# Patient Record
Sex: MALE | Race: WHITE | NOT HISPANIC OR LATINO | Employment: OTHER | URBAN - METROPOLITAN AREA
[De-identification: names, ages, dates, MRNs, and addresses within clinical notes are randomized per-mention and may not be internally consistent; named-entity substitution may affect disease eponyms.]

---

## 2017-01-19 ENCOUNTER — TELEPHONE (OUTPATIENT)
Dept: NEUROLOGY | Facility: CLINIC | Age: 52
End: 2017-01-19

## 2017-01-19 ENCOUNTER — TELEPHONE (OUTPATIENT)
Dept: INTERNAL MEDICINE | Facility: CLINIC | Age: 52
End: 2017-01-19

## 2017-01-19 ENCOUNTER — OFFICE VISIT (OUTPATIENT)
Dept: NEUROLOGY | Facility: CLINIC | Age: 52
End: 2017-01-19
Payer: COMMERCIAL

## 2017-01-19 VITALS
HEIGHT: 72 IN | DIASTOLIC BLOOD PRESSURE: 101 MMHG | SYSTOLIC BLOOD PRESSURE: 162 MMHG | RESPIRATION RATE: 20 BRPM | WEIGHT: 237 LBS | BODY MASS INDEX: 32.1 KG/M2 | HEART RATE: 60 BPM

## 2017-01-19 DIAGNOSIS — G44.011 INTRACTABLE EPISODIC CLUSTER HEADACHE: Primary | ICD-10-CM

## 2017-01-19 DIAGNOSIS — G43.909 MIGRAINE WITHOUT STATUS MIGRAINOSUS, NOT INTRACTABLE, UNSPECIFIED MIGRAINE TYPE: Primary | ICD-10-CM

## 2017-01-19 PROCEDURE — 1159F MED LIST DOCD IN RCRD: CPT | Mod: S$GLB,,, | Performed by: PSYCHIATRY & NEUROLOGY

## 2017-01-19 PROCEDURE — 99204 OFFICE O/P NEW MOD 45 MIN: CPT | Mod: S$GLB,,, | Performed by: PSYCHIATRY & NEUROLOGY

## 2017-01-19 PROCEDURE — 99999 PR PBB SHADOW E&M-EST. PATIENT-LVL III: CPT | Mod: PBBFAC,,, | Performed by: PSYCHIATRY & NEUROLOGY

## 2017-01-19 RX ORDER — DIVALPROEX SODIUM 500 MG/1
500 TABLET, DELAYED RELEASE ORAL 2 TIMES DAILY
COMMUNITY
End: 2018-04-10 | Stop reason: SDUPTHER

## 2017-01-19 RX ORDER — ZOLMITRIPTAN 5 MG/1
1 SPRAY, METERED NASAL DAILY PRN
COMMUNITY
Start: 2017-01-19 | End: 2017-04-04

## 2017-01-19 RX ORDER — AMLODIPINE BESYLATE 5 MG/1
1 TABLET ORAL DAILY
COMMUNITY
Start: 2017-01-19 | End: 2017-01-20 | Stop reason: SDUPTHER

## 2017-01-19 RX ORDER — PREDNISONE 20 MG/1
1 TABLET ORAL DAILY
COMMUNITY
Start: 2017-01-19 | End: 2017-01-20 | Stop reason: SDUPTHER

## 2017-01-19 NOTE — PROGRESS NOTES
Headache questionnaire    1. When did your Headaches start?    01- (this cycle) previous was 2013      2. Where are your headaches located?   Left side-eye-forehead      3. Your headache's characteristics:   Excruciating, Pressure, Sharp      4. How long does the headache last?   Hours-days      5. How often does the headache occur?   daily      6. Are your headaches preceded or accompanied by other symptoms? yes   If yes, please describe.  Sometimes nausea      7. Does the headache awaken you at night? yes   If so, how often? Every time        8. Please renny the word that best describes your headache's intensity:    severe      9. Using a scale of 1 through 10, with 0 = no pain and 10 = the worst pain:   What score is your headache now? 7   What score is your headache at its worst? 10   What score is your headache at its best? 3        10. Possible associated headache symptoms:  [x]  Sensitivity to light  [] Dizziness  [x] Nasal or sinus pressure/ pain   [x] Sensitivity to noise  [] Vertigo  [x] Problems with concentration  [] Sensitivity to smells  [] Ringing in ears  [x] Problems with memory    [x] Blurred vision  [x] Irritability  [x] Problems with task completion   [] Double vision  [] Anger  [x]  Problems with relaxation  [] Loss of appetite  [] Anxiety  [x] Neck tightness, Neck pain  [x] Nausea   [x] Nasal congestion  [x] Vomiting         11. Headache improving factors:  [] Sleep  [x] Heat  [] Darkness  [x] Ice  [x] Local pressure [] Menses (period)  [x] Massage   [] Medications:        12. Headache worsening factors:   [] Fatigue [] Sneezing  [x] Changes in Weather  [] Light [] Bending Over [x] Stress  [] Noise [] Ovulation  [] Multiple Sclerosis Flare-Up  [] Smells  [] Menses  [] Food   [] Coughing [] Alcohol      13. Number of caffeinated drinks per day: 3-4      14. Number of diet drinks per day:  0      15. Have you seen any other [facility] within the last 3 years?  no

## 2017-01-19 NOTE — PROGRESS NOTES
Subjective:       Patient ID: Jovan Mcwilliams is a 51 y.o. male.    Chief Complaint: Headache    HPI  The patient is a pleasant 52 y/o male with chief complaint of headaches. He has been getting them in the fall (Octobe-November) since 2012. Next cool season in 2013, they recurred. He underwent an MRI of the brain with not significant findings (my interpretation). This last fall he did not get the headaches, instead they came on 1/3/2017. The headache is strictly unilateral, originating in a discrete area in the left posterior region and projecting to the left orbital and periorbital region. He went to the ED where he was given a combination of steroids, benadryl and compazine as well as a supraorbital block with good response. He is followed by Dr. Velasquez who has recommended prevention with verapamil and Depakote and transitional treatment with steroids. Unfortunately, verapamil causes decreased libido. He was switched to Amlodipine. He has never tried oxygen. He treats acutely with Zomig NS, previously with SQ sumatriptan    Review of Systems   Constitutional: Negative for activity change, appetite change, chills, fatigue and fever.   HENT: Positive for congestion, ear pain, facial swelling, postnasal drip, sinus pressure, sneezing and tinnitus. Negative for dental problem, hearing loss, trouble swallowing and voice change.    Eyes: Positive for photophobia, pain and redness. Negative for visual disturbance.   Respiratory: Positive for cough and wheezing. Negative for chest tightness and shortness of breath.    Cardiovascular: Negative for chest pain, palpitations and leg swelling.   Gastrointestinal: Positive for nausea and vomiting. Negative for abdominal pain, blood in stool, constipation and diarrhea.   Endocrine: Negative for cold intolerance and heat intolerance.   Genitourinary: Negative for difficulty urinating, dysuria and urgency.   Musculoskeletal: Positive for neck stiffness. Negative for arthralgias,  back pain, gait problem, myalgias and neck pain.   Skin: Negative for color change, pallor and rash.   Neurological: Positive for headaches. Negative for dizziness, tremors, seizures, syncope, facial asymmetry, speech difficulty, weakness, light-headedness and numbness.   Hematological: Negative for adenopathy. Does not bruise/bleed easily.   Psychiatric/Behavioral: Positive for agitation, decreased concentration and sleep disturbance. Negative for behavioral problems, confusion, self-injury and suicidal ideas. The patient is not nervous/anxious.          Past Medical History   Diagnosis Date    Additional heart attack      10 years ago    Eye pain      left eye    GERD (gastroesophageal reflux disease)     Hyperlipemia      Past Surgical History   Procedure Laterality Date    Back surgery      Hernia repair       Family History   Problem Relation Age of Onset    Hypertension Mother     Hyperlipidemia Mother     Diabetes Father     Cancer Maternal Aunt     Cancer Paternal Aunt     Cancer Maternal Grandmother     Cancer Maternal Grandfather     Cancer Paternal Grandfather      Social History     Social History    Marital status: Single     Spouse name: N/A    Number of children: N/A    Years of education: N/A     Occupational History    Not on file.     Social History Main Topics    Smoking status: Current Every Day Smoker     Packs/day: 1.00     Years: 15.00    Smokeless tobacco: Not on file      Comment: is about to quit    Alcohol use 0.6 oz/week     1 Cans of beer per week      Comment: per week    Drug use: No    Sexual activity: Not on file     Other Topics Concern    Not on file     Social History Narrative     Review of patient's allergies indicates:   Allergen Reactions    Doxycycline      Other reaction(s): nausea    Erythromycin      Other reaction(s): Nausea       Current Outpatient Prescriptions:     ALSUMA 6 mg/0.5 mL kit, , Disp: , Rfl:     amlodipine (NORVASC) 5 MG tablet,  Take 1 tablet by mouth once daily., Disp: , Rfl:     aspirin (ECOTRIN) 81 MG EC tablet, Take 81 mg by mouth Daily. 1 Tablet, Delayed Release (E.C.) Oral Every day, Disp: , Rfl:     divalproex (DEPAKOTE) 500 MG TbEC, Take 500 mg by mouth 2 (two) times daily., Disp: , Rfl:     predniSONE (DELTASONE) 20 MG tablet, Take 1 tablet by mouth once daily., Disp: , Rfl:     ZOMIG 5 mg Spry, 1 spray by Nasal route daily as needed., Disp: , Rfl:     ascorbic acid (VITAMIN C) 1000 MG tablet, , Disp: , Rfl:     multivitamin (THERAGRAN) per tablet, Take by mouth. 1 Tablet Oral Every day, Disp: , Rfl:     [DISCONTINUED] buPROPion (WELLBUTRIN XL) 150 MG TB24 tablet, , Disp: , Rfl:     [DISCONTINUED] valacyclovir (VALTREX) 1000 MG tablet, , Disp: , Rfl:       Objective:      Vitals:    01/19/17 1543   BP: (!) 162/101   Pulse: 60   Resp: 20     Body mass index is 32.14 kg/(m^2).    Physical Exam    Constitutional:   He appears well-developed and well-nourished. He is well groomed  HENT:    Head: Atraumatic, oral and nasal mucosa intact  Eyes: Conjunctivae and EOM are normal. Pupils are equal, round, and reactive to light OU  Neck: Neck supple. No thyromegaly present  Cardiovascular: Normal rate and normal heart sounds  No murmur heard  Pulmonary/Chest: Effort normal and breath sounds normal  Musculoskeletal: Normal range of motion. No joint stiffness. No vertebral point tenderness  Skin: Skin is warm and dry  Psychiatric: Normal mood and affect     Neuro exam:    Mental status:  Awake, attentive, Alert, oriented to self, place, year and month  Language function is intact    Cranial Nerves:  Smell was not formally evaluated  Cranial Nerves II - XII: intact  Pursuits were smooth, normal saccades, no nystagmus OU  Funduscopic exam - disc were flat and pink, no exudates or hemorrhages OU  Motor - facial movement was symmetrical and normal     Palate moved well and was symmetrical with normal palatal and oral sensation  Tongue  movements were full    Coordination:     Rapid alternating movements and rapid finger tapping - normal bilaterally  Finger to nose - normal and symmetric bilaterally   No intentional or positional tremor.     Motor:  Normal muscle bulk and symmetry. No fasciculations were noted    No pronator drift  Strength 5/5 bilaterally     Reflexes:  Tendon reflexes were 2 + at biceps, triceps, brachioradialis, patellar, and Achilles bilaterally  No clonus was noted     Sensory: Intact to light touch, pin prick in all extremities. Vibration and proprioception intact in all extremities.     Gait: Romberg absent. Normal gait. Normal arm swing and turns.     Review of Data: I have reviewed images of an MRI from 2013 and see no abnormal findings        Assessment and Plan   Cluster-Migraine syndrome. While there is a component of migraine, the vast majority of the headache phenotype is undoubtedly Cluster headache as evidenced by the headache characteristics, the circannual and circadian pattern and the response to medication. I have reviewed an MRI from 2013 which does not show underlying pathology or lesions. I do not think that another MRI is needed at this time.  Recommendations:  TRANSITIONAL TREATMENT  Prednisone 50 mg for 5 days, 40 mg for 3 days, 30 mg for 3 days, 20 mg for 3 days, and 10 mg for 3 days.  PREVENTIVE TREATMENT  Depakote 500 mg bid  Amlodipine for blood pressure control  ACUTE TREATMENT  Trial of lidocaine nasal spray  Zomig nasal spray should be restricted given his past cardiac history    Pa Griffith M.D  Medical Director, Headache and Facial Pain  [facility]

## 2017-01-19 NOTE — PATIENT INSTRUCTIONS
Cluster-Migraine syndrome. While there is a component of migraine, the vast majority of the headache phenotype is undoubtedly Cluster headache as evidenced by the headache characteristics, the circannual and circadian pattern and the response to medication. I have reviewed an MRI from 2013 which does not show underlying pathology or lesions. I do not think that another MRI is needed at this time.  Recommendations:  TRANSITIONAL TREATMENT  Prednisone 50 mg for 5 days, 40 mg for 3 days, 30 mg for 3 days, 20 mg for 3 days, and 10 mg for 3 days.  PREVENTIVE TREATMENT  Depakote 500 mg bid  Amlodipine for prevention  ACUTE TREATMENT  Trial of lidocaine nasal spray  Zomig nasal spray should be restricted given his past cardiac history

## 2017-01-19 NOTE — MR AVS SNAPSHOT
[facility]  [address] [facility] [address]  [place] [address]  Phone: [phone]  Fax: [phone]                  Jovan Mcwilliams   2017 3:00 PM   Office Visit    Description:  Male : 1965   Provider:  Helene Griffith MD   Department:  [facility]           Reason for Visit     Headache                To Do List           Goals (5 Years of Data)     None      [facility]     [facility] On [facility] Nurse Care Line -  Assistance  Registered nurses in the [facility] provide clinical advisement, health education, appointment booking, and other advisory services.  Call for this free service at [phone].             Medications           Message regarding Medications     Verify the changes and/or additions to your medication regime listed below are the same as discussed with your clinician today.  If any of these changes or additions are incorrect, please notify your healthcare provider.        STOP taking these medications     pravastatin (PRAVACHOL) 40 MG tablet Take by mouth. 1 Tablet Oral Every day    ranitidine (ZANTAC) 150 MG tablet Take by mouth. 1 Tablet Oral Every day    topiramate (TOPAMAX) 25 MG tablet            Verify that the below list of medications is an accurate representation of the medications you are currently taking.  If none reported, the list may be blank. If incorrect, please contact your healthcare provider. Carry this list with you in case of emergency.           Current Medications     ALSUMA 6 mg/0.5 mL kit     amlodipine (NORVASC) 5 MG tablet Take 1 tablet by mouth once daily.    ascorbic acid (VITAMIN C) 1000 MG tablet     aspirin (ECOTRIN) 81 MG EC tablet Take 81 mg by mouth Daily. 1 Tablet, Delayed Release (E.C.) Oral Every day    divalproex (DEPAKOTE) 500 MG TbEC Take 500 mg by mouth 2 (two) times daily.    multivitamin (THERAGRAN) per tablet Take by mouth. 1 Tablet Oral Every day    predniSONE (DELTASONE) 20 MG tablet Take 1  tablet by mouth once daily.    ZOMIG 5 mg Spry 1 spray by Nasal route daily as needed.           Clinical Reference Information           Vital Signs - Last Recorded  Most recent update: 1/19/2017  3:45 PM by Kary Burden LPN    BP Pulse Resp Ht Wt BMI    (!) 162/101 60 20 6' (1.829 m) 107.5 kg (237 lb) 32.14 kg/m2      Blood Pressure          Most Recent Value    BP  (!)  162/101      Allergies as of 1/19/2017     Doxycycline    Erythromycin      Immunizations Administered on Date of Encounter - 1/19/2017     None      Instructions    Cluster-Migraine syndrome. While there is a component of migraine, the vast majority of the headache phenotype is undoubtedly Cluster headache as evidenced by the headache characteristics, the circannual and circadian pattern and the response to medication. I have reviewed an MRI from 2013 which does not show underlying pathology or lesions. I do not think that another MRI is needed at this time.  Recommendations:  TRANSITIONAL TREATMENT  Prednisone 50 mg for 5 days, 40 mg for 3 days, 30 mg for 3 days, 20 mg for 3 days, and 10 mg for 3 days.  PREVENTIVE TREATMENT  Depakote 500 mg bid  Amlodipine for prevention  ACUTE TREATMENT  Trial of lidocaine nasal spray  Zomig nasal spray should be restricted given his past cardiac history         Smoking Cessation     If you would like to quit smoking:   You may be eligible for free services if you are a [place] resident and started smoking cigarettes before September 1, 1988.  Call the Smoking Cessation [employer] ([employer]) toll free at (376) 707-1542 or (506) 262-0082.   Call 1-800-QUIT-NOW if you do not meet the above criteria.

## 2017-01-19 NOTE — LETTER
January 20, 2017      Amauri Dickerson Jr., MD  [address]  [facility] [address]           [facility]  [address] [facility] [address] [place] [address]  Phone: [phone]  Fax: [phone]          Patient: Jovan Mcwilliams   MR Number: [id number]   YOB: 1965   Date of Visit: 1/19/2017       Dear Dr. Amauri Dickerson Jr.:    Thank you for referring Jovan Mcwilliams to me for evaluation. Attached you will find relevant portions of my assessment and plan of care.    If you have questions, please do not hesitate to call me. I look forward to following Jovan Mcwilliams along with you.    Sincerely,    Helene Griffith MD    Enclosure  CC:  No Recipients    If you would like to receive this communication electronically, please contact [email or web address] or [phone] to request more information on "[employer]" Link access.    For providers and/or their staff who would like to refer a patient to [facility], please contact us through our one-stop-shop provider referral line, [facility], at [phone].    If you feel you have received this communication in error or would no longer like to receive these types of communications, please e-mail [email or web address]

## 2017-01-19 NOTE — TELEPHONE ENCOUNTER
----- Message from Ilene Peter sent at 1/19/2017 10:06 AM CST -----  Contact: self   Patient would like to know if Dr Dickerson can submit a referral in for neurology . He is having migraines and they are saying he needs a referral.    He can be reached at [phone]

## 2017-01-19 NOTE — TELEPHONE ENCOUNTER
----- Message from Ilene Peter sent at 1/19/2017 10:05 AM CST -----  Contact: self   Patient would like to be contacted and speak with the nurse.  He is experiencing migraines and would like to be soon as possible , he feels like he can not wait until April. He would like a second opinion     He would like a work in today           He can be reaced at [phone]

## 2017-01-20 RX ORDER — AMLODIPINE BESYLATE 5 MG/1
5 TABLET ORAL DAILY
Qty: 30 TABLET | Refills: 11 | Status: SHIPPED | OUTPATIENT
Start: 2017-01-20 | End: 2017-04-04 | Stop reason: SDUPTHER

## 2017-01-20 RX ORDER — PREDNISONE 20 MG/1
TABLET ORAL
Qty: 55 TABLET | Refills: 0 | Status: SHIPPED | OUTPATIENT
Start: 2017-01-20 | End: 2017-02-20

## 2017-01-20 NOTE — TELEPHONE ENCOUNTER
----- Message from Armani Araya sent at 1/20/2017  2:32 PM CST -----  Contact: self   Patient wants to speak with a nurse regarding RX that was not called in please call back at [phone]

## 2017-02-09 ENCOUNTER — OFFICE VISIT (OUTPATIENT)
Dept: NEUROLOGY | Facility: CLINIC | Age: 52
End: 2017-02-09
Payer: COMMERCIAL

## 2017-02-09 ENCOUNTER — TELEPHONE (OUTPATIENT)
Dept: INTERNAL MEDICINE | Facility: CLINIC | Age: 52
End: 2017-02-09

## 2017-02-09 VITALS
BODY MASS INDEX: 31.95 KG/M2 | WEIGHT: 235.88 LBS | DIASTOLIC BLOOD PRESSURE: 90 MMHG | RESPIRATION RATE: 18 BRPM | HEIGHT: 72 IN | SYSTOLIC BLOOD PRESSURE: 141 MMHG | TEMPERATURE: 97 F | HEART RATE: 74 BPM

## 2017-02-09 DIAGNOSIS — G44.011 INTRACTABLE EPISODIC CLUSTER HEADACHE: Primary | ICD-10-CM

## 2017-02-09 PROCEDURE — 99999 PR PBB SHADOW E&M-EST. PATIENT-LVL III: CPT | Mod: PBBFAC,,, | Performed by: PSYCHIATRY & NEUROLOGY

## 2017-02-09 PROCEDURE — 99214 OFFICE O/P EST MOD 30 MIN: CPT | Mod: S$GLB,,, | Performed by: PSYCHIATRY & NEUROLOGY

## 2017-02-09 RX ORDER — HYDROCODONE BITARTRATE AND ACETAMINOPHEN 7.5; 325 MG/1; MG/1
1 TABLET ORAL DAILY
Refills: 0 | COMMUNITY
Start: 2017-01-20 | End: 2018-09-13

## 2017-02-09 NOTE — PROGRESS NOTES
Subjective:       Patient ID: Jovan Mcwilliams is a 51 y.o. male.    Chief Complaint: Headache  INTERVAL HISTORY  Transitional treatment with prednisone and prevention with Depakote resolved his headaches. He wishes to stay on low dose prevention to avoid another Cluster exacerbation.  HPI  The patient is a pleasant 50 y/o male with chief complaint of headaches. He has been getting them in the fall (Octobe-November) since 2012. Next cool season in 2013, they recurred. He underwent an MRI of the brain with not significant findings (my interpretation). This last fall he did not get the headaches, instead they came on 1/3/2017. The headache is strictly unilateral, originating in a discrete area in the left posterior region and projecting to the left orbital and periorbital region. He went to the ED where he was given a combination of steroids, benadryl and compazine as well as a supraorbital block with good response. He is followed by Dr. Velasquez who has recommended prevention with verapamil and Depakote and transitional treatment with steroids. Unfortunately, verapamil causes decreased libido. He was switched to Amlodipine. He has never tried oxygen. He treats acutely with Zomig NS, previously with SQ sumatriptan    Review of Systems   Constitutional: Negative for activity change, appetite change, chills, fatigue and fever.   HENT: Positive for congestion, ear pain, facial swelling, postnasal drip, sinus pressure, sneezing and tinnitus. Negative for dental problem, hearing loss, trouble swallowing and voice change.    Eyes: Positive for photophobia, pain and redness. Negative for visual disturbance.   Respiratory: Positive for cough and wheezing. Negative for chest tightness and shortness of breath.    Cardiovascular: Negative for chest pain, palpitations and leg swelling.   Gastrointestinal: Positive for nausea and vomiting. Negative for abdominal pain, blood in stool, constipation and diarrhea.   Endocrine: Negative for  cold intolerance and heat intolerance.   Genitourinary: Negative for difficulty urinating, dysuria and urgency.   Musculoskeletal: Positive for neck stiffness. Negative for arthralgias, back pain, gait problem, myalgias and neck pain.   Skin: Negative for color change, pallor and rash.   Neurological: Positive for headaches. Negative for dizziness, tremors, seizures, syncope, facial asymmetry, speech difficulty, weakness, light-headedness and numbness.   Hematological: Negative for adenopathy. Does not bruise/bleed easily.   Psychiatric/Behavioral: Positive for agitation, decreased concentration and sleep disturbance. Negative for behavioral problems, confusion, self-injury and suicidal ideas. The patient is not nervous/anxious.          Past Medical History   Diagnosis Date    Additional heart attack      10 years ago    Eye pain      left eye    GERD (gastroesophageal reflux disease)     Hyperlipemia      Past Surgical History   Procedure Laterality Date    Back surgery      Hernia repair       Family History   Problem Relation Age of Onset    Hypertension Mother     Hyperlipidemia Mother     Diabetes Father     Cancer Maternal Aunt     Cancer Paternal Aunt     Cancer Maternal Grandmother     Cancer Maternal Grandfather     Cancer Paternal Grandfather      Social History     Social History    Marital status: Single     Spouse name: N/A    Number of children: N/A    Years of education: N/A     Occupational History    Not on file.     Social History Main Topics    Smoking status: Current Every Day Smoker     Packs/day: 1.00     Years: 15.00    Smokeless tobacco: Not on file      Comment: is about to quit    Alcohol use 0.6 oz/week     1 Cans of beer per week      Comment: per week    Drug use: No    Sexual activity: Not on file     Other Topics Concern    Not on file     Social History Narrative     Review of patient's allergies indicates:   Allergen Reactions    Doxycycline      Other  reaction(s): nausea    Erythromycin      Other reaction(s): Nausea       Current Outpatient Prescriptions:     ALSUMA 6 mg/0.5 mL kit, , Disp: , Rfl:     amlodipine (NORVASC) 5 MG tablet, Take 1 tablet by mouth once daily., Disp: , Rfl:     aspirin (ECOTRIN) 81 MG EC tablet, Take 81 mg by mouth Daily. 1 Tablet, Delayed Release (E.C.) Oral Every day, Disp: , Rfl:     divalproex (DEPAKOTE) 500 MG TbEC, Take 500 mg by mouth 2 (two) times daily., Disp: , Rfl:     predniSONE (DELTASONE) 20 MG tablet, Take 1 tablet by mouth once daily., Disp: , Rfl:     ZOMIG 5 mg Spry, 1 spray by Nasal route daily as needed., Disp: , Rfl:     ascorbic acid (VITAMIN C) 1000 MG tablet, , Disp: , Rfl:     multivitamin (THERAGRAN) per tablet, Take by mouth. 1 Tablet Oral Every day, Disp: , Rfl:     [DISCONTINUED] buPROPion (WELLBUTRIN XL) 150 MG TB24 tablet, , Disp: , Rfl:     [DISCONTINUED] valacyclovir (VALTREX) 1000 MG tablet, , Disp: , Rfl:       Objective:      Vitals:    02/09/17 1458   BP: (!) 141/90   Pulse: 74   Resp: 18   Temp: 96.8 °F (36 °C)     Body mass index is 31.99 kg/(m^2).      Previous Physical Exam    Constitutional:   He appears well-developed and well-nourished. He is well groomed  HENT:    Head: Atraumatic, oral and nasal mucosa intact  Eyes: Conjunctivae and EOM are normal. Pupils are equal, round, and reactive to light OU  Neck: Neck supple. No thyromegaly present  Cardiovascular: Normal rate and normal heart sounds  No murmur heard  Pulmonary/Chest: Effort normal and breath sounds normal  Musculoskeletal: Normal range of motion. No joint stiffness. No vertebral point tenderness  Skin: Skin is warm and dry  Psychiatric: Normal mood and affect     Neuro exam:    Mental status:  Awake, attentive, Alert, oriented to self, place, year and month  Language function is intact    Cranial Nerves:  Smell was not formally evaluated  Cranial Nerves II - XII: intact  Pursuits were smooth, normal saccades, no nystagmus  OU  Funduscopic exam - disc were flat and pink, no exudates or hemorrhages OU  Motor - facial movement was symmetrical and normal     Palate moved well and was symmetrical with normal palatal and oral sensation  Tongue movements were full    Coordination:     Rapid alternating movements and rapid finger tapping - normal bilaterally  Finger to nose - normal and symmetric bilaterally   No intentional or positional tremor.     Motor:  Normal muscle bulk and symmetry. No fasciculations were noted    No pronator drift  Strength 5/5 bilaterally     Reflexes:  Tendon reflexes were 2 + at biceps, triceps, brachioradialis, patellar, and Achilles bilaterally  No clonus was noted     Sensory: Intact to light touch, pin prick in all extremities. Vibration and proprioception intact in all extremities.     Gait: Romberg absent. Normal gait. Normal arm swing and turns.     Review of Data: I have reviewed images of an MRI from 2013 and see no abnormal findings        Assessment and Plan   Cluster-Migraine syndrome. While there is a component of migraine, the vast majority of the headache phenotype is undoubtedly Cluster headache as evidenced by the headache characteristics, the circannual and circadian pattern and the response to medication. I have reviewed an MRI from 2013 which does not show underlying pathology or lesions. I do not think that another MRI is needed at this time.    Treatment given commpletely resolved pain, finished Prednisone, tolerating Depakote 500 mg bid  For acute attacks  Trial of lidocaine nasal spray if pain recurs    Hypertension noted. Advised to monitor blood pressure and discuss with PCP  Counseling:  More than 50% of the 25 minute encounter was spent face to face counseling the patient regarding current status and future plan of care as well as side of the medications. All questions were answered to patient's satisfaction    Pa Griffith M.D  Medical Director, Headache and Facial Pain  [facility]

## 2017-02-20 ENCOUNTER — NURSE TRIAGE (OUTPATIENT)
Dept: ADMINISTRATIVE | Facility: CLINIC | Age: 52
End: 2017-02-20

## 2017-02-20 ENCOUNTER — HOSPITAL ENCOUNTER (OUTPATIENT)
Dept: RADIOLOGY | Facility: HOSPITAL | Age: 52
Discharge: HOME | End: 2017-02-20
Attending: INTERNAL MEDICINE
Payer: COMMERCIAL

## 2017-02-20 ENCOUNTER — OFFICE VISIT (OUTPATIENT)
Dept: INTERNAL MEDICINE | Facility: CLINIC | Age: 52
End: 2017-02-20
Payer: COMMERCIAL

## 2017-02-20 VITALS
HEIGHT: 72 IN | WEIGHT: 240.31 LBS | SYSTOLIC BLOOD PRESSURE: 124 MMHG | DIASTOLIC BLOOD PRESSURE: 86 MMHG | HEART RATE: 76 BPM | BODY MASS INDEX: 32.55 KG/M2 | TEMPERATURE: 99 F | OXYGEN SATURATION: 97 %

## 2017-02-20 DIAGNOSIS — F17.210 CIGARETTE SMOKER: ICD-10-CM

## 2017-02-20 DIAGNOSIS — R10.10 UPPER ABDOMINAL PAIN: ICD-10-CM

## 2017-02-20 DIAGNOSIS — Z00.00 ANNUAL PHYSICAL EXAM: ICD-10-CM

## 2017-02-20 DIAGNOSIS — R10.11 RUQ PAIN: Primary | ICD-10-CM

## 2017-02-20 DIAGNOSIS — R10.11 RUQ PAIN: ICD-10-CM

## 2017-02-20 PROCEDURE — 71020 XR CHEST PA AND LATERAL: CPT | Mod: 26,,, | Performed by: RADIOLOGY

## 2017-02-20 PROCEDURE — 99203 OFFICE O/P NEW LOW 30 MIN: CPT | Mod: S$GLB,,, | Performed by: INTERNAL MEDICINE

## 2017-02-20 PROCEDURE — 71020 XR CHEST PA AND LATERAL: CPT | Mod: TC

## 2017-02-20 PROCEDURE — 99999 PR PBB SHADOW E&M-EST. PATIENT-LVL III: CPT | Mod: PBBFAC,,, | Performed by: INTERNAL MEDICINE

## 2017-02-20 NOTE — TELEPHONE ENCOUNTER
Reason for Disposition   [1] MODERATE pain (e.g., interferes with normal activities) AND [2] pain comes and goes (cramps) AND [3] present > 24 hours  (Exception: pain with Vomiting or Diarrhea - see that Guideline)    Protocols used: ST ABDOMINAL PAIN - MALE-A-    Pt calling with complaints of abdominal pain for 2 to 3 weeks.  No other symptoms like vomiting, diarrhea, or fever per pt.  Pt does state the pain is all over the abdomen and can radiate to his back.  Appointment scheduled today.

## 2017-02-20 NOTE — MR AVS SNAPSHOT
[facility]  [address]  [facility] [address]  Phone: [phone]  Fax: [phone]                  Jovan Mcwilliams   2017 11:30 AM   Office Visit    Description:  Male : 1965   Provider:  Teodora Shaikh MD   Department:  [facility]           Reason for Visit     Abdominal Pain           Diagnoses this Visit        Comments    RUQ pain    -  Primary     Upper abdominal pain         Annual physical exam                To Do List           Future Appointments        Provider Department Dept Phone    2017 1:30 PM [id number] [facility] [phone]    2017 8:40 AM Amauri Dickerson Jr., MD [facility] [phone]      Goals (5 Years of Data)     None      [facility]     [facility] Care [facility] -  Assistance  Registered nurses in the [facility] provide clinical advisement, health education, appointment booking, and other advisory services.  Call for this free service at [phone].             Medications           Message regarding Medications     Verify the changes and/or additions to your medication regime listed below are the same as discussed with your clinician today.  If any of these changes or additions are incorrect, please notify your healthcare provider.        STOP taking these medications     predniSONE (DELTASONE) 20 MG tablet Take 50 mg for 5 days, 40 mg for 3 days, 30 mg for 3 days, 20 mg for 3 days, and 10 mg for 3 days           Verify that the below list of medications is an accurate representation of the medications you are currently taking.  If none reported, the list may be blank. If incorrect, please contact your healthcare provider. Carry this list with you in case of emergency.           Current Medications     ALSUMA 6 mg/0.5 mL kit     amlodipine (NORVASC) 5 MG tablet Take 1 tablet (5 mg total) by mouth once daily.    ascorbic acid (VITAMIN  C) 1000 MG tablet     aspirin (ECOTRIN) 81 MG EC tablet Take 81 mg by mouth Daily. 1 Tablet, Delayed Release (E.C.) Oral Every day    divalproex (DEPAKOTE) 500 MG TbEC Take 500 mg by mouth 2 (two) times daily.    hydrocodone-acetaminophen 7.5-325mg (NORCO) 7.5-325 mg per tablet Take 1 tablet by mouth once daily.    multivitamin (THERAGRAN) per tablet Take by mouth. 1 Tablet Oral Every day    ZOMIG 5 mg Spry 1 spray by Nasal route daily as needed.           Clinical Reference Information           Your Vitals Were     BP Pulse Temp Height Weight SpO2    124/86 (BP Location: Right arm, Patient Position: Sitting, BP Method: Manual) 76 99.1 °F (37.3 °C) (Oral) 6' (1.829 m) 109 kg (240 lb 4.8 oz) 97%    BMI                32.59 kg/m2          Blood Pressure          Most Recent Value    BP  124/86      Allergies as of 2/20/2017     Doxycycline    Erythromycin      Immunizations Administered on Date of Encounter - 2/20/2017     None      Orders Placed During Today's Visit     Future Labs/Procedures Expected by Expires    CBC auto differential  2/20/2017 2/20/2018    Comprehensive metabolic panel  2/20/2017 2/20/2018    Lipid panel  2/20/2017 2/20/2018    US Abdomen Complete  2/20/2017 5/21/2017    X-Ray Chest PA And Lateral  2/20/2017 2/20/2018      Smoking Cessation     If you would like to quit smoking:   You may be eligible for free services if you are a [place] resident and started smoking cigarettes before September 1, 1988.  Call the Smoking Cessation Trust ([facility]) toll free at (806) 393-1278 or (479) 035-3302.   Call 1-800-QUIT-NOW if you do not meet the above criteria.            Language Assistance Services     ATTENTION: Language assistance services are available, free of charge. Please call [phone].      ATENCIÓN: Si joycelynla devinlos, tiene a durant disposición servicios gratuitos de asistencia lingüística. Llame al [phone].     CHÚ Ý: N?u b?n nói Ti?ng Vi?t, có các d?ch v? h? tr? ngôn ng? mi?n phí  dành cho b?n. G?i s? [phone].         [facility] complies with applicable Federal civil rights laws and does not discriminate on the basis of race, color, national origin, age, disability, or sex.

## 2017-02-20 NOTE — MEDICAL/APP STUDENT
Subjective:       Patient ID: Jovan Mcwilliams is a 51 y.o. male.    Chief Complaint: Abdominal Pain (x 3 weeks upper and lower 5/10 otc tylenol norco some relief)    HPI   Mr. Mcwilliams presents today with a 3 week abdominal pain. 3 weeks ago he began developing headaches that led to a cold. During the cold, pt developed an intense cough after which he felt he had strained a muscle in his right lower chest or upper abdomen. Pt was also concerned he may have broken a rib from coughing too hard. Pain has started to radiate from RUQ to LUQ and both flanks. Constant dull pain that is usually a 5/10 but can get up to a 10/10. Exacerbated by rolling over in bed and taking a deep breath. Occasionally pain is so intense will wake pt up at night. This is his first experience with pain of this nature. Finds relief when laying in certain positions. Ibuprofen, aleve, and tylenol have provided mild relief. Also c/o bloating. Normal BMs.     Taking Norco and Depakote for headaches. Norco is PRN, not daily. Plans to taper Depakote after Roberto Gras.    Review of Systems   Constitutional: Negative for fatigue, fever and unexpected weight change.   HENT: Negative for congestion, rhinorrhea, sore throat and trouble swallowing.    Respiratory: Negative for cough, chest tightness and shortness of breath.    Cardiovascular: Negative for chest pain, palpitations and leg swelling.   Gastrointestinal: Positive for abdominal pain. Negative for blood in stool, constipation, diarrhea, nausea and vomiting.        Diffuse abdominal pain but worse in RUQ and LUQ.   Genitourinary: Negative for dysuria, frequency and urgency.   Neurological: Positive for headaches. Negative for dizziness, seizures, syncope and light-headedness.   Psychiatric/Behavioral: Negative for dysphoric mood. The patient is not nervous/anxious.        Objective:      Physical Exam   Constitutional: He is oriented to person, place, and time. He appears well-developed and  well-nourished.   HENT:   Head: Normocephalic and atraumatic.   Cardiovascular: Normal rate, regular rhythm, normal heart sounds and intact distal pulses.    Pulmonary/Chest: Effort normal and breath sounds normal.   Abdominal: Soft. He exhibits distension. He exhibits no mass. There is tenderness. There is no rebound and no guarding.   Tenderness in RUQ.   Neurological: He is alert and oriented to person, place, and time.       Assessment:       1. RUQ pain    2. Upper abdominal pain    3. Annual physical exam        Plan:         1. RUQ Pain - 3 weeks, constant  - CXR to evaluate for rib fracture  - Abdo U/S to evaluate for cholecystitis  - Continue OTC ibuprofen for pain control    Health Maintenance - CBC, CMP, and Lipid Panel    Amberly Corrales MS-4  [employer]

## 2017-02-20 NOTE — PROGRESS NOTES
Subjective:       Patient ID: Jovan Mcwilliams is a 51 y.o. male.    Chief Complaint: Abdominal Pain (x 3 weeks upper and lower 5/10 otc tylenol norco some relief)    HPI   H/ cad, gerd.  3 weeks ago developed headaches and was dx with cluster headaches.  Then developed a cold.  Had an intense cough followed by R sided abd pain, near rib cage,  about 3 weeks ago.   He though it was muscular, but pain radiated and increased in intensity.  Now pain in RUQ, sometimes LUQ and occas radiates to both flanks.  Constant dull pain.  Moving torso makes  Stabbing pain in RUQ at lower rib region.  May awaken him from sleep.  5-10/10.  Pain exacerbated by deep breath or sigh.  Also feels bloated.  BM's nl.  Taking hydrocodone on occasion.  He has tried aleve, ibuprofen, with some relief.    He has a h/o cluster headaches, exacerbated in January, for which Depakote started.     Review of Systems   Constitutional: Negative for activity change and unexpected weight change.   Respiratory: Negative for chest tightness and shortness of breath.    Cardiovascular: Negative for chest pain and leg swelling.   Gastrointestinal: Negative for abdominal pain.   Neurological: Negative for headaches.   Psychiatric/Behavioral: Negative for dysphoric mood.       Objective:      Physical Exam   Constitutional: He is oriented to person, place, and time. He appears well-developed and well-nourished.   Eyes: No scleral icterus.   Neck: No JVD present. No thyromegaly present.   Cardiovascular: Normal rate, regular rhythm and normal heart sounds.    Pulmonary/Chest: Effort normal and breath sounds normal. No respiratory distress. He has no wheezes. He has no rales.   Abdominal: Soft. He exhibits no mass. There is no tenderness.   Musculoskeletal: He exhibits no edema.   Neurological: He is alert and oriented to person, place, and time.   Psychiatric: He has a normal mood and affect. His behavior is normal.       Assessment:       1. RUQ pain - likely due to  muscle pull   2. Upper abdominal pain -  r/o depakote effect   3. Annual physical exam upcoming.  Will check labs now    4.     Tobacco smoker  Plan:       Jovan was seen today for abdominal pain.    Diagnoses and all orders for this visit:    RUQ pain  -     X-Ray Chest PA And Lateral; Future    Upper abdominal pain  -     US Abdomen Complete; Future    Annual physical exam  -     Comprehensive metabolic panel; Future  -     CBC auto differential; Future  -     Lipid panel; Future    Cigarette smoker       Stop smoking

## 2017-02-22 ENCOUNTER — TELEPHONE (OUTPATIENT)
Dept: INTERNAL MEDICINE | Facility: CLINIC | Age: 52
End: 2017-02-22

## 2017-02-22 RX ORDER — IBUPROFEN 800 MG/1
TABLET ORAL
Qty: 90 TABLET | Refills: 0 | Status: SHIPPED | OUTPATIENT
Start: 2017-02-22 | End: 2018-09-17

## 2017-02-22 NOTE — TELEPHONE ENCOUNTER
Is Dr. Shaikh willing to prescribe medication for pt. Pt was seen in her clinic on 2/20/17 for RUQ pain. Please advise.

## 2017-02-22 NOTE — TELEPHONE ENCOUNTER
----- Message from Will Vargas sent at 2/21/2017  4:15 PM CST -----  Contact: self/ [phone] cell  Type: Rx    Name of medication(s):  Ibuprofen 800 MG    Is this a refill? New rx? New    Who prescribed medication?     Pharmacy Name, Phone, & Location: [facility]    Comments: Pt was seen in clinic on yesterday and discussed getting a milder medication sent in to the pharmacy.  Pt states that she told him she would send in Ibuprofen 600 or 800 mg.  Pt would like to request that it be sent in today, if possible.  Please call and advise.    Thank you

## 2017-03-03 ENCOUNTER — HOSPITAL ENCOUNTER (OUTPATIENT)
Dept: RADIOLOGY | Facility: HOSPITAL | Age: 52
Discharge: HOME | End: 2017-03-03
Attending: INTERNAL MEDICINE
Payer: COMMERCIAL

## 2017-03-03 DIAGNOSIS — R10.10 UPPER ABDOMINAL PAIN: ICD-10-CM

## 2017-03-03 PROCEDURE — 76700 US EXAM ABDOM COMPLETE: CPT | Mod: TC

## 2017-03-03 PROCEDURE — 76700 US EXAM ABDOM COMPLETE: CPT | Mod: 26,,, | Performed by: RADIOLOGY

## 2017-04-04 ENCOUNTER — OFFICE VISIT (OUTPATIENT)
Dept: INTERNAL MEDICINE | Facility: CLINIC | Age: 52
End: 2017-04-04
Payer: COMMERCIAL

## 2017-04-04 VITALS
HEART RATE: 82 BPM | HEIGHT: 72 IN | DIASTOLIC BLOOD PRESSURE: 88 MMHG | SYSTOLIC BLOOD PRESSURE: 126 MMHG | BODY MASS INDEX: 33.23 KG/M2 | WEIGHT: 245.38 LBS

## 2017-04-04 DIAGNOSIS — K21.9 GASTROESOPHAGEAL REFLUX DISEASE, ESOPHAGITIS PRESENCE NOT SPECIFIED: Primary | ICD-10-CM

## 2017-04-04 DIAGNOSIS — I15.9 SECONDARY HYPERTENSION: ICD-10-CM

## 2017-04-04 DIAGNOSIS — E78.5 HYPERLIPIDEMIA, UNSPECIFIED HYPERLIPIDEMIA TYPE: ICD-10-CM

## 2017-04-04 DIAGNOSIS — Z72.0 TOBACCO ABUSE: ICD-10-CM

## 2017-04-04 PROBLEM — I10 HTN (HYPERTENSION): Status: ACTIVE | Noted: 2017-04-04

## 2017-04-04 PROCEDURE — 3079F DIAST BP 80-89 MM HG: CPT | Mod: S$GLB,,, | Performed by: INTERNAL MEDICINE

## 2017-04-04 PROCEDURE — 99999 PR PBB SHADOW E&M-EST. PATIENT-LVL III: CPT | Mod: PBBFAC,,, | Performed by: INTERNAL MEDICINE

## 2017-04-04 PROCEDURE — 99214 OFFICE O/P EST MOD 30 MIN: CPT | Mod: S$GLB,,, | Performed by: INTERNAL MEDICINE

## 2017-04-04 PROCEDURE — 3074F SYST BP LT 130 MM HG: CPT | Mod: S$GLB,,, | Performed by: INTERNAL MEDICINE

## 2017-04-04 PROCEDURE — 1160F RVW MEDS BY RX/DR IN RCRD: CPT | Mod: S$GLB,,, | Performed by: INTERNAL MEDICINE

## 2017-04-04 RX ORDER — AMLODIPINE BESYLATE 5 MG/1
5 TABLET ORAL DAILY
Qty: 30 TABLET | Refills: 11 | Status: SHIPPED | OUTPATIENT
Start: 2017-04-04 | End: 2018-09-13

## 2017-04-04 RX ORDER — ATORVASTATIN CALCIUM 40 MG/1
40 TABLET, FILM COATED ORAL DAILY
Qty: 90 TABLET | Refills: 3 | Status: SHIPPED | OUTPATIENT
Start: 2017-04-04 | End: 2018-09-13

## 2017-04-04 RX ORDER — FLUCONAZOLE 100 MG/1
100 TABLET ORAL DAILY
Qty: 7 TABLET | Refills: 2 | Status: SHIPPED | OUTPATIENT
Start: 2017-04-04 | End: 2017-04-11

## 2017-04-04 RX ORDER — OMEPRAZOLE 40 MG/1
40 CAPSULE, DELAYED RELEASE ORAL DAILY
Qty: 90 CAPSULE | Refills: 3 | Status: SHIPPED | OUTPATIENT
Start: 2017-04-04 | End: 2018-09-13 | Stop reason: SDUPTHER

## 2017-04-04 NOTE — PROGRESS NOTES
He is a 51 -year-old gentleman coming in today to follow up his ongoing   medical problems including hyperlipidemia and some glucose intolerance.         He has been having headaches. He Has had cluster headaches. He saw a neurologist, Dr Helene Griffith<  on the [place] and was dx with migraines.   He was put on Depakote usually just in November. It has helped.  He headaches are much better.     History of Brugada syndrome with abnormal EKG. He saw arrhythmia back in   2008, Dr. Merritt. He is currently doing well. He denies any chest   pain or short-windedness.       Tobacco abuse: He has tried medication for this. He wants to  quite. He was also put on the bupropion for his smoking, but it caused some Ed so he stopped.  He does not want to take anything for this.     Reflux: He was taking over-the-counter omeprazole. He's had reflux for about 4 years,. He states it worsens with some of the foods he eats.           ROS : Gen - no fatigue or significant weight change  Eyes - no eye pain or visual changes  ENT - no hoarseness or sore throat  CV - No chest pain or SOB. NO palpitations.   Pulm - no cough or wheezing  GI - no N/V/D   no dysuria or incontinence  MS - no joint pain or muscle pain  Skin - no rash, or c/o of skin lesions  Neuro - no HA, dizziness--- memory is doing well.   Heme - no abnormal bleeding or bruising  Endo - no polydipsia, or temperature changes  Psych - no anxiety or depression         PHYSICAL EXAM: Today, his weight is 245 lb, blood pressure 126/88 , pulse of   82 and regular.   NECK: Supple. No JVD. Thyroid is not enlarged.   LUNGS/CHEST   Clear to auscultation and percussion with normal air movement. Breathing   was unlabored.   CARDIOVASCULAR: S1, S2, regular rate and rhythm without murmur, gallop or   rub.   ABDOMEN: Soft, nontender. No hepatosplenomegaly. No guarding or rebound   tenderness.   LOWER EXTREMITIES: No edema.     ASSESSMENT:   1. Hyperlipidemia. -  Discussed diet and  exercise. WIll start lipitor 40 mg a day   2. Tobacco abuse. Spent 5 minutes counseling. WIll set up smoking cessation classes.    3. Headaches- doing better.   4. GERD- will start prescription Omeprazole.    5. History of Brugada syndrome with abnormal EKG. He saw arrhythmia back in   2008, Dr. Merritt. He is currently doing well.           F/u in 6 months      Complains of cloudy urine -- will check a UA. No penis discharge.

## 2017-04-11 ENCOUNTER — CLINICAL SUPPORT (OUTPATIENT)
Dept: SMOKING CESSATION | Facility: CLINIC | Age: 52
End: 2017-04-11
Payer: COMMERCIAL

## 2017-04-11 DIAGNOSIS — F17.210 MODERATE SMOKER (20 OR LESS PER DAY): Primary | ICD-10-CM

## 2017-04-11 PROCEDURE — 99999 PR PBB SHADOW E&M-EST. PATIENT-LVL II: CPT | Mod: PBBFAC,,,

## 2017-04-11 PROCEDURE — 99404 PREV MED CNSL INDIV APPRX 60: CPT | Mod: S$GLB,,, | Performed by: GENERAL PRACTICE

## 2017-04-11 RX ORDER — IBUPROFEN 200 MG
1 TABLET ORAL DAILY
Qty: 14 PATCH | Refills: 0 | Status: SHIPPED | OUTPATIENT
Start: 2017-04-11 | End: 2017-04-27 | Stop reason: ALTCHOICE

## 2017-04-11 RX ORDER — VARENICLINE TARTRATE 0.5 (11)-1
KIT ORAL
Qty: 1 PACKAGE | Refills: 0 | Status: SHIPPED | OUTPATIENT
Start: 2017-04-11 | End: 2017-05-03 | Stop reason: DRUGHIGH

## 2017-04-12 ENCOUNTER — CLINICAL SUPPORT (OUTPATIENT)
Dept: SMOKING CESSATION | Facility: CLINIC | Age: 52
End: 2017-04-12
Payer: COMMERCIAL

## 2017-04-12 DIAGNOSIS — F17.210 MODERATE SMOKER (20 OR LESS PER DAY): Primary | ICD-10-CM

## 2017-04-12 PROCEDURE — 90853 GROUP PSYCHOTHERAPY: CPT | Mod: S$GLB,,, | Performed by: GENERAL PRACTICE

## 2017-04-12 PROCEDURE — 99999 PR PBB SHADOW E&M-EST. PATIENT-LVL II: CPT | Mod: PBBFAC,,,

## 2017-04-12 NOTE — Clinical Note
Patient is currently smoking 6 cigarettes per day and using a 21 mg patch with no negative side effects at this time. Patient has not begun taking Chantix as he has not yet received it.

## 2017-04-13 NOTE — PROGRESS NOTES
Smoking Cessation Group Session #2    Site: [facility]  Date:  4/12/17  Clinical Status of Patient: Outpatient   Length of Service and Code: 60 minutes - [id number]   Number in Attendance: 6  Group Activities/Focus of Group:  completion of TCRS (Tobacco Cessation Rating Scale) reviewed strategies, cues, and triggers. Introduced the negative impact of tobacco on health, the health advantages of discontinuing the use of tobacco, time line improved health changes after a quit, withdrawal issues to expect from nicotine and habit, and ways to achieve the goal of a quit.        Target symptoms:  withdrawal and medication side effects             The following were rated moderate (3) to severe (4) on TCRS:       Moderate 3: none     Severe 4:   none  Patient's Response to Intervention: Patient is currently smoking 6 cigarettes per day and using a 21 mg patch with no negative side effects at this time. Patient has not begun taking Chantix as he has not yet received it.  Progress Toward Goals and Other Mental Status Changes: The patient denies any abnormal behavioral or mental changes at this time.         Diagnosis: Z72.0  Plan:The patient will continue with group therapy sessions and medication monitoring by CTTS. Prescribed medication management will be by physician.   Return to Clinic: 1 week    Quit Date:    Planned Quit Date:

## 2017-04-20 ENCOUNTER — CLINICAL SUPPORT (OUTPATIENT)
Dept: SMOKING CESSATION | Facility: CLINIC | Age: 52
End: 2017-04-20
Payer: COMMERCIAL

## 2017-04-20 DIAGNOSIS — F17.210 MODERATE SMOKER (20 OR LESS PER DAY): Primary | ICD-10-CM

## 2017-04-20 PROCEDURE — 99407 BEHAV CHNG SMOKING > 10 MIN: CPT | Mod: S$GLB,,, | Performed by: GENERAL PRACTICE

## 2017-04-20 PROCEDURE — 99999 PR PBB SHADOW E&M-EST. PATIENT-LVL II: CPT | Mod: PBBFAC,,,

## 2017-04-26 ENCOUNTER — CLINICAL SUPPORT (OUTPATIENT)
Dept: SMOKING CESSATION | Facility: CLINIC | Age: 52
End: 2017-04-26
Payer: COMMERCIAL

## 2017-04-26 DIAGNOSIS — F17.210 MODERATE SMOKER (20 OR LESS PER DAY): Primary | ICD-10-CM

## 2017-04-26 PROCEDURE — 99999 PR PBB SHADOW E&M-EST. PATIENT-LVL II: CPT | Mod: PBBFAC,,,

## 2017-04-26 PROCEDURE — 90853 GROUP PSYCHOTHERAPY: CPT | Mod: S$GLB,,, | Performed by: GENERAL PRACTICE

## 2017-04-26 NOTE — Clinical Note
Patient is currently smoking 4 cigarettes per day and taking Chantix with no negative side effects at this time.

## 2017-04-27 NOTE — PROGRESS NOTES
Smoking Cessation Group Session #4    Site: [facility]  Date:  4/26/17  Clinical Status of Patient: Outpatient   Length of Service and Code: 60 minutes - 83199   Number in Attendance: 5  Group Activities/Focus of Group:  completion of TCRS (Tobacco Cessation Rating Scale) reviewed strategies, habitual behavior, stress, and high risk situations. Introduced stress with addition interventions, SOLVE, relaxation with interventions, nutrition, exercise, weight gain, and the importance of rewarding oneself for accomplishments toward becoming tobacco free. Open discussion of all items with interventions.       Target symptoms:  withdrawal and medication side effects             The following were rated moderate (3) to severe (4) on TCRS:       Moderate 3: none     Severe 4:   none  Patient's Response to Intervention: Patient is currently smoking 4 cigarettes per day and taking Chantix with no negative side effects at this time.  Progress Toward Goals and Other Mental Status Changes: The patient denies any abnormal behavioral or mental changes at this time.         Diagnosis: Z72.0  Plan: The patient will continue with group therapy sessions and medication monitoring by CTTS. Prescribed medication management will be by physician.   Return to Clinic: 1 week    Quit Date:    Planned Quit Date: 5/1/17

## 2017-05-03 ENCOUNTER — TELEPHONE (OUTPATIENT)
Dept: SMOKING CESSATION | Facility: CLINIC | Age: 52
End: 2017-05-03

## 2017-05-03 ENCOUNTER — CLINICAL SUPPORT (OUTPATIENT)
Dept: SMOKING CESSATION | Facility: CLINIC | Age: 52
End: 2017-05-03
Payer: COMMERCIAL

## 2017-05-03 DIAGNOSIS — F17.200 TOBACCO USE DISORDER: Primary | ICD-10-CM

## 2017-05-03 DIAGNOSIS — F17.210 MODERATE SMOKER (20 OR LESS PER DAY): Primary | ICD-10-CM

## 2017-05-03 PROCEDURE — 99999 PR PBB SHADOW E&M-EST. PATIENT-LVL II: CPT | Mod: PBBFAC,,,

## 2017-05-03 PROCEDURE — 90853 GROUP PSYCHOTHERAPY: CPT | Mod: S$GLB,,, | Performed by: GENERAL PRACTICE

## 2017-05-03 RX ORDER — VARENICLINE TARTRATE 1 MG/1
1 TABLET, FILM COATED ORAL 2 TIMES DAILY
Qty: 62 TABLET | Refills: 0 | Status: SHIPPED | OUTPATIENT
Start: 2017-05-03 | End: 2017-05-24 | Stop reason: SDUPTHER

## 2017-05-03 NOTE — Clinical Note
Patient is currently smoking 3-4 cigarettes per day and taking Chantix with no negative side effects at this time.

## 2017-05-04 NOTE — PROGRESS NOTES
Smoking Cessation Group Session #5    Site: [facility]  Date:  5/3/17  Clinical Status of Patient: Outpatient   Length of Service and Code: 60 minutes - 51605   Number in Attendance: 2  Group Activities/Focus of Group:  completion of TCRS (Tobacco Cessation Rating Scale) reviewed strategies, habitual behavior, high risks situations, understanding urges and cravings, stress and relaxation with open discussion and additional interventions, Introduced lapses, relapses, understanding them and analyzing the situation of a lapse, conflict issues that may be linked to a lapse.         Target symptoms:  withdrawal and medication side effects             The following were rated moderate (3) to severe (4) on TCRS:       Moderate 3: none     Severe 4:   none  Patient's Response to Intervention: Patient is currently smoking 3-4 cigarettes per day and taking Chantix with no negative side effects at this time.  Progress Toward Goals and Other Mental Status Changes: The patient denies any abnormal behavioral or mental changes at this time.         Diagnosis: Z72.0  Plan: The patient will continue with group therapy sessions and medication monitoring by CTTS. Prescribed medication management will be by physician.   Return to Clinic: 1 week    Quit Date:    Planned Quit Date: 5/10/17

## 2017-05-19 ENCOUNTER — CLINICAL SUPPORT (OUTPATIENT)
Dept: SMOKING CESSATION | Facility: CLINIC | Age: 52
End: 2017-05-19
Payer: COMMERCIAL

## 2017-05-19 DIAGNOSIS — F17.200 TOBACCO USE DISORDER: Primary | ICD-10-CM

## 2017-05-19 PROCEDURE — 99999 PR PBB SHADOW E&M-EST. PATIENT-LVL II: CPT | Mod: PBBFAC,,,

## 2017-05-19 PROCEDURE — 99407 BEHAV CHNG SMOKING > 10 MIN: CPT | Mod: S$GLB,,, | Performed by: GENERAL PRACTICE

## 2017-05-24 ENCOUNTER — CLINICAL SUPPORT (OUTPATIENT)
Dept: SMOKING CESSATION | Facility: CLINIC | Age: 52
End: 2017-05-24
Payer: COMMERCIAL

## 2017-05-24 DIAGNOSIS — F17.200 TOBACCO USE DISORDER: Primary | ICD-10-CM

## 2017-05-24 PROCEDURE — 99999 PR PBB SHADOW E&M-EST. PATIENT-LVL II: CPT | Mod: PBBFAC,,,

## 2017-05-24 PROCEDURE — 99407 BEHAV CHNG SMOKING > 10 MIN: CPT | Mod: S$GLB,,, | Performed by: GENERAL PRACTICE

## 2017-05-24 RX ORDER — VARENICLINE TARTRATE 1 MG/1
1 TABLET, FILM COATED ORAL 2 TIMES DAILY
Qty: 62 TABLET | Refills: 0 | Status: SHIPPED | OUTPATIENT
Start: 2017-05-24 | End: 2017-06-24

## 2017-06-30 ENCOUNTER — CLINICAL SUPPORT (OUTPATIENT)
Dept: SMOKING CESSATION | Facility: CLINIC | Age: 52
End: 2017-06-30
Payer: COMMERCIAL

## 2017-06-30 ENCOUNTER — TELEPHONE (OUTPATIENT)
Dept: SMOKING CESSATION | Facility: CLINIC | Age: 52
End: 2017-06-30

## 2017-06-30 DIAGNOSIS — F17.200 TOBACCO USE DISORDER: Primary | ICD-10-CM

## 2017-06-30 PROCEDURE — 99406 BEHAV CHNG SMOKING 3-10 MIN: CPT | Mod: S$GLB,,, | Performed by: GENERAL PRACTICE

## 2017-06-30 PROCEDURE — 99999 PR PBB SHADOW E&M-EST. PATIENT-LVL II: CPT | Mod: PBBFAC,,,

## 2017-07-17 RX ORDER — FLUCONAZOLE 100 MG/1
100 TABLET ORAL DAILY
Qty: 7 TABLET | Refills: 2 | Status: SHIPPED | OUTPATIENT
Start: 2017-07-17 | End: 2017-08-16

## 2017-10-04 ENCOUNTER — LAB VISIT (OUTPATIENT)
Dept: LAB | Facility: HOSPITAL | Age: 52
End: 2017-10-04
Attending: INTERNAL MEDICINE
Payer: COMMERCIAL

## 2017-10-04 DIAGNOSIS — E78.5 HYPERLIPIDEMIA, UNSPECIFIED HYPERLIPIDEMIA TYPE: ICD-10-CM

## 2017-10-04 LAB
ALBUMIN SERPL BCP-MCNC: 3.7 G/DL
ALP SERPL-CCNC: 85 U/L
ALT SERPL W/O P-5'-P-CCNC: 43 U/L
ANION GAP SERPL CALC-SCNC: 11 MMOL/L
AST SERPL-CCNC: 25 U/L
BILIRUB SERPL-MCNC: 0.6 MG/DL
BUN SERPL-MCNC: 13 MG/DL
CALCIUM SERPL-MCNC: 9.7 MG/DL
CHLORIDE SERPL-SCNC: 106 MMOL/L
CHOLEST SERPL-MCNC: 170 MG/DL
CHOLEST/HDLC SERPL: 3.9 {RATIO}
CO2 SERPL-SCNC: 26 MMOL/L
CREAT SERPL-MCNC: 1.1 MG/DL
EST. GFR  (AFRICAN AMERICAN): >60 ML/MIN/1.73 M^2
EST. GFR  (NON AFRICAN AMERICAN): >60 ML/MIN/1.73 M^2
GLUCOSE SERPL-MCNC: 109 MG/DL
HDLC SERPL-MCNC: 44 MG/DL
HDLC SERPL: 25.9 %
LDLC SERPL CALC-MCNC: 95.6 MG/DL
NONHDLC SERPL-MCNC: 126 MG/DL
POTASSIUM SERPL-SCNC: 4.8 MMOL/L
PROT SERPL-MCNC: 7.1 G/DL
SODIUM SERPL-SCNC: 143 MMOL/L
TRIGL SERPL-MCNC: 152 MG/DL

## 2017-10-04 PROCEDURE — 36415 COLL VENOUS BLD VENIPUNCTURE: CPT

## 2017-10-04 PROCEDURE — 80061 LIPID PANEL: CPT

## 2017-10-04 PROCEDURE — 80053 COMPREHEN METABOLIC PANEL: CPT

## 2017-12-11 RX ORDER — FLUCONAZOLE 100 MG/1
100 TABLET ORAL DAILY
Qty: 7 TABLET | Refills: 2 | Status: SHIPPED | OUTPATIENT
Start: 2017-12-11 | End: 2018-01-10

## 2018-03-28 ENCOUNTER — TELEPHONE (OUTPATIENT)
Dept: NEUROLOGY | Facility: CLINIC | Age: 53
End: 2018-03-28

## 2018-03-28 RX ORDER — PREDNISONE 10 MG/1
TABLET ORAL
Qty: 20 TABLET | Refills: 0 | Status: SHIPPED | OUTPATIENT
Start: 2018-03-28 | End: 2018-04-10 | Stop reason: SDUPTHER

## 2018-03-28 NOTE — TELEPHONE ENCOUNTER
Called pt and scheduled follow up appointment for worsening headaches. Appointment successfully scheduled, pt verbalized understanding.

## 2018-03-28 NOTE — TELEPHONE ENCOUNTER
----- Message from Suzie Snow sent at 3/28/2018  9:15 AM CDT -----  Contact: self [phone]  He was calling to schedule an appt for worsening headaches.  I do not have availability until 5/2.  Please call him about fitting him in or with advice.  Thank you!

## 2018-04-03 ENCOUNTER — TELEPHONE (OUTPATIENT)
Dept: SMOKING CESSATION | Facility: CLINIC | Age: 53
End: 2018-04-03

## 2018-04-10 ENCOUNTER — TELEPHONE (OUTPATIENT)
Dept: NEUROLOGY | Facility: CLINIC | Age: 53
End: 2018-04-10

## 2018-04-10 ENCOUNTER — OFFICE VISIT (OUTPATIENT)
Dept: NEUROLOGY | Facility: CLINIC | Age: 53
End: 2018-04-10
Payer: COMMERCIAL

## 2018-04-10 VITALS
HEIGHT: 72 IN | SYSTOLIC BLOOD PRESSURE: 143 MMHG | HEART RATE: 61 BPM | BODY MASS INDEX: 33.2 KG/M2 | WEIGHT: 245.13 LBS | RESPIRATION RATE: 16 BRPM | DIASTOLIC BLOOD PRESSURE: 87 MMHG

## 2018-04-10 DIAGNOSIS — I49.8 BRUGADA SYNDROME: ICD-10-CM

## 2018-04-10 DIAGNOSIS — G44.011 INTRACTABLE EPISODIC CLUSTER HEADACHE: Primary | ICD-10-CM

## 2018-04-10 PROCEDURE — 99999 PR PBB SHADOW E&M-EST. PATIENT-LVL III: CPT | Mod: PBBFAC,,, | Performed by: PSYCHIATRY & NEUROLOGY

## 2018-04-10 PROCEDURE — 3079F DIAST BP 80-89 MM HG: CPT | Mod: CPTII,S$GLB,, | Performed by: PSYCHIATRY & NEUROLOGY

## 2018-04-10 PROCEDURE — 96374 THER/PROPH/DIAG INJ IV PUSH: CPT | Mod: S$GLB,,, | Performed by: PSYCHIATRY & NEUROLOGY

## 2018-04-10 PROCEDURE — 99214 OFFICE O/P EST MOD 30 MIN: CPT | Mod: 25,S$GLB,, | Performed by: PSYCHIATRY & NEUROLOGY

## 2018-04-10 PROCEDURE — 3077F SYST BP >= 140 MM HG: CPT | Mod: CPTII,S$GLB,, | Performed by: PSYCHIATRY & NEUROLOGY

## 2018-04-10 RX ORDER — DIVALPROEX SODIUM 500 MG/1
500 TABLET, DELAYED RELEASE ORAL 2 TIMES DAILY
Qty: 60 TABLET | Refills: 4 | Status: SHIPPED | OUTPATIENT
Start: 2018-04-10 | End: 2020-02-10 | Stop reason: SDUPTHER

## 2018-04-10 RX ORDER — PREDNISONE 10 MG/1
TABLET ORAL
Qty: 20 TABLET | Refills: 0 | Status: SHIPPED | OUTPATIENT
Start: 2018-04-10 | End: 2018-09-13

## 2018-04-10 RX ORDER — BUTALBITAL, ACETAMINOPHEN AND CAFFEINE 50; 325; 40 MG/1; MG/1; MG/1
TABLET ORAL
Qty: 14 TABLET | Refills: 2 | Status: SHIPPED | OUTPATIENT
Start: 2018-04-10 | End: 2018-04-30 | Stop reason: SDUPTHER

## 2018-04-10 RX ORDER — KETOROLAC TROMETHAMINE 30 MG/ML
30 INJECTION, SOLUTION INTRAMUSCULAR; INTRAVENOUS ONCE
Status: SHIPPED | OUTPATIENT
Start: 2018-04-10 | End: 2018-04-13

## 2018-04-10 NOTE — TELEPHONE ENCOUNTER
----- Message from Lisbet Carnes sent at 4/10/2018  4:37 PM CDT -----  Contact: Patient  Jovan, patient [phone] calling because he was seen today and Lidocaine nasal spray was supposed to be called in, but he did not receive it. Please advise. Thanks.    [id number]  [id number]  [place] [address]  Phone: [phone] Fax: [phone]

## 2018-04-10 NOTE — PROGRESS NOTES
Subjective:       Patient ID: Jovan Mcwilliams is a 51 y.o. male.    Chief Complaint: Headache  INTERVAL HISTORY  The patient comes for follow up. He is experiencing a severe exacerbation of his Cluster-Migraine syndrome. He is taking steroids left over from last exacerbation. He weaned himself off Depakote. He is not taking Zomig nasal spray because of significant cardiac history.  HPI  The patient is a pleasant 52 y/o male with chief complaint of headaches. He has been getting them in the fall (Octobe-November) since 2012. Next cool season in 2013, they recurred. He underwent an MRI of the brain with not significant findings (my interpretation). This last fall he did not get the headaches, instead they came on 1/3/2017. The headache is strictly unilateral, originating in a discrete area in the left posterior region and projecting to the left orbital and periorbital region. He went to the ED where he was given a combination of steroids, benadryl and compazine as well as a supraorbital block with good response. He is followed by Dr. Velasquez who has recommended prevention with verapamil and Depakote and transitional treatment with steroids. Unfortunately, verapamil causes decreased libido. He was switched to Amlodipine. He has never tried oxygen. He treats acutely with Zomig NS, previously with SQ sumatriptan    Review of Systems   Constitutional: Negative for activity change, appetite change, chills, fatigue and fever.   HENT: Positive for congestion, ear pain, facial swelling, postnasal drip, sinus pressure, sneezing and tinnitus. Negative for dental problem, hearing loss, trouble swallowing and voice change.    Eyes: Positive for photophobia, pain and redness. Negative for visual disturbance.   Respiratory: Positive for cough and wheezing. Negative for chest tightness and shortness of breath.    Cardiovascular: Negative for chest pain, palpitations and leg swelling.   Gastrointestinal: Positive for nausea and vomiting.  Negative for abdominal pain, blood in stool, constipation and diarrhea.   Endocrine: Negative for cold intolerance and heat intolerance.   Genitourinary: Negative for difficulty urinating, dysuria and urgency.   Musculoskeletal: Positive for neck stiffness. Negative for arthralgias, back pain, gait problem, myalgias and neck pain.   Skin: Negative for color change, pallor and rash.   Neurological: Positive for headaches. Negative for dizziness, tremors, seizures, syncope, facial asymmetry, speech difficulty, weakness, light-headedness and numbness.   Hematological: Negative for adenopathy. Does not bruise/bleed easily.   Psychiatric/Behavioral: Positive for agitation, decreased concentration and sleep disturbance. Negative for behavioral problems, confusion, self-injury and suicidal ideas. The patient is not nervous/anxious.          Past Medical History   Diagnosis Date    Additional heart attack      10 years ago    Eye pain      left eye    GERD (gastroesophageal reflux disease)     Hyperlipemia      Past Surgical History   Procedure Laterality Date    Back surgery      Hernia repair       Family History   Problem Relation Age of Onset    Hypertension Mother     Hyperlipidemia Mother     Diabetes Father     Cancer Maternal Aunt     Cancer Paternal Aunt     Cancer Maternal Grandmother     Cancer Maternal Grandfather     Cancer Paternal Grandfather      Social History     Social History    Marital status: Single     Spouse name: N/A    Number of children: N/A    Years of education: N/A     Occupational History    Not on file.     Social History Main Topics    Smoking status: Current Every Day Smoker     Packs/day: 1.00     Years: 15.00    Smokeless tobacco: Not on file      Comment: is about to quit    Alcohol use 0.6 oz/week     1 Cans of beer per week      Comment: per week    Drug use: No    Sexual activity: Not on file     Other Topics Concern    Not on file     Social History Narrative      Review of patient's allergies indicates:   Allergen Reactions    Doxycycline      Other reaction(s): nausea    Erythromycin      Other reaction(s): Nausea       Current Outpatient Prescriptions   Medication Sig    ascorbic acid (VITAMIN C) 1000 MG tablet     aspirin (ECOTRIN) 81 MG EC tablet Take 81 mg by mouth Daily. 1 Tablet, Delayed Release (E.C.) Oral Every day    ibuprofen (ADVIL,MOTRIN) 800 MG tablet 1 tqab po q 8 h prn pain    omeprazole (PRILOSEC) 40 MG capsule Take 1 capsule (40 mg total) by mouth once daily.    predniSONE (DELTASONE) 10 mg tablet pack Prednisone 10 mg tabs (40 mg for 2 days, 30 mg po for 2 days, 20 mg po for 2 days and 10 mg po for 2 days. Omeprazol 20 mg daily for gastro protection) Total #20 tabs    amlodipine (NORVASC) 5 MG tablet Take 1 tablet (5 mg total) by mouth once daily.    atorvastatin (LIPITOR) 40 MG tablet Take 1 tablet (40 mg total) by mouth once daily.    divalproex (DEPAKOTE) 500 MG TbEC Take 500 mg by mouth 2 (two) times daily.    hydrocodone-acetaminophen 7.5-325mg (NORCO) 7.5-325 mg per tablet Take 1 tablet by mouth once daily.    multivitamin (THERAGRAN) per tablet Take by mouth. 1 Tablet Oral Every day     No current facility-administered medications for this visit.            Objective:      Vitals:    04/10/18 1424   BP: (!) 143/87   Pulse: 61   Resp: 16     Body mass index is 33.25 kg/m².      Previous Physical Exam    Constitutional:   He appears well-developed and well-nourished. He is well groomed  HENT:    Head: Atraumatic, oral and nasal mucosa intact  Eyes: Conjunctivae and EOM are normal. Pupils are equal, round, and reactive to light OU  Neck: Neck supple. No thyromegaly present  Cardiovascular: Normal rate and normal heart sounds  No murmur heard  Pulmonary/Chest: Effort normal and breath sounds normal  Musculoskeletal: Normal range of motion. No joint stiffness. No vertebral point tenderness  Skin: Skin is warm and dry  Psychiatric: Normal  mood and affect     Neuro exam:    Mental status:  Awake, attentive, Alert, oriented to self, place, year and month  Language function is intact    Cranial Nerves:  Smell was not formally evaluated  Cranial Nerves II - XII: intact  Pursuits were smooth, normal saccades, no nystagmus OU  Funduscopic exam - disc were flat and pink, no exudates or hemorrhages OU  Motor - facial movement was symmetrical and normal     Palate moved well and was symmetrical with normal palatal and oral sensation  Tongue movements were full    Coordination:     Rapid alternating movements and rapid finger tapping - normal bilaterally  Finger to nose - normal and symmetric bilaterally   No intentional or positional tremor.     Motor:  Normal muscle bulk and symmetry. No fasciculations were noted    No pronator drift  Strength 5/5 bilaterally     Reflexes:  Tendon reflexes were 2 + at biceps, triceps, brachioradialis, patellar, and Achilles bilaterally  No clonus was noted     Sensory: Intact to light touch, pin prick in all extremities. Vibration and proprioception intact in all extremities.     Gait: Romberg absent. Normal gait. Normal arm swing and turns.     Review of Data: I have reviewed images of an MRI from 2013 and see no abnormal findings    The patient reported a severe headache affecting the left orbital and temporal region. I injected 30 mg of IV Toradol very slow push.        Assessment and Plan   Cluster-Migraine syndrome. While there is a component of migraine, the vast majority of the headache phenotype is undoubtedly Cluster headache as evidenced by the headache characteristics, the circannual and circadian pattern and the response to medication and the tearing of the left eye.  Today I gave him Toradol 30 mg IV. Prednisone refilled for transition treatment.  Restart Depakote.  For acute attacks use Lidocaine IN spray  Fioricet for rescue  Counseling:  More than 50% of the 25 minute encounter was spent face to face  counseling the patient regarding current status and future plan of care as well as side of the medications. All questions were answered to patient's satisfaction            Pa Griffith M.D  Medical Director, Headache and Facial Pain  [facility]

## 2018-04-10 NOTE — LETTER
April 10,2018             [facility]  [address] [facility]  [place] [address]  Phone: [phone]  Fax: [phone]   April 10, 2018     Patient: Jovan Mcwilliams   YOB: 1965   Date of Visit: 4/10/2018       To Whom it May Concern:    Jovan Mcwilliams was seen in my clinic on 4/4/18 and  4/10/2018. He may return to work with no restrictions.    If you have any questions or concerns, please don't hesitate to call.    Sincerely,         Ade Dave LPN

## 2018-04-13 ENCOUNTER — TELEPHONE (OUTPATIENT)
Dept: NEUROLOGY | Facility: CLINIC | Age: 53
End: 2018-04-13

## 2018-04-13 ENCOUNTER — CLINICAL SUPPORT (OUTPATIENT)
Dept: NEUROLOGY | Facility: CLINIC | Age: 53
End: 2018-04-13
Payer: COMMERCIAL

## 2018-04-13 VITALS
HEIGHT: 72 IN | WEIGHT: 245.13 LBS | HEART RATE: 76 BPM | DIASTOLIC BLOOD PRESSURE: 87 MMHG | SYSTOLIC BLOOD PRESSURE: 146 MMHG | BODY MASS INDEX: 33.2 KG/M2 | RESPIRATION RATE: 18 BRPM

## 2018-04-13 PROCEDURE — 99999 PR PBB SHADOW E&M-EST. PATIENT-LVL III: CPT | Mod: PBBFAC,,, | Performed by: PSYCHIATRY & NEUROLOGY

## 2018-04-13 RX ORDER — KETOROLAC TROMETHAMINE 30 MG/ML
60 INJECTION, SOLUTION INTRAMUSCULAR; INTRAVENOUS
Status: DISCONTINUED | OUTPATIENT
Start: 2018-04-13 | End: 2018-09-13

## 2018-04-13 NOTE — LETTER
April 13, 2018      [facility]  [address]  [place] [address]  Phone: [phone]  Fax: [phone]       Patient: Jovan Mcwilliams   YOB: 1965  Date of Visit: 04/13/2018    To Whom It May Concern:    Rianna Mcwilliams  was under our care at  [facility]  04/11/2018 until 4/13/2018 . He may return to work with no restrictions. If you have any questions or concerns, or if I can be of further assistance, please do not hesitate to contact me.    Sincerely,      Ade Dave LPN

## 2018-04-13 NOTE — PROGRESS NOTES
Patient came into clinic today for Nurse Visit to receive Toradol IM 60 mg per provider request. IM administered in right arm deltoid. Pt tolerated well.

## 2018-04-13 NOTE — TELEPHONE ENCOUNTER
----- Message from Dora Gonzalez sent at 4/13/2018 10:37 AM CDT -----  Contact: [phone]  Patient stated he needs to speak with nurse concerning being seen today for injection/stated important he be seen/had bad three days/please call patient back at [phone] to schedule or advise.

## 2018-04-17 ENCOUNTER — NURSE TRIAGE (OUTPATIENT)
Dept: ADMINISTRATIVE | Facility: CLINIC | Age: 53
End: 2018-04-17

## 2018-04-17 ENCOUNTER — OFFICE VISIT (OUTPATIENT)
Dept: NEUROLOGY | Facility: CLINIC | Age: 53
End: 2018-04-17
Payer: COMMERCIAL

## 2018-04-17 VITALS
WEIGHT: 244.69 LBS | SYSTOLIC BLOOD PRESSURE: 134 MMHG | HEART RATE: 68 BPM | DIASTOLIC BLOOD PRESSURE: 87 MMHG | HEIGHT: 72 IN | RESPIRATION RATE: 16 BRPM | BODY MASS INDEX: 33.14 KG/M2

## 2018-04-17 DIAGNOSIS — G44.011 INTRACTABLE EPISODIC CLUSTER HEADACHE: Primary | ICD-10-CM

## 2018-04-17 PROCEDURE — 99999 PR PBB SHADOW E&M-EST. PATIENT-LVL III: CPT | Mod: PBBFAC,,, | Performed by: PSYCHIATRY & NEUROLOGY

## 2018-04-17 PROCEDURE — 64450 NJX AA&/STRD OTHER PN/BRANCH: CPT | Mod: LT,S$GLB,, | Performed by: PSYCHIATRY & NEUROLOGY

## 2018-04-17 PROCEDURE — 3079F DIAST BP 80-89 MM HG: CPT | Mod: CPTII,S$GLB,, | Performed by: PSYCHIATRY & NEUROLOGY

## 2018-04-17 PROCEDURE — 64405 NJX AA&/STRD GR OCPL NRV: CPT | Mod: 59,LT,S$GLB, | Performed by: PSYCHIATRY & NEUROLOGY

## 2018-04-17 PROCEDURE — 3075F SYST BP GE 130 - 139MM HG: CPT | Mod: CPTII,S$GLB,, | Performed by: PSYCHIATRY & NEUROLOGY

## 2018-04-17 RX ORDER — VERAPAMIL HYDROCHLORIDE 240 MG/1
240 TABLET, FILM COATED, EXTENDED RELEASE ORAL NIGHTLY
Qty: 30 TABLET | Refills: 11 | Status: SHIPPED | OUTPATIENT
Start: 2018-04-17 | End: 2019-04-15 | Stop reason: SDUPTHER

## 2018-04-17 RX ORDER — SUMATRIPTAN SUCCINATE 6 MG/.5ML
6 INJECTION, SOLUTION SUBCUTANEOUS NIGHTLY PRN
Qty: 12 EACH | Refills: 3 | Status: SHIPPED | OUTPATIENT
Start: 2018-04-17 | End: 2018-10-11

## 2018-04-17 NOTE — PROGRESS NOTES
Subjective:       Patient ID: Jovan Mcwilliams is a 51 y.o. male.    Chief Complaint: Headache  INTERVAL HISTORY  The patient comes for follow up. He is experiencing a severe exacerbation of his Cluster-Migraine syndrome. He is just finishing a steroids course. He is on Depakote 500 mg BID, started a week ago. Otherwise information below is still accurate and current.    HPI  The patient is a pleasant 50 y/o male with chief complaint of headaches. He has been getting them in the fall (Octobe-November) since 2012. Next cool season in 2013, they recurred. He underwent an MRI of the brain with not significant findings (my interpretation). This last fall he did not get the headaches, instead they came on 1/3/2017. The headache is strictly unilateral, originating in a discrete area in the left posterior region and projecting to the left orbital and periorbital region. He went to the ED where he was given a combination of steroids, benadryl and compazine as well as a supraorbital block with good response. He is followed by Dr. Velasquez who has recommended prevention with verapamil and Depakote and transitional treatment with steroids. Unfortunately, verapamil causes decreased libido. He was switched to Amlodipine. He has never tried oxygen. He treats acutely with Zomig NS, previously with SQ sumatriptan    Review of Systems   Constitutional: Negative for activity change, appetite change, chills, fatigue and fever.   HENT: Positive for congestion, ear pain, facial swelling, postnasal drip, sinus pressure, sneezing and tinnitus. Negative for dental problem, hearing loss, trouble swallowing and voice change.    Eyes: Positive for photophobia, pain and redness. Negative for visual disturbance.   Respiratory: Positive for cough and wheezing. Negative for chest tightness and shortness of breath.    Cardiovascular: Negative for chest pain, palpitations and leg swelling.   Gastrointestinal: Positive for nausea and vomiting. Negative  for abdominal pain, blood in stool, constipation and diarrhea.   Endocrine: Negative for cold intolerance and heat intolerance.   Genitourinary: Negative for difficulty urinating, dysuria and urgency.   Musculoskeletal: Positive for neck stiffness. Negative for arthralgias, back pain, gait problem, myalgias and neck pain.   Skin: Negative for color change, pallor and rash.   Neurological: Positive for headaches. Negative for dizziness, tremors, seizures, syncope, facial asymmetry, speech difficulty, weakness, light-headedness and numbness.   Hematological: Negative for adenopathy. Does not bruise/bleed easily.   Psychiatric/Behavioral: Positive for agitation, decreased concentration and sleep disturbance. Negative for behavioral problems, confusion, self-injury and suicidal ideas. The patient is not nervous/anxious.          Past Medical History   Diagnosis Date    Additional heart attack      10 years ago    Eye pain      left eye    GERD (gastroesophageal reflux disease)     Hyperlipemia      Past Surgical History   Procedure Laterality Date    Back surgery      Hernia repair       Family History   Problem Relation Age of Onset    Hypertension Mother     Hyperlipidemia Mother     Diabetes Father     Cancer Maternal Aunt     Cancer Paternal Aunt     Cancer Maternal Grandmother     Cancer Maternal Grandfather     Cancer Paternal Grandfather      Social History     Social History    Marital status: Single     Spouse name: N/A    Number of children: N/A    Years of education: N/A     Occupational History    Not on file.     Social History Main Topics    Smoking status: Current Every Day Smoker     Packs/day: 1.00     Years: 15.00    Smokeless tobacco: Not on file      Comment: is about to quit    Alcohol use 0.6 oz/week     1 Cans of beer per week      Comment: per week    Drug use: No    Sexual activity: Not on file     Other Topics Concern    Not on file     Social History Narrative      Review of patient's allergies indicates:   Allergen Reactions    Doxycycline      Other reaction(s): nausea    Erythromycin      Other reaction(s): Nausea       Current Outpatient Prescriptions   Medication Sig    ascorbic acid (VITAMIN C) 1000 MG tablet     aspirin (ECOTRIN) 81 MG EC tablet Take 81 mg by mouth Daily. 1 Tablet, Delayed Release (E.C.) Oral Every day    ibuprofen (ADVIL,MOTRIN) 800 MG tablet 1 tqab po q 8 h prn pain    omeprazole (PRILOSEC) 40 MG capsule Take 1 capsule (40 mg total) by mouth once daily.    predniSONE (DELTASONE) 10 mg tablet pack Prednisone 10 mg tabs (40 mg for 2 days, 30 mg po for 2 days, 20 mg po for 2 days and 10 mg po for 2 days. Omeprazol 20 mg daily for gastro protection) Total #20 tabs    amlodipine (NORVASC) 5 MG tablet Take 1 tablet (5 mg total) by mouth once daily.    atorvastatin (LIPITOR) 40 MG tablet Take 1 tablet (40 mg total) by mouth once daily.    divalproex (DEPAKOTE) 500 MG TbEC Take 500 mg by mouth 2 (two) times daily.    hydrocodone-acetaminophen 7.5-325mg (NORCO) 7.5-325 mg per tablet Take 1 tablet by mouth once daily.    multivitamin (THERAGRAN) per tablet Take by mouth. 1 Tablet Oral Every day     No current facility-administered medications for this visit.            Objective:      Vitals:    04/17/18 1443   BP: 134/87   Pulse: 68   Resp: 16     Body mass index is 33.19 kg/m².    Previous Physical Exam    Constitutional:   He appears well-developed and well-nourished. He is well groomed  HENT:    Head: Atraumatic, oral and nasal mucosa intact  Eyes: Conjunctivae and EOM are normal. Pupils are equal, round, and reactive to light OU  Neck: Neck supple. No thyromegaly present  Cardiovascular: Normal rate and normal heart sounds  No murmur heard  Pulmonary/Chest: Effort normal and breath sounds normal  Musculoskeletal: Normal range of motion. No joint stiffness. No vertebral point tenderness  Skin: Skin is warm and dry  Psychiatric: Normal mood  and affect     Neuro exam:    Mental status:  Awake, attentive, Alert, oriented to self, place, year and month  Language function is intact    Cranial Nerves:  Smell was not formally evaluated  Cranial Nerves II - XII: intact  Pursuits were smooth, normal saccades, no nystagmus OU  Funduscopic exam - disc were flat and pink, no exudates or hemorrhages OU  Motor - facial movement was symmetrical and normal     Palate moved well and was symmetrical with normal palatal and oral sensation  Tongue movements were full    Coordination:     Rapid alternating movements and rapid finger tapping - normal bilaterally  Finger to nose - normal and symmetric bilaterally   No intentional or positional tremor.     Motor:  Normal muscle bulk and symmetry. No fasciculations were noted    No pronator drift  Strength 5/5 bilaterally     Reflexes:  Tendon reflexes were 2 + at biceps, triceps, brachioradialis, patellar, and Achilles bilaterally  No clonus was noted     Sensory: Intact to light touch, pin prick in all extremities. Vibration and proprioception intact in all extremities.     Gait: Romberg absent. Normal gait. Normal arm swing and turns.     Review of Data: I have reviewed images of an MRI from 2013 and see no abnormal findings    AURICULOTEMPORAL Block,LEFT SIDE  After having been explained the risks and benefits of the procedure, the patient provided consent . Then, the area over the auriculotemporal nerves was prepped with alcohol. Using a 30 gauge 1/2 inch needle, 1mL of local anesthetic was injected into each of the nerves bilaterally. There was negative aspiration for blood. The patient tolerated the procedure well without any apparent complications.  The local anesthetic was bupivacaine 0.25%.  The patient tolerated the procedure well and no complications were encountered.    SUPRAORBITAL BLOCK, LEFT SIDE  After obtaining informed consent, the patient was positioned in supine position on the exam table.  The patients  eye bow region regions were prepped in the usual sterile fashion. Area of maximal tenderness was identified by palpation. A 30 gauge, 0.5 inch needle was introduced under the skin down to the periosteum just above the medial border of the eye brow. After aspiration was negative or blood approximately 1.5 cc of 0.25% bupivacaine was injected. The patient tolerated the procedure well with no adverse events.     Greater and Lesser occipital Nerve block left side  A time out was conducted just before the start of the procedure to verify the correct patient and procedure, procedure location, and all relevant critical information.   After risks and benefits were explained including bleeding, infection, worsening of the pain, damage to the area being injected, weakness, allergic reaction to medications, vascular injection, and nerve damage, and verbal consent was obtained. All questions were answered.   The area of the occipital nerve were identified and the skin prepped three times with alcohol and the alcohol allowed to dry. Anatomical landmark of occipital protuberance and mastoid identified and area 2cm lateral to external occipital protuberance located, next a 30 gauge 1 inch needle was placed in the area and after negative aspiration, medication was injected. Medication used: Lidocaine 1% and Bupivacaine 0.25% AND KENALOG 20 MG  Instructed patient to stay very well hydrated with electrolyte liquids and to stay in cool environments to avoid causing triggers for today.         Assessment and Plan   Cluster-Migraine syndrome. While there is a component of migraine, the vast majority of the headache phenotype is undoubtedly Cluster headache as evidenced by the headache characteristics, the circannual and circadian pattern and the response to medication and the tearing of the left eye.  Today I gave him A TRIPLLE BLOCK: left auriculotemporal, supraorbital and greater occipital nerves  Consider SPG block if headache  persists  Continue Depakote and start Verapamil 240 mg QHS. Add magnesium to counteract constipation  For acute attacks use Lidocaine IN spray and Imitrex 6 mg SQ injection if allowed by his Internist given the unclear history of CAD  Fioricet for rescue      Pa Griffith M.D  Medical Director, Headache and Facial Pain  [facility]

## 2018-04-17 NOTE — TELEPHONE ENCOUNTER
"  Reason for Disposition   [1] Follow-up call to recent contact AND [2] information only call, no triage required    Answer Assessment - Initial Assessment Questions  1. REASON FOR CALL or QUESTION: "What is your reason for calling today?" or "How can I best help you?" or "What question do you have that I can help answer?"      Pt was seen by neuro today and given injections for headache. On the way home wife reported sx's were worse. He is now sleeping. She wanted to know if worsening pain was normal    Protocols used: ST INFORMATION ONLY CALL-A-AH    "

## 2018-04-30 ENCOUNTER — TELEPHONE (OUTPATIENT)
Dept: NEUROLOGY | Facility: CLINIC | Age: 53
End: 2018-04-30

## 2018-04-30 ENCOUNTER — OFFICE VISIT (OUTPATIENT)
Dept: NEUROLOGY | Facility: CLINIC | Age: 53
End: 2018-04-30
Payer: COMMERCIAL

## 2018-04-30 ENCOUNTER — CLINICAL SUPPORT (OUTPATIENT)
Dept: SMOKING CESSATION | Facility: CLINIC | Age: 53
End: 2018-04-30
Payer: COMMERCIAL

## 2018-04-30 VITALS
BODY MASS INDEX: 33.05 KG/M2 | HEIGHT: 72 IN | DIASTOLIC BLOOD PRESSURE: 82 MMHG | HEART RATE: 73 BPM | SYSTOLIC BLOOD PRESSURE: 118 MMHG | WEIGHT: 244 LBS | RESPIRATION RATE: 18 BRPM

## 2018-04-30 DIAGNOSIS — Z51.81 MEDICATION MONITORING ENCOUNTER: ICD-10-CM

## 2018-04-30 DIAGNOSIS — F17.200 NICOTINE DEPENDENCE: Primary | ICD-10-CM

## 2018-04-30 DIAGNOSIS — G44.011 INTRACTABLE EPISODIC CLUSTER HEADACHE: ICD-10-CM

## 2018-04-30 DIAGNOSIS — G44.89 CHRONIC MIXED HEADACHE SYNDROME: ICD-10-CM

## 2018-04-30 PROCEDURE — 3074F SYST BP LT 130 MM HG: CPT | Mod: CPTII,S$GLB,, | Performed by: PSYCHIATRY & NEUROLOGY

## 2018-04-30 PROCEDURE — 99407 BEHAV CHNG SMOKING > 10 MIN: CPT | Mod: S$GLB,,,

## 2018-04-30 PROCEDURE — 99999 PR PBB SHADOW E&M-EST. PATIENT-LVL III: CPT | Mod: PBBFAC,,, | Performed by: PSYCHIATRY & NEUROLOGY

## 2018-04-30 PROCEDURE — 99215 OFFICE O/P EST HI 40 MIN: CPT | Mod: S$GLB,,, | Performed by: PSYCHIATRY & NEUROLOGY

## 2018-04-30 PROCEDURE — 3079F DIAST BP 80-89 MM HG: CPT | Mod: CPTII,S$GLB,, | Performed by: PSYCHIATRY & NEUROLOGY

## 2018-04-30 RX ORDER — BUTALBITAL, ACETAMINOPHEN AND CAFFEINE 50; 325; 40 MG/1; MG/1; MG/1
TABLET ORAL
Qty: 14 TABLET | Refills: 2 | Status: SHIPPED | OUTPATIENT
Start: 2018-04-30 | End: 2018-06-26 | Stop reason: SDUPTHER

## 2018-04-30 NOTE — PROGRESS NOTES
"Subjective:       Patient ID: Jovan Mcwilliams is a 51 y.o. male.    Chief Complaint: Headache  INTERVAL HISTORY  The patient is added to the clinic schedule as his headaches persist. He complains of a new symptom consisting of blurry vision with the left eye. He denies a specific description of a visual migraine aura in the form of fortification spectra, scotomata, hemianopsia, photopsia or transient loss of vision. He uses Imitrex injectable with good benefit but states that he is "drained" and unable to work the next day. He is tired because of lack of sleep.   He underwent peripheral nerve blocks last week including GLORIA, auriculotemporal and supraorbital. He stated that initially he felt significant benefit but when the pain came back, it came back much worse, he almost went to the ED.   He is on Depakote 500 mg BID. Otherwise headache characteristics are unchanged.  HPI  The patient is a pleasant 52 y/o male with chief complaint of headaches. He has been getting them in the fall (Octobe-November) since 2012. Next cool season in 2013, they recurred. He underwent an MRI of the brain with not significant findings (my interpretation). This last fall he did not get the headaches, instead they came on 1/3/2017. The headache is strictly unilateral, originating in a discrete area in the left posterior region and projecting to the left orbital and periorbital region. He went to the ED where he was given a combination of steroids, benadryl and compazine as well as a supraorbital block with good response. He is followed by Dr. Velasquez who has recommended prevention with verapamil and Depakote and transitional treatment with steroids. Unfortunately, verapamil causes decreased libido. He was switched to Amlodipine. He has never tried oxygen. He treats acutely with Zomig NS, previously with SQ sumatriptan    Review of Systems   Constitutional: Negative for activity change, appetite change, chills, fatigue and fever.   HENT: " Positive for congestion, ear pain, facial swelling, postnasal drip, sinus pressure, sneezing and tinnitus. Negative for dental problem, hearing loss, trouble swallowing and voice change.    Eyes: Positive for photophobia, pain and redness. Negative for visual disturbance.   Respiratory: Positive for cough and wheezing. Negative for chest tightness and shortness of breath.    Cardiovascular: Negative for chest pain, palpitations and leg swelling.   Gastrointestinal: Positive for nausea and vomiting. Negative for abdominal pain, blood in stool, constipation and diarrhea.   Endocrine: Negative for cold intolerance and heat intolerance.   Genitourinary: Negative for difficulty urinating, dysuria and urgency.   Musculoskeletal: Positive for neck stiffness. Negative for arthralgias, back pain, gait problem, myalgias and neck pain.   Skin: Negative for color change, pallor and rash.   Neurological: Positive for headaches. Negative for dizziness, tremors, seizures, syncope, facial asymmetry, speech difficulty, weakness, light-headedness and numbness.   Hematological: Negative for adenopathy. Does not bruise/bleed easily.   Psychiatric/Behavioral: Positive for agitation, decreased concentration and sleep disturbance. Negative for behavioral problems, confusion, self-injury and suicidal ideas. The patient is not nervous/anxious.          Past Medical History   Diagnosis Date    Additional heart attack      10 years ago    Eye pain      left eye    GERD (gastroesophageal reflux disease)     Hyperlipemia      Past Surgical History   Procedure Laterality Date    Back surgery      Hernia repair       Family History   Problem Relation Age of Onset    Hypertension Mother     Hyperlipidemia Mother     Diabetes Father     Cancer Maternal Aunt     Cancer Paternal Aunt     Cancer Maternal Grandmother     Cancer Maternal Grandfather     Cancer Paternal Grandfather      Social History     Social History    Marital status:  Single     Spouse name: N/A    Number of children: N/A    Years of education: N/A     Occupational History    Not on file.     Social History Main Topics    Smoking status: Current Every Day Smoker     Packs/day: 1.00     Years: 15.00    Smokeless tobacco: Not on file      Comment: is about to quit    Alcohol use 0.6 oz/week     1 Cans of beer per week      Comment: per week    Drug use: No    Sexual activity: Not on file     Other Topics Concern    Not on file     Social History Narrative     Review of patient's allergies indicates:   Allergen Reactions    Doxycycline      Other reaction(s): nausea    Erythromycin      Other reaction(s): Nausea       Current Outpatient Prescriptions   Medication Sig    ascorbic acid (VITAMIN C) 1000 MG tablet     aspirin (ECOTRIN) 81 MG EC tablet Take 81 mg by mouth Daily. 1 Tablet, Delayed Release (E.C.) Oral Every day    ibuprofen (ADVIL,MOTRIN) 800 MG tablet 1 tqab po q 8 h prn pain    omeprazole (PRILOSEC) 40 MG capsule Take 1 capsule (40 mg total) by mouth once daily.    predniSONE (DELTASONE) 10 mg tablet pack Prednisone 10 mg tabs (40 mg for 2 days, 30 mg po for 2 days, 20 mg po for 2 days and 10 mg po for 2 days. Omeprazol 20 mg daily for gastro protection) Total #20 tabs    amlodipine (NORVASC) 5 MG tablet Take 1 tablet (5 mg total) by mouth once daily.    atorvastatin (LIPITOR) 40 MG tablet Take 1 tablet (40 mg total) by mouth once daily.    divalproex (DEPAKOTE) 500 MG TbEC Take 500 mg by mouth 2 (two) times daily.    hydrocodone-acetaminophen 7.5-325mg (NORCO) 7.5-325 mg per tablet Take 1 tablet by mouth once daily.    multivitamin (THERAGRAN) per tablet Take by mouth. 1 Tablet Oral Every day     No current facility-administered medications for this visit.            Objective:      Vitals:    04/30/18 1511   BP: 118/82   Pulse: 73   Resp: 18       Previous Physical Exam    Constitutional:   He appears well-developed and well-nourished. He is well  groomed  HENT:    Head: Atraumatic, oral and nasal mucosa intact  Eyes: Conjunctivae and EOM are normal. Pupils are equal, round, and reactive to light OU  Neck: Neck supple. No thyromegaly present  Cardiovascular: Normal rate and normal heart sounds  No murmur heard  Pulmonary/Chest: Effort normal and breath sounds normal  Musculoskeletal: Normal range of motion. No joint stiffness. No vertebral point tenderness  Skin: Skin is warm and dry  Psychiatric: Normal mood and affect     Neuro exam:    Mental status:  Awake, attentive, Alert, oriented to self, place, year and month  Language function is intact    Cranial Nerves:  Smell was not formally evaluated  Cranial Nerves II - XII: intact  Pursuits were smooth, normal saccades, no nystagmus OU  Funduscopic exam - disc were flat and pink, no exudates or hemorrhages OU  Motor - facial movement was symmetrical and normal     Palate moved well and was symmetrical with normal palatal and oral sensation  Tongue movements were full    Coordination:     Rapid alternating movements and rapid finger tapping - normal bilaterally  Finger to nose - normal and symmetric bilaterally   No intentional or positional tremor.     Motor:  Normal muscle bulk and symmetry. No fasciculations were noted    No pronator drift  Strength 5/5 bilaterally     Reflexes:  Tendon reflexes were 2 + at biceps, triceps, brachioradialis, patellar, and Achilles bilaterally  No clonus was noted     Sensory: Intact to light touch, pin prick in all extremities. Vibration and proprioception intact in all extremities.     Gait: Romberg absent. Normal gait. Normal arm swing and turns.     Review of Data: I have reviewed images of an MRI from 2013 and see no abnormal findings        Assessment and Plan   Cluster-Migraine syndrome. While there is a component of migraine, the vast majority of the headache phenotype is undoubtedly Cluster headache as evidenced by the headache characteristics, the circannual and  circadian pattern and the response to medication and the tearing of the left eye.  There are a number of atypical features associated to his headache. As an example, the constant blurred vision of the left eye is not associated with either migraine or cluster headache. In addition, the response to the nerve blocks is atypical. Furthermore, the effect from Imitrex is also quite atypical as it does not cross the BBB, has a half life of 1 hour and was designed to preserve function. It is atypical to be drained and unable to work after its use. Therefore, I will obtain an MRI of the brain to rule out underlying pathology.  Wife inquires about hypnic headache. This patient does not meet criteria as per IHC-III classification  Continue Depakote and Verapamil 240 mg QHS. Add magnesium to counteract constipation  For acute attacks use Lidocaine IN spray and Imitrex 6 mg SQ injection only if allowed by his Internist given the unclear history of CAD  Fioricet for rescue    Counseling:  More than 50% of the 40 minute encounter was spent face to face counseling the patient regarding current status and future plan of care as well as side of the medications. All questions were answered to patient's satisfaction        Pa Griffith M.D  Medical Director, Headache and Facial Pain  [facility]

## 2018-04-30 NOTE — TELEPHONE ENCOUNTER
----- Message from Cherelle Ruvalcaba sent at 4/27/2018  2:32 PM CDT -----  Contact: patient   Patient calling to speak to the Nurse. Please advise. He states that he is having severe headaches. Call to pod. No answer.   Call back    Thanks!

## 2018-04-30 NOTE — TELEPHONE ENCOUNTER
Returned call and spoke with patient. Patient says he has been getting daily headaches. Sumatriptan will help for a little while, but the headaches come right back. Appointment scheduled. Patient verbalized understanding.

## 2018-05-18 ENCOUNTER — HOSPITAL ENCOUNTER (EMERGENCY)
Facility: HOSPITAL | Age: 53
Discharge: HOME | End: 2018-05-18
Attending: EMERGENCY MEDICINE
Payer: COMMERCIAL

## 2018-05-18 VITALS
WEIGHT: 245 LBS | RESPIRATION RATE: 16 BRPM | HEART RATE: 60 BPM | DIASTOLIC BLOOD PRESSURE: 94 MMHG | HEIGHT: 72 IN | OXYGEN SATURATION: 97 % | SYSTOLIC BLOOD PRESSURE: 150 MMHG | BODY MASS INDEX: 33.18 KG/M2 | TEMPERATURE: 98 F

## 2018-05-18 DIAGNOSIS — R51.9 ACUTE NONINTRACTABLE HEADACHE, UNSPECIFIED HEADACHE TYPE: Primary | ICD-10-CM

## 2018-05-18 PROCEDURE — 63600175 PHARM REV CODE 636 W HCPCS: Performed by: EMERGENCY MEDICINE

## 2018-05-18 PROCEDURE — 96361 HYDRATE IV INFUSION ADD-ON: CPT

## 2018-05-18 PROCEDURE — 96374 THER/PROPH/DIAG INJ IV PUSH: CPT

## 2018-05-18 PROCEDURE — 96372 THER/PROPH/DIAG INJ SC/IM: CPT

## 2018-05-18 PROCEDURE — 25000003 PHARM REV CODE 250: Performed by: EMERGENCY MEDICINE

## 2018-05-18 PROCEDURE — 99284 EMERGENCY DEPT VISIT MOD MDM: CPT | Mod: 25

## 2018-05-18 PROCEDURE — 96375 TX/PRO/DX INJ NEW DRUG ADDON: CPT

## 2018-05-18 RX ORDER — DIPHENHYDRAMINE HYDROCHLORIDE 50 MG/ML
50 INJECTION INTRAMUSCULAR; INTRAVENOUS
Status: COMPLETED | OUTPATIENT
Start: 2018-05-18 | End: 2018-05-18

## 2018-05-18 RX ORDER — METOCLOPRAMIDE HYDROCHLORIDE 5 MG/ML
10 INJECTION INTRAMUSCULAR; INTRAVENOUS
Status: COMPLETED | OUTPATIENT
Start: 2018-05-18 | End: 2018-05-18

## 2018-05-18 RX ORDER — SUMATRIPTAN 6 MG/.5ML
6 INJECTION, SOLUTION SUBCUTANEOUS
Status: COMPLETED | OUTPATIENT
Start: 2018-05-18 | End: 2018-05-18

## 2018-05-18 RX ORDER — DEXAMETHASONE SODIUM PHOSPHATE 4 MG/ML
8 INJECTION, SOLUTION INTRA-ARTICULAR; INTRALESIONAL; INTRAMUSCULAR; INTRAVENOUS; SOFT TISSUE
Status: COMPLETED | OUTPATIENT
Start: 2018-05-18 | End: 2018-05-18

## 2018-05-18 RX ADMIN — METOCLOPRAMIDE 10 MG: 5 INJECTION, SOLUTION INTRAMUSCULAR; INTRAVENOUS at 04:05

## 2018-05-18 RX ADMIN — DEXAMETHASONE SODIUM PHOSPHATE 8 MG: 4 INJECTION, SOLUTION INTRAMUSCULAR; INTRAVENOUS at 06:05

## 2018-05-18 RX ADMIN — SODIUM CHLORIDE 1000 ML: 0.9 INJECTION, SOLUTION INTRAVENOUS at 04:05

## 2018-05-18 RX ADMIN — SUMATRIPTAN 6 MG: 6 INJECTION, SOLUTION SUBCUTANEOUS at 06:05

## 2018-05-18 RX ADMIN — DIPHENHYDRAMINE HYDROCHLORIDE 50 MG: 50 INJECTION, SOLUTION INTRAMUSCULAR; INTRAVENOUS at 04:05

## 2018-05-18 NOTE — TELEPHONE ENCOUNTER
----- Message from Latanya Timmons sent at 5/18/2018  1:23 PM CDT -----  Contact: Eduin [phone]  Prescription Request:     Name of medication: fluconazole (DIFLUCAN) 100 MG tablet    Reason for request: Refill    Pharmacy: [id number] - [place] - [id number] [facility] [address]    Please advise.    Thank You

## 2018-05-18 NOTE — ED NOTES
To From 23 , 52 year old white male complaint of cluster headache /migraine to left eye that began at 1430 .. Pressure above left eye  With jaw pain  With nausea   Did take lidocaine nasal spray at 1500 and ibuprofen 800 mg at 1530

## 2018-05-18 NOTE — ED PROVIDER NOTES
Encounter Date: 5/18/2018    SCRIBE #1 NOTE: I, Almas Martinez, am scribing for, and in the presence of,  Dr. Freda Gamez. I have scribed the entire note.       History     Chief Complaint   Patient presents with    Migraine     pt c/o L sided cluster HA that began at approx 2:30 pm today.  Pt states he has a history and pain feels similar to migraines in the past.  Pt states he took lidocaine nasal spray, 800 ibuprofen, and is drinking coke.  pt states he also usually takes fiorcet and imitrex injections but could not drive home to get them     Jovan Mcwilliams is a 52 y.o. male who  has a past medical history of Additional heart attack; Eye pain; GERD (gastroesophageal reflux disease); and Hyperlipemia.    The patient presents to the ED due to L sided cluster migraine headache x2 hrs. Pt has hx of similar headaches, and feels symptoms are identical.  HA was gradual in onset and is not the worst of his life.   He generally uses lidocaine spray, 800 mg ibuprofen, Imitrex IM and coca-cola. HA began in left eye and has radiated into the temporal area throughout the left side. He has associated nausea, one episode vomiting yesterday, decreased balance, blurred vision. Pt got new glasses two weeks prior. He has presented to ER previously for migraine.    Cluster headaches dx by Dr. Griffith in [place]        The history is provided by the patient.     Review of patient's allergies indicates:   Allergen Reactions    Doxycycline      Other reaction(s): nausea    Erythromycin      Other reaction(s): Nausea     Past Medical History:   Diagnosis Date    Additional heart attack     10 years ago    Eye pain     left eye    GERD (gastroesophageal reflux disease)     Hyperlipemia      Past Surgical History:   Procedure Laterality Date    BACK SURGERY      HERNIA REPAIR       Family History   Problem Relation Age of Onset    Hypertension Mother     Hyperlipidemia Mother     Diabetes Father     Cancer Maternal Aunt      Cancer Paternal Aunt     Cancer Maternal Grandmother     Cancer Maternal Grandfather     Cancer Paternal Grandfather      Social History   Substance Use Topics    Smoking status: Former Smoker     Packs/day: 1.00     Years: 31.00     Quit date: 5/5/2017    Smokeless tobacco: Never Used      Comment: is about to quit    Alcohol use 0.6 oz/week     1 Cans of beer per week      Comment: per week     Review of Systems   Constitutional: Negative for chills and fever.   HENT: Negative for congestion, ear pain, rhinorrhea and sore throat.    Eyes: Positive for photophobia and visual disturbance.   Respiratory: Negative for cough, shortness of breath and wheezing.    Cardiovascular: Negative for chest pain and palpitations.   Gastrointestinal: Negative for abdominal pain, diarrhea, nausea and vomiting.   Genitourinary: Negative for dysuria and hematuria.   Musculoskeletal: Negative for back pain, myalgias and neck pain.   Skin: Negative for rash.   Neurological: Positive for dizziness and headaches (migraine). Negative for weakness and light-headedness.   Psychiatric/Behavioral: Negative for confusion.       Physical Exam     Initial Vitals [05/18/18 1616]   BP Pulse Resp Temp SpO2   (!) 188/98 64 16 98.1 °F (36.7 °C) 97 %      MAP       128         Physical Exam    Nursing note and vitals reviewed.  Constitutional: He appears well-developed and well-nourished. He is not diaphoretic. No distress.   HENT:   Head: Normocephalic and atraumatic.   Right Ear: External ear normal.   Left Ear: External ear normal.   Nose: Nose normal.   No temporal tenderness to palpation   Eyes: Conjunctivae and EOM are normal. Pupils are equal, round, and reactive to light. Right eye exhibits no discharge. Left eye exhibits no discharge. No scleral icterus.   Neck: Normal range of motion. Neck supple.   No meningismus   Cardiovascular: Normal rate, regular rhythm and normal heart sounds. Exam reveals no gallop and no friction rub.    No  murmur heard.  Pulmonary/Chest: Breath sounds normal. No respiratory distress.   Abdominal: Soft. He exhibits no distension. There is no tenderness.   Musculoskeletal: Normal range of motion.   Neurological: He is alert and oriented to person, place, and time. He has normal strength. No cranial nerve deficit or sensory deficit.   Symmetric strength   Skin: Skin is warm and dry. Capillary refill takes less than 2 seconds.   Psychiatric: He has a normal mood and affect. Thought content normal.         ED Course   Procedures  Labs Reviewed - No data to display          Medical Decision Making:   Initial Assessment:   53 y/o male presents with L sided migraine HA since 2:30 pm. Pt has hx of migraine. Will give Reglan, benadryl, fluids and medication for pain.   Differential Diagnosis:   Migraine headache, cluster headache  ED Management:  6:23 PM  feeling better on reassessment, HA almost completely gone    I do not feel that it is necessary to order imaging on this patient presenting with HA that is exactly like his previous cluster headaches.  He is followed by neurology and has MRI scheduled next week.  Low suspicion for SAH or other intracranial pathology.  No focal neuro deficits on exam.  Low suspicion for temporal arteritis, no temporal TTP.  Low suspicion for meningitis, no meningismus, no fever, patient nontoxic appearing.  Suspect migraine/cluster HA.   Patient stable for discharge.  Given strict return precautions and recommendations to follow up with neurologist next week.                        Clinical Impression:   The encounter diagnosis was Acute nonintractable headache, unspecified headache type.    Disposition:   Disposition: Discharged  Condition: Stable       I, Freda Gamez,  personally performed the services described in this documentation. All medical record entries made by the scribe were at my direction and in my presence.  I have reviewed the chart and agree that the record reflects my  personal performance and is accurate and complete. Freda Gamez M.D. 6:32 PM05/18/2018                   Freda Gamez MD  05/18/18 1834

## 2018-05-20 RX ORDER — FLUCONAZOLE 100 MG/1
100 TABLET ORAL DAILY
Qty: 7 TABLET | Refills: 2 | OUTPATIENT
Start: 2018-05-20 | End: 2018-06-19

## 2018-05-23 ENCOUNTER — HOSPITAL ENCOUNTER (OUTPATIENT)
Dept: RADIOLOGY | Facility: HOSPITAL | Age: 53
Discharge: HOME | End: 2018-05-23
Attending: PSYCHIATRY & NEUROLOGY
Payer: COMMERCIAL

## 2018-05-23 DIAGNOSIS — G44.89 CHRONIC MIXED HEADACHE SYNDROME: ICD-10-CM

## 2018-05-23 PROCEDURE — 70553 MRI BRAIN STEM W/O & W/DYE: CPT | Mod: 26,,, | Performed by: RADIOLOGY

## 2018-05-23 PROCEDURE — 70553 MRI BRAIN STEM W/O & W/DYE: CPT | Mod: TC,PO

## 2018-05-23 PROCEDURE — A9585 GADOBUTROL INJECTION: HCPCS | Mod: PO | Performed by: PSYCHIATRY & NEUROLOGY

## 2018-05-23 PROCEDURE — 25500020 PHARM REV CODE 255: Mod: PO | Performed by: PSYCHIATRY & NEUROLOGY

## 2018-05-23 RX ORDER — GADOBUTROL 604.72 MG/ML
10 INJECTION INTRAVENOUS
Status: COMPLETED | OUTPATIENT
Start: 2018-05-23 | End: 2018-05-23

## 2018-05-23 RX ADMIN — GADOBUTROL 10 ML: 604.72 INJECTION INTRAVENOUS at 09:05

## 2018-06-13 ENCOUNTER — TELEPHONE (OUTPATIENT)
Dept: NEUROLOGY | Facility: CLINIC | Age: 53
End: 2018-06-13

## 2018-06-13 NOTE — TELEPHONE ENCOUNTER
----- Message from Helene Griffith MD sent at 6/12/2018  4:48 PM CDT -----  Contact: Pt  I have no further recommendations. I am having a meeting with a rep tomorrow about a new device for chronic Cluster patients. Will let him know if he is a good candidate for the surgically implanted device (ATI)  ----- Message -----  From: Katy Juarez LPN  Sent: 6/12/2018   9:12 AM  To: Helene Griffith MD    Would you like for me to add this patient to the 1:00 spot that is on hold?  ----- Message -----  From: Ayanna Lopez  Sent: 6/12/2018   8:39 AM  To: Nacho Narayan Staff (Neuro)    Same Day Appointment Request    Pt states he is experiencing an extremely bad headache would like to know if he can be seen today     Pt contact number [phone]  Thanks

## 2018-06-13 NOTE — TELEPHONE ENCOUNTER
----- Message from Ayanna Lopez sent at 6/12/2018  8:39 AM CDT -----  Contact: Pt  Same Day Appointment Request    Pt states he is experiencing an extremely bad headache would like to know if he can be seen today     Pt contact number [phone]  Thanks

## 2018-06-13 NOTE — TELEPHONE ENCOUNTER
Spoke with the pt, pt was informed of new device via [place] and plans to go by the office to discuss.

## 2018-06-26 DIAGNOSIS — G44.011 INTRACTABLE EPISODIC CLUSTER HEADACHE: Primary | ICD-10-CM

## 2018-06-26 RX ORDER — BUTALBITAL, ACETAMINOPHEN AND CAFFEINE 50; 325; 40 MG/1; MG/1; MG/1
TABLET ORAL
Qty: 14 TABLET | Refills: 2 | Status: SHIPPED | OUTPATIENT
Start: 2018-06-26 | End: 2019-08-06 | Stop reason: SDUPTHER

## 2018-06-26 NOTE — TELEPHONE ENCOUNTER
----- Message from oDra Gonzalez sent at 6/26/2018  8:54 AM CDT -----  Type:  RX Refill Request    Who Called:  Wife (Dia)  RX Name and Strength:  butalbital-acetaminophen-caffeine -40 mg (FIORICET, ESGIC) -40 mg per tablet  Preferred Pharmacy with phone number:  [facility] at [phone] (Phone)  Local or Mail Order:  local  Ordering Provider:  Helene Giraldo Call Back Number:  [phone]  Additional Information:

## 2018-06-27 ENCOUNTER — OFFICE VISIT (OUTPATIENT)
Dept: OTOLARYNGOLOGY | Facility: CLINIC | Age: 53
End: 2018-06-27
Payer: COMMERCIAL

## 2018-06-27 VITALS — DIASTOLIC BLOOD PRESSURE: 92 MMHG | HEART RATE: 65 BPM | SYSTOLIC BLOOD PRESSURE: 137 MMHG

## 2018-06-27 DIAGNOSIS — R51.9 HEADACHE, UNSPECIFIED HEADACHE TYPE: Primary | ICD-10-CM

## 2018-06-27 PROCEDURE — 99999 PR PBB SHADOW E&M-EST. PATIENT-LVL II: CPT | Mod: PBBFAC,,, | Performed by: OTOLARYNGOLOGY

## 2018-06-27 PROCEDURE — 99204 OFFICE O/P NEW MOD 45 MIN: CPT | Mod: 25,S$GLB,, | Performed by: OTOLARYNGOLOGY

## 2018-06-27 PROCEDURE — 31231 NASAL ENDOSCOPY DX: CPT | Mod: S$GLB,,, | Performed by: OTOLARYNGOLOGY

## 2018-06-27 PROCEDURE — 3075F SYST BP GE 130 - 139MM HG: CPT | Mod: CPTII,S$GLB,, | Performed by: OTOLARYNGOLOGY

## 2018-06-27 PROCEDURE — 3080F DIAST BP >= 90 MM HG: CPT | Mod: CPTII,S$GLB,, | Performed by: OTOLARYNGOLOGY

## 2018-06-28 NOTE — CONSULTS
Mr. Mcwilliams presents for consultation.    VITAL SIGNS:  Per nurses' notes.    CHIEF COMPLAINT:  Headaches.    HISTORY OF PRESENT ILLNESS:  This is a 52-year-old white male who has a history   of once annually cluster type migraine headaches on his left side.  He states   that this either occurs between December and March or between March and May.  He   denies any nasal obstruction.  It only occurs once a year.  He is being seen   and treated by Dr. Griffith on the [place] who has a headache specialist.  An   MRI had been ordered and was obtained last month, which showed no intracranial   issues, but did have some evidence of possible nasal septal spur.  He presents   for evaluation of this.    REVIEW OF SYSTEMS:  CONSTITUTIONAL: Weight loss or weight gain: Negative.  ALLERGY/IMMUNOLOGIC: Negative.  ENT/Mouth:  Hearing Loss/Dizziness/Tinnitus: Negative.  Ear Infections/Otalgia: Negative.  Rhinitis/Sinusitis/Epistaxis: Negative.  Headache/Facial Pain: Negative.  Nasal Obstruction/Snoring/DELMY: Negative.  Throat: Infections/Pain: Negative.  Hoarseness/Speech Disturbance: Negative.  Salivary Glands Disorder: Negative.  Trauma: Hx: Negative.    Cardiovascular:  MI/Angina: Negative.  Hypertension: Negative.  Endo: DM/Steroids: Negative.  Eyes: Negative.  GI: Dysphagia/Reflux: Negative.  : GYN Pregnancy: Negative.      Renal: Dialysis: Negative.  Lymph: Neck Mass/Lymphadenopathy: Negative.  Musculoskeletal: Negative.  Hem: Bleeding Disorders/Anemia: Negative.  Neuro: Cranial/Neuralgia: Negative.  Pulm: Asthma/SOB/Cough: Negative.  Skin/Breast: Negative.    PAST MEDICAL/FAMILY/SOCIAL HISTORY:    Additional Past Medical History   ENT Surgery: Negative.   Occupational Exposure: Negative.    Problems: Negative.   Cancer: Negative.   Positive for heart attack 10 years ago, left eye pain, reflux disease and   hyperlipidemia.  Surgical history positive for back surgery and hernia repair.    Past Family History   Family  history hearing loss: Negative.   Family history cancer: Positive.   Positive for diabetes, hyperlipidemia and hypertension.    Past Social History   Tobacco: He is a former smoker who quit in 2017.   Alcohol: He is a regular social beer drinker.    MEDICATIONS:  Per MedCard.    ALLERGIES:  Per MedCard.    EXAMINATION:  General Appearance:  Well-developed, well-nourished 52-year-old white male in no   apparent distress.  Communication Ability:  Good.  EARS, NOSE, THROAT, MOUTH;  EARS: Clear.   External auditory canals: Clear.   Hearing: Grossly Intact.   Tympanic membranes: Clear.  NOSE:   External: Shows adequate valve support.   Intranasal: The septum appears relatively straight anteriorly, 1-2+ turbinates.    On flexible fiberoptic exam with scope #[id number], nasal endoscopy shows him to   have a very moderate inferiorly based right nasal septal spur, not abutting the   sidewall.  Remainder of his mucosa is healthy.  No polyps or masses or lesions   are noted.  This spur is on the right side.  His left side appears open and   clear completely.  MOUTH:   Intraorally: Lips, teeth and gums: Normal.   Oropharynx: Normal.   Mucosa: Normal.  THROAT:   Tongue: Normal.   Palate: Normal.   Tonsils: Minimum.   Posterior pharynx: Normal.  HEAD/FACE INSPECTION: Normal and atraumatic.   Palpation/Percussion:  Non tender.   Facial Strength: Normal and symmetric.   Salivary glands: Normal.    NECK: Supple.  THYROID: No masses.  LYMPHATICS: No nodes.  RESPIRATORY:   Effort: Normal.  EYES:   Ocular Mobility: Normal.   Vision: Grossly intact.  NEURO/PSYCH:   Cranial nerves: 2-12 grossly intact.   Orientation: x3.   Mood/Affect: Normal.    IMPRESSION:  A 52-year-old white male with history of once a year left-sided   migraine type headaches.    RECOMMENDATION:  I have discussed my findings with him in detail as well as my   recommendations for treatment.  I do not feel that his nasal spur is causing his   headaches nor is it causing  him any nasal obstruction.  We have discussed the   use of sinus rinses utilizing distilled water and he states that he has done   this in the past and will now do this regularly.  He will continue to follow up   with Dr. Griffith for his headaches.  He will return to clinic here pkathi DIEZ  dd: 06/27/2018 09:52:54 (CDT)  td: 06/27/2018 23:25:21 (CDT)  Doc ID   #[id number]  Job ID #[id number]    CC:

## 2018-07-16 DIAGNOSIS — Z12.11 COLON CANCER SCREENING: ICD-10-CM

## 2018-09-13 ENCOUNTER — OFFICE VISIT (OUTPATIENT)
Dept: INTERNAL MEDICINE | Facility: CLINIC | Age: 53
End: 2018-09-13
Payer: COMMERCIAL

## 2018-09-13 ENCOUNTER — TELEPHONE (OUTPATIENT)
Dept: GASTROENTEROLOGY | Facility: CLINIC | Age: 53
End: 2018-09-13

## 2018-09-13 ENCOUNTER — TELEPHONE (OUTPATIENT)
Dept: INTERNAL MEDICINE | Facility: CLINIC | Age: 53
End: 2018-09-13

## 2018-09-13 ENCOUNTER — LAB VISIT (OUTPATIENT)
Dept: LAB | Facility: HOSPITAL | Age: 53
End: 2018-09-13
Attending: INTERNAL MEDICINE
Payer: COMMERCIAL

## 2018-09-13 VITALS
WEIGHT: 244.06 LBS | OXYGEN SATURATION: 97 % | SYSTOLIC BLOOD PRESSURE: 140 MMHG | HEART RATE: 68 BPM | DIASTOLIC BLOOD PRESSURE: 94 MMHG | TEMPERATURE: 99 F | BODY MASS INDEX: 33.06 KG/M2 | HEIGHT: 72 IN

## 2018-09-13 DIAGNOSIS — I15.9 SECONDARY HYPERTENSION: Primary | ICD-10-CM

## 2018-09-13 DIAGNOSIS — Z12.5 SCREENING PSA (PROSTATE SPECIFIC ANTIGEN): ICD-10-CM

## 2018-09-13 DIAGNOSIS — E78.5 HYPERLIPIDEMIA, UNSPECIFIED HYPERLIPIDEMIA TYPE: ICD-10-CM

## 2018-09-13 DIAGNOSIS — R21 RASH: ICD-10-CM

## 2018-09-13 DIAGNOSIS — K21.9 GASTROESOPHAGEAL REFLUX DISEASE, ESOPHAGITIS PRESENCE NOT SPECIFIED: ICD-10-CM

## 2018-09-13 DIAGNOSIS — I25.10 CORONARY ARTERY DISEASE, ANGINA PRESENCE UNSPECIFIED, UNSPECIFIED VESSEL OR LESION TYPE, UNSPECIFIED WHETHER NATIVE OR TRANSPLANTED HEART: ICD-10-CM

## 2018-09-13 LAB
ALBUMIN SERPL BCP-MCNC: 4.2 G/DL
ALP SERPL-CCNC: 71 U/L
ALT SERPL W/O P-5'-P-CCNC: 29 U/L
ANION GAP SERPL CALC-SCNC: 10 MMOL/L
AST SERPL-CCNC: 20 U/L
BILIRUB SERPL-MCNC: 0.5 MG/DL
BUN SERPL-MCNC: 15 MG/DL
CALCIUM SERPL-MCNC: 9.6 MG/DL
CHLORIDE SERPL-SCNC: 105 MMOL/L
CHOLEST SERPL-MCNC: 312 MG/DL
CHOLEST/HDLC SERPL: 8.2 {RATIO}
CO2 SERPL-SCNC: 27 MMOL/L
COMPLEXED PSA SERPL-MCNC: 0.42 NG/ML
CREAT SERPL-MCNC: 1.1 MG/DL
EST. GFR  (AFRICAN AMERICAN): >60 ML/MIN/1.73 M^2
EST. GFR  (NON AFRICAN AMERICAN): >60 ML/MIN/1.73 M^2
GLUCOSE SERPL-MCNC: 93 MG/DL
HDLC SERPL-MCNC: 38 MG/DL
HDLC SERPL: 12.2 %
LDLC SERPL CALC-MCNC: 229.8 MG/DL
NONHDLC SERPL-MCNC: 274 MG/DL
POTASSIUM SERPL-SCNC: 4 MMOL/L
PROT SERPL-MCNC: 7.4 G/DL
SODIUM SERPL-SCNC: 142 MMOL/L
TRIGL SERPL-MCNC: 221 MG/DL

## 2018-09-13 PROCEDURE — 3008F BODY MASS INDEX DOCD: CPT | Mod: CPTII,S$GLB,, | Performed by: INTERNAL MEDICINE

## 2018-09-13 PROCEDURE — 80061 LIPID PANEL: CPT

## 2018-09-13 PROCEDURE — 3080F DIAST BP >= 90 MM HG: CPT | Mod: CPTII,S$GLB,, | Performed by: INTERNAL MEDICINE

## 2018-09-13 PROCEDURE — 86803 HEPATITIS C AB TEST: CPT

## 2018-09-13 PROCEDURE — 80053 COMPREHEN METABOLIC PANEL: CPT

## 2018-09-13 PROCEDURE — 99999 PR PBB SHADOW E&M-EST. PATIENT-LVL V: CPT | Mod: PBBFAC,,, | Performed by: INTERNAL MEDICINE

## 2018-09-13 PROCEDURE — 3077F SYST BP >= 140 MM HG: CPT | Mod: CPTII,S$GLB,, | Performed by: INTERNAL MEDICINE

## 2018-09-13 PROCEDURE — 99214 OFFICE O/P EST MOD 30 MIN: CPT | Mod: S$GLB,,, | Performed by: INTERNAL MEDICINE

## 2018-09-13 PROCEDURE — 36415 COLL VENOUS BLD VENIPUNCTURE: CPT

## 2018-09-13 PROCEDURE — 84153 ASSAY OF PSA TOTAL: CPT

## 2018-09-13 RX ORDER — OMEPRAZOLE 40 MG/1
40 CAPSULE, DELAYED RELEASE ORAL DAILY
Qty: 90 CAPSULE | Refills: 3 | Status: SHIPPED | OUTPATIENT
Start: 2018-09-13 | End: 2019-08-21 | Stop reason: SDUPTHER

## 2018-09-13 RX ORDER — CLOTRIMAZOLE AND BETAMETHASONE DIPROPIONATE 10; .64 MG/G; MG/G
CREAM TOPICAL 2 TIMES DAILY
Qty: 15 G | Refills: 1 | Status: SHIPPED | OUTPATIENT
Start: 2018-09-13

## 2018-09-13 RX ORDER — ATORVASTATIN CALCIUM 40 MG/1
40 TABLET, FILM COATED ORAL DAILY
Qty: 90 TABLET | Refills: 3 | Status: SHIPPED | OUTPATIENT
Start: 2018-09-13 | End: 2019-08-21 | Stop reason: SDUPTHER

## 2018-09-13 NOTE — TELEPHONE ENCOUNTER
----- Message from Catina Cruz sent at 9/13/2018  2:11 PM CDT -----  Contact: pt#[phone]  Patient Returning Call from [facility]    Who Left Message for Patient:Elissa   Communication Preference:callback   Additional Information:

## 2018-09-13 NOTE — TELEPHONE ENCOUNTER
Spoke with patient.  Scheduled to see Dr.Kimberly Johnson at [facility]'s [employer] location on 10/11 for 2:30.  Will mail confirmation.

## 2018-09-13 NOTE — PROGRESS NOTES
He is a 52 -year-old gentleman coming in today to follow up his ongoing   medical problems including hyperlipidemia and some glucose intolerance.         He has been having headaches. He Has had cluster headaches. He saw a neurologist, Dr Helene Griffith<  on the [place] and was dx with migraines.   He was put on Depakote usually just in November. It has helped.  He headaches are much better.     History of Brugada syndrome with abnormal EKG. He saw arrhythmia back in   2008, Dr. Merritt. He is currently doing well. He denies any chest   pain or short-windedness.       Tobacco abuse: He has tried medication for this. He wants to  quite. He was also put on the bupropion for his smoking, but it caused some Ed so he stopped.  He does not want to take anything for this.     Reflux: He was taking over-the-counter omeprazole. He's had reflux for about 4 years,. He states it worsens with some of the foods he eats.           ROS : Gen - no fatigue or significant weight change  Eyes - no eye pain or visual changes  ENT - no hoarseness or sore throat  CV - No chest pain or SOB. NO palpitations.   Pulm - no cough or wheezing  GI - no N/V/D   no dysuria or incontinence  MS - no joint pain or muscle pain  Skin - no rash, or c/o of skin lesions  Neuro - no HA, dizziness--- memory is doing well.   Heme - no abnormal bleeding or bruising  Endo - no polydipsia, or temperature changes  Psych - no anxiety or depression         PHYSICAL EXAM: BP (!) 140/94 (BP Location: Right arm, Patient Position: Sitting, BP Method: Large (Manual))   Pulse 68   Temp 99 °F (37.2 °C)   Ht 6' (1.829 m)   Wt 110.7 kg (244 lb 0.8 oz)   SpO2 97%   BMI 33.10 kg/m²   NECK: Supple. No JVD. Thyroid is not enlarged.   LUNGS/CHEST   Clear to auscultation and percussion with normal air movement. Breathing   was unlabored.   CARDIOVASCULAR: S1, S2, regular rate and rhythm without murmur, gallop or   rub.   ABDOMEN: Soft, nontender. No  hepatosplenomegaly. No guarding or rebound   tenderness.   LOWER EXTREMITIES: No edema.     ASSESSMENT:   1. Hyperlipidemia. -  Discussed diet and exercise. WIll restart lipitor 40 mg a day   2. Tobacco abuse. Spent 5 minutes counseling. WIll set up smoking cessation classes.    3. Headaches- doing better.   4. GERD- will restart prescription Omeprazole and will refer to gastro .    5. History of Brugada syndrome vs Brugada pattern with abnormal EKG. He saw arrhythmia back in   2008,  Will check ekg and echo and refer back to cards    6. Uri- conservative treatment   7. Brother with recent prostate cancer dx- will screen with psa   8. htn- did not take bp last night- will bring back in 2 months and recheck         F/u in 2 months        Answers for HPI/ROS submitted by the patient on 9/12/2018   activity change: No  unexpected weight change: No  neck pain: No  hearing loss: Yes  rhinorrhea: Yes  trouble swallowing: No  eye discharge: No  visual disturbance: No  chest tightness: No  wheezing: No  chest pain: Yes  palpitations: No  blood in stool: No  constipation: No  vomiting: No  diarrhea: No  polydipsia: No  polyuria: Yes  difficulty urinating: No  urgency: Yes  hematuria: No  arthralgias: Yes  headaches: Yes  weakness: No  confusion: No  dysphoric mood: No

## 2018-09-13 NOTE — TELEPHONE ENCOUNTER
----- Message from Meliton Hauqe sent at 9/13/2018  2:14 PM CDT -----  Contact: self/[phone]  Type: Return to Work Note    When were you seen by the physician/provider? Yes ()    How long have you been out of work? 9/12-9/13    When will you be returning to work? 9/14    Comments: Pt forgot to get a return to work slip. Please advise.      Thanks

## 2018-09-13 NOTE — TELEPHONE ENCOUNTER
----- Message from Merry Lewis sent at 9/13/2018 10:56 AM CDT -----  Pt is in need of an apt with Gastro for Reflux.  Please call with and apt

## 2018-09-13 NOTE — TELEPHONE ENCOUNTER
----- Message from Catina Cruz sent at 9/13/2018  2:11 PM CDT -----  Contact: pt#[phone]  Patient Returning Call from [facility]    Who Left Message for Patient:Elissa   Communication Preference:callback   Additional Information:

## 2018-09-14 LAB — HCV AB SERPL QL IA: NEGATIVE

## 2018-09-17 ENCOUNTER — OFFICE VISIT (OUTPATIENT)
Dept: CARDIOLOGY | Facility: CLINIC | Age: 53
End: 2018-09-17
Payer: COMMERCIAL

## 2018-09-17 VITALS
HEART RATE: 65 BPM | HEIGHT: 72 IN | SYSTOLIC BLOOD PRESSURE: 139 MMHG | WEIGHT: 250.44 LBS | DIASTOLIC BLOOD PRESSURE: 89 MMHG | BODY MASS INDEX: 33.92 KG/M2

## 2018-09-17 DIAGNOSIS — I15.9 SECONDARY HYPERTENSION: Primary | ICD-10-CM

## 2018-09-17 DIAGNOSIS — R07.89 OTHER CHEST PAIN: ICD-10-CM

## 2018-09-17 DIAGNOSIS — E78.5 HYPERLIPIDEMIA, UNSPECIFIED HYPERLIPIDEMIA TYPE: ICD-10-CM

## 2018-09-17 DIAGNOSIS — F17.200 SMOKER: ICD-10-CM

## 2018-09-17 PROCEDURE — 99204 OFFICE O/P NEW MOD 45 MIN: CPT | Mod: S$GLB,,, | Performed by: INTERNAL MEDICINE

## 2018-09-17 PROCEDURE — 3008F BODY MASS INDEX DOCD: CPT | Mod: CPTII,S$GLB,, | Performed by: INTERNAL MEDICINE

## 2018-09-17 PROCEDURE — 3079F DIAST BP 80-89 MM HG: CPT | Mod: CPTII,S$GLB,, | Performed by: INTERNAL MEDICINE

## 2018-09-17 PROCEDURE — 3075F SYST BP GE 130 - 139MM HG: CPT | Mod: CPTII,S$GLB,, | Performed by: INTERNAL MEDICINE

## 2018-09-17 PROCEDURE — 99999 PR PBB SHADOW E&M-EST. PATIENT-LVL III: CPT | Mod: PBBFAC,,, | Performed by: INTERNAL MEDICINE

## 2018-09-17 NOTE — LETTER
September 17, 2018      Amauri Dickerson Jr., MD  [address]  [facility] [address]           [facility]  [address] [facility] [address] [place] [address]  Phone: [phone]          Patient: Jovan Mcwilliams   MR Number: [id number]   YOB: 1965   Date of Visit: 9/17/2018       Dear Dr. Amauri Dickerson Jr.:    Thank you for referring Jovan Mcwilliams to me for evaluation. Attached you will find relevant portions of my assessment and plan of care.    If you have questions, please do not hesitate to call me. I look forward to following Jovan Mcwilliams along with you.    Sincerely,    Kostas Daugherty Jr., MD    Enclosure  CC:  No Recipients    If you would like to receive this communication electronically, please contact [email or web address] or [phone] to request more information on [employer] Link access.    For providers and/or their staff who would like to refer a patient to [facility], please contact us through our one-stop-shop provider referral line, [facility], at [phone].    If you feel you have received this communication in error or would no longer like to receive these types of communications, please e-mail [email or web address]

## 2018-09-17 NOTE — PROGRESS NOTES
Subjective:    Patient ID:  Jovan Mcwilliams is a 52 y.o. male who presents for evaluation of Consult (Refer by PCP with labs)      HPI52 yo WM smoker, HTN and HLD who has had EKG's in the past showing a Brugada pattern with negative EP study in 2008. No hx of syncope and no family hx of sudden death. Does have occasional chest pain but no consistently related to exertion.    Review of Systems   Constitution: Negative for decreased appetite, fever, weakness, malaise/fatigue, weight gain and weight loss.   HENT: Negative for hearing loss and nosebleeds.    Eyes: Negative for visual disturbance.   Cardiovascular: Positive for chest pain. Negative for claudication, cyanosis, dyspnea on exertion, irregular heartbeat, leg swelling, near-syncope, orthopnea, palpitations, paroxysmal nocturnal dyspnea and syncope.   Respiratory: Negative for cough, hemoptysis, shortness of breath, sleep disturbances due to breathing, snoring and wheezing.    Endocrine: Negative for cold intolerance, heat intolerance, polydipsia and polyuria.   Hematologic/Lymphatic: Negative for adenopathy and bleeding problem. Does not bruise/bleed easily.   Skin: Negative for color change, itching, poor wound healing, rash and suspicious lesions.   Musculoskeletal: Negative for arthritis, back pain, falls, joint pain, joint swelling, muscle cramps, muscle weakness and myalgias.   Gastrointestinal: Negative for bloating, abdominal pain, change in bowel habit, constipation, flatus, heartburn, hematemesis, hematochezia, hemorrhoids, jaundice, melena, nausea and vomiting.   Genitourinary: Negative for bladder incontinence, decreased libido, frequency, hematuria, hesitancy and urgency.   Neurological: Negative for brief paralysis, difficulty with concentration, excessive daytime sleepiness, dizziness, focal weakness, headaches, light-headedness, loss of balance, numbness and vertigo.   Psychiatric/Behavioral: Negative for altered mental status, depression and  memory loss. The patient does not have insomnia and is not nervous/anxious.    Allergic/Immunologic: Negative for environmental allergies, hives and persistent infections.        Objective:    Physical Exam   Constitutional: He is oriented to person, place, and time. He appears well-developed and well-nourished. No distress.   /89 (BP Location: Right arm, Patient Position: Sitting, BP Method: Large (Automatic))   Pulse 65   Ht 6' (1.829 m)   Wt 113.6 kg (250 lb 7.1 oz)   BMI 33.97 kg/m²      HENT:   Head: Normocephalic and atraumatic.   Eyes: Conjunctivae and lids are normal. Pupils are equal, round, and reactive to light. Right eye exhibits no discharge. No scleral icterus.   Neck: Normal range of motion. Neck supple. No JVD present. No tracheal deviation present. No thyromegaly present.   Cardiovascular: Normal rate, regular rhythm, S1 normal, S2 normal, normal heart sounds and intact distal pulses. Exam reveals no gallop and no friction rub.   No murmur heard.  Pulses:       Carotid pulses are 2+ on the right side, and 2+ on the left side.       Radial pulses are 2+ on the right side, and 2+ on the left side.        Femoral pulses are 2+ on the right side, and 2+ on the left side.       Popliteal pulses are 2+ on the right side, and 2+ on the left side.        Dorsalis pedis pulses are 2+ on the right side, and 2+ on the left side.        Posterior tibial pulses are 2+ on the right side, and 2+ on the left side.   Pulmonary/Chest: Effort normal and breath sounds normal. No respiratory distress. He has no wheezes. He has no rales. He exhibits no tenderness.   Abdominal: Soft. Bowel sounds are normal. He exhibits no distension and no mass. There is no hepatosplenomegaly or hepatomegaly. There is no tenderness. There is no rebound and no guarding.   Musculoskeletal: Normal range of motion. He exhibits no edema or tenderness.   Lymphadenopathy:     He has no cervical adenopathy.   Neurological: He is alert  and oriented to person, place, and time. He has normal reflexes. No cranial nerve deficit. Coordination normal.   Skin: Skin is warm and dry. No rash noted. He is not diaphoretic. No erythema. No pallor.   Psychiatric: He has a normal mood and affect. His speech is normal and behavior is normal. Judgment and thought content normal. Cognition and memory are normal.         Assessment:       1. Secondary hypertension    2. Hyperlipidemia, unspecified hyperlipidemia type    3. Smoker    4. Other chest pain         Plan:     Do not feel the hx of Brugada has any clinical significance.    Has severe HLD with HTN and smoker along with atypical CP    Lipitor 40 mg already started. Add ASA 81mg    Patient advised to modify risk factors such as weight, exercise, diet,  tobacco and alcohol exposure      Orders Placed This Encounter   Procedures    Echocardiogram stress test     Follow-up in about 6 months (around 3/17/2019).

## 2018-09-26 ENCOUNTER — TELEPHONE (OUTPATIENT)
Dept: INTERNAL MEDICINE | Facility: CLINIC | Age: 53
End: 2018-09-26

## 2018-10-02 ENCOUNTER — CLINICAL SUPPORT (OUTPATIENT)
Dept: CARDIOLOGY | Facility: CLINIC | Age: 53
End: 2018-10-02
Attending: INTERNAL MEDICINE
Payer: COMMERCIAL

## 2018-10-02 ENCOUNTER — TELEPHONE (OUTPATIENT)
Dept: CARDIOLOGY | Facility: CLINIC | Age: 53
End: 2018-10-02

## 2018-10-02 VITALS — WEIGHT: 250 LBS | HEIGHT: 72 IN | BODY MASS INDEX: 33.86 KG/M2

## 2018-10-02 DIAGNOSIS — R07.89 OTHER CHEST PAIN: ICD-10-CM

## 2018-10-02 DIAGNOSIS — F17.200 SMOKER: ICD-10-CM

## 2018-10-02 DIAGNOSIS — E78.5 HYPERLIPIDEMIA, UNSPECIFIED HYPERLIPIDEMIA TYPE: ICD-10-CM

## 2018-10-02 DIAGNOSIS — I15.9 SECONDARY HYPERTENSION: ICD-10-CM

## 2018-10-02 LAB
ASCENDING AORTA: 3.67 CM
BSA FOR ECHO PROCEDURE: 2.4 M2
CV ECHO LV RWT: 0.46 CM
CV STRESS BASE HR: 60
DIASTOLIC BLOOD PRESSURE: 97
DOP CALC LVOT AREA: 4.3 CM2
DOP CALC LVOT DIAMETER: 2.34 CM
DOP CALC LVOT STROKE VOLUME: 69.33 CM3
DOP CALCLVOT PEAK VEL VTI: 16.13 CM
E WAVE DECELERATION TIME: 169.37 MSEC
E/A RATIO: 1.13
E/E' RATIO: 8.21
ECHO LV POSTERIOR WALL: 1.1 CM (ref 0.6–1.1)
FRACTIONAL SHORTENING: 32 % (ref 28–44)
INTERVENTRICULAR SEPTUM: 1.05 CM (ref 0.6–1.1)
IVRT: 0.08 MSEC
LA MAJOR: 4.31 CM
LA MINOR: 4.94 CM
LA WIDTH: 3.87 CM
LEFT ATRIUM SIZE: 3.54 CM
LEFT ATRIUM VOLUME INDEX: 22.3 ML/M2
LEFT ATRIUM VOLUME: 53.61 CM3
LEFT INTERNAL DIMENSION IN SYSTOLE: 3.14 CM (ref 2.1–4)
LEFT VENTRICLE DIASTOLIC VOLUME INDEX: 41.46 ML/M2
LEFT VENTRICLE DIASTOLIC VOLUME: 99.51 ML
LEFT VENTRICLE MASS INDEX: 74.1 G/M2
LEFT VENTRICLE SYSTOLIC VOLUME INDEX: 16.3 ML/M2
LEFT VENTRICLE SYSTOLIC VOLUME: 39.21 ML
LEFT VENTRICULAR INTERNAL DIMENSION IN DIASTOLE: 4.64 CM (ref 3.5–6)
LEFT VENTRICULAR MASS: 177.94 G
LV LATERAL E/E' RATIO: 6.5
LV SEPTAL E/E' RATIO: 11.14
MV PEAK A VEL: 0.69 M/S
MV PEAK E VEL: 0.78 M/S
MV STENOSIS PRESSURE HALF TIME: 49.12 MS
MV VALVE AREA P 1/2 METHOD: 4.48 CM2
OHS CV CPX 1 MINUTE RECOVERY HEART RATE: 100 BPM
OHS CV CPX 85 PERCENT MAX PREDICTED HEART RATE MALE: 145
OHS CV CPX ESTIMATED METS: 15
OHS CV CPX MAX PREDICTED HEART RATE: 170
OHS CV CPX PATIENT IS FEMALE: 0
OHS CV CPX PATIENT IS MALE: 1
OHS CV CPX PEAK DIASTOLIC BLOOD PRESSURE: 55 MMHG
OHS CV CPX PEAK HEAR RATE: 129
OHS CV CPX PEAK RATE PRESSURE PRODUCT: NORMAL
OHS CV CPX PEAK SYSTOLIC BLOOD PRESSURE: 196
OHS CV CPX PERCENT MAX PREDICTED HEART RATE ACHIEVED: 76
OHS CV CPX PERCENT TARGET HEART RATE ACHIEVED: 75.88
OHS CV CPX RATE PRESSURE PRODUCT PRESENTING: 9480
OHS CV CPX TARGET HEART RATE: 170
PULM VEIN S/D RATIO: 1
PV PEAK D VEL: 0.33 M/S
PV PEAK S VEL: 0.33 M/S
RA MAJOR: 3.9 CM
RA WIDTH: 4.01 CM
RETIRED EF AND QEF - SEE NOTES: 60.6 %
SINUS: 3.71 CM
STJ: 3.76 CM
STRESS ECHO POST EXERCISE DUR MIN: 7 MIN
STRESS ECHO POST EXERCISE DUR SEC: 47
SYSTOLIC BLOOD PRESSURE: 158
TDI LATERAL: 0.12
TDI SEPTAL: 0.07
TDI: 0.1

## 2018-10-02 PROCEDURE — 99999 PR PBB SHADOW E&M-EST. PATIENT-LVL I: CPT | Mod: PBBFAC,,,

## 2018-10-02 PROCEDURE — 93351 STRESS TTE COMPLETE: CPT | Mod: S$GLB,,, | Performed by: INTERNAL MEDICINE

## 2018-10-11 ENCOUNTER — OFFICE VISIT (OUTPATIENT)
Dept: GASTROENTEROLOGY | Facility: CLINIC | Age: 53
End: 2018-10-11
Payer: COMMERCIAL

## 2018-10-11 VITALS
BODY MASS INDEX: 33.72 KG/M2 | WEIGHT: 249 LBS | SYSTOLIC BLOOD PRESSURE: 143 MMHG | HEIGHT: 72 IN | DIASTOLIC BLOOD PRESSURE: 81 MMHG | HEART RATE: 68 BPM

## 2018-10-11 DIAGNOSIS — K21.9 GASTROESOPHAGEAL REFLUX DISEASE, ESOPHAGITIS PRESENCE NOT SPECIFIED: Primary | ICD-10-CM

## 2018-10-11 PROCEDURE — 99203 OFFICE O/P NEW LOW 30 MIN: CPT | Mod: S$GLB,,, | Performed by: INTERNAL MEDICINE

## 2018-10-11 PROCEDURE — 99999 PR PBB SHADOW E&M-EST. PATIENT-LVL III: CPT | Mod: PBBFAC,,, | Performed by: INTERNAL MEDICINE

## 2018-10-11 NOTE — LETTER
October 11, 2018      Amauri Dickerson Jr., MD  [address]  [facility] [address]           [facility]  [address] [place] [address]  [place] [address]  Phone: [phone]          Patient: Jovan Mcwilliams   MR Number: [id number]   YOB: 1965   Date of Visit: 10/11/2018       Dear Dr. Amauri Dickerson Jr.:    Thank you for referring Jovan Mcwilliams to me for evaluation. Attached you will find relevant portions of my assessment and plan of care.    If you have questions, please do not hesitate to call me. I look forward to following Jovan Mcwilliams along with you.    Sincerely,    Mary Johnson MD    Enclosure  CC:  No Recipients    If you would like to receive this communication electronically, please contact [email or web address] or [phone] to request more information on [employer] Link access.    For providers and/or their staff who would like to refer a patient to [facility], please contact us through our one-stop-shop provider referral line, [facility] , at [phone].    If you feel you have received this communication in error or would no longer like to receive these types of communications, please e-mail [email or web address]

## 2018-10-11 NOTE — PATIENT INSTRUCTIONS
EGD Prep Instructions    [facility] [address] Clinic Office [phone]  Endoscopy Lab [phone]    You are scheduled for an EGD with Dr. Johnson on Monday, October 29 at [facility].  You will check in at Admit on the first floor of the hospital.    You may have clear liquids in the morning when you wake.  Nothing to drink starting THREE HOURS BEFORE YOUR ARRIVAL TIME.  You MAY brush your teeth.    You MAY take your blood pressure, heart, and seizure medication on the morning of the procedure, with a SIP of water.  Hold ALL other medications until after the procedure.    If you are on blood thinners THAT YOU HAVE BEEN INSTRUCTED TO HOLD BY YOUR DOCTOR FOR THIS PROCEDURE, then do NOT take this the morning of your EGD.  Do NOT stop these medications on your own, they must be approved to be held by your doctor.  Your EGD can NOT be done if you are on these medications.  Examples of blood thinners include: Coumadin, Aggrenox, Plavix, Pradaxa, Reapro, Pletal, Xarelto, Ticagrelor, Brilinta, Eliquis, and high dose aspirin (325 mg).  You do not have to stop baby aspirin 81 mg.    You will receive a call 2 days before your EGD to tell you the time to arrive.  If you have not received a call by the day before your procedure, call the Endoscopy Lab at [phone].

## 2018-10-11 NOTE — PROGRESS NOTES
Subjective:       Patient ID: Jovan Mcwilliams is a 52 y.o. male.    Chief Complaint: Gastroesophageal Reflux    53 yo M complains of longstanding GERD.  He states he has had symptoms for over 10 years.  He will get symptoms intermittently, and when it is more frequent and more severe, he will take PPI for a few months.  Symptoms will improve and he will then stop the PPI.  The cycle will repeat.  For the past 2 months he wakes with bile or fluid in his mouth.  Sometimes he has to get up to spit it out, and he has vomited bile once.  He denies hematemesis or weight loss.      Review of Systems   Constitutional: Negative for appetite change and unexpected weight change.   Eyes: Negative for photophobia and visual disturbance.   Respiratory: Negative for chest tightness, shortness of breath and wheezing.    Cardiovascular: Negative for chest pain, palpitations and leg swelling.   Genitourinary: Negative for dysuria, flank pain and hematuria.   Musculoskeletal: Negative for joint swelling and myalgias.   Skin: Negative for color change and rash.   Neurological: Negative for dizziness and speech difficulty.   Psychiatric/Behavioral: Negative for confusion and hallucinations.       Objective:       BP (!) 143/81 (BP Location: Right arm)   Pulse 68   Ht 6' (1.829 m)   Wt 112.9 kg (249 lb)   BMI 33.77 kg/m²     Physical Exam   Constitutional: He is oriented to person, place, and time. He appears well-developed and well-nourished.   HENT:   Head: Normocephalic and atraumatic.   Eyes: EOM are normal. Pupils are equal, round, and reactive to light.   Neck: Normal range of motion. Neck supple.   Cardiovascular: Normal rate, regular rhythm, normal heart sounds and intact distal pulses.   Pulmonary/Chest: Effort normal and breath sounds normal.   Abdominal: Soft. Bowel sounds are normal. He exhibits no distension and no mass. There is no tenderness. There is no rebound and no guarding.   Musculoskeletal: He exhibits no edema or  deformity.   Neurological: He is alert and oriented to person, place, and time.   Skin: Skin is warm and dry.   Psychiatric: He has a normal mood and affect. His behavior is normal.       Assessment:       1. Gastroesophageal reflux disease, esophagitis presence not specified        Plan:       Gastroesophageal reflux disease, esophagitis presence not specified  -     Case request GI: EGD (ESOPHAGOGASTRODUODENOSCOPY)  -     Cont PPI.  Pt can try to decrease to the 20 mg dose after 6-12 months if he chooses.  Lowest dose that is effective is the best dose.  -     Proven risks of long term PPI use, including pneumonia, c.diff infection, and bone loss, as well as theoretical risks including dementia and CKD, were discussed with the patient at length and the patient understands risks and benefits of therapy.  Option of tapering/weaning PPI away was also discussed, including the need for possible long term therapy to treat symptoms if they recur after cessation of medication, as well as to mitigate the risk of developing complications of reflux such as Pat's esophagus and/or esophageal cancer.  Patient understands the risks and benefits of treatment with drug versus cessation.  Daily supplementation with MVI with calcium and vitamin D were recommended as was follow up with PCP for bone scan when appropriate.  Labs including B12, folate, CMP, CBC, calcium, and magnesium should be checked at least annually.

## 2018-10-24 ENCOUNTER — TELEPHONE (OUTPATIENT)
Dept: GASTROENTEROLOGY | Facility: CLINIC | Age: 53
End: 2018-10-24

## 2018-10-24 NOTE — TELEPHONE ENCOUNTER
----- Message from Aleshia Lai RN sent at 10/24/2018  8:42 AM CDT -----  Regarding: Cancelled Procedure.   Patient cancelled because of co pay for procedure. Patient states that medication therapy has been very effective.

## 2018-10-26 ENCOUNTER — PATIENT MESSAGE (OUTPATIENT)
Dept: NEUROLOGY | Facility: CLINIC | Age: 53
End: 2018-10-26

## 2018-10-26 DIAGNOSIS — G43.719 INTRACTABLE CHRONIC MIGRAINE WITHOUT AURA AND WITHOUT STATUS MIGRAINOSUS: Primary | ICD-10-CM

## 2018-11-05 ENCOUNTER — TELEPHONE (OUTPATIENT)
Dept: GASTROENTEROLOGY | Facility: CLINIC | Age: 53
End: 2018-11-05

## 2018-11-05 NOTE — TELEPHONE ENCOUNTER
Spoke with patient to see if he was ready to schedule his EGD with Dr. Johnson. Patient stated that he has to get with his wife to see when she's available to bring on his procedure day. Patient stated that he will give office a call back.

## 2018-11-06 ENCOUNTER — OFFICE VISIT (OUTPATIENT)
Dept: DERMATOLOGY | Facility: CLINIC | Age: 53
End: 2018-11-06
Payer: COMMERCIAL

## 2018-11-06 DIAGNOSIS — D22.9 MULTIPLE BENIGN NEVI: ICD-10-CM

## 2018-11-06 DIAGNOSIS — L82.1 SK (SEBORRHEIC KERATOSIS): Primary | ICD-10-CM

## 2018-11-06 DIAGNOSIS — D23.9 DERMATOFIBROMA: ICD-10-CM

## 2018-11-06 DIAGNOSIS — Z12.83 SCREENING EXAM FOR SKIN CANCER: ICD-10-CM

## 2018-11-06 PROCEDURE — 99203 OFFICE O/P NEW LOW 30 MIN: CPT | Mod: S$GLB,,, | Performed by: DERMATOLOGY

## 2018-11-06 PROCEDURE — 99999 PR PBB SHADOW E&M-EST. PATIENT-LVL II: CPT | Mod: PBBFAC,,, | Performed by: DERMATOLOGY

## 2018-11-06 NOTE — PROGRESS NOTES
Subjective:       Patient ID:  Jovan Mcwilliams is a 52 y.o. male who presents for   Chief Complaint   Patient presents with    Skin Check     TBSE, spot to L lat knee few years asymptomatic no prev tx      Here for TBSE  + h/o sun exposure  No h/o nmsc or mm    Patient complains of lesion(s)  Location: left knee  Duration: 3 years  Symptoms: none  Relieving factors/Previous treatments: none              Review of Systems   Skin: Positive for activity-related sunscreen use and recent sunburn (post neck). Negative for daily sunscreen use.   Hematologic/Lymphatic: Does not bruise/bleed easily.        Objective:    Physical Exam   Constitutional: He appears well-developed and well-nourished. No distress.   Neurological: He is alert and oriented to person, place, and time. He is not disoriented.   Psychiatric: He has a normal mood and affect.   Skin:   Areas Examined (abnormalities noted in diagram):   Scalp / Hair Palpated and Inspected  Head / Face Inspection Performed  Neck Inspection Performed  Chest / Axilla Inspection Performed  Abdomen Inspection Performed  Genitals / Buttocks / Groin Inspection Performed  Back Inspection Performed  RUE Inspected  LUE Inspection Performed  RLE Inspected  LLE Inspection Performed  Nails and Digits Inspection Performed              Diagram Legend     Erythematous scaling macule/papule c/w actinic keratosis       Vascular papule c/w angioma      Pigmented verrucoid papule/plaque c/w seborrheic keratosis      Yellow umbilicated papule c/w sebaceous hyperplasia      Irregularly shaped tan macule c/w lentigo     1-2 mm smooth white papules consistent with Milia      Movable subcutaneous cyst with punctum c/w epidermal inclusion cyst      Subcutaneous movable cyst c/w pilar cyst      Firm pink to brown papule c/w dermatofibroma      Pedunculated fleshy papule(s) c/w skin tag(s)      Evenly pigmented macule c/w junctional nevus     Mildly variegated pigmented, slightly irregular-bordered  macule c/w mildly atypical nevus      Flesh colored to evenly pigmented papule c/w intradermal nevus       Pink pearly papule/plaque c/w basal cell carcinoma      Erythematous hyperkeratotic cursted plaque c/w SCC      Surgical scar with no sign of skin cancer recurrence      Open and closed comedones      Inflammatory papules and pustules      Verrucoid papule consistent consistent with wart     Erythematous eczematous patches and plaques     Dystrophic onycholytic nail with subungual debris c/w onychomycosis     Umbilicated papule    Erythematous-base heme-crusted tan verrucoid plaque consistent with inflamed seborrheic keratosis     Erythematous Silvery Scaling Plaque c/w Psoriasis     See annotation      Assessment / Plan:        SK (seborrheic keratosis)  These are benign inherited growths without a malignant potential. Reassurance given to patient. No treatment is necessary.       Multiple benign nevi  total body skin examination performed today including at least 12 points as noted in physical examination. No lesions suspicious for malignancy noted.  Reassurance provided.  Instructed patient to observe lesion(s) for changes and follow up in clinic if changes are noted. Discussed ABCDE's of moles and brochure provided.      Screening exam for skin cancer  Total body skin examination performed today including at least 12 points as noted in physical examination. No lesions suspicious for malignancy noted.      Dermatofibroma  This is a benign scar-like lesion secondary to minor trauma. No treatment required.                Follow-up if symptoms worsen or fail to improve.

## 2018-11-06 NOTE — LETTER
November 6, 2018      Amauri Dickerson Jr., MD  [address]  [facility] [address]           [facility]  [address]  [facility] [address]  Phone: [phone]  Fax: [phone]          Patient: Jovan Mcwilliams   MR Number: [id number]   YOB: 1965   Date of Visit: 11/6/2018       Dear Dr. Amauri Dickerson Jr.:    Thank you for referring Jovan Mcwilliams to me for evaluation. Attached you will find relevant portions of my assessment and plan of care.    If you have questions, please do not hesitate to call me. I look forward to following Jovan Mcwilliams along with you.    Sincerely,    Evy Delcid MD    Enclosure  CC:  No Recipients    If you would like to receive this communication electronically, please contact [email or web address] or [phone] to request more information on [employer] Link access.    For providers and/or their staff who would like to refer a patient to [facility], please contact us through our one-stop-shop provider referral line, [facility], at [phone].    If you feel you have received this communication in error or would no longer like to receive these types of communications, please e-mail [email or web address]

## 2018-11-14 ENCOUNTER — OFFICE VISIT (OUTPATIENT)
Dept: INTERNAL MEDICINE | Facility: CLINIC | Age: 53
End: 2018-11-14
Payer: COMMERCIAL

## 2018-11-14 VITALS
OXYGEN SATURATION: 94 % | WEIGHT: 250 LBS | BODY MASS INDEX: 33.86 KG/M2 | SYSTOLIC BLOOD PRESSURE: 130 MMHG | HEIGHT: 72 IN | DIASTOLIC BLOOD PRESSURE: 82 MMHG | HEART RATE: 73 BPM

## 2018-11-14 DIAGNOSIS — E78.5 HYPERLIPIDEMIA, UNSPECIFIED HYPERLIPIDEMIA TYPE: ICD-10-CM

## 2018-11-14 DIAGNOSIS — K21.9 GASTROESOPHAGEAL REFLUX DISEASE, ESOPHAGITIS PRESENCE NOT SPECIFIED: ICD-10-CM

## 2018-11-14 DIAGNOSIS — I15.9 SECONDARY HYPERTENSION: Primary | ICD-10-CM

## 2018-11-14 PROCEDURE — 99999 PR PBB SHADOW E&M-EST. PATIENT-LVL III: CPT | Mod: PBBFAC,,, | Performed by: INTERNAL MEDICINE

## 2018-11-14 PROCEDURE — 99396 PREV VISIT EST AGE 40-64: CPT | Mod: S$GLB,,, | Performed by: INTERNAL MEDICINE

## 2018-11-14 PROCEDURE — 3075F SYST BP GE 130 - 139MM HG: CPT | Mod: CPTII,S$GLB,, | Performed by: INTERNAL MEDICINE

## 2018-11-14 PROCEDURE — 3079F DIAST BP 80-89 MM HG: CPT | Mod: CPTII,S$GLB,, | Performed by: INTERNAL MEDICINE

## 2018-12-12 ENCOUNTER — TELEPHONE (OUTPATIENT)
Dept: GASTROENTEROLOGY | Facility: CLINIC | Age: 53
End: 2018-12-12

## 2018-12-12 NOTE — TELEPHONE ENCOUNTER
Attempted to contact patient about scheduling an EGD. Left patient a message to give office a call back.

## 2018-12-19 ENCOUNTER — LAB VISIT (OUTPATIENT)
Dept: LAB | Facility: HOSPITAL | Age: 53
End: 2018-12-19
Attending: INTERNAL MEDICINE
Payer: COMMERCIAL

## 2018-12-19 DIAGNOSIS — Z12.11 COLON CANCER SCREENING: ICD-10-CM

## 2018-12-19 LAB — HEMOCCULT STL QL IA: NEGATIVE

## 2018-12-19 PROCEDURE — 82274 ASSAY TEST FOR BLOOD FECAL: CPT

## 2018-12-23 NOTE — PROGRESS NOTES
HISTORY OF PRESENT ILLNESS:  A 53-year-old gentleman, I last saw him back in   September.  At that time, his blood pressure is elevated.  Last visit, we   addressed hyperlipidemia, tobacco abuse, headaches, reflux or gouty syndrome and   URI, which has resolved and a family history of prostate cancer.  He remained   on his verapamil 240 a day.  Blood pressure today is 130/82, which is much   better.  He does not take any blood pressure medication.  He also has been   having reflux and at last visit, we started him on omeprazole that has helped   out the reflux quite a bit  He says he is feeling well.  He denies any chest   pain, shortness of breath, palpitations, nausea, vomiting and blurriness of   vision.  No PND or orthopnea.  He has seen Gastro and they have him set up for   an EGD, but he states his GI symptoms have since resolved, otherwise he has no   new complaints.    SCREENINGS:  He is due for his influenza vaccine, which we discussed.    PHYSICAL EXAMINATION:  GENERAL:  A well-appearing 53-year-old gentleman, in no acute distress.  VITAL SIGNS:  Blood pressure 130/82, pulse is 76.  He is 6 feet tall, weight 113   kg.  NECK:  Supple.  CHEST:  Clear without wheeze.  CARDIOVASCULAR:  S1 and S2.  Regular rate and rhythm without murmur, gallop or   rub.  ABDOMEN:  Soft and nontender.  No palpable liver.    ASSESSMENT:  1.  Hypertension, better controlled.  Continue verapamil.  2.  Hyperlipidemia.  We will continue atorvastatin 40 mg a day.  3.  Reflux.  Question about whether he needs an EGD.  We discussed that GI would   like to do one.  I will recommend it since he has been having issue for so long   and we will follow him up afterwards.      ROSITA  dd: 12/23/2018 09:48:06 (CST)  td: 12/24/2018 09:09:41 (CST)  Doc ID   #[id number]  Job ID #[id number]    CC:     Answers for HPI/ROS submitted by the patient on 11/14/2018   activity change: No  unexpected weight change: No  neck pain: No  hearing loss:  No  rhinorrhea: No  trouble swallowing: No  eye discharge: No  visual disturbance: No  chest tightness: No  wheezing: No  chest pain: No  palpitations: No  blood in stool: No  constipation: No  vomiting: No  diarrhea: No  polydipsia: No  polyuria: No  difficulty urinating: No  urgency: No  hematuria: No  joint swelling: No  arthralgias: No  headaches: No  weakness: No  confusion: No  dysphoric mood: No

## 2019-01-28 ENCOUNTER — OFFICE VISIT (OUTPATIENT)
Dept: FAMILY MEDICINE | Facility: CLINIC | Age: 54
End: 2019-01-28
Payer: COMMERCIAL

## 2019-01-28 VITALS
WEIGHT: 246.69 LBS | DIASTOLIC BLOOD PRESSURE: 84 MMHG | OXYGEN SATURATION: 97 % | HEART RATE: 77 BPM | BODY MASS INDEX: 33.46 KG/M2 | TEMPERATURE: 99 F | SYSTOLIC BLOOD PRESSURE: 142 MMHG

## 2019-01-28 DIAGNOSIS — J44.1 CHRONIC OBSTRUCTIVE PULMONARY DISEASE WITH ACUTE EXACERBATION: ICD-10-CM

## 2019-01-28 DIAGNOSIS — F17.200 SMOKER: Primary | ICD-10-CM

## 2019-01-28 DIAGNOSIS — B96.89 ACUTE BACTERIAL BRONCHITIS: ICD-10-CM

## 2019-01-28 DIAGNOSIS — J20.8 ACUTE BACTERIAL BRONCHITIS: ICD-10-CM

## 2019-01-28 PROCEDURE — 99213 PR OFFICE/OUTPT VISIT, EST, LEVL III, 20-29 MIN: ICD-10-PCS | Mod: S$GLB,,, | Performed by: PHYSICIAN ASSISTANT

## 2019-01-28 PROCEDURE — 3077F SYST BP >= 140 MM HG: CPT | Mod: CPTII,S$GLB,, | Performed by: PHYSICIAN ASSISTANT

## 2019-01-28 PROCEDURE — 99999 PR PBB SHADOW E&M-EST. PATIENT-LVL III: ICD-10-PCS | Mod: PBBFAC,,, | Performed by: PHYSICIAN ASSISTANT

## 2019-01-28 PROCEDURE — 99213 OFFICE O/P EST LOW 20 MIN: CPT | Mod: S$GLB,,, | Performed by: PHYSICIAN ASSISTANT

## 2019-01-28 PROCEDURE — 3079F PR MOST RECENT DIASTOLIC BLOOD PRESSURE 80-89 MM HG: ICD-10-PCS | Mod: CPTII,S$GLB,, | Performed by: PHYSICIAN ASSISTANT

## 2019-01-28 PROCEDURE — 3077F PR MOST RECENT SYSTOLIC BLOOD PRESSURE >= 140 MM HG: ICD-10-PCS | Mod: CPTII,S$GLB,, | Performed by: PHYSICIAN ASSISTANT

## 2019-01-28 PROCEDURE — 3079F DIAST BP 80-89 MM HG: CPT | Mod: CPTII,S$GLB,, | Performed by: PHYSICIAN ASSISTANT

## 2019-01-28 PROCEDURE — 3008F BODY MASS INDEX DOCD: CPT | Mod: CPTII,S$GLB,, | Performed by: PHYSICIAN ASSISTANT

## 2019-01-28 PROCEDURE — 99999 PR PBB SHADOW E&M-EST. PATIENT-LVL III: CPT | Mod: PBBFAC,,, | Performed by: PHYSICIAN ASSISTANT

## 2019-01-28 PROCEDURE — 3008F PR BODY MASS INDEX (BMI) DOCUMENTED: ICD-10-PCS | Mod: CPTII,S$GLB,, | Performed by: PHYSICIAN ASSISTANT

## 2019-01-28 RX ORDER — PREDNISONE 10 MG/1
TABLET ORAL
Qty: 15 TABLET | Refills: 0 | Status: SHIPPED | OUTPATIENT
Start: 2019-01-28 | End: 2020-01-20

## 2019-01-28 RX ORDER — AMOXICILLIN AND CLAVULANATE POTASSIUM 875; 125 MG/1; MG/1
1 TABLET, FILM COATED ORAL 2 TIMES DAILY
Qty: 20 TABLET | Refills: 0 | Status: SHIPPED | OUTPATIENT
Start: 2019-01-28 | End: 2019-02-07

## 2019-01-28 RX ORDER — ALBUTEROL SULFATE 90 UG/1
2 AEROSOL, METERED RESPIRATORY (INHALATION) EVERY 4 HOURS PRN
Qty: 18 G | Refills: 5 | Status: SHIPPED | OUTPATIENT
Start: 2019-01-28

## 2019-01-28 NOTE — PROGRESS NOTES
Subjective:       Patient ID: Jovan Mcwilliams is a 53 y.o. male.    Chief Complaint: Cough and Nasal Congestion    HPI   Sx x 2 wks  wheeze  Review of Systems   Constitutional: Positive for activity change. Negative for appetite change, chills, diaphoresis, fatigue, fever and unexpected weight change.   HENT: Positive for congestion and postnasal drip. Negative for sinus pressure, sinus pain and sore throat.    Eyes: Negative.    Respiratory: Positive for cough and wheezing.    Cardiovascular: Negative.  Negative for chest pain and leg swelling.   Gastrointestinal: Negative.    Endocrine: Negative.    Genitourinary: Negative.    Musculoskeletal: Negative.    Skin: Negative.  Negative for rash.       Objective:      Physical Exam   Constitutional: He appears well-developed and well-nourished. No distress.   HENT:   Head: Normocephalic and atraumatic.   Right Ear: External ear normal.   Left Ear: External ear normal.   Nose: Nose normal.   Mouth/Throat: Oropharynx is clear and moist. No oropharyngeal exudate.   Sinuses non tender  Mucus clear   Eyes: Conjunctivae are normal. No scleral icterus.   Neck: Normal range of motion. Neck supple. No tracheal deviation present. No thyromegaly present.   Cardiovascular: Normal rate, regular rhythm, normal heart sounds and intact distal pulses. Exam reveals no gallop and no friction rub.   No murmur heard.  Pulmonary/Chest: Effort normal. No stridor. No respiratory distress. He has wheezes. He has no rales.   Faint expiratory wheeze with few scattered basilar rhonchi bilat    Musculoskeletal: He exhibits no edema.   Lymphadenopathy:     He has no cervical adenopathy.   Skin: Skin is warm and dry. No rash noted.   Vitals reviewed.      Assessment:       1. Smoker    2. Chronic obstructive pulmonary disease with acute exacerbation    3. Acute bacterial bronchitis        Plan:       Smoker  -     albuterol (PROVENTIL/VENTOLIN HFA) 90 mcg/actuation inhaler; Inhale 2 puffs into the lungs  every 4 (four) hours as needed for Wheezing or Shortness of Breath.  Dispense: 18 g; Refill: 5  -     amoxicillin-clavulanate 875-125mg (AUGMENTIN) 875-125 mg per tablet; Take 1 tablet by mouth 2 (two) times daily. for 10 days  Dispense: 20 tablet; Refill: 0  -     predniSONE (DELTASONE) 10 MG tablet; 2 tabs in AM x 5 days then one tab in AM x 5 days  Dispense: 15 tablet; Refill: 0    Chronic obstructive pulmonary disease with acute exacerbation  -     albuterol (PROVENTIL/VENTOLIN HFA) 90 mcg/actuation inhaler; Inhale 2 puffs into the lungs every 4 (four) hours as needed for Wheezing or Shortness of Breath.  Dispense: 18 g; Refill: 5  -     amoxicillin-clavulanate 875-125mg (AUGMENTIN) 875-125 mg per tablet; Take 1 tablet by mouth 2 (two) times daily. for 10 days  Dispense: 20 tablet; Refill: 0  -     predniSONE (DELTASONE) 10 MG tablet; 2 tabs in AM x 5 days then one tab in AM x 5 days  Dispense: 15 tablet; Refill: 0    Acute bacterial bronchitis  -     albuterol (PROVENTIL/VENTOLIN HFA) 90 mcg/actuation inhaler; Inhale 2 puffs into the lungs every 4 (four) hours as needed for Wheezing or Shortness of Breath.  Dispense: 18 g; Refill: 5  -     amoxicillin-clavulanate 875-125mg (AUGMENTIN) 875-125 mg per tablet; Take 1 tablet by mouth 2 (two) times daily. for 10 days  Dispense: 20 tablet; Refill: 0  -     predniSONE (DELTASONE) 10 MG tablet; 2 tabs in AM x 5 days then one tab in AM x 5 days  Dispense: 15 tablet; Refill: 0    discussed otc's  Vaporizer  Stop smoking handout given

## 2019-02-15 ENCOUNTER — TELEPHONE (OUTPATIENT)
Dept: GASTROENTEROLOGY | Facility: CLINIC | Age: 54
End: 2019-02-15

## 2019-04-02 DIAGNOSIS — G43.719 INTRACTABLE CHRONIC MIGRAINE WITHOUT AURA AND WITHOUT STATUS MIGRAINOSUS: ICD-10-CM

## 2019-04-15 DIAGNOSIS — G43.719 INTRACTABLE CHRONIC MIGRAINE WITHOUT AURA AND WITHOUT STATUS MIGRAINOSUS: Primary | ICD-10-CM

## 2019-04-15 RX ORDER — VERAPAMIL HYDROCHLORIDE 240 MG/1
240 TABLET, FILM COATED, EXTENDED RELEASE ORAL NIGHTLY
Qty: 30 TABLET | Refills: 11 | Status: SHIPPED | OUTPATIENT
Start: 2019-04-15 | End: 2020-08-03 | Stop reason: SDUPTHER

## 2019-05-14 ENCOUNTER — OFFICE VISIT (OUTPATIENT)
Dept: INTERNAL MEDICINE | Facility: CLINIC | Age: 54
End: 2019-05-14
Payer: COMMERCIAL

## 2019-05-14 ENCOUNTER — LAB VISIT (OUTPATIENT)
Dept: LAB | Facility: HOSPITAL | Age: 54
End: 2019-05-14
Attending: INTERNAL MEDICINE
Payer: COMMERCIAL

## 2019-05-14 VITALS
BODY MASS INDEX: 34.1 KG/M2 | SYSTOLIC BLOOD PRESSURE: 120 MMHG | HEART RATE: 72 BPM | HEIGHT: 72 IN | DIASTOLIC BLOOD PRESSURE: 88 MMHG | WEIGHT: 251.75 LBS | OXYGEN SATURATION: 97 %

## 2019-05-14 DIAGNOSIS — E66.9 CLASS 1 OBESITY WITH BODY MASS INDEX (BMI) OF 34.0 TO 34.9 IN ADULT, UNSPECIFIED OBESITY TYPE, UNSPECIFIED WHETHER SERIOUS COMORBIDITY PRESENT: ICD-10-CM

## 2019-05-14 DIAGNOSIS — K21.9 GASTROESOPHAGEAL REFLUX DISEASE, ESOPHAGITIS PRESENCE NOT SPECIFIED: ICD-10-CM

## 2019-05-14 DIAGNOSIS — Z12.5 SCREENING PSA (PROSTATE SPECIFIC ANTIGEN): ICD-10-CM

## 2019-05-14 DIAGNOSIS — Z72.0 TOBACCO ABUSE: ICD-10-CM

## 2019-05-14 DIAGNOSIS — I15.9 SECONDARY HYPERTENSION: ICD-10-CM

## 2019-05-14 DIAGNOSIS — J44.1 CHRONIC OBSTRUCTIVE PULMONARY DISEASE WITH ACUTE EXACERBATION: Primary | ICD-10-CM

## 2019-05-14 DIAGNOSIS — E78.5 HYPERLIPIDEMIA, UNSPECIFIED HYPERLIPIDEMIA TYPE: ICD-10-CM

## 2019-05-14 LAB
ALBUMIN SERPL BCP-MCNC: 4.1 G/DL (ref 3.5–5.2)
ALP SERPL-CCNC: 78 U/L (ref 55–135)
ALT SERPL W/O P-5'-P-CCNC: 37 U/L (ref 10–44)
ANION GAP SERPL CALC-SCNC: 8 MMOL/L (ref 8–16)
AST SERPL-CCNC: 26 U/L (ref 10–40)
BILIRUB SERPL-MCNC: 0.6 MG/DL (ref 0.1–1)
BUN SERPL-MCNC: 18 MG/DL (ref 6–20)
CALCIUM SERPL-MCNC: 9.6 MG/DL (ref 8.7–10.5)
CHLORIDE SERPL-SCNC: 104 MMOL/L (ref 95–110)
CHOLEST SERPL-MCNC: 172 MG/DL (ref 120–199)
CHOLEST/HDLC SERPL: 4 {RATIO} (ref 2–5)
CO2 SERPL-SCNC: 28 MMOL/L (ref 23–29)
COMPLEXED PSA SERPL-MCNC: 0.48 NG/ML (ref 0–4)
CREAT SERPL-MCNC: 1.1 MG/DL (ref 0.5–1.4)
EST. GFR  (AFRICAN AMERICAN): >60 ML/MIN/1.73 M^2
EST. GFR  (NON AFRICAN AMERICAN): >60 ML/MIN/1.73 M^2
ESTIMATED AVG GLUCOSE: 111 MG/DL (ref 68–131)
GLUCOSE SERPL-MCNC: 103 MG/DL (ref 70–110)
HBA1C MFR BLD HPLC: 5.5 % (ref 4–5.6)
HDLC SERPL-MCNC: 43 MG/DL (ref 40–75)
HDLC SERPL: 25 % (ref 20–50)
LDLC SERPL CALC-MCNC: 107.6 MG/DL (ref 63–159)
NONHDLC SERPL-MCNC: 129 MG/DL
POTASSIUM SERPL-SCNC: 4.1 MMOL/L (ref 3.5–5.1)
PROT SERPL-MCNC: 7 G/DL (ref 6–8.4)
SODIUM SERPL-SCNC: 140 MMOL/L (ref 136–145)
TRIGL SERPL-MCNC: 107 MG/DL (ref 30–150)

## 2019-05-14 PROCEDURE — 99999 PR PBB SHADOW E&M-EST. PATIENT-LVL V: CPT | Mod: PBBFAC,,, | Performed by: INTERNAL MEDICINE

## 2019-05-14 PROCEDURE — 36415 COLL VENOUS BLD VENIPUNCTURE: CPT

## 2019-05-14 PROCEDURE — 3079F DIAST BP 80-89 MM HG: CPT | Mod: CPTII,S$GLB,, | Performed by: INTERNAL MEDICINE

## 2019-05-14 PROCEDURE — 99396 PR PREVENTIVE VISIT,EST,40-64: ICD-10-PCS | Mod: S$GLB,,, | Performed by: INTERNAL MEDICINE

## 2019-05-14 PROCEDURE — 80061 LIPID PANEL: CPT

## 2019-05-14 PROCEDURE — 99396 PREV VISIT EST AGE 40-64: CPT | Mod: S$GLB,,, | Performed by: INTERNAL MEDICINE

## 2019-05-14 PROCEDURE — 3074F SYST BP LT 130 MM HG: CPT | Mod: CPTII,S$GLB,, | Performed by: INTERNAL MEDICINE

## 2019-05-14 PROCEDURE — 84153 ASSAY OF PSA TOTAL: CPT

## 2019-05-14 PROCEDURE — 80053 COMPREHEN METABOLIC PANEL: CPT

## 2019-05-14 PROCEDURE — 3079F PR MOST RECENT DIASTOLIC BLOOD PRESSURE 80-89 MM HG: ICD-10-PCS | Mod: CPTII,S$GLB,, | Performed by: INTERNAL MEDICINE

## 2019-05-14 PROCEDURE — 83036 HEMOGLOBIN GLYCOSYLATED A1C: CPT

## 2019-05-14 PROCEDURE — 3074F PR MOST RECENT SYSTOLIC BLOOD PRESSURE < 130 MM HG: ICD-10-PCS | Mod: CPTII,S$GLB,, | Performed by: INTERNAL MEDICINE

## 2019-05-14 PROCEDURE — 99999 PR PBB SHADOW E&M-EST. PATIENT-LVL V: ICD-10-PCS | Mod: PBBFAC,,, | Performed by: INTERNAL MEDICINE

## 2019-05-14 NOTE — PROGRESS NOTES
He is a 53  -year-old gentleman coming in today to follow up his ongoing   medical problems including hyperlipidemia, Gerd, and reflux, tobacco abuse,  and some glucose intolerance.         He has been having headaches. He Has had cluster headaches. He saw a neurologist, Dr Helene Griffith<  on the [place] and was dx with migraines. Since last visit he has not been using the Depakote.    .  He headaches are much better.     History of Brugada syndrome with abnormal EKG. He saw arrhythmia back in   2008, Dr. Merritt. He is currently doing well. He denies any chest   pain or short-windedness.       Tobacco abuse: He has tried medication for this. He wants to  quite. His wife is on board now.  He had stopped but has restarted.  He does have COPD.        Reflux:  He's had reflux for about 5 years,. He states it worsens with some of the foods he eats. He has been on omeprazole, but was not taking it regularly and had a lot of breakthrough. Now that e has been more regular taking his meds- he is doing much better.       ROS : Gen - no fatigue or significant weight change  Eyes - no eye pain or visual changes  ENT - no hoarseness or sore throat  CV - No chest pain or SOB.  NO palpitations.  Pulm - no cough or wheezing  GI - no N/V/D   no dysuria or incontinence  MS - no joint pain or muscle pain  Skin - no rash, or c/o of skin lesions  Neuro - no HA, dizziness--- memory is doing well.   Heme - no abnormal bleeding or bruising  Endo - no polydipsia, or temperature changes  Psych - no anxiety or depression       PHYSICAL EXAMINATION: /88 (BP Location: Right arm, Patient Position: Sitting, BP Method: Large (Manual))   Pulse 72   Ht 6' (1.829 m)   Wt 114.2 kg (251 lb 12.3 oz)   SpO2 97%   BMI 34.15 kg/m²     GENERAL:  A well-appearing 53-year-old gentleman, in no acute distress.    NECK:  Supple.  CHEST:  Clear without wheeze.  CARDIOVASCULAR:  S1 and S2.  Regular rate and rhythm without murmur, gallop or    Rub.  Lungs are clear bilaterally    ABDOMEN:  Soft and nontender.  No palpable liver.     ASSESSMENT:  1.  Hypertension, better controlled.  Continue verapamil.  2.  Hyperlipidemia.  We will continue atorvastatin 40 mg a day. Need to recheck labs    3.  Reflux.  Needs regular PPI. And diet    4. COPD and tobacco abuse- smoking cessation consult.  .  5. Obesity       Discussed diet and exercise.

## 2019-05-14 NOTE — MEDICAL/APP STUDENT
Subjective:       Patient ID: Jovan Mcwilliams is a 53 y.o. male.    Chief Complaint: Annual Exam    Mr Mcwilliams is a 54 yo man with PMHx of HTN, HLD, COPD, GERD. Here for annual visit and medication refills. He has been doing well over the year. He had some episodes of severe GERD which he saw his GI for and was planning to get an endoscopy, after he became more regular with taking his PPI the episodes have resolved and he has not gone on to do the EGD. The other issue is his smoking. He had good success with the cessation clinic in the past, however is now back to smoking 0.75 packs per day. He would like to go back with is wife.     Review of Systems   Constitutional: Negative.    HENT: Negative.    Eyes: Negative.    Respiratory: Negative for cough, chest tightness and shortness of breath.    Cardiovascular: Negative for chest pain and palpitations.   Gastrointestinal: Negative.    Genitourinary: Negative.    Musculoskeletal: Negative.    Skin: Negative.    Neurological: Negative.        Objective:      Physical Exam   Constitutional: He is oriented to person, place, and time. He appears well-developed and well-nourished.   HENT:   Head: Normocephalic and atraumatic.   Eyes: Pupils are equal, round, and reactive to light. EOM are normal.   Cardiovascular: Normal rate, regular rhythm and normal heart sounds.   Pulmonary/Chest: Effort normal and breath sounds normal. He has no wheezes.   Abdominal: Soft. There is no tenderness.   Neurological: He is alert and oriented to person, place, and time.   Skin: Skin is warm and dry.   Psychiatric: He has a normal mood and affect. His behavior is normal.       Assessment:    Mr Mcwilliams is a 54 yo man here for his annual physical. Will address his tobacco abuse as well as his hyperlipidemia from Labs in 09/18.  Plan:       Tobacco Abuse:  - good outcome in past with smoking cessation clinic  - referral placed with smoking cessation clinic for him and he will take his wife along.      COPD:  - Secondary to smoking history  - not current requiring albuterol    HTN:  - /80 today  - well controlled off medication    Brugada Syndrome:  - on verapamil 240, can continue    HLD:  - 9/18 lipids LDL >230  - repeat lipid panel today  - if elevated increase atorvastatin, currently on atorvastatin 40mg    GERD:  - better controlled with omeprazole 40, continue  - if worsening can get his EGD    Health Maintenance:  -Ordered CMP, Lipids, PSA, HbA1c    RTC in 1 year    Tahir Sanchez  Medical Student  [facility]

## 2019-08-06 ENCOUNTER — PATIENT MESSAGE (OUTPATIENT)
Dept: NEUROLOGY | Facility: CLINIC | Age: 54
End: 2019-08-06

## 2019-08-06 DIAGNOSIS — G44.011 INTRACTABLE EPISODIC CLUSTER HEADACHE: ICD-10-CM

## 2019-08-06 RX ORDER — BUTALBITAL, ACETAMINOPHEN AND CAFFEINE 50; 325; 40 MG/1; MG/1; MG/1
TABLET ORAL
Qty: 14 TABLET | Refills: 2 | Status: SHIPPED | OUTPATIENT
Start: 2019-08-06 | End: 2019-10-23 | Stop reason: SDUPTHER

## 2019-08-21 RX ORDER — OMEPRAZOLE 40 MG/1
40 CAPSULE, DELAYED RELEASE ORAL DAILY
Qty: 90 CAPSULE | Refills: 3 | Status: SHIPPED | OUTPATIENT
Start: 2019-08-21 | End: 2020-09-28 | Stop reason: SDUPTHER

## 2019-08-21 RX ORDER — ATORVASTATIN CALCIUM 40 MG/1
40 TABLET, FILM COATED ORAL DAILY
Qty: 90 TABLET | Refills: 3 | Status: SHIPPED | OUTPATIENT
Start: 2019-08-21 | End: 2020-03-03 | Stop reason: SDUPTHER

## 2019-10-23 DIAGNOSIS — G44.011 INTRACTABLE EPISODIC CLUSTER HEADACHE: ICD-10-CM

## 2019-10-24 RX ORDER — BUTALBITAL, ACETAMINOPHEN AND CAFFEINE 50; 325; 40 MG/1; MG/1; MG/1
TABLET ORAL
Qty: 14 TABLET | Refills: 2 | Status: SHIPPED | OUTPATIENT
Start: 2019-10-24 | End: 2020-01-28 | Stop reason: SDUPTHER

## 2019-11-25 ENCOUNTER — OFFICE VISIT (OUTPATIENT)
Dept: DERMATOLOGY | Facility: CLINIC | Age: 54
End: 2019-11-25
Payer: COMMERCIAL

## 2019-11-25 ENCOUNTER — PATIENT OUTREACH (OUTPATIENT)
Dept: ADMINISTRATIVE | Facility: OTHER | Age: 54
End: 2019-11-25

## 2019-11-25 DIAGNOSIS — L02.91 ABSCESS: Primary | ICD-10-CM

## 2019-11-25 PROCEDURE — 87077 CULTURE AEROBIC IDENTIFY: CPT

## 2019-11-25 PROCEDURE — 10060 PR DRAIN SKIN ABSCESS SIMPLE: ICD-10-PCS | Mod: S$GLB,,, | Performed by: NURSE PRACTITIONER

## 2019-11-25 PROCEDURE — 99999 PR PBB SHADOW E&M-EST. PATIENT-LVL II: ICD-10-PCS | Mod: PBBFAC,,, | Performed by: NURSE PRACTITIONER

## 2019-11-25 PROCEDURE — 87070 CULTURE OTHR SPECIMN AEROBIC: CPT

## 2019-11-25 PROCEDURE — 99214 PR OFFICE/OUTPT VISIT, EST, LEVL IV, 30-39 MIN: ICD-10-PCS | Mod: 25,S$GLB,, | Performed by: NURSE PRACTITIONER

## 2019-11-25 PROCEDURE — 99999 PR PBB SHADOW E&M-EST. PATIENT-LVL II: CPT | Mod: PBBFAC,,, | Performed by: NURSE PRACTITIONER

## 2019-11-25 PROCEDURE — 10060 I&D ABSCESS SIMPLE/SINGLE: CPT | Mod: S$GLB,,, | Performed by: NURSE PRACTITIONER

## 2019-11-25 PROCEDURE — 99214 OFFICE O/P EST MOD 30 MIN: CPT | Mod: 25,S$GLB,, | Performed by: NURSE PRACTITIONER

## 2019-11-25 PROCEDURE — 87186 SC STD MICRODIL/AGAR DIL: CPT

## 2019-11-25 RX ORDER — MUPIROCIN 20 MG/G
OINTMENT TOPICAL
Qty: 30 G | Refills: 2 | Status: SHIPPED | OUTPATIENT
Start: 2019-11-25 | End: 2021-02-04

## 2019-11-25 RX ORDER — SULFAMETHOXAZOLE AND TRIMETHOPRIM 800; 160 MG/1; MG/1
1 TABLET ORAL 2 TIMES DAILY
Qty: 20 TABLET | Refills: 0 | Status: SHIPPED | OUTPATIENT
Start: 2019-11-25 | End: 2019-12-05

## 2019-11-25 NOTE — PROGRESS NOTES
Subjective:       Patient ID:  Jovan Mcwilliams is a 53 y.o. male who presents for   Chief Complaint   Patient presents with    Cyst     under left arm, X4days, painful, no tx      Known pt to [facility]. Last skin cancer screening 11/6/18, here today for new problem    Cyst  - Initial  Affected locations: left axilla  Duration: 4 days  Signs / symptoms: redness, tender and growing  Severity: mild to moderate  Timing: constant  Aggravated by: nothing  Treatments tried: warm compresses.        Review of Systems   Constitutional: Negative for fever, chills, fatigue and malaise.   Skin: Positive for activity-related sunscreen use. Negative for daily sunscreen use.        Objective:    Physical Exam   Constitutional: He appears well-developed and well-nourished. No distress.   Neurological: He is alert and oriented to person, place, and time. He is not disoriented.   Psychiatric: He has a normal mood and affect.   Skin:   Areas Examined (abnormalities noted in diagram):   Chest / Axilla Inspection Performed             Diagram Legend     Erythematous scaling macule/papule c/w actinic keratosis       Vascular papule c/w angioma      Pigmented verrucoid papule/plaque c/w seborrheic keratosis      Yellow umbilicated papule c/w sebaceous hyperplasia      Irregularly shaped tan macule c/w lentigo     1-2 mm smooth white papules consistent with Milia      Movable subcutaneous cyst with punctum c/w epidermal inclusion cyst      Subcutaneous movable cyst c/w pilar cyst      Firm pink to brown papule c/w dermatofibroma      Pedunculated fleshy papule(s) c/w skin tag(s)      Evenly pigmented macule c/w junctional nevus     Mildly variegated pigmented, slightly irregular-bordered macule c/w mildly atypical nevus      Flesh colored to evenly pigmented papule c/w intradermal nevus       Pink pearly papule/plaque c/w basal cell carcinoma      Erythematous hyperkeratotic cursted plaque c/w SCC      Surgical scar with no sign of skin cancer  recurrence      Open and closed comedones      Inflammatory papules and pustules      Verrucoid papule consistent consistent with wart     Erythematous eczematous patches and plaques     Dystrophic onycholytic nail with subungual debris c/w onychomycosis     Umbilicated papule    Erythematous-base heme-crusted tan verrucoid plaque consistent with inflamed seborrheic keratosis     Erythematous Silvery Scaling Plaque c/w Psoriasis     See annotation      Assessment / Plan:        Abscess  -     sulfamethoxazole-trimethoprim 800-160mg (BACTRIM DS) 800-160 mg Tab; Take 1 tablet by mouth 2 (two) times daily. for 10 days  Dispense: 20 tablet; Refill: 0  -     mupirocin (BACTROBAN) 2 % ointment; AAA BID  Dispense: 30 g; Refill: 2  -     Aerobic culture    Lesion incised with #11 blade and drained on today's date. Bacterial culture performed. Patient instructed to perform warm compresses 2 - 3 times/daily.    Bacterial cx sent today- allergy to Doxy & erythromycin; will start on PO Bactrim           Follow up if symptoms worsen or fail to improve.

## 2019-11-27 LAB — BACTERIA SPEC AEROBE CULT: ABNORMAL

## 2019-11-28 ENCOUNTER — PATIENT MESSAGE (OUTPATIENT)
Dept: DERMATOLOGY | Facility: CLINIC | Age: 54
End: 2019-11-28

## 2020-01-14 ENCOUNTER — TELEPHONE (OUTPATIENT)
Dept: NEUROLOGY | Facility: CLINIC | Age: 55
End: 2020-01-14

## 2020-01-14 NOTE — TELEPHONE ENCOUNTER
Called patient and informed Dr. Griffith soonest follow up appointment is in march. Offered to schedule patient with our nurse practitioner, Deyanira Jackman. Patient stated that was fine and requested 1/20/2020 if we had appointments available that day. Scheduled patient appointment. Patient expressed understanding.

## 2020-01-14 NOTE — TELEPHONE ENCOUNTER
----- Message from Fay Covington sent at 1/14/2020  2:24 PM CST -----  Contact: Jovan pt  Type: Needs Medical Advice    Who Called:  Jovan  Symptoms (please be specific):  Pt is getting the migraines again  How long has patient had these symptoms:  3 weeks  Pharmacy name and phone #:    [facility] - [place] - [address]  [id number]  [place] [id number]  Phone: [phone] Fax: [phone]    Best Call Back Number: [phone]  Additional Information: Pls call pt regarding his migraines

## 2020-01-20 ENCOUNTER — OFFICE VISIT (OUTPATIENT)
Dept: NEUROLOGY | Facility: CLINIC | Age: 55
End: 2020-01-20
Payer: COMMERCIAL

## 2020-01-20 VITALS
DIASTOLIC BLOOD PRESSURE: 96 MMHG | RESPIRATION RATE: 18 BRPM | HEART RATE: 63 BPM | HEIGHT: 72 IN | BODY MASS INDEX: 32.77 KG/M2 | WEIGHT: 241.94 LBS | SYSTOLIC BLOOD PRESSURE: 155 MMHG

## 2020-01-20 DIAGNOSIS — G44.011 INTRACTABLE EPISODIC CLUSTER HEADACHE: Primary | ICD-10-CM

## 2020-01-20 DIAGNOSIS — G43.719 INTRACTABLE CHRONIC MIGRAINE WITHOUT AURA AND WITHOUT STATUS MIGRAINOSUS: ICD-10-CM

## 2020-01-20 PROCEDURE — 99999 PR PBB SHADOW E&M-EST. PATIENT-LVL IV: CPT | Mod: PBBFAC,,, | Performed by: NURSE PRACTITIONER

## 2020-01-20 PROCEDURE — 3080F DIAST BP >= 90 MM HG: CPT | Mod: CPTII,S$GLB,, | Performed by: NURSE PRACTITIONER

## 2020-01-20 PROCEDURE — 99214 PR OFFICE/OUTPT VISIT, EST, LEVL IV, 30-39 MIN: ICD-10-PCS | Mod: S$GLB,,, | Performed by: NURSE PRACTITIONER

## 2020-01-20 PROCEDURE — 3008F PR BODY MASS INDEX (BMI) DOCUMENTED: ICD-10-PCS | Mod: CPTII,S$GLB,, | Performed by: NURSE PRACTITIONER

## 2020-01-20 PROCEDURE — 3077F PR MOST RECENT SYSTOLIC BLOOD PRESSURE >= 140 MM HG: ICD-10-PCS | Mod: CPTII,S$GLB,, | Performed by: NURSE PRACTITIONER

## 2020-01-20 PROCEDURE — 99999 PR PBB SHADOW E&M-EST. PATIENT-LVL IV: ICD-10-PCS | Mod: PBBFAC,,, | Performed by: NURSE PRACTITIONER

## 2020-01-20 PROCEDURE — 3080F PR MOST RECENT DIASTOLIC BLOOD PRESSURE >= 90 MM HG: ICD-10-PCS | Mod: CPTII,S$GLB,, | Performed by: NURSE PRACTITIONER

## 2020-01-20 PROCEDURE — 3077F SYST BP >= 140 MM HG: CPT | Mod: CPTII,S$GLB,, | Performed by: NURSE PRACTITIONER

## 2020-01-20 PROCEDURE — 99214 OFFICE O/P EST MOD 30 MIN: CPT | Mod: S$GLB,,, | Performed by: NURSE PRACTITIONER

## 2020-01-20 PROCEDURE — 3008F BODY MASS INDEX DOCD: CPT | Mod: CPTII,S$GLB,, | Performed by: NURSE PRACTITIONER

## 2020-01-20 RX ORDER — PROCHLORPERAZINE MALEATE 10 MG
10 TABLET ORAL EVERY 6 HOURS PRN
Qty: 60 TABLET | Refills: 11 | Status: SHIPPED | OUTPATIENT
Start: 2020-01-20 | End: 2022-05-17 | Stop reason: SDUPTHER

## 2020-01-20 NOTE — PROGRESS NOTES
"Subjective:       Patient ID: Jovan Mcwilliams is a 51 y.o. male.    Chief Complaint: Headache    INTERVAL HISTORY 1/20/2020  Patient presents for follow up. He has been on Aimovig 70 mg since October 2018. He is also on Depakote and verapamil. He stopped depakote 6 months ago but resumed when cluster cycle about 5 weeks ago. Wife present today reports patient started taking Aimovig every other month about 6 months ago as well.  He has Imitrex IM, Zomig NS and Fioricet for escalations. He reports he has not used the Imitrex or Zomig in close to two years. When he last used the Imitrex, he reports the headache worsened for 5 minutes and then improved. He has HTN, HLD and history of chest pain.     Today he reports he is a little worse. Headaches are starting again after 18 months of little to no headaches. They are behind the left eye and left side of the head. current pain 3 with range 0-10. This cluster began about 5 weeks ago. Lidocaine NS used as first line but not effective. Fioricet second line and somewhat effective.     INTERVAL HISTORY  The patient is added to the clinic schedule as his headaches persist. He complains of a new symptom consisting of blurry vision with the left eye. He denies a specific description of a visual migraine aura in the form of fortification spectra, scotomata, hemianopsia, photopsia or transient loss of vision. He uses Imitrex injectable with good benefit but states that he is "drained" and unable to work the next day. He is tired because of lack of sleep.   He underwent peripheral nerve blocks last week including GLORIA, auriculotemporal and supraorbital. He stated that initially he felt significant benefit but when the pain came back, it came back much worse, he almost went to the ED.   He is on Depakote 500 mg BID. Otherwise headache characteristics are unchanged.  HPI  The patient is a pleasant 52 y/o male with chief complaint of headaches. He has been getting them in the fall " (Octobe-November) since 2012. Next cool season in 2013, they recurred. He underwent an MRI of the brain with not significant findings (my interpretation). This last fall he did not get the headaches, instead they came on 1/3/2017. The headache is strictly unilateral, originating in a discrete area in the left posterior region and projecting to the left orbital and periorbital region. He went to the ED where he was given a combination of steroids, benadryl and compazine as well as a supraorbital block with good response. He is followed by Dr. Velasquez who has recommended prevention with verapamil and Depakote and transitional treatment with steroids. Unfortunately, verapamil causes decreased libido. He was switched to Amlodipine. He has never tried oxygen. He treats acutely with Zomig NS, previously with SQ sumatriptan    Review of Systems   Constitutional: Negative for activity change, appetite change, chills, fatigue and fever.   HENT: Positive for congestion, ear pain, facial swelling, postnasal drip, sinus pressure, sneezing and tinnitus. Negative for dental problem, hearing loss, trouble swallowing and voice change.    Eyes: Positive for photophobia, pain and redness. Negative for visual disturbance.   Respiratory: Positive for cough and wheezing. Negative for chest tightness and shortness of breath.    Cardiovascular: Negative for chest pain, palpitations and leg swelling.   Gastrointestinal: Positive for nausea and vomiting. Negative for abdominal pain, blood in stool, constipation and diarrhea.   Endocrine: Negative for cold intolerance and heat intolerance.   Genitourinary: Negative for difficulty urinating, dysuria and urgency.   Musculoskeletal: Positive for neck stiffness. Negative for arthralgias, back pain, gait problem, myalgias and neck pain.   Skin: Negative for color change, pallor and rash.   Neurological: Positive for headaches. Negative for dizziness, tremors, seizures, syncope, facial asymmetry,  speech difficulty, weakness, light-headedness and numbness.   Hematological: Negative for adenopathy. Does not bruise/bleed easily.   Psychiatric/Behavioral: Positive for agitation, decreased concentration and sleep disturbance. Negative for behavioral problems, confusion, self-injury and suicidal ideas. The patient is not nervous/anxious.          Past Medical History   Diagnosis Date    Additional heart attack      10 years ago    Eye pain      left eye    GERD (gastroesophageal reflux disease)     Hyperlipemia      Past Surgical History   Procedure Laterality Date    Back surgery      Hernia repair       Family History   Problem Relation Age of Onset    Hypertension Mother     Hyperlipidemia Mother     Diabetes Father     Cancer Maternal Aunt     Cancer Paternal Aunt     Cancer Maternal Grandmother     Cancer Maternal Grandfather     Cancer Paternal Grandfather      Social History     Social History    Marital status: Single     Spouse name: N/A    Number of children: N/A    Years of education: N/A     Occupational History    Not on file.     Social History Main Topics    Smoking status: Current Every Day Smoker     Packs/day: 1.00     Years: 15.00    Smokeless tobacco: Not on file      Comment: is about to quit    Alcohol use 0.6 oz/week     1 Cans of beer per week      Comment: per week    Drug use: No    Sexual activity: Not on file     Other Topics Concern    Not on file     Social History Narrative     Review of patient's allergies indicates:   Allergen Reactions    Doxycycline      Other reaction(s): nausea    Erythromycin      Other reaction(s): Nausea       Current Outpatient Prescriptions   Medication Sig    ascorbic acid (VITAMIN C) 1000 MG tablet     aspirin (ECOTRIN) 81 MG EC tablet Take 81 mg by mouth Daily. 1 Tablet, Delayed Release (E.C.) Oral Every day    ibuprofen (ADVIL,MOTRIN) 800 MG tablet 1 tqab po q 8 h prn pain    omeprazole (PRILOSEC) 40 MG capsule Take 1  capsule (40 mg total) by mouth once daily.    predniSONE (DELTASONE) 10 mg tablet pack Prednisone 10 mg tabs (40 mg for 2 days, 30 mg po for 2 days, 20 mg po for 2 days and 10 mg po for 2 days. Omeprazol 20 mg daily for gastro protection) Total #20 tabs    amlodipine (NORVASC) 5 MG tablet Take 1 tablet (5 mg total) by mouth once daily.    atorvastatin (LIPITOR) 40 MG tablet Take 1 tablet (40 mg total) by mouth once daily.    divalproex (DEPAKOTE) 500 MG TbEC Take 500 mg by mouth 2 (two) times daily.    hydrocodone-acetaminophen 7.5-325mg (NORCO) 7.5-325 mg per tablet Take 1 tablet by mouth once daily.    multivitamin (THERAGRAN) per tablet Take by mouth. 1 Tablet Oral Every day     No current facility-administered medications for this visit.            Objective:      Vitals:    01/20/20 0850   BP: (!) 155/96   Pulse: 63   Resp: 18       Previous Physical Exam    Constitutional:   He appears well-developed and well-nourished. He is well groomed  HENT:    Head: Atraumatic, oral and nasal mucosa intact  Eyes: Conjunctivae and EOM are normal. Pupils are equal, round, and reactive to light OU  Neck: Neck supple. No thyromegaly present  Cardiovascular: Normal rate and normal heart sounds  No murmur heard  Pulmonary/Chest: Effort normal and breath sounds normal  Musculoskeletal: Normal range of motion. No joint stiffness. No vertebral point tenderness  Skin: Skin is warm and dry  Psychiatric: Normal mood and affect     Neuro exam:    Mental status:  Awake, attentive, Alert, oriented to self, place, year and month  Language function is intact    Cranial Nerves:  Smell was not formally evaluated  Cranial Nerves II - XII: intact  Pursuits were smooth, normal saccades, no nystagmus OU  Funduscopic exam - disc were flat and pink, no exudates or hemorrhages OU  Motor - facial movement was symmetrical and normal     Palate moved well and was symmetrical with normal palatal and oral sensation  Tongue movements were  full    Coordination:     Rapid alternating movements and rapid finger tapping - normal bilaterally  Finger to nose - normal and symmetric bilaterally   No intentional or positional tremor.     Motor:  Normal muscle bulk and symmetry. No fasciculations were noted    No pronator drift  Strength 5/5 bilaterally     Reflexes:  Tendon reflexes were 2 + at biceps, triceps, brachioradialis, patellar, and Achilles bilaterally  No clonus was noted     Sensory: Intact to light touch, pin prick in all extremities. Vibration and proprioception intact in all extremities.     Gait: Romberg absent. Normal gait. Normal arm swing and turns.     Review of Data: I have reviewed images of an MRI from 2013 and see no abnormal findings        Assessment and Plan   Cluster-Migraine syndrome. While there is a component of migraine, the vast majority of the headache phenotype is undoubtedly Cluster headache as evidenced by the headache characteristics, the circannual and circadian pattern and the response to medication and the tearing of the left eye.    Stop aimovig.  Start Emgality cluster dosing of 300 mg monthly during cluster cycle.   Continue Depakote and Verapamil. Add magnesium to counteract constipation  For acute attacks use Lidocaine IN.  Avoid Imitrex 6 mg SQ injection as he has HTN, HLD, smoker, and has recurrent chest pain.  Fioricet for rescue  Add compazine for nausea and mild headache relief.   Discussed with patient new oral acute treatment options recently approved by  FDA but not available in pharmacy at this time. Will trial one of the new options when available.     Counseling:  More than 50% of the 30 minute encounter was spent face to face counseling the patient regarding current status and future plan of care as well as side of the medications. All questions were answered to patient's satisfaction    KELLY Hilario

## 2020-01-22 ENCOUNTER — TELEPHONE (OUTPATIENT)
Dept: NEUROLOGY | Facility: CLINIC | Age: 55
End: 2020-01-22

## 2020-01-22 NOTE — TELEPHONE ENCOUNTER
----- Message from Alvin Woods sent at 1/22/2020 11:00 AM CST -----  Contact: Self  Pt calling in regards to when will his shot for headaches starts to be delivered so he can start back feeling better and going to work    Pt states he haven't heard anything as of yet on what is going on with shots and need nurse or provider to contact him    Please advise pt can be reached at [phone]

## 2020-01-22 NOTE — TELEPHONE ENCOUNTER
Called patient in regards to prescription of emgality. Instructed patient to call [facility] in regards to prescription delivery. Patient verbalized understanding.

## 2020-01-23 ENCOUNTER — TELEPHONE (OUTPATIENT)
Dept: PHARMACY | Facility: CLINIC | Age: 55
End: 2020-01-23

## 2020-01-24 ENCOUNTER — TELEPHONE (OUTPATIENT)
Dept: NEUROLOGY | Facility: CLINIC | Age: 55
End: 2020-01-24

## 2020-01-24 DIAGNOSIS — G44.011 INTRACTABLE EPISODIC CLUSTER HEADACHE: Primary | ICD-10-CM

## 2020-01-24 RX ORDER — METHYLPREDNISOLONE 4 MG/1
TABLET ORAL
Qty: 1 PACKAGE | Refills: 0 | Status: SHIPPED | OUTPATIENT
Start: 2020-01-24 | End: 2020-02-04 | Stop reason: ALTCHOICE

## 2020-01-24 NOTE — TELEPHONE ENCOUNTER
Returned patients call. Spoke with Dia. Dia stated patient has had a migraine since Monday after leaving doctors office. Patient has tried Fioricet, 2 sumatriptan injections and lidocaine nasal spray with no constant relief. States that pain will go away but then come back even worse. Patient is requesting something else to relieve pain. Informed I would send a message and see if our NP can send anything in. Dia expressed understanding.

## 2020-01-24 NOTE — TELEPHONE ENCOUNTER
----- Message from Sharmaine Hung sent at 1/24/2020  2:25 PM CST -----  Contact: Pt wife Dia   Type: Needs Medical Advice    Who Called: Dia  Best Call Back Number: [phone]   Additional Information: Dia is requesting a call from office regarding pt having a migraine. Dia would like advise on what to do. Please call Dia [phone] and advise.

## 2020-01-24 NOTE — TELEPHONE ENCOUNTER
Called patient and spoke with cortney. Patient has also taken compazine. Informed patient that our NP is sending steroids to the pharmacy to help break the cycle. Cortney expressed understanding and also informed that she is not sure if its a side effect of the mediations or not but patient has been having incontinence problems. Informed cortney that if patient does not get better and he is still having incontinence to bring patient to the ED. Cortney expressed understanding.

## 2020-01-24 NOTE — TELEPHONE ENCOUNTER
----- Message from Sharmaine Hung sent at 1/24/2020  2:25 PM CST -----  Contact: Pt wife Dia   Type: Needs Medical Advice    Who Called: Dia  Best Call Back Number: [phone]   Additional Information: Dia is requesting a call from office regarding pt having a migraine. Dia would like advise on what to do. Please call Dia [phone] and advise.

## 2020-01-28 ENCOUNTER — TELEPHONE (OUTPATIENT)
Dept: NEUROLOGY | Facility: CLINIC | Age: 55
End: 2020-01-28

## 2020-01-28 DIAGNOSIS — G44.011 INTRACTABLE EPISODIC CLUSTER HEADACHE: ICD-10-CM

## 2020-01-28 RX ORDER — BUTALBITAL, ACETAMINOPHEN AND CAFFEINE 50; 325; 40 MG/1; MG/1; MG/1
TABLET ORAL
Qty: 14 TABLET | Refills: 2 | Status: SHIPPED | OUTPATIENT
Start: 2020-01-28 | End: 2020-03-03 | Stop reason: SDUPTHER

## 2020-01-28 NOTE — TELEPHONE ENCOUNTER
----- Message from Armani Araya sent at 1/28/2020  4:27 PM CST -----  Contact: self   Patient want to speak with a nurse regarding having headaches and some medical advice please call back at [phone]    Case number [id number]

## 2020-01-29 ENCOUNTER — TELEPHONE (OUTPATIENT)
Dept: NEUROLOGY | Facility: CLINIC | Age: 55
End: 2020-01-29

## 2020-01-29 DIAGNOSIS — G44.89 CHRONIC MIXED HEADACHE SYNDROME: ICD-10-CM

## 2020-01-29 NOTE — TELEPHONE ENCOUNTER
Submitted URGENT prior authorization for EMGALITY to insurance company for review on 1/29/2020 [facility]

## 2020-01-29 NOTE — TELEPHONE ENCOUNTER
Returned patients call in regards to current migraine. Advised patient to come in and  prescription for new migraine medication , Uberverly. Patient stated he will come into clinic and  prescription .

## 2020-01-29 NOTE — TELEPHONE ENCOUNTER
----- Message from Carleen Gregorio sent at 1/29/2020  4:06 PM CST -----  Contact: Dia(wife)  Type:  Needs Medical Advice    Who Called: Dia  Would the patient rather a call back or a response via [employer]? Call back  Best Call Back Number: [phone]  Additional Information: Would like to speak to nurse in office

## 2020-01-30 ENCOUNTER — TELEPHONE (OUTPATIENT)
Dept: NEUROLOGY | Facility: CLINIC | Age: 55
End: 2020-01-30

## 2020-01-30 NOTE — TELEPHONE ENCOUNTER
Severe migraines can cause these symptoms, however, if he needs STAT MRI, he would likely need ER for evaluation.

## 2020-01-30 NOTE — TELEPHONE ENCOUNTER
----- Message from Alanis Brand sent at 1/30/2020 12:07 PM CST -----  Contact: patient  Type: Needs Medical Advice    Who Called:  Patient  Best Call Back Number: [phone] (home)   Additional Information: the patient would like a call back to sched an MRI he missed a call

## 2020-01-30 NOTE — TELEPHONE ENCOUNTER
DOCUMENTATION ONLY:   Prior authorization for EMGALITY approved from 1/30/2020 to 4/30/2020.     Case ID# [id number]    Co-pay: $50- $0 WITH [employer]    Patient Assistance IS NOT required.     Forward to clinical pharmacist for consult & shipment. [facility]

## 2020-02-01 ENCOUNTER — PATIENT MESSAGE (OUTPATIENT)
Dept: NEUROLOGY | Facility: CLINIC | Age: 55
End: 2020-02-01

## 2020-02-04 ENCOUNTER — TELEPHONE (OUTPATIENT)
Dept: PHARMACY | Facility: CLINIC | Age: 55
End: 2020-02-04

## 2020-02-04 NOTE — TELEPHONE ENCOUNTER
Initial Emgality consult completed on . Emgality 100mg x3 will be shipped on  to arrive at patient's home on  via FedEx. $0 copay. Patient intends to start Emgality once received. Address confirmed. Confirmed 2 patient identifiers - name and . Therapy Appropriate.    Indication: Cluster headache prophlaxis  Goals of treatment: reduction in cluster attack frequency    --Injection experience: Aimovig    Store in refrigerator prior to use (do not freeze, do not shake, keep in original box until use).    Counseled patient on administration directions:  - Inject (300 mg) comes in three (100 mg) prefilled syringes, which are taken one after the other at the start of a cluster period and then every month until the end of the cluster period.  - Take out of the refrigerator 30 minutes prior to injection to reach room temperature.  - Wash hands before and after injection.  - Monthly RX will come with gauze, band aids, and alcohol swabs.  - Patient may self-inject in either the front/top of the thighs, abdomen- but at least 2 inches away from belly button   - If someone else is giving the injection, they may also use the outer part of your upper arm or your buttocks.   - Use 3 different injection sites.   - Patient was instructed to rotate injections sites monthly.  - Inspect medication - should be clear and colorless to slightly yellow to slightly brown.   - Patient is to wipe down the injection site with the alcohol pad, wait to dry.    - Remove white cap from needle (pull straight off).  - Gently pinch the skin where you will inject and insert needle at a 45-degree angle.  - Slowly depress plunger with thumb until all medicine injected   - If you have bleeding at the injection site, press a cotton ball or gauze over the injection site. Do not rub the injection site.  - Do not put the needle cap back on the prefilled syringe.  - Patient will use sharps container; once full, per state law, she/he may securely lock  the sharps container and place in trash. Pharmacy will replace the sharps at no additional charge.    Patient was counseled on possible side effects:  - Injection site reaction: redness, soreness, itching, bruising, which should resolve within 3-5 days.  - Allergic reactions: swelling of face, mouth, tongue, throat, trouble breathing.     Medication and Allergy list reviewed. Drug list up to date in Epic. No DDIs or drug-allergy concerns with Emgality.    Consultation included the importance of compliance and of keeping all follow up appointments. Patient understands to report any medication changes to [facility] and provider. All questions answered and addressed to patients satisfaction. [facility] will contact patient in 3 weeks for refills.    Huy Rodriguez, PharmD  Clinical Pharmacist   [facility]   P: [phone]

## 2020-02-08 ENCOUNTER — PATIENT MESSAGE (OUTPATIENT)
Dept: NEUROLOGY | Facility: CLINIC | Age: 55
End: 2020-02-08

## 2020-02-08 DIAGNOSIS — G44.011 INTRACTABLE EPISODIC CLUSTER HEADACHE: Primary | ICD-10-CM

## 2020-02-10 RX ORDER — DIVALPROEX SODIUM 500 MG/1
500 TABLET, DELAYED RELEASE ORAL 2 TIMES DAILY
Qty: 60 TABLET | Refills: 4 | Status: SHIPPED | OUTPATIENT
Start: 2020-02-10 | End: 2020-06-17 | Stop reason: SDUPTHER

## 2020-02-18 ENCOUNTER — PATIENT OUTREACH (OUTPATIENT)
Dept: ADMINISTRATIVE | Facility: HOSPITAL | Age: 55
End: 2020-02-18

## 2020-02-18 DIAGNOSIS — Z12.11 ENCOUNTER FOR FIT (FECAL IMMUNOCHEMICAL TEST) SCREENING: Primary | ICD-10-CM

## 2020-03-03 ENCOUNTER — LAB VISIT (OUTPATIENT)
Dept: LAB | Facility: HOSPITAL | Age: 55
End: 2020-03-03
Attending: INTERNAL MEDICINE
Payer: COMMERCIAL

## 2020-03-03 ENCOUNTER — PATIENT MESSAGE (OUTPATIENT)
Dept: PHARMACY | Facility: CLINIC | Age: 55
End: 2020-03-03

## 2020-03-03 ENCOUNTER — OFFICE VISIT (OUTPATIENT)
Dept: INTERNAL MEDICINE | Facility: CLINIC | Age: 55
End: 2020-03-03
Payer: COMMERCIAL

## 2020-03-03 ENCOUNTER — IMMUNIZATION (OUTPATIENT)
Dept: PHARMACY | Facility: CLINIC | Age: 55
End: 2020-03-03
Payer: COMMERCIAL

## 2020-03-03 VITALS
DIASTOLIC BLOOD PRESSURE: 86 MMHG | BODY MASS INDEX: 33.47 KG/M2 | SYSTOLIC BLOOD PRESSURE: 130 MMHG | WEIGHT: 247.13 LBS | OXYGEN SATURATION: 96 % | HEIGHT: 72 IN | HEART RATE: 68 BPM

## 2020-03-03 DIAGNOSIS — Z12.5 SCREENING PSA (PROSTATE SPECIFIC ANTIGEN): ICD-10-CM

## 2020-03-03 DIAGNOSIS — Z11.4 ENCOUNTER FOR SCREENING FOR HIV: ICD-10-CM

## 2020-03-03 DIAGNOSIS — I15.9 SECONDARY HYPERTENSION: ICD-10-CM

## 2020-03-03 DIAGNOSIS — Z12.11 COLON CANCER SCREENING: ICD-10-CM

## 2020-03-03 DIAGNOSIS — G44.011 INTRACTABLE EPISODIC CLUSTER HEADACHE: ICD-10-CM

## 2020-03-03 DIAGNOSIS — F17.200 SMOKER: ICD-10-CM

## 2020-03-03 DIAGNOSIS — E78.5 HYPERLIPIDEMIA, UNSPECIFIED HYPERLIPIDEMIA TYPE: ICD-10-CM

## 2020-03-03 DIAGNOSIS — J44.1 CHRONIC OBSTRUCTIVE PULMONARY DISEASE WITH ACUTE EXACERBATION: Primary | ICD-10-CM

## 2020-03-03 PROBLEM — R07.89 OTHER CHEST PAIN: Status: RESOLVED | Noted: 2018-09-17 | Resolved: 2020-03-03

## 2020-03-03 LAB
ALBUMIN SERPL BCP-MCNC: 3.6 G/DL (ref 3.5–5.2)
ALP SERPL-CCNC: 53 U/L (ref 55–135)
ALT SERPL W/O P-5'-P-CCNC: 30 U/L (ref 10–44)
ANION GAP SERPL CALC-SCNC: 7 MMOL/L (ref 8–16)
AST SERPL-CCNC: 23 U/L (ref 10–40)
BILIRUB SERPL-MCNC: 0.2 MG/DL (ref 0.1–1)
BUN SERPL-MCNC: 17 MG/DL (ref 6–20)
CALCIUM SERPL-MCNC: 9 MG/DL (ref 8.7–10.5)
CHLORIDE SERPL-SCNC: 108 MMOL/L (ref 95–110)
CHOLEST SERPL-MCNC: 306 MG/DL (ref 120–199)
CHOLEST/HDLC SERPL: 7.5 {RATIO} (ref 2–5)
CO2 SERPL-SCNC: 30 MMOL/L (ref 23–29)
COMPLEXED PSA SERPL-MCNC: 0.74 NG/ML (ref 0–4)
CREAT SERPL-MCNC: 1.1 MG/DL (ref 0.5–1.4)
EST. GFR  (AFRICAN AMERICAN): >60 ML/MIN/1.73 M^2
EST. GFR  (NON AFRICAN AMERICAN): >60 ML/MIN/1.73 M^2
GLUCOSE SERPL-MCNC: 89 MG/DL (ref 70–110)
HDLC SERPL-MCNC: 41 MG/DL (ref 40–75)
HDLC SERPL: 13.4 % (ref 20–50)
LDLC SERPL CALC-MCNC: 215.6 MG/DL (ref 63–159)
NONHDLC SERPL-MCNC: 265 MG/DL
POTASSIUM SERPL-SCNC: 4.2 MMOL/L (ref 3.5–5.1)
PROT SERPL-MCNC: 6.6 G/DL (ref 6–8.4)
SODIUM SERPL-SCNC: 145 MMOL/L (ref 136–145)
TRIGL SERPL-MCNC: 247 MG/DL (ref 30–150)

## 2020-03-03 PROCEDURE — 99214 PR OFFICE/OUTPT VISIT, EST, LEVL IV, 30-39 MIN: ICD-10-PCS | Mod: S$GLB,,, | Performed by: INTERNAL MEDICINE

## 2020-03-03 PROCEDURE — 3075F SYST BP GE 130 - 139MM HG: CPT | Mod: CPTII,S$GLB,, | Performed by: INTERNAL MEDICINE

## 2020-03-03 PROCEDURE — 84153 ASSAY OF PSA TOTAL: CPT

## 2020-03-03 PROCEDURE — 99999 PR PBB SHADOW E&M-EST. PATIENT-LVL III: ICD-10-PCS | Mod: PBBFAC,,, | Performed by: INTERNAL MEDICINE

## 2020-03-03 PROCEDURE — 99999 PR PBB SHADOW E&M-EST. PATIENT-LVL III: CPT | Mod: PBBFAC,,, | Performed by: INTERNAL MEDICINE

## 2020-03-03 PROCEDURE — 80061 LIPID PANEL: CPT

## 2020-03-03 PROCEDURE — 86703 HIV-1/HIV-2 1 RESULT ANTBDY: CPT

## 2020-03-03 PROCEDURE — 3079F DIAST BP 80-89 MM HG: CPT | Mod: CPTII,S$GLB,, | Performed by: INTERNAL MEDICINE

## 2020-03-03 PROCEDURE — 3008F PR BODY MASS INDEX (BMI) DOCUMENTED: ICD-10-PCS | Mod: CPTII,S$GLB,, | Performed by: INTERNAL MEDICINE

## 2020-03-03 PROCEDURE — 80053 COMPREHEN METABOLIC PANEL: CPT

## 2020-03-03 PROCEDURE — 3075F PR MOST RECENT SYSTOLIC BLOOD PRESS GE 130-139MM HG: ICD-10-PCS | Mod: CPTII,S$GLB,, | Performed by: INTERNAL MEDICINE

## 2020-03-03 PROCEDURE — 3008F BODY MASS INDEX DOCD: CPT | Mod: CPTII,S$GLB,, | Performed by: INTERNAL MEDICINE

## 2020-03-03 PROCEDURE — 36415 COLL VENOUS BLD VENIPUNCTURE: CPT

## 2020-03-03 PROCEDURE — 99214 OFFICE O/P EST MOD 30 MIN: CPT | Mod: S$GLB,,, | Performed by: INTERNAL MEDICINE

## 2020-03-03 PROCEDURE — 3079F PR MOST RECENT DIASTOLIC BLOOD PRESSURE 80-89 MM HG: ICD-10-PCS | Mod: CPTII,S$GLB,, | Performed by: INTERNAL MEDICINE

## 2020-03-03 RX ORDER — BUTALBITAL, ACETAMINOPHEN AND CAFFEINE 50; 325; 40 MG/1; MG/1; MG/1
TABLET ORAL
Qty: 14 TABLET | Refills: 2 | Status: SHIPPED | OUTPATIENT
Start: 2020-03-03 | End: 2020-04-27 | Stop reason: SDUPTHER

## 2020-03-03 RX ORDER — UBROGEPANT 50 MG/1
TABLET ORAL
COMMUNITY
Start: 2020-01-30 | End: 2021-07-14

## 2020-03-03 RX ORDER — ATORVASTATIN CALCIUM 40 MG/1
40 TABLET, FILM COATED ORAL DAILY
Qty: 90 TABLET | Refills: 3 | Status: SHIPPED | OUTPATIENT
Start: 2020-03-03 | End: 2020-08-03 | Stop reason: SDUPTHER

## 2020-03-03 RX ORDER — FLUCONAZOLE 100 MG/1
100 TABLET ORAL DAILY
Qty: 7 TABLET | Refills: 1 | Status: SHIPPED | OUTPATIENT
Start: 2020-03-03 | End: 2020-03-10

## 2020-03-03 NOTE — PROGRESS NOTES
He is a 54  -year-old gentleman coming in today to follow up his ongoing   medical problems including hyperlipidemia, Gerd, and reflux, tobacco abuse,  and some glucose intolerance.         He has been having headaches. He Has had cluster headaches. He saw a neurologist, Dr Helene Griffith<  on the [place] and was dx with migraines. Since last visit he has not been using the Depakote.    .  He headaches are a pain.  He  is starting Galcanezumab injections every 4 weeks,       History of Brugada syndrome with abnormal EKG. He saw arrhythmia back in   2008, Dr. Merritt. He is currently doing well. He denies any chest   pain or short-windedness.       Tobacco abuse: He has tried medication for this. He wants to  quite. He is cutting back again.  He started a new position yesterday and he thinks that this position is gonna help him quit smoking.  His wife is on board now.  He had stopped but has restarted.  He does have COPD.         Reflux:  He's had reflux for about 5 years,. He states it worsens with some of the foods he eats. He has been on omeprazole, but was not taking it regularly and had a lot of breakthrough. Now that   has been more regular taking his meds- he is doing much better.        Answers for HPI/ROS submitted by the patient on 3/3/2020   activity change: No  unexpected weight change: No  neck pain: No  hearing loss: No  rhinorrhea: No  trouble swallowing: No  eye discharge: No  visual disturbance: No  chest tightness: No  wheezing: No  chest pain: No  palpitations: No  blood in stool: No  constipation: No  vomiting: No  diarrhea: No  polydipsia: No  polyuria: No  difficulty urinating: No  urgency: No  hematuria: No  joint swelling: No  arthralgias: No  headaches: Yes  weakness: No  confusion: No  dysphoric mood: No       ROS : Gen - no fatigue or significant weight change  Eyes - no eye pain or visual changes  ENT - no hoarseness or sore throat  CV - No chest pain or SOB.  NO palpitations.  Pulm -  no cough or wheezing  GI - no N/V/D   no dysuria or incontinence  MS - no joint pain or muscle pain  Skin - no rash, or c/o of skin lesions  Neuro - no HA, dizziness--- memory is doing well.   Heme - no abnormal bleeding or bruising  Endo - no polydipsia, or temperature changes  Psych - no anxiety or depression        PHYSICAL EXAMINATION:     /86 (BP Location: Right arm, Patient Position: Sitting, BP Method: Large (Manual))   Pulse 68   Ht 6' (1.829 m)   Wt 112.1 kg (247 lb 2.2 oz)   SpO2 96%   BMI 33.52 kg/m²        GENERAL:  A well-appearing 54-year-old gentleman, in no acute distress.     General appearance: alert, appears stated age and cooperative  Head: Normocephalic, without obvious abnormality, atraumatic  Eyes: conjunctivae/corneas clear. PERRL, EOM's intact. Fundi benign.  Throat: lips, mucosa, and tongue normal; teeth and gums normal  Neck: no adenopathy, no carotid bruit, no JVD, supple, symmetrical, trachea midline and thyroid not enlarged, symmetric, no tenderness/mass/nodules  Back: symmetric, no curvature. ROM normal. No CVA tenderness.  Lungs: clear to auscultation bilaterally  Chest wall: no tenderness  Heart: regular rate and rhythm, S1, S2 normal, no murmur, click, rub or gallop  Abdomen: soft, non-tender; bowel sounds normal; no masses,  no organomegaly  Extremities: extremities normal, atraumatic, no cyanosis or edema  Skin: Skin color, texture, turgor normal. No rashes or lesions  Lymph nodes: Cervical, supraclavicular, and axillary nodes normal.  Neurologic: Grossly normal         ASSESSMENT:  1.  Hypertension, better controlled.  Continue verapamil.  2.  Hyperlipidemia.  We will continue atorvastatin 40 mg a day. Need to recheck labs    3.  Reflux.  Needs regular PPI. And diet    4. COPD and tobacco abuse- smoking cessation consult.  .  5. Obesity - has had some weight loss.          Discussed diet and exercise.

## 2020-03-04 LAB — HIV 1+2 AB+HIV1 P24 AG SERPL QL IA: NEGATIVE

## 2020-03-09 ENCOUNTER — LAB VISIT (OUTPATIENT)
Dept: LAB | Facility: HOSPITAL | Age: 55
End: 2020-03-09
Attending: INTERNAL MEDICINE
Payer: COMMERCIAL

## 2020-03-09 DIAGNOSIS — Z12.11 COLON CANCER SCREENING: ICD-10-CM

## 2020-03-09 LAB — HEMOCCULT STL QL IA: NEGATIVE

## 2020-03-09 PROCEDURE — 82274 ASSAY TEST FOR BLOOD FECAL: CPT

## 2020-03-17 ENCOUNTER — TELEPHONE (OUTPATIENT)
Dept: PHARMACY | Facility: CLINIC | Age: 55
End: 2020-03-17

## 2020-03-30 ENCOUNTER — PATIENT MESSAGE (OUTPATIENT)
Dept: NEUROLOGY | Facility: CLINIC | Age: 55
End: 2020-03-30

## 2020-03-30 ENCOUNTER — TELEPHONE (OUTPATIENT)
Dept: NEUROLOGY | Facility: CLINIC | Age: 55
End: 2020-03-30

## 2020-04-01 ENCOUNTER — TELEPHONE (OUTPATIENT)
Dept: NEUROLOGY | Facility: CLINIC | Age: 55
End: 2020-04-01

## 2020-04-09 ENCOUNTER — TELEPHONE (OUTPATIENT)
Dept: PAIN MEDICINE | Facility: CLINIC | Age: 55
End: 2020-04-09

## 2020-04-16 ENCOUNTER — TELEPHONE (OUTPATIENT)
Dept: PHARMACY | Facility: CLINIC | Age: 55
End: 2020-04-16

## 2020-04-16 ENCOUNTER — PATIENT OUTREACH (OUTPATIENT)
Dept: ADMINISTRATIVE | Facility: OTHER | Age: 55
End: 2020-04-16

## 2020-04-17 ENCOUNTER — DOCUMENTATION ONLY (OUTPATIENT)
Dept: NEUROLOGY | Facility: CLINIC | Age: 55
End: 2020-04-17

## 2020-04-20 ENCOUNTER — TELEPHONE (OUTPATIENT)
Dept: NEUROLOGY | Facility: CLINIC | Age: 55
End: 2020-04-20

## 2020-04-20 NOTE — TELEPHONE ENCOUNTER
----- Message from Geno Hackett sent at 4/20/2020  1:44 PM CDT -----  Contact: Jarret MURPHY/ [facility]  Type: Needs Medical Advice    Who Called:  Jarret Giraldo Call Back Number: [phone]  Additional Information: Requesting a call back regarding question they had regarding pt medication aimovig 17 mg   Please Advise ---Thank you

## 2020-04-22 ENCOUNTER — TELEPHONE (OUTPATIENT)
Dept: NEUROLOGY | Facility: CLINIC | Age: 55
End: 2020-04-22

## 2020-04-22 NOTE — TELEPHONE ENCOUNTER
----- Message from Tessa Franco sent at 4/22/2020 10:51 AM CDT -----  Contact: self  Type:  Pharmacy Calling to Clarify an RX    Name of Caller:  Macario   Pharmacy Name:  [facility]  Prescription Name:  Aimobig 70 MG per ML  What do they need to clarify?:  Needs prior authorization  Best Call Back Number:  [phone] PA#[id number]  Additional Information:  na

## 2020-04-23 ENCOUNTER — TELEPHONE (OUTPATIENT)
Dept: PHARMACY | Facility: CLINIC | Age: 55
End: 2020-04-23

## 2020-04-27 DIAGNOSIS — G44.011 INTRACTABLE EPISODIC CLUSTER HEADACHE: ICD-10-CM

## 2020-04-27 RX ORDER — BUTALBITAL, ACETAMINOPHEN AND CAFFEINE 50; 325; 40 MG/1; MG/1; MG/1
TABLET ORAL
Qty: 14 TABLET | Refills: 2 | Status: SHIPPED | OUTPATIENT
Start: 2020-04-27 | End: 2020-07-17 | Stop reason: SDUPTHER

## 2020-05-21 ENCOUNTER — TELEPHONE (OUTPATIENT)
Dept: PHARMACY | Facility: CLINIC | Age: 55
End: 2020-05-21

## 2020-06-16 ENCOUNTER — TELEPHONE (OUTPATIENT)
Dept: PHARMACY | Facility: CLINIC | Age: 55
End: 2020-06-16

## 2020-06-17 DIAGNOSIS — G44.011 INTRACTABLE EPISODIC CLUSTER HEADACHE: ICD-10-CM

## 2020-06-18 RX ORDER — DIVALPROEX SODIUM 500 MG/1
500 TABLET, DELAYED RELEASE ORAL 2 TIMES DAILY
Qty: 60 TABLET | Refills: 4 | Status: SHIPPED | OUTPATIENT
Start: 2020-06-18 | End: 2020-12-14 | Stop reason: SDUPTHER

## 2020-06-24 ENCOUNTER — TELEPHONE (OUTPATIENT)
Dept: PHARMACY | Facility: CLINIC | Age: 55
End: 2020-06-24

## 2020-06-24 NOTE — TELEPHONE ENCOUNTER
Spoke to patient regarding Emgality refill. Verified to ship on Thursday 6/25 for delivery on Friday 6/26 via [employer]. Address confirmed. $0.00 copay at 004.     Deshaun Fajardo, PharmD  Clinical Pharmacist  [facility]  P: [phone]     [phone]

## 2020-07-17 ENCOUNTER — TELEPHONE (OUTPATIENT)
Dept: PHARMACY | Facility: CLINIC | Age: 55
End: 2020-07-17

## 2020-07-17 DIAGNOSIS — G44.011 INTRACTABLE EPISODIC CLUSTER HEADACHE: ICD-10-CM

## 2020-07-17 RX ORDER — BUTALBITAL, ACETAMINOPHEN AND CAFFEINE 50; 325; 40 MG/1; MG/1; MG/1
TABLET ORAL
Qty: 14 TABLET | Refills: 1 | Status: SHIPPED | OUTPATIENT
Start: 2020-07-17 | End: 2021-07-22 | Stop reason: SDUPTHER

## 2020-08-03 ENCOUNTER — IMMUNIZATION (OUTPATIENT)
Dept: PHARMACY | Facility: CLINIC | Age: 55
End: 2020-08-03
Payer: COMMERCIAL

## 2020-08-03 ENCOUNTER — HOSPITAL ENCOUNTER (OUTPATIENT)
Dept: RADIOLOGY | Facility: HOSPITAL | Age: 55
Discharge: HOME | End: 2020-08-03
Attending: INTERNAL MEDICINE
Payer: COMMERCIAL

## 2020-08-03 ENCOUNTER — OFFICE VISIT (OUTPATIENT)
Dept: INTERNAL MEDICINE | Facility: CLINIC | Age: 55
End: 2020-08-03
Payer: COMMERCIAL

## 2020-08-03 VITALS
OXYGEN SATURATION: 96 % | SYSTOLIC BLOOD PRESSURE: 136 MMHG | HEIGHT: 72 IN | DIASTOLIC BLOOD PRESSURE: 90 MMHG | WEIGHT: 250.44 LBS | HEART RATE: 69 BPM | BODY MASS INDEX: 33.92 KG/M2

## 2020-08-03 DIAGNOSIS — J44.1 CHRONIC OBSTRUCTIVE PULMONARY DISEASE WITH ACUTE EXACERBATION: ICD-10-CM

## 2020-08-03 DIAGNOSIS — N50.89 TESTICULAR MASS: ICD-10-CM

## 2020-08-03 DIAGNOSIS — G43.719 INTRACTABLE CHRONIC MIGRAINE WITHOUT AURA AND WITHOUT STATUS MIGRAINOSUS: ICD-10-CM

## 2020-08-03 DIAGNOSIS — I15.9 SECONDARY HYPERTENSION: ICD-10-CM

## 2020-08-03 DIAGNOSIS — N50.89 TESTICULAR MASS: Primary | ICD-10-CM

## 2020-08-03 DIAGNOSIS — Z12.5 SCREENING PSA (PROSTATE SPECIFIC ANTIGEN): ICD-10-CM

## 2020-08-03 DIAGNOSIS — E78.5 HYPERLIPIDEMIA, UNSPECIFIED HYPERLIPIDEMIA TYPE: ICD-10-CM

## 2020-08-03 DIAGNOSIS — F17.200 SMOKER: ICD-10-CM

## 2020-08-03 PROCEDURE — 99999 PR PBB SHADOW E&M-EST. PATIENT-LVL V: CPT | Mod: PBBFAC,,, | Performed by: INTERNAL MEDICINE

## 2020-08-03 PROCEDURE — 3075F SYST BP GE 130 - 139MM HG: CPT | Mod: CPTII,S$GLB,, | Performed by: INTERNAL MEDICINE

## 2020-08-03 PROCEDURE — 3008F PR BODY MASS INDEX (BMI) DOCUMENTED: ICD-10-PCS | Mod: CPTII,S$GLB,, | Performed by: INTERNAL MEDICINE

## 2020-08-03 PROCEDURE — 99999 PR PBB SHADOW E&M-EST. PATIENT-LVL V: ICD-10-PCS | Mod: PBBFAC,,, | Performed by: INTERNAL MEDICINE

## 2020-08-03 PROCEDURE — 99214 OFFICE O/P EST MOD 30 MIN: CPT | Mod: S$GLB,,, | Performed by: INTERNAL MEDICINE

## 2020-08-03 PROCEDURE — 76870 US SCROTUM AND TESTICLES: ICD-10-PCS | Mod: 26,,, | Performed by: RADIOLOGY

## 2020-08-03 PROCEDURE — 3080F PR MOST RECENT DIASTOLIC BLOOD PRESSURE >= 90 MM HG: ICD-10-PCS | Mod: CPTII,S$GLB,, | Performed by: INTERNAL MEDICINE

## 2020-08-03 PROCEDURE — 99214 PR OFFICE/OUTPT VISIT, EST, LEVL IV, 30-39 MIN: ICD-10-PCS | Mod: S$GLB,,, | Performed by: INTERNAL MEDICINE

## 2020-08-03 PROCEDURE — 76870 US EXAM SCROTUM: CPT | Mod: 26,,, | Performed by: RADIOLOGY

## 2020-08-03 PROCEDURE — 3008F BODY MASS INDEX DOCD: CPT | Mod: CPTII,S$GLB,, | Performed by: INTERNAL MEDICINE

## 2020-08-03 PROCEDURE — 3080F DIAST BP >= 90 MM HG: CPT | Mod: CPTII,S$GLB,, | Performed by: INTERNAL MEDICINE

## 2020-08-03 PROCEDURE — 76870 US EXAM SCROTUM: CPT | Mod: TC

## 2020-08-03 PROCEDURE — 3075F PR MOST RECENT SYSTOLIC BLOOD PRESS GE 130-139MM HG: ICD-10-PCS | Mod: CPTII,S$GLB,, | Performed by: INTERNAL MEDICINE

## 2020-08-03 RX ORDER — ATORVASTATIN CALCIUM 40 MG/1
40 TABLET, FILM COATED ORAL DAILY
Qty: 90 TABLET | Refills: 3 | Status: SHIPPED | OUTPATIENT
Start: 2020-08-03 | End: 2021-02-04 | Stop reason: SDUPTHER

## 2020-08-03 RX ORDER — VERAPAMIL HYDROCHLORIDE 240 MG/1
240 TABLET, FILM COATED, EXTENDED RELEASE ORAL NIGHTLY
Qty: 90 TABLET | Refills: 3 | Status: SHIPPED | OUTPATIENT
Start: 2020-08-03 | End: 2021-07-06 | Stop reason: SDUPTHER

## 2020-08-03 NOTE — PROGRESS NOTES
"Mr. Mcwilliams is a 54 year old male with hypertension, hyperlipidemia, headaches, and chronic obstructive pulmonary disease. He is here to follow up on his ongoing medical issues and for new R testicular pain. He states that this pain started about 4 weeks ago when he sat down in his truck. As he sat he "rolled" the testicle causing immediate pain. He denies any discoloration, but notes some increased redness and swelling on the right size. The pain has remained the same since that time, without any improvement from NSAIDs or rest. He denies any urinary changes, denies pain with ejaculation, and denies any lesions or other trauma to the area.     HTN: Noted since 2017. On verapamil 240mg QD. Has not been on recently due to running out of the medication. No CP or SOB. States he is doing well.     HLD: Labs in March showed worse control compared to previous check. Pt states he was off the atorvastatin 40mg at the time. Says he is since back on the medication. He also states that he is working on his diet and trying to avoid excessive fat intake.    HA: Following with VIANCA Potter for his intractable headaches. Taking depakote BID and has started a new monthly shot. He states he has had better control.     COPD: Longtime smoker with previous attempts at quitting. Last attempt was 2 years ago. He is currently a .5ppd smoker. He states that he and his wife are both trying to quit and would like some help making a plan.     Brugada syndrome: Past abnormal EKG. Arrhythmia back in 2008 and saw Dr. Merritt. He is currently doing well. He denies any CP or SOB.     Review of Systems   Constitutional: Negative for chills and fever.   HENT: Negative for hearing loss and sore throat.    Eyes: Negative for photophobia and pain.   Respiratory: Negative for cough, shortness of breath and wheezing.    Cardiovascular: Negative for chest pain and palpitations.   Gastrointestinal: Negative for abdominal pain, constipation, diarrhea, " heartburn, nausea and vomiting.   Genitourinary: Negative for dysuria, frequency and hematuria.   Musculoskeletal: Negative for myalgias.   Neurological: Positive for headaches. Negative for dizziness and weakness.   Endo/Heme/Allergies: Positive for environmental allergies.     Labs:   3/20 CMP: unremarkable  3/20 HIV: neg  3/20 PSA: .74  3/20 Lipid Panel: Elevated triglycerides, LDL, cholesterol. Decreased HDL/ cholesterol ratio.   3/20 iFOBT: neg    No new imaging or procedures to review.    Physical Exam   Constitutional: He is oriented to person, place, and time. He appears well-developed and well-nourished.   HENT:   Head: Normocephalic and atraumatic.   Eyes: Conjunctivae and EOM are normal.   Neck: Normal range of motion. Neck supple.   Cardiovascular: Normal rate, regular rhythm and normal heart sounds.   Pulmonary/Chest: Effort normal and breath sounds normal.   Abdominal: Soft. Bowel sounds are normal.   Genitourinary:    Penis normal.   No penile tenderness.    Genitourinary Comments: Swollen and erythematous R scrotum. No tenderness noted.      Musculoskeletal: Normal range of motion.         General: No edema.   Neurological: He is alert and oriented to person, place, and time.   Skin: Skin is warm and dry.   Psychiatric: He has a normal mood and affect. His behavior is normal. Judgment and thought content normal.       Assessment and Plan    Mr. Mcwilliams is a 54 year old male with hypertension, hyperlipidemia, headaches, and chronic obstructive pulmonary disease. He is here to follow up on his ongoing medical issues and for new R testicular pain.    R Testicular Pain:  - US of the scrotum ordered    Tobacco Abuse:  - good outcome in past with smoking cessation clinic  - referral placed with smoking cessation clinic for him and he will take his wife along.      COPD:  - Secondary to smoking history  - not current requiring albuterol     HTN:  - /90 today  - Refilled his medication     Brugada  Syndrome:  - on verapamil 240, can continue     HLD:  - Back on atorvastatin  - repeat lipid panel      GERD:  - controlled with omeprazole 40, continue

## 2020-08-03 NOTE — PROGRESS NOTES
I have personally taken the history and examined this patient and agree with the resident's note as stated above with the following thoughts:  BP (!) 136/90 (BP Location: Right arm, Patient Position: Sitting, BP Method: Large (Manual))   Pulse 69   Ht 6' (1.829 m)   Wt 113.6 kg (250 lb 7.1 oz)   SpO2 96%   BMI 33.97 kg/m²     Discussed getting testicular US>  Rest support and will follow up on this

## 2020-08-07 ENCOUNTER — TELEPHONE (OUTPATIENT)
Dept: INTERNAL MEDICINE | Facility: CLINIC | Age: 55
End: 2020-08-07

## 2020-08-07 RX ORDER — CIPROFLOXACIN 500 MG/1
500 TABLET ORAL 2 TIMES DAILY
Qty: 14 TABLET | Refills: 0 | Status: SHIPPED | OUTPATIENT
Start: 2020-08-07 | End: 2020-08-14

## 2020-08-07 NOTE — TELEPHONE ENCOUNTER
We are going to treat him for epididymitis.  Cipro sent in.    He is going let me know how he is doing next week.

## 2020-08-19 ENCOUNTER — CLINICAL SUPPORT (OUTPATIENT)
Dept: SMOKING CESSATION | Facility: CLINIC | Age: 55
End: 2020-08-19
Payer: COMMERCIAL

## 2020-08-19 DIAGNOSIS — F17.200 TOBACCO USE DISORDER: Primary | ICD-10-CM

## 2020-08-19 PROCEDURE — 99999 PR PBB SHADOW E&M-EST. PATIENT-LVL III: ICD-10-PCS | Mod: PBBFAC,,,

## 2020-08-19 PROCEDURE — 99999 PR PBB SHADOW E&M-EST. PATIENT-LVL III: CPT | Mod: PBBFAC,,,

## 2020-08-19 PROCEDURE — 99404 PREV MED CNSL INDIV APPRX 60: CPT | Mod: S$GLB,,, | Performed by: GENERAL PRACTICE

## 2020-08-19 PROCEDURE — 99404 PR PREVENT COUNSEL,INDIV,60 MIN: ICD-10-PCS | Mod: S$GLB,,, | Performed by: GENERAL PRACTICE

## 2020-08-19 RX ORDER — VARENICLINE TARTRATE 0.5 (11)-1
KIT ORAL
Qty: 53 TABLET | Refills: 0 | Status: SHIPPED | OUTPATIENT
Start: 2020-08-19 | End: 2020-09-10 | Stop reason: DRUGHIGH

## 2020-08-19 RX ORDER — DIPHENHYDRAMINE HCL 25 MG
CAPSULE ORAL
Qty: 100 EACH | Refills: 0 | Status: SHIPPED | OUTPATIENT
Start: 2020-08-19 | End: 2020-11-11

## 2020-08-19 NOTE — Clinical Note
Patient will be participating in weekly tobacco cessation meetings and will begin the prescribed tobacco cessation medication regime of the Chantix starter pack and 4 mg gum. Patient has used Chantix, patches and lozenges before.

## 2020-08-22 ENCOUNTER — TELEPHONE (OUTPATIENT)
Dept: PHARMACY | Facility: CLINIC | Age: 55
End: 2020-08-22

## 2020-09-10 ENCOUNTER — CLINICAL SUPPORT (OUTPATIENT)
Dept: SMOKING CESSATION | Facility: CLINIC | Age: 55
End: 2020-09-10
Payer: COMMERCIAL

## 2020-09-10 DIAGNOSIS — F17.200 TOBACCO USE DISORDER: Primary | ICD-10-CM

## 2020-09-10 PROCEDURE — 99404 PREV MED CNSL INDIV APPRX 60: CPT | Mod: S$GLB,,, | Performed by: GENERAL PRACTICE

## 2020-09-10 PROCEDURE — 99999 PR PBB SHADOW E&M-EST. PATIENT-LVL III: ICD-10-PCS | Mod: PBBFAC,,,

## 2020-09-10 PROCEDURE — 99999 PR PBB SHADOW E&M-EST. PATIENT-LVL III: CPT | Mod: PBBFAC,,,

## 2020-09-10 PROCEDURE — 99404 PR PREVENT COUNSEL,INDIV,60 MIN: ICD-10-PCS | Mod: S$GLB,,, | Performed by: GENERAL PRACTICE

## 2020-09-10 RX ORDER — VARENICLINE TARTRATE 1 MG/1
1 TABLET, FILM COATED ORAL 2 TIMES DAILY
Qty: 60 TABLET | Refills: 0 | Status: SHIPPED | OUTPATIENT
Start: 2020-09-10 | End: 2020-10-14 | Stop reason: SDUPTHER

## 2020-09-10 NOTE — Clinical Note
Patient is currently smoking 4 cpd and taking Chantix and gum with no negative side effects at this time. We discussed strategies to help with his quit as well as habitual behaviors and distractions. Patient has follow up in 2 weeks.

## 2020-09-10 NOTE — PROGRESS NOTES
Individual Follow-Up Form    9/10/2020    Quit Date:     Clinical Status of Patient: Outpatient    Length of Service: 60 minutes    Continuing Medication: yes  Chantix    Other Medications: gum     Target Symptoms: Withdrawal and medication side effects. The following were  rated moderate (3) to severe (4) on TCRS:  · Moderate (3): none  · Severe (4): none    Comments: Patient is currently smoking 4 cpd and taking Chantix and gum with no negative side effects at this time. We discussed strategies to help with his quit as well as habitual behaviors and distractions. Patient has follow up in 2 weeks.    Diagnosis: F17.200    Next Visit: 2 weeks

## 2020-09-19 ENCOUNTER — TELEPHONE (OUTPATIENT)
Dept: PHARMACY | Facility: CLINIC | Age: 55
End: 2020-09-19

## 2020-09-19 NOTE — TELEPHONE ENCOUNTER
Call attempt 1- [facility] refill. No Answer. LEft VOicemail. Sent mychart. NEeds copay card. opening FA activity and sending FA an email.

## 2020-09-26 ENCOUNTER — TELEPHONE (OUTPATIENT)
Dept: PHARMACY | Facility: CLINIC | Age: 55
End: 2020-09-26

## 2020-09-26 NOTE — TELEPHONE ENCOUNTER
Call attempt #2- [facility] refill. No Answer. Left VOicemail. MyChart not read. Needs copay card - FA has also been reaching out.

## 2020-09-27 ENCOUNTER — PATIENT MESSAGE (OUTPATIENT)
Dept: INTERNAL MEDICINE | Facility: CLINIC | Age: 55
End: 2020-09-27

## 2020-09-28 ENCOUNTER — CLINICAL SUPPORT (OUTPATIENT)
Dept: SMOKING CESSATION | Facility: CLINIC | Age: 55
End: 2020-09-28
Payer: COMMERCIAL

## 2020-09-28 DIAGNOSIS — F17.200 TOBACCO USE DISORDER: Primary | ICD-10-CM

## 2020-09-28 PROCEDURE — 99404 PR PREVENT COUNSEL,INDIV,60 MIN: ICD-10-PCS | Mod: S$GLB,,, | Performed by: GENERAL PRACTICE

## 2020-09-28 PROCEDURE — 99999 PR PBB SHADOW E&M-EST. PATIENT-LVL III: ICD-10-PCS | Mod: PBBFAC,,,

## 2020-09-28 PROCEDURE — 99999 PR PBB SHADOW E&M-EST. PATIENT-LVL III: CPT | Mod: PBBFAC,,,

## 2020-09-28 PROCEDURE — 99404 PREV MED CNSL INDIV APPRX 60: CPT | Mod: S$GLB,,, | Performed by: GENERAL PRACTICE

## 2020-09-28 RX ORDER — OMEPRAZOLE 40 MG/1
40 CAPSULE, DELAYED RELEASE ORAL DAILY
Qty: 90 CAPSULE | Refills: 3 | Status: SHIPPED | OUTPATIENT
Start: 2020-09-28 | End: 2021-07-22 | Stop reason: SDUPTHER

## 2020-09-28 NOTE — PROGRESS NOTES
Individual Follow-Up Form    9/28/2020    Quit Date: 9/20/20    Clinical Status of Patient: Outpatient    Length of Service: 60 minutes    Continuing Medication: yes  Chantix    Other Medications:      Target Symptoms: Withdrawal and medication side effects. The following were  rated moderate (3) to severe (4) on TCRS:  · Moderate (3): none  · Severe (4): none    Comments: Patient is currently tobacco free and taking Chantix with no negative side effects at this time. We discussed health, habitual behaviors and strategies to maintain his quit. Patient has follow up in 2 weeks.    Diagnosis: F17.200    Next Visit: 2 weeks

## 2020-09-28 NOTE — Clinical Note
Patient is currently tobacco free and taking Chantix with no negative side effects at this time. We discussed health, habitual behaviors and strategies to maintain his quit. Patient has follow up in 2 weeks.

## 2020-10-14 ENCOUNTER — TELEPHONE (OUTPATIENT)
Dept: SMOKING CESSATION | Facility: CLINIC | Age: 55
End: 2020-10-14

## 2020-10-14 DIAGNOSIS — F17.200 TOBACCO USE DISORDER: ICD-10-CM

## 2020-10-14 RX ORDER — VARENICLINE TARTRATE 1 MG/1
1 TABLET, FILM COATED ORAL 2 TIMES DAILY
Qty: 60 TABLET | Refills: 0 | Status: SHIPPED | OUTPATIENT
Start: 2020-10-14 | End: 2020-11-13

## 2020-10-18 ENCOUNTER — PATIENT MESSAGE (OUTPATIENT)
Dept: INTERNAL MEDICINE | Facility: CLINIC | Age: 55
End: 2020-10-18

## 2020-10-22 ENCOUNTER — LAB VISIT (OUTPATIENT)
Dept: LAB | Facility: HOSPITAL | Age: 55
End: 2020-10-22
Attending: INTERNAL MEDICINE
Payer: COMMERCIAL

## 2020-10-22 ENCOUNTER — IMMUNIZATION (OUTPATIENT)
Dept: INTERNAL MEDICINE | Facility: CLINIC | Age: 55
End: 2020-10-22
Payer: COMMERCIAL

## 2020-10-22 ENCOUNTER — OFFICE VISIT (OUTPATIENT)
Dept: INTERNAL MEDICINE | Facility: CLINIC | Age: 55
End: 2020-10-22
Payer: COMMERCIAL

## 2020-10-22 VITALS
HEIGHT: 72 IN | WEIGHT: 262.38 LBS | HEART RATE: 55 BPM | OXYGEN SATURATION: 98 % | DIASTOLIC BLOOD PRESSURE: 86 MMHG | SYSTOLIC BLOOD PRESSURE: 120 MMHG | BODY MASS INDEX: 35.54 KG/M2

## 2020-10-22 DIAGNOSIS — E78.5 HYPERLIPIDEMIA, UNSPECIFIED HYPERLIPIDEMIA TYPE: ICD-10-CM

## 2020-10-22 DIAGNOSIS — I15.9 SECONDARY HYPERTENSION: ICD-10-CM

## 2020-10-22 DIAGNOSIS — J44.1 CHRONIC OBSTRUCTIVE PULMONARY DISEASE WITH ACUTE EXACERBATION: Primary | ICD-10-CM

## 2020-10-22 DIAGNOSIS — K21.9 GASTROESOPHAGEAL REFLUX DISEASE, UNSPECIFIED WHETHER ESOPHAGITIS PRESENT: ICD-10-CM

## 2020-10-22 DIAGNOSIS — R10.11 RUQ PAIN: ICD-10-CM

## 2020-10-22 LAB
ALBUMIN SERPL BCP-MCNC: 4 G/DL (ref 3.5–5.2)
ALP SERPL-CCNC: 47 U/L (ref 55–135)
ALT SERPL W/O P-5'-P-CCNC: 35 U/L (ref 10–44)
ANION GAP SERPL CALC-SCNC: 8 MMOL/L (ref 8–16)
AST SERPL-CCNC: 23 U/L (ref 10–40)
BILIRUB SERPL-MCNC: 0.6 MG/DL (ref 0.1–1)
BUN SERPL-MCNC: 23 MG/DL (ref 6–20)
CALCIUM SERPL-MCNC: 8.9 MG/DL (ref 8.7–10.5)
CHLORIDE SERPL-SCNC: 105 MMOL/L (ref 95–110)
CHOLEST SERPL-MCNC: 190 MG/DL (ref 120–199)
CHOLEST/HDLC SERPL: 4.5 {RATIO} (ref 2–5)
CO2 SERPL-SCNC: 28 MMOL/L (ref 23–29)
CREAT SERPL-MCNC: 1 MG/DL (ref 0.5–1.4)
EST. GFR  (AFRICAN AMERICAN): >60 ML/MIN/1.73 M^2
EST. GFR  (NON AFRICAN AMERICAN): >60 ML/MIN/1.73 M^2
GLUCOSE SERPL-MCNC: 89 MG/DL (ref 70–110)
HDLC SERPL-MCNC: 42 MG/DL (ref 40–75)
HDLC SERPL: 22.1 % (ref 20–50)
LDLC SERPL CALC-MCNC: 117.6 MG/DL (ref 63–159)
NONHDLC SERPL-MCNC: 148 MG/DL
POTASSIUM SERPL-SCNC: 4.1 MMOL/L (ref 3.5–5.1)
PROT SERPL-MCNC: 6.8 G/DL (ref 6–8.4)
SODIUM SERPL-SCNC: 141 MMOL/L (ref 136–145)
TRIGL SERPL-MCNC: 152 MG/DL (ref 30–150)

## 2020-10-22 PROCEDURE — 36415 COLL VENOUS BLD VENIPUNCTURE: CPT

## 2020-10-22 PROCEDURE — 3074F SYST BP LT 130 MM HG: CPT | Mod: CPTII,S$GLB,, | Performed by: INTERNAL MEDICINE

## 2020-10-22 PROCEDURE — 80061 LIPID PANEL: CPT

## 2020-10-22 PROCEDURE — 90471 IMMUNIZATION ADMIN: CPT | Mod: S$GLB,,, | Performed by: INTERNAL MEDICINE

## 2020-10-22 PROCEDURE — 99214 PR OFFICE/OUTPT VISIT, EST, LEVL IV, 30-39 MIN: ICD-10-PCS | Mod: 25,S$GLB,, | Performed by: INTERNAL MEDICINE

## 2020-10-22 PROCEDURE — 3079F PR MOST RECENT DIASTOLIC BLOOD PRESSURE 80-89 MM HG: ICD-10-PCS | Mod: CPTII,S$GLB,, | Performed by: INTERNAL MEDICINE

## 2020-10-22 PROCEDURE — 3079F DIAST BP 80-89 MM HG: CPT | Mod: CPTII,S$GLB,, | Performed by: INTERNAL MEDICINE

## 2020-10-22 PROCEDURE — 99999 PR PBB SHADOW E&M-EST. PATIENT-LVL IV: ICD-10-PCS | Mod: PBBFAC,,, | Performed by: INTERNAL MEDICINE

## 2020-10-22 PROCEDURE — 3008F PR BODY MASS INDEX (BMI) DOCUMENTED: ICD-10-PCS | Mod: CPTII,S$GLB,, | Performed by: INTERNAL MEDICINE

## 2020-10-22 PROCEDURE — 99214 OFFICE O/P EST MOD 30 MIN: CPT | Mod: 25,S$GLB,, | Performed by: INTERNAL MEDICINE

## 2020-10-22 PROCEDURE — 90686 IIV4 VACC NO PRSV 0.5 ML IM: CPT | Mod: S$GLB,,, | Performed by: INTERNAL MEDICINE

## 2020-10-22 PROCEDURE — 3074F PR MOST RECENT SYSTOLIC BLOOD PRESSURE < 130 MM HG: ICD-10-PCS | Mod: CPTII,S$GLB,, | Performed by: INTERNAL MEDICINE

## 2020-10-22 PROCEDURE — 80053 COMPREHEN METABOLIC PANEL: CPT

## 2020-10-22 PROCEDURE — 90471 FLU VACCINE (QUAD) GREATER THAN OR EQUAL TO 3YO PRESERVATIVE FREE IM: ICD-10-PCS | Mod: S$GLB,,, | Performed by: INTERNAL MEDICINE

## 2020-10-22 PROCEDURE — 3008F BODY MASS INDEX DOCD: CPT | Mod: CPTII,S$GLB,, | Performed by: INTERNAL MEDICINE

## 2020-10-22 PROCEDURE — 99999 PR PBB SHADOW E&M-EST. PATIENT-LVL IV: CPT | Mod: PBBFAC,,, | Performed by: INTERNAL MEDICINE

## 2020-10-22 PROCEDURE — 90686 FLU VACCINE (QUAD) GREATER THAN OR EQUAL TO 3YO PRESERVATIVE FREE IM: ICD-10-PCS | Mod: S$GLB,,, | Performed by: INTERNAL MEDICINE

## 2020-10-22 NOTE — PROGRESS NOTES
He is a 54  -year-old gentleman coming in today to follow up his ongoing   medical problems including hyperlipidemia, Gerd, and reflux, tobacco abuse,  and some glucose intolerance.         He has been having headaches. He Has had cluster headaches. He saw a neurologist, Dr Helene Griffith<  on the [place] and was dx with migraines. Since last visit he has not been using the Depakote.    .  He headaches are a pain.  He  is starting Galcanezumab injections every 4 weeks,       History of Brugada syndrome with abnormal EKG. He saw arrhythmia back in   2008, Dr. Merritt. He is currently doing well. He denies any chest   pain or short-windedness.       Tobacco abuse: He has stopped smoking since last visit-- it has been about a month.  He went through smoking cessation. He is still on chantix.  He has gained about  15 #  Since last visit.    He does have COPD.         Reflux:  He's had reflux for about 5 years,. He states it worsens with some of the foods he eats. He has been on omeprazole, but was not taking it regularly and had a lot of breakthrough. Now that   has been more regular taking his meds- he is doing much better.        Hyperlipidemia-- started on lipitor 40 mg back in 8/2020 -- he has been on this for a while.            ROS : Gen - no fatigue or significant weight change  Eyes - no eye pain or visual changes  ENT - no hoarseness or sore throat  CV - No chest pain or SOB.  NO palpitations.  Pulm - no cough or wheezing  GI - no N/V/D-- He has right sided abdominal pain for the past 4 weeks.  It is right sided..  Food does not seem to bother it, but he things fried food might.     no dysuria or incontinence  MS - no joint pain or muscle pain  Skin - no rash, or c/o of skin lesions  Neuro - no HA, dizziness--- memory is doing well.   Heme - no abnormal bleeding or bruising  Endo - no polydipsia, or temperature changes  Psych - no anxiety or depression        PHYSICAL EXAMINATION:           /86  (BP Location: Right arm, Patient Position: Sitting, BP Method: Large (Manual))   Pulse (!) 55   Ht 6' (1.829 m)   Wt 119 kg (262 lb 5.6 oz)   SpO2 98%   BMI 35.58 kg/m²           GENERAL:  A well-appearing 54-year-old gentleman, in no acute distress.     General appearance: alert, appears stated age and cooperative  Head: Normocephalic, without obvious abnormality, atraumatic  Eyes: conjunctivae/corneas clear. PERRL, EOM's intact. Fundi benign.  Throat: lips, mucosa, and tongue normal; teeth and gums normal  Neck: no adenopathy, no carotid bruit, no JVD, supple, symmetrical, trachea midline and thyroid not enlarged, symmetric, no tenderness/mass/nodules  Back: symmetric, no curvature. ROM normal. No CVA tenderness.  Lungs: clear to auscultation bilaterally  Chest wall: no tenderness  Heart: regular rate and rhythm, S1, S2 normal, no murmur, click, rub or gallop  Abdomen: soft, non-tender; bowel sounds normal; no masses,  no organomegaly  Extremities: extremities normal, atraumatic, no cyanosis or edema  Skin: Skin color, texture, turgor normal. No rashes or lesions  Lymph nodes: Cervical, supraclavicular, and axillary nodes normal.  Neurologic: Grossly normal           ASSESSMENT:  1.  Hypertension, better controlled.  Continue verapamil.  2.  Hyperlipidemia.  We will continue atorvastatin 40 mg a day. Need to recheck labs    3.  Reflux.  Needs regular PPI. Doing well.  4. COPD and tobacco abuse- stopped smoking    5. Obesity -  BMI - 35.58.    6. RUQ pain- reviewed us from 2017 and that had resolved back then-- will recheck us of abdomen.          Discussed diet and exercise.

## 2020-10-28 ENCOUNTER — TELEPHONE (OUTPATIENT)
Dept: PHARMACY | Facility: CLINIC | Age: 55
End: 2020-10-28

## 2020-10-28 ENCOUNTER — TELEPHONE (OUTPATIENT)
Dept: SMOKING CESSATION | Facility: CLINIC | Age: 55
End: 2020-10-28

## 2020-10-28 ENCOUNTER — TELEPHONE (OUTPATIENT)
Dept: INTERNAL MEDICINE | Facility: CLINIC | Age: 55
End: 2020-10-28

## 2020-10-28 NOTE — TELEPHONE ENCOUNTER
----- Message from Puja Perez sent at 10/28/2020 10:40 AM CDT -----  Contact: [phone]  Patient would like to speak to the nurse in regard to his ultrasound nitza tomorrow. Please call and advise.
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
## 2020-10-29 ENCOUNTER — HOSPITAL ENCOUNTER (OUTPATIENT)
Dept: RADIOLOGY | Facility: HOSPITAL | Age: 55
Discharge: HOME | End: 2020-10-29
Attending: INTERNAL MEDICINE
Payer: COMMERCIAL

## 2020-10-29 ENCOUNTER — PATIENT OUTREACH (OUTPATIENT)
Dept: ADMINISTRATIVE | Facility: OTHER | Age: 55
End: 2020-10-29

## 2020-10-29 DIAGNOSIS — R10.11 RUQ PAIN: ICD-10-CM

## 2020-10-29 PROCEDURE — 76700 US ABDOMEN COMPLETE: ICD-10-PCS | Mod: 26,,, | Performed by: RADIOLOGY

## 2020-10-29 PROCEDURE — 76700 US EXAM ABDOM COMPLETE: CPT | Mod: 26,,, | Performed by: RADIOLOGY

## 2020-10-29 PROCEDURE — 76700 US EXAM ABDOM COMPLETE: CPT | Mod: TC

## 2020-10-30 ENCOUNTER — OFFICE VISIT (OUTPATIENT)
Dept: DERMATOLOGY | Facility: CLINIC | Age: 55
End: 2020-10-30
Payer: COMMERCIAL

## 2020-10-30 DIAGNOSIS — L82.1 SK (SEBORRHEIC KERATOSIS): ICD-10-CM

## 2020-10-30 DIAGNOSIS — D23.9 DERMATOFIBROMA: ICD-10-CM

## 2020-10-30 DIAGNOSIS — D22.9 MULTIPLE BENIGN NEVI: ICD-10-CM

## 2020-10-30 DIAGNOSIS — Z12.83 SCREENING EXAM FOR SKIN CANCER: ICD-10-CM

## 2020-10-30 DIAGNOSIS — C44.612 BCC (BASAL CELL CARCINOMA), ARM, RIGHT: Primary | ICD-10-CM

## 2020-10-30 PROCEDURE — 99214 OFFICE O/P EST MOD 30 MIN: CPT | Mod: S$GLB,,, | Performed by: DERMATOLOGY

## 2020-10-30 PROCEDURE — 99214 PR OFFICE/OUTPT VISIT, EST, LEVL IV, 30-39 MIN: ICD-10-PCS | Mod: S$GLB,,, | Performed by: DERMATOLOGY

## 2020-10-30 PROCEDURE — 99999 PR PBB SHADOW E&M-EST. PATIENT-LVL III: CPT | Mod: PBBFAC,,, | Performed by: DERMATOLOGY

## 2020-10-30 PROCEDURE — 99999 PR PBB SHADOW E&M-EST. PATIENT-LVL III: ICD-10-PCS | Mod: PBBFAC,,, | Performed by: DERMATOLOGY

## 2020-10-30 NOTE — PROGRESS NOTES
Subjective:       Patient ID:  Jovan Mcwilliams is a 54 y.o. male who presents for   Chief Complaint   Patient presents with    Skin Check     Here for TBSE  + h/o sun exposure  No h/o nmsc or mm    Last seen by me for TBSE 11/18    Patient with new complaint of lesion(s)  Location: right forearm  Duration: 6 months  Symptoms: raised  Relieving factors/Previous treatments: none          Review of Systems   Skin: Positive for activity-related sunscreen use and recent sunburn (head - mild). Negative for daily sunscreen use.   Hematologic/Lymphatic: Does not bruise/bleed easily.        Objective:    Physical Exam   Constitutional: He appears well-developed and well-nourished. No distress.   Neurological: He is alert and oriented to person, place, and time. He is not disoriented.   Psychiatric: He has a normal mood and affect.   Skin:   Areas Examined (abnormalities noted in diagram):   Scalp / Hair Palpated and Inspected  Head / Face Inspection Performed  Neck Inspection Performed  Chest / Axilla Inspection Performed  Abdomen Inspection Performed  Genitals / Buttocks / Groin Inspection Performed  Back Inspection Performed  RUE Inspected  LUE Inspection Performed  RLE Inspected  LLE Inspection Performed  Nails and Digits Inspection Performed              Diagram Legend     Erythematous scaling macule/papule c/w actinic keratosis       Vascular papule c/w angioma      Pigmented verrucoid papule/plaque c/w seborrheic keratosis      Yellow umbilicated papule c/w sebaceous hyperplasia      Irregularly shaped tan macule c/w lentigo     1-2 mm smooth white papules consistent with Milia      Movable subcutaneous cyst with punctum c/w epidermal inclusion cyst      Subcutaneous movable cyst c/w pilar cyst      Firm pink to brown papule c/w dermatofibroma      Pedunculated fleshy papule(s) c/w skin tag(s)      Evenly pigmented macule c/w junctional nevus     Mildly variegated pigmented, slightly irregular-bordered macule c/w mildly  atypical nevus      Flesh colored to evenly pigmented papule c/w intradermal nevus       Pink pearly papule/plaque c/w basal cell carcinoma      Erythematous hyperkeratotic cursted plaque c/w SCC      Surgical scar with no sign of skin cancer recurrence      Open and closed comedones      Inflammatory papules and pustules      Verrucoid papule consistent consistent with wart     Erythematous eczematous patches and plaques     Dystrophic onycholytic nail with subungual debris c/w onychomycosis     Umbilicated papule    Erythematous-base heme-crusted tan verrucoid plaque consistent with inflamed seborrheic keratosis     Erythematous Silvery Scaling Plaque c/w Psoriasis     See annotation              Assessment / Plan:        BCC (basal cell carcinoma), right forearm  Schedule procedure for definitive excision.       Multiple benign nevi   - stable and chronic      SK (seborrheic keratosis)  These are benign inherited growths without a malignant potential. Reassurance given to patient. No treatment is necessary.       Dermatofibroma   - stable and chronic      Screening exam for skin cancer  Total body skin examination performed today including at least 12 points as noted in physical examination. Suspicious lesions noted.               Follow up for excision.

## 2020-11-03 ENCOUNTER — PATIENT MESSAGE (OUTPATIENT)
Dept: INTERNAL MEDICINE | Facility: CLINIC | Age: 55
End: 2020-11-03

## 2020-11-04 ENCOUNTER — TELEPHONE (OUTPATIENT)
Dept: INTERNAL MEDICINE | Facility: CLINIC | Age: 55
End: 2020-11-04

## 2020-11-04 DIAGNOSIS — K62.89 RECTAL PAIN: Primary | ICD-10-CM

## 2020-11-04 RX ORDER — DICLOFENAC SODIUM 50 MG/1
50 TABLET, DELAYED RELEASE ORAL 2 TIMES DAILY
Qty: 28 TABLET | Refills: 0 | Status: SHIPPED | OUTPATIENT
Start: 2020-11-04 | End: 2020-11-18

## 2020-11-04 NOTE — TELEPHONE ENCOUNTER
I will give him some diclofenac 1 twice a day for the next 2 weeks and if the right sided pain which I feel is skeletal muscular pain is not getting better, he will let me know in 2 weeks.  Also he wishes see Colorectal surgery because he has a rectal sensation of just being un clean.

## 2020-11-06 ENCOUNTER — OFFICE VISIT (OUTPATIENT)
Dept: SURGERY | Facility: CLINIC | Age: 55
End: 2020-11-06
Payer: COMMERCIAL

## 2020-11-06 ENCOUNTER — TELEPHONE (OUTPATIENT)
Dept: SURGERY | Facility: CLINIC | Age: 55
End: 2020-11-06

## 2020-11-06 VITALS
BODY MASS INDEX: 35.05 KG/M2 | DIASTOLIC BLOOD PRESSURE: 86 MMHG | SYSTOLIC BLOOD PRESSURE: 143 MMHG | WEIGHT: 258.81 LBS | HEART RATE: 65 BPM | HEIGHT: 72 IN

## 2020-11-06 DIAGNOSIS — L29.0 PRURITUS ANI: Primary | ICD-10-CM

## 2020-11-06 DIAGNOSIS — K62.89 RECTAL PAIN: ICD-10-CM

## 2020-11-06 DIAGNOSIS — Z12.11 SCREENING FOR COLON CANCER: ICD-10-CM

## 2020-11-06 PROCEDURE — 3079F PR MOST RECENT DIASTOLIC BLOOD PRESSURE 80-89 MM HG: ICD-10-PCS | Mod: CPTII,S$GLB,, | Performed by: NURSE PRACTITIONER

## 2020-11-06 PROCEDURE — 3077F PR MOST RECENT SYSTOLIC BLOOD PRESSURE >= 140 MM HG: ICD-10-PCS | Mod: CPTII,S$GLB,, | Performed by: NURSE PRACTITIONER

## 2020-11-06 PROCEDURE — 3008F BODY MASS INDEX DOCD: CPT | Mod: CPTII,S$GLB,, | Performed by: NURSE PRACTITIONER

## 2020-11-06 PROCEDURE — 99999 PR PBB SHADOW E&M-EST. PATIENT-LVL IV: CPT | Mod: PBBFAC,,, | Performed by: NURSE PRACTITIONER

## 2020-11-06 PROCEDURE — 3077F SYST BP >= 140 MM HG: CPT | Mod: CPTII,S$GLB,, | Performed by: NURSE PRACTITIONER

## 2020-11-06 PROCEDURE — 46600 PR DIAG2STIC A2SCOPY: ICD-10-PCS | Mod: S$GLB,,, | Performed by: NURSE PRACTITIONER

## 2020-11-06 PROCEDURE — 99999 PR PBB SHADOW E&M-EST. PATIENT-LVL IV: ICD-10-PCS | Mod: PBBFAC,,, | Performed by: NURSE PRACTITIONER

## 2020-11-06 PROCEDURE — 46600 DIAGNOSTIC ANOSCOPY SPX: CPT | Mod: S$GLB,,, | Performed by: NURSE PRACTITIONER

## 2020-11-06 PROCEDURE — 99203 OFFICE O/P NEW LOW 30 MIN: CPT | Mod: 25,S$GLB,, | Performed by: NURSE PRACTITIONER

## 2020-11-06 PROCEDURE — 3079F DIAST BP 80-89 MM HG: CPT | Mod: CPTII,S$GLB,, | Performed by: NURSE PRACTITIONER

## 2020-11-06 PROCEDURE — 3008F PR BODY MASS INDEX (BMI) DOCUMENTED: ICD-10-PCS | Mod: CPTII,S$GLB,, | Performed by: NURSE PRACTITIONER

## 2020-11-06 PROCEDURE — 99203 PR OFFICE/OUTPT VISIT, NEW, LEVL III, 30-44 MIN: ICD-10-PCS | Mod: 25,S$GLB,, | Performed by: NURSE PRACTITIONER

## 2020-11-06 NOTE — PROGRESS NOTES
Patient ID:  Jovan Mcwilliams is a 54 y.o. male     Chief Complaint:   Chief Complaint   Patient presents with    Rectal Pain        HPI:  Jovan Mcwilliams presents to colon and rectal surgery with a complaint of rectal itching, burning for the past two months. He has used prep H, neosporin, Aquaphor, perianal wipes with no improvement in symptoms. He has a bowel movement three times a day, soft formed. He has difficulty cleaning after bowel movements and feels as though he has to excessively wipe. Will occasionally see a small amount of blood on toilet paper. +abdominal pain, currently being worked up by PCP    Colonoscopy: Never   Family history of CRC: Grandfather with colon cancer  Prior anorectal surgeries: none     ROS:     Review of Systems   Constitutional: Negative for appetite change, chills, fatigue, fever and unexpected weight change.   Respiratory: Negative for shortness of breath.    Cardiovascular: Negative for chest pain.   Gastrointestinal: Positive for abdominal pain (RUQ), blood in stool (minimal ) and rectal pain (itching, burning). Negative for abdominal distention, anal bleeding, constipation, diarrhea, nausea and vomiting.       Review of patient's allergies indicates:   Allergen Reactions    Doxycycline Anaphylaxis     Other reaction(s): nausea    Erythromycin Other (See Comments)     Other reaction(s): Nausea  Pain with IV use     Past Medical History:   Diagnosis Date    Additional heart attack     10 years ago    Eye pain     left eye    GERD (gastroesophageal reflux disease)     Headache     Hyperlipemia     Hypertension      Past Surgical History:   Procedure Laterality Date    BACK SURGERY      HERNIA REPAIR       Family History   Problem Relation Age of Onset    Hypertension Mother     Hyperlipidemia Mother     Diabetes Father     Cancer Maternal Aunt     Cancer Paternal Aunt     Cancer Maternal Grandmother     Melanoma Maternal Grandmother     Cancer Maternal Grandfather      Cancer Paternal Grandfather      Current Outpatient Medications on File Prior to Visit   Medication Sig    albuterol (PROVENTIL/VENTOLIN HFA) 90 mcg/actuation inhaler Inhale 2 puffs into the lungs every 4 (four) hours as needed for Wheezing or Shortness of Breath.    aspirin (ECOTRIN) 81 MG EC tablet Take 81 mg by mouth Daily. 1 Tablet, Delayed Release (E.C.) Oral Every day    atorvastatin (LIPITOR) 40 MG tablet Take 1 tablet (40 mg total) by mouth once daily.    butalbital-acetaminophen-caffeine -40 mg (FIORICET, ESGIC) -40 mg per tablet Take 2 po Q12 prn for rescue if IN lidocaine fails    clotrimazole-betamethasone 1-0.05% (LOTRISONE) cream Apply topically 2 (two) times daily.    diclofenac (VOLTAREN) 50 MG EC tablet Take 1 tablet (50 mg total) by mouth 2 (two) times daily. for 14 days    divalproex (DEPAKOTE) 500 MG TbEC Take 1 tablet (500 mg total) by mouth 2 (two) times daily.    galcanezumab-gnlm (GALCANEZUMAB-GNLM) 300 mg/3 mL (100 mg/mL x 3) Syrg Inject 3 mLs (300 mg total) into the skin every 28 days.    mupirocin (BACTROBAN) 2 % ointment AAA BID    omeprazole (PRILOSEC) 40 MG capsule Take 1 capsule (40 mg total) by mouth once daily.    prochlorperazine (COMPAZINE) 10 MG tablet Take 1 tablet (10 mg total) by mouth every 6 (six) hours as needed (migraine or nausea).    ubrogepant (UBRELVY)tablet 50 mg TAKE ONE TABLET BY MOUTH EVERY 2 HOURS AS NEEDED max of TWO per day    varenicline (CHANTIX) 1 mg Tab Take 1 tablet (1 mg total) by mouth 2 (two) times daily. CX    verapamiL (CALAN-SR) 240 MG CR tablet Take 1 tablet (240 mg total) by mouth every evening.    nicotine polacrilex (NICORETTE) 4 MG Gum Take up to 6 pieces daily as needed (Patient not taking: Reported on 9/28/2020)    [DISCONTINUED] buPROPion (WELLBUTRIN XL) 150 MG TB24 tablet     [DISCONTINUED] valacyclovir (VALTREX) 1000 MG tablet      No current facility-administered medications on file prior to visit.         PE:  Vitals:    11/06/20 1305   BP: (!) 143/86   Pulse: 65     Physical Exam   Constitutional: He is oriented to person, place, and time and well-developed, well-nourished, and in no distress. No distress.   HENT:   Head: Normocephalic and atraumatic.   Pulmonary/Chest: Effort normal. No respiratory distress.   Abdominal: Soft. He exhibits no distension. There is no abdominal tenderness.   Genitourinary:    Genitourinary Comments: Excoriated samson anal skin, especially between gluteal cleft. No fissures, condyloma or hemorrhoids.   ELFEGO no masses, blood, or stool.        Musculoskeletal: Normal range of motion.   Neurological: He is alert and oriented to person, place, and time. GCS score is 15.   Skin: Skin is warm and dry. No rash noted. No erythema.   Psychiatric: Affect normal.       Impression:  Encounter Diagnoses   Name Primary?    Rectal pain     Screening for colon cancer     Pruritus ani Yes       PLAN:  Jovan was seen today for rectal pain.    Diagnoses and all orders for this visit:    Pruritus ani    Rectal pain  -     Ambulatory referral/consult to Colorectal Surgery    Screening for colon cancer  -     Case request GI: COLONOSCOPY      Schedule screening c-scope   Discussed pruritis precautions:   -Avoid excessive trauma when cleaning after BM's - cleanse with moistened wipes, then pat dry    -Avoid excessive moisture - use cotton balls or corn starch   -Avoid external irritants such as deodorized or perfumed soaps   -Discussed possible dietary role (coffee, chocolate, alcohol, citrus, etc) - suggested stepwise elimination if above measures are not effective

## 2020-11-11 ENCOUNTER — CLINICAL SUPPORT (OUTPATIENT)
Dept: SMOKING CESSATION | Facility: CLINIC | Age: 55
End: 2020-11-11
Payer: COMMERCIAL

## 2020-11-11 DIAGNOSIS — F17.200 TOBACCO USE DISORDER: Primary | ICD-10-CM

## 2020-11-11 PROCEDURE — 99999 PR PBB SHADOW E&M-EST. PATIENT-LVL III: CPT | Mod: PBBFAC,,,

## 2020-11-11 PROCEDURE — 99404 PR PREVENT COUNSEL,INDIV,60 MIN: ICD-10-PCS | Mod: S$GLB,,, | Performed by: GENERAL PRACTICE

## 2020-11-11 PROCEDURE — 99404 PREV MED CNSL INDIV APPRX 60: CPT | Mod: S$GLB,,, | Performed by: GENERAL PRACTICE

## 2020-11-11 PROCEDURE — 99999 PR PBB SHADOW E&M-EST. PATIENT-LVL III: ICD-10-PCS | Mod: PBBFAC,,,

## 2020-11-11 NOTE — Clinical Note
Patient remains tobacco free and taking Chantix with no negative side effects at this time. Patient will continue to use strategies we discussed to maintain his quit. We also discussed high risk situations and nicotine withdrawals. I will follow up in 2 weeks.

## 2020-11-11 NOTE — PROGRESS NOTES
Individual Follow-Up Form    11/11/2020    Quit Date: 9/21/20    Clinical Status of Patient: Outpatient    Length of Service: 60 minutes    Continuing Medication: yes  Chantix    Other Medications: none     Target Symptoms: Withdrawal and medication side effects. The following were  rated moderate (3) to severe (4) on TCRS:  · Moderate (3): none  · Severe (4): none    Comments: Patient remains tobacco free and taking Chantix with no negative side effects at this time. Patient will continue to use strategies we discussed to maintain his quit. We also discussed high risk situations and nicotine withdrawals. I will follow up in 2 weeks.    Diagnosis: F17.200    Next Visit: 2 weeks

## 2020-11-15 ENCOUNTER — PATIENT MESSAGE (OUTPATIENT)
Dept: DERMATOLOGY | Facility: CLINIC | Age: 55
End: 2020-11-15

## 2020-11-16 ENCOUNTER — TELEPHONE (OUTPATIENT)
Dept: DERMATOLOGY | Facility: CLINIC | Age: 55
End: 2020-11-16

## 2020-11-16 NOTE — TELEPHONE ENCOUNTER
Pt contacted & stated he needs to be seen today . He has staff infection.   Advised that we do not have a physician in clinic today. Pt stated he left message for his dermatologist on [facility] .    ----- Message from Xiomara Iraheta sent at 11/16/2020  9:27 AM CST -----  Type:  Sooner Apoointment Request    Caller is requesting a sooner appointment.  Caller declined first available appointment listed below.  Caller will not accept being placed on the waitlist and is requesting a message be sent to doctor.    Name of Caller:  Patient   When is the first available appointment?  Scheduled for 12/3  Symptoms: Spot on leg   Best Call Back Number:      Additional Information:  Please advise-thank you

## 2020-11-17 ENCOUNTER — OFFICE VISIT (OUTPATIENT)
Dept: DERMATOLOGY | Facility: CLINIC | Age: 55
End: 2020-11-17
Payer: COMMERCIAL

## 2020-11-17 DIAGNOSIS — L02.91 ABSCESS: Primary | ICD-10-CM

## 2020-11-17 PROCEDURE — 99214 OFFICE O/P EST MOD 30 MIN: CPT | Mod: 25,S$GLB,, | Performed by: DERMATOLOGY

## 2020-11-17 PROCEDURE — 87070 CULTURE OTHR SPECIMN AEROBIC: CPT

## 2020-11-17 PROCEDURE — 1125F PR PAIN SEVERITY QUANTIFIED, PAIN PRESENT: ICD-10-PCS | Mod: S$GLB,,, | Performed by: DERMATOLOGY

## 2020-11-17 PROCEDURE — 99999 PR PBB SHADOW E&M-EST. PATIENT-LVL III: CPT | Mod: PBBFAC,,, | Performed by: DERMATOLOGY

## 2020-11-17 PROCEDURE — 87186 SC STD MICRODIL/AGAR DIL: CPT

## 2020-11-17 PROCEDURE — 99214 PR OFFICE/OUTPT VISIT, EST, LEVL IV, 30-39 MIN: ICD-10-PCS | Mod: 25,S$GLB,, | Performed by: DERMATOLOGY

## 2020-11-17 PROCEDURE — 1125F AMNT PAIN NOTED PAIN PRSNT: CPT | Mod: S$GLB,,, | Performed by: DERMATOLOGY

## 2020-11-17 PROCEDURE — 10060 I&D ABSCESS SIMPLE/SINGLE: CPT | Mod: S$GLB,,, | Performed by: DERMATOLOGY

## 2020-11-17 PROCEDURE — 87077 CULTURE AEROBIC IDENTIFY: CPT

## 2020-11-17 PROCEDURE — 99999 PR PBB SHADOW E&M-EST. PATIENT-LVL III: ICD-10-PCS | Mod: PBBFAC,,, | Performed by: DERMATOLOGY

## 2020-11-17 PROCEDURE — 10060 PR DRAIN SKIN ABSCESS SIMPLE: ICD-10-PCS | Mod: S$GLB,,, | Performed by: DERMATOLOGY

## 2020-11-17 RX ORDER — SULFAMETHOXAZOLE AND TRIMETHOPRIM 800; 160 MG/1; MG/1
1 TABLET ORAL 2 TIMES DAILY
Qty: 20 TABLET | Refills: 0 | Status: SHIPPED | OUTPATIENT
Start: 2020-11-17 | End: 2020-11-27

## 2020-11-17 NOTE — PATIENT INSTRUCTIONS
"Patient instructed to perform warm compresses 2 - 3 times/daily.    Instructions for removal of iodoform gauze provided to patient - remove at 1/2" per day      "

## 2020-11-17 NOTE — PROGRESS NOTES
Subjective:       Patient ID:  Jovan Mcwilliams is a 54 y.o. male who presents for   Chief Complaint   Patient presents with    Cyst     Patient with new complaint of abscess x2    Location: Right crease and inner thigh  Duration: 1 week  Symptoms: drained and tender  Relieving factors/Previous treatments: hot compresses and bactroban oint     Had similar axilla 1 year ago      Review of Systems   Constitutional: Negative for fever and chills.   Gastrointestinal: Negative for nausea and vomiting.   Skin: Positive for abscesses. Negative for itching and rash.   Hematologic/Lymphatic: Does not bruise/bleed easily.        Objective:    Physical Exam   Constitutional: He appears well-developed and well-nourished. No distress.   Neurological: He is alert and oriented to person, place, and time. He is not disoriented.   Psychiatric: He has a normal mood and affect.   Skin:   Areas Examined (abnormalities noted in diagram):   Genitals / Buttocks / Groin Inspection Performed  RLE Inspected  LLE Inspection Performed              Diagram Legend     Erythematous scaling macule/papule c/w actinic keratosis       Vascular papule c/w angioma      Pigmented verrucoid papule/plaque c/w seborrheic keratosis      Yellow umbilicated papule c/w sebaceous hyperplasia      Irregularly shaped tan macule c/w lentigo     1-2 mm smooth white papules consistent with Milia      Movable subcutaneous cyst with punctum c/w epidermal inclusion cyst      Subcutaneous movable cyst c/w pilar cyst      Firm pink to brown papule c/w dermatofibroma      Pedunculated fleshy papule(s) c/w skin tag(s)      Evenly pigmented macule c/w junctional nevus     Mildly variegated pigmented, slightly irregular-bordered macule c/w mildly atypical nevus      Flesh colored to evenly pigmented papule c/w intradermal nevus       Pink pearly papule/plaque c/w basal cell carcinoma      Erythematous hyperkeratotic cursted plaque c/w SCC      Surgical scar with no sign of  skin cancer recurrence      Open and closed comedones      Inflammatory papules and pustules      Verrucoid papule consistent consistent with wart     Erythematous eczematous patches and plaques     Dystrophic onycholytic nail with subungual debris c/w onychomycosis     Umbilicated papule    Erythematous-base heme-crusted tan verrucoid plaque consistent with inflamed seborrheic keratosis     Erythematous Silvery Scaling Plaque c/w Psoriasis     See annotation      Assessment / Plan:        Abscess x 2  -     Aerobic culture  -     sulfamethoxazole-trimethoprim 800-160mg (BACTRIM DS) 800-160 mg Tab; Take 1 tablet by mouth 2 (two) times daily. for 10 days  Dispense: 20 tablet; Refill: 0  -     triamcinolone acetonide injection 10 mg    Lesion right upper inner thigh incised with #11 blade and drained on today's date. Bacterial culture performed.  Defect packed with iodoform gauze. Instructions for removal provided to patient.   Patient instructed to perform warm compresses 2 - 3 times/daily.    For inguinal fold lesion:  Intralesional Kenalog 10mg/cc (10 cc total) injected into 1 lesions on the right inguinal fold today after obtaining verbal consent including risk of surrounding hypopigmentation. Patient tolerated procedure well.    Units: 1  NDC for Kenalog 10mg/cc:  2926-6559-58               Follow up if symptoms worsen or fail to improve.

## 2020-11-21 LAB — BACTERIA SPEC AEROBE CULT: ABNORMAL

## 2020-12-03 ENCOUNTER — PROCEDURE VISIT (OUTPATIENT)
Dept: DERMATOLOGY | Facility: CLINIC | Age: 55
End: 2020-12-03
Payer: COMMERCIAL

## 2020-12-03 DIAGNOSIS — C44.612 BCC (BASAL CELL CARCINOMA), ARM, RIGHT: Primary | ICD-10-CM

## 2020-12-03 PROCEDURE — 88305 TISSUE EXAM BY PATHOLOGIST: ICD-10-PCS | Mod: 26,,, | Performed by: PATHOLOGY

## 2020-12-03 PROCEDURE — 99499 UNLISTED E&M SERVICE: CPT | Mod: S$GLB,,, | Performed by: DERMATOLOGY

## 2020-12-03 PROCEDURE — 11602 PR EXC SKIN MALIG 1.1-2 CM TRUNK,ARM,LEG: ICD-10-PCS | Mod: S$GLB,,, | Performed by: DERMATOLOGY

## 2020-12-03 PROCEDURE — 99499 NO LOS: ICD-10-PCS | Mod: S$GLB,,, | Performed by: DERMATOLOGY

## 2020-12-03 PROCEDURE — 88305 TISSUE EXAM BY PATHOLOGIST: CPT | Performed by: PATHOLOGY

## 2020-12-03 PROCEDURE — 12032 INTMD RPR S/A/T/EXT 2.6-7.5: CPT | Mod: 51,S$GLB,, | Performed by: DERMATOLOGY

## 2020-12-03 PROCEDURE — 88305 TISSUE EXAM BY PATHOLOGIST: CPT | Mod: 26,,, | Performed by: PATHOLOGY

## 2020-12-03 PROCEDURE — 11602 EXC TR-EXT MAL+MARG 1.1-2 CM: CPT | Mod: S$GLB,,, | Performed by: DERMATOLOGY

## 2020-12-03 PROCEDURE — 12032 PR LAYR CLOS WND TRUNK,ARM,LEG 2.6-7.5 CM: ICD-10-PCS | Mod: 51,S$GLB,, | Performed by: DERMATOLOGY

## 2020-12-03 NOTE — PATIENT INSTRUCTIONS
Post- Operative Wound Care    Your doctor has performed local skin surgery today.  Vaseline ointment and a pressure bandage were placed after the surgery.  It is very important that you keep this bandage in place for 24 hours.  This will decrease the risk of post - operative infection and bleeding.  After 24 hours, you may remove the band aid and wash the area with warm soap and water and apply Vaseline ointment.  Many patients prefer to use Neosporin or Bacitracin ointment.  This is acceptable; however know that you can develop an allergy to this medication even if you have used it safely for years.  It is important to keep the area moist.  Letting it dry out and get air slows healing time, will worsen the scar, and make it more difficult to remove the stitches.  Band aid is optional after first 24 hours.        If you notice increasing redness, tenderness, pain, or yellow drainage at the biopsy or surgical site, please notify your doctor.  These are signs of an infection.    If your biopsy/surgical site is bleeding, apply firm pressure for 15 minutes straight.  Repeat for another 15 minutes, if it is still bleeding.   If the surgical site continues to bleed, then please contact your doctor.      For [facility] users:   You will receive your pathology results in [facility] as soon as they are available. Please be assured that your physician/provider will review your results and contact you should additional treatment be required. This is one more way [facility] is putting you first.       [facility] [place] [address]/ [phone] FAX/ [email or web address]

## 2020-12-03 NOTE — PROGRESS NOTES
PROCEDURE: Elliptical excision with intermediate layered repair in order to decrease dead space and decrease tension.    ANESTHETIC: 9 cc 1% Xylocaine with Epinephrine 1:100,000, buffered    SURGEON: Evy Delcid M.D.    ASSISTANTS: Farzana Chamorro MA    PREOPERATIVE DIAGNOSIS:  Basal Cell Carcinoma    POSTOPERATIVE DIAGNOSIS:  Same as preoperative diagnosis    PATHOLOGIC DIAGNOSIS: Pending    LOCATION: right forearm    INITIAL LESION SIZE: 0.8 cm    EXCISED DIAMETER: 1.6 cm    PREPARATION: The diagnosis, procedure, alternatives, benefits and risks, including but not limited to: infection, bleeding/bruising, drug reactions, pain, scar or cosmetic defect, local sensation disturbances, wound dehiscence (separation of wound edges after sutures removed) and/or recurrence of present condition were explained to the patient. The patient elected to proceed.  Patient's identity was verified using 2 patient identifiers and the side and site was verified.  Time out period with surgeon, assistant and patient in surgical suite was taken.    PROCEDURE: The location noted above was prepped, draped, and anesthetized in the usual sterile fashion per Farzana Chamorro MA. Lesional tissue was carefully marked with at least 4 mm margins of clinically normal skin in all directions. A fusiform elliptical excision was done with #15 blade carried down completely through the dermis into the deep subcutaneous tissues to the level of the non-muscle fascia, and dissection was carried out in that plane.  Electrocoagulation was used to obtain hemostasis. Blood loss was minimal. The wound was then approximated in a layered fashion with subcutaneous and intradermal sutures of 4.0 Monocryl, approximately 3 in number, and the wound was then superficially closed with simple interrupted sutures of 4.0 Prolene.    The patient tolerated the procedure well.    The area was cleaned and dressed appropriately and the patient was given wound care  instructions, as well as an appointment for follow-up evaluation.    LENGTH OF REPAIR: 4.1 cm

## 2020-12-07 LAB
FINAL PATHOLOGIC DIAGNOSIS: NORMAL
GROSS: NORMAL

## 2020-12-14 ENCOUNTER — PATIENT OUTREACH (OUTPATIENT)
Dept: ADMINISTRATIVE | Facility: OTHER | Age: 55
End: 2020-12-14

## 2020-12-14 ENCOUNTER — PATIENT MESSAGE (OUTPATIENT)
Dept: DERMATOLOGY | Facility: CLINIC | Age: 55
End: 2020-12-14

## 2020-12-14 ENCOUNTER — PATIENT MESSAGE (OUTPATIENT)
Dept: NEUROLOGY | Facility: CLINIC | Age: 55
End: 2020-12-14

## 2020-12-14 ENCOUNTER — CLINICAL SUPPORT (OUTPATIENT)
Dept: DERMATOLOGY | Facility: CLINIC | Age: 55
End: 2020-12-14
Payer: COMMERCIAL

## 2020-12-14 ENCOUNTER — OFFICE VISIT (OUTPATIENT)
Dept: NEUROLOGY | Facility: CLINIC | Age: 55
End: 2020-12-14
Payer: COMMERCIAL

## 2020-12-14 DIAGNOSIS — G44.011 INTRACTABLE EPISODIC CLUSTER HEADACHE: ICD-10-CM

## 2020-12-14 DIAGNOSIS — L08.9 WOUND INFECTION: Primary | ICD-10-CM

## 2020-12-14 DIAGNOSIS — T14.8XXA WOUND INFECTION: Primary | ICD-10-CM

## 2020-12-14 PROCEDURE — 99213 OFFICE O/P EST LOW 20 MIN: CPT | Mod: 95,,, | Performed by: NURSE PRACTITIONER

## 2020-12-14 PROCEDURE — 99024 POSTOP FOLLOW-UP VISIT: CPT | Mod: S$GLB,,, | Performed by: DERMATOLOGY

## 2020-12-14 PROCEDURE — 99999 PR PBB SHADOW E&M-EST. PATIENT-LVL I: ICD-10-PCS | Mod: PBBFAC,,,

## 2020-12-14 PROCEDURE — 99024 PR POST-OP FOLLOW-UP VISIT: ICD-10-PCS | Mod: S$GLB,,, | Performed by: DERMATOLOGY

## 2020-12-14 PROCEDURE — 99999 PR PBB SHADOW E&M-EST. PATIENT-LVL I: CPT | Mod: PBBFAC,,,

## 2020-12-14 PROCEDURE — 1126F AMNT PAIN NOTED NONE PRSNT: CPT | Mod: ,,, | Performed by: NURSE PRACTITIONER

## 2020-12-14 PROCEDURE — 99213 PR OFFICE/OUTPT VISIT, EST, LEVL III, 20-29 MIN: ICD-10-PCS | Mod: 95,,, | Performed by: NURSE PRACTITIONER

## 2020-12-14 PROCEDURE — 1126F PR PAIN SEVERITY QUANTIFIED, NO PAIN PRESENT: ICD-10-PCS | Mod: ,,, | Performed by: NURSE PRACTITIONER

## 2020-12-14 RX ORDER — SULFAMETHOXAZOLE AND TRIMETHOPRIM 800; 160 MG/1; MG/1
1 TABLET ORAL 2 TIMES DAILY
Qty: 14 TABLET | Refills: 0 | Status: SHIPPED | OUTPATIENT
Start: 2020-12-14 | End: 2020-12-24

## 2020-12-14 RX ORDER — DIVALPROEX SODIUM 500 MG/1
500 TABLET, DELAYED RELEASE ORAL 2 TIMES DAILY
Qty: 60 TABLET | Refills: 0 | Status: SHIPPED | OUTPATIENT
Start: 2020-12-14 | End: 2020-12-14 | Stop reason: SDUPTHER

## 2020-12-14 RX ORDER — DIVALPROEX SODIUM 500 MG/1
500 TABLET, DELAYED RELEASE ORAL 2 TIMES DAILY
Qty: 60 TABLET | Refills: 11 | Status: SHIPPED | OUTPATIENT
Start: 2020-12-14 | End: 2022-01-04 | Stop reason: SDUPTHER

## 2020-12-14 NOTE — PROGRESS NOTES
Pt here to recheck wound right forearm where we excised a BCC on 12/3/20. States area became red and tender and slightly oozy in past 24 hours.    PE: erythematous wound edges with slight purulent drainage and crusting    IMP/PLAN: wound infection  Sent Rx for Bactrim DS bid x 1 week    F/u later in week for S/R

## 2020-12-14 NOTE — PROGRESS NOTES
Health Maintenance Due   Topic Date Due    Pneumococcal Vaccine (Highest Risk) (1 of 3 - PCV13) 12/18/1986     Updates were requested from care everywhere.  Chart was reviewed for overdue Proactive [facility] Encounters ([facility]PH[facility]) topics (CRS, Breast Cancer Screening, Eye exam)  Health Maintenance has been updated.  LINKS immunization registry triggered.  LINKS not responding.

## 2020-12-14 NOTE — PROGRESS NOTES
Subjective:       Patient ID: Jovan Mcwilliams is a 51 y.o. male.    Chief Complaint: Headache    INTERVAL HISTORY 12/14/2020  Patient presents for virtual follow up.  The patient location is: [place], [place]   The chief complaint leading to consultation is: follow up  Visit type: audiovisual  Face to Face time with patient: 15  20 minutes of total time spent on the encounter, which includes face to face time and non-face to face time preparing to see the patient (eg, review of tests), Obtaining and/or reviewing separately obtained history, Documenting clinical information in the electronic or other health record, Independently interpreting results (not separately reported) and communicating results to the patient/family/caregiver, or Care coordination (not separately reported).   Each patient to whom he or she provides medical services by telemedicine is:  (1) informed of the relationship between the physician and patient and the respective role of any other health care provider with respect to management of the patient; and (2) notified that he or she may decline to receive medical services by telemedicine and may withdraw from such care at any time.    Notes:     Last visit was 11 months ago and at that time he was doing worse on Aimovg. We switched to Emgality.    Today he reports he is doing much better. He took Emgality for 6-8 months and did very well. Last injection was 3 months ago. He has not had any cluster migraines in about 5-6 months. He has not had to use Ubrelvy or Fioricet in months. He has not used compazine.     INTERVAL HISTORY 1/20/2020  Patient presents for follow up. He has been on Aimovig 70 mg since October 2018. He is also on Depakote and verapamil. He stopped depakote 6 months ago but resumed when cluster cycle about 5 weeks ago. Wife present today reports patient started taking Aimovig every other month about 6 months ago as well.  He has Imitrex IM, Zomig NS and Fioricet for escalations. He  "reports he has not used the Imitrex or Zomig in close to two years. When he last used the Imitrex, he reports the headache worsened for 5 minutes and then improved. He has HTN, HLD and history of chest pain.     Today he reports he is a little worse. Headaches are starting again after 18 months of little to no headaches. They are behind the left eye and left side of the head. current pain 3 with range 0-10. This cluster began about 5 weeks ago. Lidocaine NS used as first line but not effective. Fioricet second line and somewhat effective.     INTERVAL HISTORY  The patient is added to the clinic schedule as his headaches persist. He complains of a new symptom consisting of blurry vision with the left eye. He denies a specific description of a visual migraine aura in the form of fortification spectra, scotomata, hemianopsia, photopsia or transient loss of vision. He uses Imitrex injectable with good benefit but states that he is "drained" and unable to work the next day. He is tired because of lack of sleep.   He underwent peripheral nerve blocks last week including GLORIA, auriculotemporal and supraorbital. He stated that initially he felt significant benefit but when the pain came back, it came back much worse, he almost went to the ED.   He is on Depakote 500 mg BID. Otherwise headache characteristics are unchanged.  HPI  The patient is a pleasant 50 y/o male with chief complaint of headaches. He has been getting them in the fall (Octobe-November) since 2012. Next cool season in 2013, they recurred. He underwent an MRI of the brain with not significant findings (my interpretation). This last fall he did not get the headaches, instead they came on 1/3/2017. The headache is strictly unilateral, originating in a discrete area in the left posterior region and projecting to the left orbital and periorbital region. He went to the ED where he was given a combination of steroids, benadryl and compazine as well as a " supraorbital block with good response. He is followed by Dr. Velasquez who has recommended prevention with verapamil and Depakote and transitional treatment with steroids. Unfortunately, verapamil causes decreased libido. He was switched to Amlodipine. He has never tried oxygen. He treats acutely with Zomig NS, previously with SQ sumatriptan    Review of Systems   Constitutional: Negative for activity change, appetite change, chills, fatigue and fever.   HENT: Positive for congestion, ear pain, facial swelling, postnasal drip, sinus pressure, sneezing and tinnitus. Negative for dental problem, hearing loss, trouble swallowing and voice change.    Eyes: Positive for photophobia, pain and redness. Negative for visual disturbance.   Respiratory: Positive for cough and wheezing. Negative for chest tightness and shortness of breath.    Cardiovascular: Negative for chest pain, palpitations and leg swelling.   Gastrointestinal: Positive for nausea and vomiting. Negative for abdominal pain, blood in stool, constipation and diarrhea.   Endocrine: Negative for cold intolerance and heat intolerance.   Genitourinary: Negative for difficulty urinating, dysuria and urgency.   Musculoskeletal: Positive for neck stiffness. Negative for arthralgias, back pain, gait problem, myalgias and neck pain.   Skin: Negative for color change, pallor and rash.   Neurological: Positive for headaches. Negative for dizziness, tremors, seizures, syncope, facial asymmetry, speech difficulty, weakness, light-headedness and numbness.   Hematological: Negative for adenopathy. Does not bruise/bleed easily.   Psychiatric/Behavioral: Positive for agitation, decreased concentration and sleep disturbance. Negative for behavioral problems, confusion, self-injury and suicidal ideas. The patient is not nervous/anxious.          Past Medical History   Diagnosis Date    Additional heart attack      10 years ago    Eye pain      left eye    GERD  (gastroesophageal reflux disease)     Hyperlipemia      Past Surgical History   Procedure Laterality Date    Back surgery      Hernia repair       Family History   Problem Relation Age of Onset    Hypertension Mother     Hyperlipidemia Mother     Diabetes Father     Cancer Maternal Aunt     Cancer Paternal Aunt     Cancer Maternal Grandmother     Cancer Maternal Grandfather     Cancer Paternal Grandfather      Social History     Social History    Marital status: Single     Spouse name: N/A    Number of children: N/A    Years of education: N/A     Occupational History    Not on file.     Social History Main Topics    Smoking status: Current Every Day Smoker     Packs/day: 1.00     Years: 15.00    Smokeless tobacco: Not on file      Comment: is about to quit    Alcohol use 0.6 oz/week     1 Cans of beer per week      Comment: per week    Drug use: No    Sexual activity: Not on file     Other Topics Concern    Not on file     Social History Narrative     Review of patient's allergies indicates:   Allergen Reactions    Doxycycline      Other reaction(s): nausea    Erythromycin      Other reaction(s): Nausea       Current Outpatient Prescriptions   Medication Sig    ascorbic acid (VITAMIN C) 1000 MG tablet     aspirin (ECOTRIN) 81 MG EC tablet Take 81 mg by mouth Daily. 1 Tablet, Delayed Release (E.C.) Oral Every day    ibuprofen (ADVIL,MOTRIN) 800 MG tablet 1 tqab po q 8 h prn pain    omeprazole (PRILOSEC) 40 MG capsule Take 1 capsule (40 mg total) by mouth once daily.    predniSONE (DELTASONE) 10 mg tablet pack Prednisone 10 mg tabs (40 mg for 2 days, 30 mg po for 2 days, 20 mg po for 2 days and 10 mg po for 2 days. Omeprazol 20 mg daily for gastro protection) Total #20 tabs    amlodipine (NORVASC) 5 MG tablet Take 1 tablet (5 mg total) by mouth once daily.    atorvastatin (LIPITOR) 40 MG tablet Take 1 tablet (40 mg total) by mouth once daily.    divalproex (DEPAKOTE) 500 MG TbEC Take 500  mg by mouth 2 (two) times daily.    hydrocodone-acetaminophen 7.5-325mg (NORCO) 7.5-325 mg per tablet Take 1 tablet by mouth once daily.    multivitamin (THERAGRAN) per tablet Take by mouth. 1 Tablet Oral Every day     No current facility-administered medications for this visit.            Objective:      There were no vitals filed for this visit.    Previous Physical Exam    Constitutional:   He appears well-developed and well-nourished. He is well groomed  HENT:    Head: Atraumatic, oral and nasal mucosa intact  Eyes: Conjunctivae and EOM are normal. Pupils are equal, round, and reactive to light OU  Neck: Neck supple. No thyromegaly present  Cardiovascular: Normal rate and normal heart sounds  No murmur heard  Pulmonary/Chest: Effort normal and breath sounds normal  Musculoskeletal: Normal range of motion. No joint stiffness. No vertebral point tenderness  Skin: Skin is warm and dry  Psychiatric: Normal mood and affect     Neuro exam:    Mental status:  Awake, attentive, Alert, oriented to self, place, year and month  Language function is intact    Cranial Nerves:  Smell was not formally evaluated  Cranial Nerves II - XII: intact  Pursuits were smooth, normal saccades, no nystagmus OU  Funduscopic exam - disc were flat and pink, no exudates or hemorrhages OU  Motor - facial movement was symmetrical and normal     Palate moved well and was symmetrical with normal palatal and oral sensation  Tongue movements were full    Coordination:     Rapid alternating movements and rapid finger tapping - normal bilaterally  Finger to nose - normal and symmetric bilaterally   No intentional or positional tremor.     Motor:  Normal muscle bulk and symmetry. No fasciculations were noted    No pronator drift  Strength 5/5 bilaterally     Reflexes:  Tendon reflexes were 2 + at biceps, triceps, brachioradialis, patellar, and Achilles bilaterally  No clonus was noted     Sensory: Intact to light touch, pin prick in all extremities.  Vibration and proprioception intact in all extremities.     Gait: Romberg absent. Normal gait. Normal arm swing and turns.     Review of Data: I have reviewed images of an MRI from 2013 and see no abnormal findings        Assessment and Plan   Cluster-Migraine syndrome. While there is a component of migraine, the vast majority of the headache phenotype is undoubtedly Cluster headache as evidenced by the headache characteristics, the circannual and circadian pattern and the response to medication and the tearing of the left eye.    He is s/p 6 months of Emgality 300 mg (cluster dosing) and has not had cluster migraines in 5-6 months. He has not had to take Emgality for the past 3 months.   Failed  aimovig.  Continue Emgality cluster dosing of 300 mg monthly during cluster cycle.   Continue Depakote and Verapamil. Continue magnesium to counteract constipation  For acute attacks use Lidocaine IN.  Avoid Imitrex 6 mg SQ injection as he has HTN, HLD, smoker, and has recurrent chest pain.  Fioricet for rescue  Continue compazine for nausea and mild headache relief.   Continue Ubrelvy PRN    KELLY Hilario

## 2020-12-14 NOTE — TELEPHONE ENCOUNTER
Called and rescheduled patient's virtual appointment to today with NP. Date/Time/Location confirmed. Verbalized understanding.

## 2020-12-17 ENCOUNTER — CLINICAL SUPPORT (OUTPATIENT)
Dept: DERMATOLOGY | Facility: CLINIC | Age: 55
End: 2020-12-17
Payer: COMMERCIAL

## 2020-12-17 DIAGNOSIS — Z48.02 ENCOUNTER FOR REMOVAL OF SUTURES: Primary | ICD-10-CM

## 2020-12-17 PROCEDURE — 99024 PR POST-OP FOLLOW-UP VISIT: ICD-10-PCS | Mod: S$GLB,,, | Performed by: DERMATOLOGY

## 2020-12-17 PROCEDURE — 99024 POSTOP FOLLOW-UP VISIT: CPT | Mod: S$GLB,,, | Performed by: DERMATOLOGY

## 2020-12-17 PROCEDURE — 99999 PR PBB SHADOW E&M-EST. PATIENT-LVL III: CPT | Mod: PBBFAC,,,

## 2020-12-17 PROCEDURE — 99999 PR PBB SHADOW E&M-EST. PATIENT-LVL III: ICD-10-PCS | Mod: PBBFAC,,,

## 2020-12-17 NOTE — PROGRESS NOTES
CC: 54 y.o.male patient is here for suture removal.     HPI: Patient is s/p punch biopsy/excision of BCC right forearm on 12/3/2020.  Patient reports no problems.    WOUND PE:  Sutures intact.  Wound healing with slight oozing, inform patient to not keep lesion cover while at home and to continue applying vaseline bid. Patient stated that he already been seen for infection and was giving antibiotic.     IMPRESSION:  Skin, right forearm, excision:  - BASAL CELL CARCINOMA.  - MARGINS ARE NEGATIVE.  MICROSCOPIC DESCRIPTION: Sections show a basaloid tumor within the dermis exhibiting peripheral  palisading, mitotic activity, and apoptotic tumor cells. All margins are negative.    Diagnosed by Sangeetha Robles M.D.  Electronically Signed By:Sangeetha Robles M.D.      PLAN:  Sutures removed today.  Continue wound care.    RTC: prn

## 2021-01-25 ENCOUNTER — LAB VISIT (OUTPATIENT)
Dept: LAB | Facility: HOSPITAL | Age: 56
End: 2021-01-25
Attending: INTERNAL MEDICINE
Payer: COMMERCIAL

## 2021-01-25 DIAGNOSIS — E78.5 HYPERLIPIDEMIA, UNSPECIFIED HYPERLIPIDEMIA TYPE: ICD-10-CM

## 2021-01-25 DIAGNOSIS — Z12.5 SCREENING PSA (PROSTATE SPECIFIC ANTIGEN): ICD-10-CM

## 2021-01-25 LAB
ALBUMIN SERPL BCP-MCNC: 3.7 G/DL (ref 3.5–5.2)
ALP SERPL-CCNC: 55 U/L (ref 55–135)
ALT SERPL W/O P-5'-P-CCNC: 41 U/L (ref 10–44)
ANION GAP SERPL CALC-SCNC: 7 MMOL/L (ref 8–16)
AST SERPL-CCNC: 22 U/L (ref 10–40)
BILIRUB SERPL-MCNC: 0.6 MG/DL (ref 0.1–1)
BUN SERPL-MCNC: 20 MG/DL (ref 6–20)
CALCIUM SERPL-MCNC: 8.7 MG/DL (ref 8.7–10.5)
CHLORIDE SERPL-SCNC: 105 MMOL/L (ref 95–110)
CHOLEST SERPL-MCNC: 172 MG/DL (ref 120–199)
CHOLEST/HDLC SERPL: 4.3 {RATIO} (ref 2–5)
CO2 SERPL-SCNC: 28 MMOL/L (ref 23–29)
COMPLEXED PSA SERPL-MCNC: 0.54 NG/ML (ref 0–4)
CREAT SERPL-MCNC: 1 MG/DL (ref 0.5–1.4)
EST. GFR  (AFRICAN AMERICAN): >60 ML/MIN/1.73 M^2
EST. GFR  (NON AFRICAN AMERICAN): >60 ML/MIN/1.73 M^2
GLUCOSE SERPL-MCNC: 118 MG/DL (ref 70–110)
HDLC SERPL-MCNC: 40 MG/DL (ref 40–75)
HDLC SERPL: 23.3 % (ref 20–50)
LDLC SERPL CALC-MCNC: 100.4 MG/DL (ref 63–159)
NONHDLC SERPL-MCNC: 132 MG/DL
POTASSIUM SERPL-SCNC: 4.1 MMOL/L (ref 3.5–5.1)
PROT SERPL-MCNC: 6.7 G/DL (ref 6–8.4)
SODIUM SERPL-SCNC: 140 MMOL/L (ref 136–145)
TRIGL SERPL-MCNC: 158 MG/DL (ref 30–150)

## 2021-01-25 PROCEDURE — 36415 COLL VENOUS BLD VENIPUNCTURE: CPT

## 2021-01-25 PROCEDURE — 84153 ASSAY OF PSA TOTAL: CPT

## 2021-01-25 PROCEDURE — 80061 LIPID PANEL: CPT

## 2021-01-25 PROCEDURE — 80053 COMPREHEN METABOLIC PANEL: CPT

## 2021-02-04 ENCOUNTER — LAB VISIT (OUTPATIENT)
Dept: INTERNAL MEDICINE | Facility: CLINIC | Age: 56
End: 2021-02-04
Payer: COMMERCIAL

## 2021-02-04 ENCOUNTER — OFFICE VISIT (OUTPATIENT)
Dept: INTERNAL MEDICINE | Facility: CLINIC | Age: 56
End: 2021-02-04
Payer: COMMERCIAL

## 2021-02-04 VITALS
DIASTOLIC BLOOD PRESSURE: 88 MMHG | WEIGHT: 272.5 LBS | HEART RATE: 68 BPM | HEIGHT: 72 IN | BODY MASS INDEX: 36.91 KG/M2 | SYSTOLIC BLOOD PRESSURE: 139 MMHG

## 2021-02-04 DIAGNOSIS — J44.1 CHRONIC OBSTRUCTIVE PULMONARY DISEASE WITH ACUTE EXACERBATION: ICD-10-CM

## 2021-02-04 DIAGNOSIS — E78.5 HYPERLIPIDEMIA, UNSPECIFIED HYPERLIPIDEMIA TYPE: ICD-10-CM

## 2021-02-04 DIAGNOSIS — I15.9 SECONDARY HYPERTENSION: Primary | ICD-10-CM

## 2021-02-04 DIAGNOSIS — E66.09 CLASS 2 OBESITY DUE TO EXCESS CALORIES WITHOUT SERIOUS COMORBIDITY WITH BODY MASS INDEX (BMI) OF 36.0 TO 36.9 IN ADULT: ICD-10-CM

## 2021-02-04 DIAGNOSIS — Z87.891 HISTORY OF TOBACCO ABUSE: ICD-10-CM

## 2021-02-04 DIAGNOSIS — Z13.9 SCREENING PROCEDURE: ICD-10-CM

## 2021-02-04 DIAGNOSIS — R73.09 ELEVATED GLUCOSE: ICD-10-CM

## 2021-02-04 PROBLEM — E66.812 CLASS 2 OBESITY WITHOUT SERIOUS COMORBIDITY IN ADULT: Status: ACTIVE | Noted: 2021-02-04

## 2021-02-04 PROBLEM — F17.200 SMOKER: Status: RESOLVED | Noted: 2018-09-17 | Resolved: 2021-02-04

## 2021-02-04 PROBLEM — E66.9 CLASS 2 OBESITY WITHOUT SERIOUS COMORBIDITY IN ADULT: Status: ACTIVE | Noted: 2021-02-04

## 2021-02-04 PROCEDURE — 3079F DIAST BP 80-89 MM HG: CPT | Mod: CPTII,S$GLB,, | Performed by: INTERNAL MEDICINE

## 2021-02-04 PROCEDURE — 99999 PR PBB SHADOW E&M-EST. PATIENT-LVL III: CPT | Mod: PBBFAC,,, | Performed by: INTERNAL MEDICINE

## 2021-02-04 PROCEDURE — U0003 INFECTIOUS AGENT DETECTION BY NUCLEIC ACID (DNA OR RNA); SEVERE ACUTE RESPIRATORY SYNDROME CORONAVIRUS 2 (SARS-COV-2) (CORONAVIRUS DISEASE [COVID-19]), AMPLIFIED PROBE TECHNIQUE, MAKING USE OF HIGH THROUGHPUT TECHNOLOGIES AS DESCRIBED BY CMS-2020-01-R: HCPCS

## 2021-02-04 PROCEDURE — 3075F SYST BP GE 130 - 139MM HG: CPT | Mod: CPTII,S$GLB,, | Performed by: INTERNAL MEDICINE

## 2021-02-04 PROCEDURE — 1126F AMNT PAIN NOTED NONE PRSNT: CPT | Mod: S$GLB,,, | Performed by: INTERNAL MEDICINE

## 2021-02-04 PROCEDURE — 3075F PR MOST RECENT SYSTOLIC BLOOD PRESS GE 130-139MM HG: ICD-10-PCS | Mod: CPTII,S$GLB,, | Performed by: INTERNAL MEDICINE

## 2021-02-04 PROCEDURE — 99999 PR PBB SHADOW E&M-EST. PATIENT-LVL III: ICD-10-PCS | Mod: PBBFAC,,, | Performed by: INTERNAL MEDICINE

## 2021-02-04 PROCEDURE — 99214 PR OFFICE/OUTPT VISIT, EST, LEVL IV, 30-39 MIN: ICD-10-PCS | Mod: S$GLB,,, | Performed by: INTERNAL MEDICINE

## 2021-02-04 PROCEDURE — 3008F PR BODY MASS INDEX (BMI) DOCUMENTED: ICD-10-PCS | Mod: CPTII,S$GLB,, | Performed by: INTERNAL MEDICINE

## 2021-02-04 PROCEDURE — 1126F PR PAIN SEVERITY QUANTIFIED, NO PAIN PRESENT: ICD-10-PCS | Mod: S$GLB,,, | Performed by: INTERNAL MEDICINE

## 2021-02-04 PROCEDURE — 3079F PR MOST RECENT DIASTOLIC BLOOD PRESSURE 80-89 MM HG: ICD-10-PCS | Mod: CPTII,S$GLB,, | Performed by: INTERNAL MEDICINE

## 2021-02-04 PROCEDURE — 3008F BODY MASS INDEX DOCD: CPT | Mod: CPTII,S$GLB,, | Performed by: INTERNAL MEDICINE

## 2021-02-04 PROCEDURE — 99214 OFFICE O/P EST MOD 30 MIN: CPT | Mod: S$GLB,,, | Performed by: INTERNAL MEDICINE

## 2021-02-04 RX ORDER — ATORVASTATIN CALCIUM 40 MG/1
40 TABLET, FILM COATED ORAL DAILY
Qty: 90 TABLET | Refills: 3 | Status: SHIPPED | OUTPATIENT
Start: 2021-02-04 | End: 2021-07-22 | Stop reason: SDUPTHER

## 2021-02-05 ENCOUNTER — LAB VISIT (OUTPATIENT)
Dept: LAB | Facility: HOSPITAL | Age: 56
End: 2021-02-05
Attending: INTERNAL MEDICINE
Payer: COMMERCIAL

## 2021-02-05 ENCOUNTER — HOSPITAL ENCOUNTER (OUTPATIENT)
Dept: PULMONOLOGY | Facility: CLINIC | Age: 56
Discharge: HOME | End: 2021-02-05
Payer: COMMERCIAL

## 2021-02-05 DIAGNOSIS — J44.1 CHRONIC OBSTRUCTIVE PULMONARY DISEASE WITH ACUTE EXACERBATION: ICD-10-CM

## 2021-02-05 DIAGNOSIS — R73.09 ELEVATED GLUCOSE: ICD-10-CM

## 2021-02-05 LAB
ANION GAP SERPL CALC-SCNC: 9 MMOL/L (ref 8–16)
BUN SERPL-MCNC: 22 MG/DL (ref 6–20)
CALCIUM SERPL-MCNC: 8.7 MG/DL (ref 8.7–10.5)
CHLORIDE SERPL-SCNC: 106 MMOL/L (ref 95–110)
CO2 SERPL-SCNC: 28 MMOL/L (ref 23–29)
CREAT SERPL-MCNC: 1.1 MG/DL (ref 0.5–1.4)
EST. GFR  (AFRICAN AMERICAN): >60 ML/MIN/1.73 M^2
EST. GFR  (NON AFRICAN AMERICAN): >60 ML/MIN/1.73 M^2
ESTIMATED AVG GLUCOSE: 105 MG/DL (ref 68–131)
GLUCOSE SERPL-MCNC: 102 MG/DL (ref 70–110)
HBA1C MFR BLD: 5.3 % (ref 4–5.6)
POTASSIUM SERPL-SCNC: 4.1 MMOL/L (ref 3.5–5.1)
SARS-COV-2 RNA RESP QL NAA+PROBE: NOT DETECTED
SODIUM SERPL-SCNC: 143 MMOL/L (ref 136–145)

## 2021-02-05 PROCEDURE — 94729 DIFFUSING CAPACITY: CPT | Mod: S$GLB,,, | Performed by: INTERNAL MEDICINE

## 2021-02-05 PROCEDURE — 94727 GAS DIL/WSHOT DETER LNG VOL: CPT | Mod: S$GLB,,, | Performed by: INTERNAL MEDICINE

## 2021-02-05 PROCEDURE — 80048 BASIC METABOLIC PNL TOTAL CA: CPT

## 2021-02-05 PROCEDURE — 94727 PR PULM FUNCTION TEST BY GAS: ICD-10-PCS | Mod: S$GLB,,, | Performed by: INTERNAL MEDICINE

## 2021-02-05 PROCEDURE — 94060 PR EVAL OF BRONCHOSPASM: ICD-10-PCS | Mod: S$GLB,,, | Performed by: INTERNAL MEDICINE

## 2021-02-05 PROCEDURE — 83036 HEMOGLOBIN GLYCOSYLATED A1C: CPT

## 2021-02-05 PROCEDURE — 36415 COLL VENOUS BLD VENIPUNCTURE: CPT

## 2021-02-05 PROCEDURE — 94729 PR C02/MEMBANE DIFFUSE CAPACITY: ICD-10-PCS | Mod: S$GLB,,, | Performed by: INTERNAL MEDICINE

## 2021-02-05 PROCEDURE — 94060 EVALUATION OF WHEEZING: CPT | Mod: S$GLB,,, | Performed by: INTERNAL MEDICINE

## 2021-02-09 ENCOUNTER — CLINICAL SUPPORT (OUTPATIENT)
Dept: URGENT CARE | Facility: CLINIC | Age: 56
End: 2021-02-09
Payer: COMMERCIAL

## 2021-02-09 DIAGNOSIS — Z03.818 ENCOUNTER FOR OBSERVATION FOR SUSPECTED EXPOSURE TO OTHER BIOLOGICAL AGENTS RULED OUT: Primary | ICD-10-CM

## 2021-02-09 LAB
CTP QC/QA: YES
SARS-COV-2 RDRP RESP QL NAA+PROBE: NEGATIVE

## 2021-02-09 PROCEDURE — 99211 PR OFFICE/OUTPT VISIT, EST, LEVL I: ICD-10-PCS | Mod: S$GLB,CS,, | Performed by: PHYSICIAN ASSISTANT

## 2021-02-09 PROCEDURE — 99211 OFF/OP EST MAY X REQ PHY/QHP: CPT | Mod: S$GLB,CS,, | Performed by: PHYSICIAN ASSISTANT

## 2021-02-09 PROCEDURE — U0002: ICD-10-PCS | Mod: QW,S$GLB,, | Performed by: PHYSICIAN ASSISTANT

## 2021-02-09 PROCEDURE — U0002 COVID-19 LAB TEST NON-CDC: HCPCS | Mod: QW,S$GLB,, | Performed by: PHYSICIAN ASSISTANT

## 2021-02-19 ENCOUNTER — PATIENT MESSAGE (OUTPATIENT)
Dept: INTERNAL MEDICINE | Facility: CLINIC | Age: 56
End: 2021-02-19

## 2021-04-14 ENCOUNTER — TELEPHONE (OUTPATIENT)
Dept: INTERNAL MEDICINE | Facility: CLINIC | Age: 56
End: 2021-04-14

## 2021-04-14 ENCOUNTER — OFFICE VISIT (OUTPATIENT)
Dept: URGENT CARE | Facility: CLINIC | Age: 56
End: 2021-04-14
Payer: COMMERCIAL

## 2021-04-14 VITALS
SYSTOLIC BLOOD PRESSURE: 146 MMHG | WEIGHT: 255 LBS | BODY MASS INDEX: 34.54 KG/M2 | HEART RATE: 62 BPM | RESPIRATION RATE: 15 BRPM | OXYGEN SATURATION: 96 % | TEMPERATURE: 98 F | HEIGHT: 72 IN | DIASTOLIC BLOOD PRESSURE: 85 MMHG

## 2021-04-14 DIAGNOSIS — J06.9 VIRAL URI WITH COUGH: Primary | ICD-10-CM

## 2021-04-14 DIAGNOSIS — R05.9 COUGH: ICD-10-CM

## 2021-04-14 DIAGNOSIS — R06.2 WHEEZING: ICD-10-CM

## 2021-04-14 LAB
CTP QC/QA: YES
SARS-COV-2 RDRP RESP QL NAA+PROBE: NEGATIVE

## 2021-04-14 PROCEDURE — 3008F PR BODY MASS INDEX (BMI) DOCUMENTED: ICD-10-PCS | Mod: CPTII,S$GLB,, | Performed by: PHYSICIAN ASSISTANT

## 2021-04-14 PROCEDURE — 99214 PR OFFICE/OUTPT VISIT, EST, LEVL IV, 30-39 MIN: ICD-10-PCS | Mod: S$GLB,,, | Performed by: PHYSICIAN ASSISTANT

## 2021-04-14 PROCEDURE — 99214 OFFICE O/P EST MOD 30 MIN: CPT | Mod: S$GLB,,, | Performed by: PHYSICIAN ASSISTANT

## 2021-04-14 PROCEDURE — 3008F BODY MASS INDEX DOCD: CPT | Mod: CPTII,S$GLB,, | Performed by: PHYSICIAN ASSISTANT

## 2021-04-14 RX ORDER — PREDNISONE 20 MG/1
20 TABLET ORAL DAILY
Qty: 3 TABLET | Refills: 0 | Status: SHIPPED | OUTPATIENT
Start: 2021-04-14 | End: 2021-04-17

## 2021-04-27 ENCOUNTER — CLINICAL SUPPORT (OUTPATIENT)
Dept: SMOKING CESSATION | Facility: CLINIC | Age: 56
End: 2021-04-27
Payer: COMMERCIAL

## 2021-04-27 DIAGNOSIS — F17.200 NICOTINE DEPENDENCE: Primary | ICD-10-CM

## 2021-04-27 PROCEDURE — 99407 BEHAV CHNG SMOKING > 10 MIN: CPT | Mod: S$GLB,,,

## 2021-04-27 PROCEDURE — 99407 PR TOBACCO USE CESSATION INTENSIVE >10 MINUTES: ICD-10-PCS | Mod: S$GLB,,,

## 2021-05-27 ENCOUNTER — PATIENT MESSAGE (OUTPATIENT)
Dept: INTERNAL MEDICINE | Facility: CLINIC | Age: 56
End: 2021-05-27

## 2021-06-08 ENCOUNTER — PATIENT MESSAGE (OUTPATIENT)
Dept: INTERNAL MEDICINE | Facility: CLINIC | Age: 56
End: 2021-06-08

## 2021-06-08 RX ORDER — FLUCONAZOLE 150 MG/1
150 TABLET ORAL DAILY
Qty: 10 TABLET | Refills: 0 | Status: SHIPPED | OUTPATIENT
Start: 2021-06-08 | End: 2021-06-18

## 2021-06-10 ENCOUNTER — PATIENT MESSAGE (OUTPATIENT)
Dept: INTERNAL MEDICINE | Facility: CLINIC | Age: 56
End: 2021-06-10

## 2021-07-06 DIAGNOSIS — G43.719 INTRACTABLE CHRONIC MIGRAINE WITHOUT AURA AND WITHOUT STATUS MIGRAINOSUS: ICD-10-CM

## 2021-07-06 RX ORDER — VERAPAMIL HYDROCHLORIDE 240 MG/1
240 TABLET, FILM COATED, EXTENDED RELEASE ORAL NIGHTLY
Qty: 90 TABLET | Refills: 3 | Status: SHIPPED | OUTPATIENT
Start: 2021-07-06 | End: 2022-08-01

## 2021-07-14 ENCOUNTER — PATIENT OUTREACH (OUTPATIENT)
Dept: ADMINISTRATIVE | Facility: OTHER | Age: 56
End: 2021-07-14

## 2021-07-14 ENCOUNTER — OFFICE VISIT (OUTPATIENT)
Dept: NEUROLOGY | Facility: CLINIC | Age: 56
End: 2021-07-14
Payer: COMMERCIAL

## 2021-07-14 VITALS
HEIGHT: 72 IN | RESPIRATION RATE: 17 BRPM | DIASTOLIC BLOOD PRESSURE: 104 MMHG | SYSTOLIC BLOOD PRESSURE: 166 MMHG | WEIGHT: 265.13 LBS | BODY MASS INDEX: 35.91 KG/M2 | HEART RATE: 62 BPM | TEMPERATURE: 99 F

## 2021-07-14 DIAGNOSIS — Z12.11 ENCOUNTER FOR FECAL IMMUNOCHEMICAL TEST SCREENING: Primary | ICD-10-CM

## 2021-07-14 DIAGNOSIS — G44.011 INTRACTABLE EPISODIC CLUSTER HEADACHE: ICD-10-CM

## 2021-07-14 PROCEDURE — 3008F BODY MASS INDEX DOCD: CPT | Mod: CPTII,S$GLB,, | Performed by: NURSE PRACTITIONER

## 2021-07-14 PROCEDURE — 3008F PR BODY MASS INDEX (BMI) DOCUMENTED: ICD-10-PCS | Mod: CPTII,S$GLB,, | Performed by: NURSE PRACTITIONER

## 2021-07-14 PROCEDURE — 1125F AMNT PAIN NOTED PAIN PRSNT: CPT | Mod: S$GLB,,, | Performed by: NURSE PRACTITIONER

## 2021-07-14 PROCEDURE — 1125F PR PAIN SEVERITY QUANTIFIED, PAIN PRESENT: ICD-10-PCS | Mod: S$GLB,,, | Performed by: NURSE PRACTITIONER

## 2021-07-14 PROCEDURE — 99999 PR PBB SHADOW E&M-EST. PATIENT-LVL V: CPT | Mod: PBBFAC,,, | Performed by: NURSE PRACTITIONER

## 2021-07-14 PROCEDURE — 99999 PR PBB SHADOW E&M-EST. PATIENT-LVL V: ICD-10-PCS | Mod: PBBFAC,,, | Performed by: NURSE PRACTITIONER

## 2021-07-14 PROCEDURE — 99214 OFFICE O/P EST MOD 30 MIN: CPT | Mod: S$GLB,,, | Performed by: NURSE PRACTITIONER

## 2021-07-14 PROCEDURE — 99214 PR OFFICE/OUTPT VISIT, EST, LEVL IV, 30-39 MIN: ICD-10-PCS | Mod: S$GLB,,, | Performed by: NURSE PRACTITIONER

## 2021-07-14 RX ORDER — SUMATRIPTAN 20 MG/1
1 SPRAY NASAL
COMMUNITY
End: 2021-07-14

## 2021-07-14 RX ORDER — GALCANEZUMAB 100 MG/ML
300 INJECTION, SOLUTION SUBCUTANEOUS
Qty: 3 ML | Refills: 11 | Status: SHIPPED | OUTPATIENT
Start: 2021-07-14 | End: 2022-08-17

## 2021-07-14 RX ORDER — ERENUMAB-AOOE 140 MG/ML
140 INJECTION, SOLUTION SUBCUTANEOUS
COMMUNITY
End: 2022-05-17

## 2021-07-14 RX ORDER — GALCANEZUMAB 100 MG/ML
300 INJECTION, SOLUTION SUBCUTANEOUS
Qty: 3 ML | Refills: 11 | Status: SHIPPED | OUTPATIENT
Start: 2021-07-14 | End: 2022-04-19 | Stop reason: SDUPTHER

## 2021-07-14 RX ORDER — RIMEGEPANT SULFATE 75 MG/75MG
75 TABLET, ORALLY DISINTEGRATING ORAL DAILY PRN
Qty: 8 TABLET | Refills: 11 | Status: SHIPPED | OUTPATIENT
Start: 2021-07-14 | End: 2021-07-15

## 2021-07-15 ENCOUNTER — TELEPHONE (OUTPATIENT)
Dept: PHARMACY | Facility: CLINIC | Age: 56
End: 2021-07-15

## 2021-07-15 DIAGNOSIS — G44.021 INTRACTABLE CHRONIC CLUSTER HEADACHE: Primary | ICD-10-CM

## 2021-07-15 RX ORDER — UBROGEPANT 100 MG/1
TABLET ORAL
Qty: 8 TABLET | Refills: 2 | Status: SHIPPED | OUTPATIENT
Start: 2021-07-15 | End: 2021-08-17 | Stop reason: ALTCHOICE

## 2021-07-16 ENCOUNTER — TELEPHONE (OUTPATIENT)
Dept: PHARMACY | Facility: CLINIC | Age: 56
End: 2021-07-16

## 2021-07-22 ENCOUNTER — PATIENT MESSAGE (OUTPATIENT)
Dept: NEUROLOGY | Facility: CLINIC | Age: 56
End: 2021-07-22

## 2021-07-22 ENCOUNTER — PATIENT MESSAGE (OUTPATIENT)
Dept: INTERNAL MEDICINE | Facility: CLINIC | Age: 56
End: 2021-07-22

## 2021-07-29 ENCOUNTER — TELEPHONE (OUTPATIENT)
Dept: NEUROLOGY | Facility: CLINIC | Age: 56
End: 2021-07-29

## 2021-07-29 ENCOUNTER — CLINICAL SUPPORT (OUTPATIENT)
Dept: NEUROLOGY | Facility: CLINIC | Age: 56
End: 2021-07-29
Payer: COMMERCIAL

## 2021-07-29 DIAGNOSIS — G44.011 INTRACTABLE EPISODIC CLUSTER HEADACHE: Primary | ICD-10-CM

## 2021-07-29 PROCEDURE — 99999 PR PBB SHADOW E&M-EST. PATIENT-LVL III: ICD-10-PCS | Mod: PBBFAC,,,

## 2021-07-29 PROCEDURE — 96372 THER/PROPH/DIAG INJ SC/IM: CPT | Mod: S$GLB,,, | Performed by: NURSE PRACTITIONER

## 2021-07-29 PROCEDURE — 96372 PR INJECTION,THERAP/PROPH/DIAG2ST, IM OR SUBCUT: ICD-10-PCS | Mod: S$GLB,,, | Performed by: NURSE PRACTITIONER

## 2021-07-29 PROCEDURE — 99999 PR PBB SHADOW E&M-EST. PATIENT-LVL III: CPT | Mod: PBBFAC,,,

## 2021-07-29 RX ORDER — PREDNISONE 10 MG/1
TABLET ORAL
Qty: 20 TABLET | Refills: 0 | Status: SHIPPED | OUTPATIENT
Start: 2021-07-29 | End: 2022-01-12

## 2021-07-29 RX ORDER — KETOROLAC TROMETHAMINE 30 MG/ML
30 INJECTION, SOLUTION INTRAMUSCULAR; INTRAVENOUS
Status: COMPLETED | OUTPATIENT
Start: 2021-07-29 | End: 2021-07-29

## 2021-07-29 RX ADMIN — KETOROLAC TROMETHAMINE 30 MG: 30 INJECTION, SOLUTION INTRAMUSCULAR; INTRAVENOUS at 02:07

## 2021-07-30 ENCOUNTER — PATIENT MESSAGE (OUTPATIENT)
Dept: NEUROLOGY | Facility: CLINIC | Age: 56
End: 2021-07-30

## 2021-08-05 ENCOUNTER — PATIENT MESSAGE (OUTPATIENT)
Dept: NEUROLOGY | Facility: CLINIC | Age: 56
End: 2021-08-05

## 2021-08-06 ENCOUNTER — PATIENT MESSAGE (OUTPATIENT)
Dept: NEUROLOGY | Facility: CLINIC | Age: 56
End: 2021-08-06

## 2021-08-17 ENCOUNTER — TELEPHONE (OUTPATIENT)
Dept: PHARMACY | Facility: CLINIC | Age: 56
End: 2021-08-17

## 2021-08-17 ENCOUNTER — PATIENT MESSAGE (OUTPATIENT)
Dept: NEUROLOGY | Facility: CLINIC | Age: 56
End: 2021-08-17

## 2021-08-17 DIAGNOSIS — G43.719 INTRACTABLE CHRONIC MIGRAINE WITHOUT AURA AND WITHOUT STATUS MIGRAINOSUS: Primary | ICD-10-CM

## 2021-08-17 RX ORDER — RIMEGEPANT SULFATE 75 MG/75MG
75 TABLET, ORALLY DISINTEGRATING ORAL DAILY PRN
Qty: 8 TABLET | Refills: 11 | Status: SHIPPED | OUTPATIENT
Start: 2021-08-17 | End: 2022-01-10

## 2021-08-19 ENCOUNTER — TELEPHONE (OUTPATIENT)
Dept: PHARMACY | Facility: CLINIC | Age: 56
End: 2021-08-19

## 2021-09-23 ENCOUNTER — PATIENT MESSAGE (OUTPATIENT)
Dept: INTERNAL MEDICINE | Facility: CLINIC | Age: 56
End: 2021-09-23

## 2021-09-24 ENCOUNTER — OFFICE VISIT (OUTPATIENT)
Dept: INTERNAL MEDICINE | Facility: CLINIC | Age: 56
End: 2021-09-24
Payer: COMMERCIAL

## 2021-09-24 ENCOUNTER — LAB VISIT (OUTPATIENT)
Dept: LAB | Facility: HOSPITAL | Age: 56
End: 2021-09-24
Attending: INTERNAL MEDICINE
Payer: COMMERCIAL

## 2021-09-24 VITALS
WEIGHT: 263.69 LBS | HEIGHT: 72 IN | SYSTOLIC BLOOD PRESSURE: 142 MMHG | OXYGEN SATURATION: 98 % | BODY MASS INDEX: 35.72 KG/M2 | DIASTOLIC BLOOD PRESSURE: 86 MMHG | HEART RATE: 77 BPM

## 2021-09-24 DIAGNOSIS — R73.09 ELEVATED GLUCOSE: ICD-10-CM

## 2021-09-24 DIAGNOSIS — E78.5 HYPERLIPIDEMIA, UNSPECIFIED HYPERLIPIDEMIA TYPE: ICD-10-CM

## 2021-09-24 DIAGNOSIS — I15.9 SECONDARY HYPERTENSION: Primary | ICD-10-CM

## 2021-09-24 DIAGNOSIS — Z87.891 PERSONAL HISTORY OF NICOTINE DEPENDENCE: ICD-10-CM

## 2021-09-24 LAB
ALBUMIN SERPL BCP-MCNC: 3.9 G/DL (ref 3.5–5.2)
ALP SERPL-CCNC: 48 U/L (ref 55–135)
ALT SERPL W/O P-5'-P-CCNC: 35 U/L (ref 10–44)
ANION GAP SERPL CALC-SCNC: 11 MMOL/L (ref 8–16)
AST SERPL-CCNC: 21 U/L (ref 10–40)
BILIRUB SERPL-MCNC: 0.4 MG/DL (ref 0.1–1)
BUN SERPL-MCNC: 16 MG/DL (ref 6–20)
CALCIUM SERPL-MCNC: 9.4 MG/DL (ref 8.7–10.5)
CHLORIDE SERPL-SCNC: 108 MMOL/L (ref 95–110)
CHOLEST SERPL-MCNC: 168 MG/DL (ref 120–199)
CHOLEST/HDLC SERPL: 4.3 {RATIO} (ref 2–5)
CO2 SERPL-SCNC: 24 MMOL/L (ref 23–29)
CREAT SERPL-MCNC: 1.1 MG/DL (ref 0.5–1.4)
EST. GFR  (AFRICAN AMERICAN): >60 ML/MIN/1.73 M^2
EST. GFR  (NON AFRICAN AMERICAN): >60 ML/MIN/1.73 M^2
ESTIMATED AVG GLUCOSE: 111 MG/DL (ref 68–131)
GLUCOSE SERPL-MCNC: 70 MG/DL (ref 70–110)
HBA1C MFR BLD: 5.5 % (ref 4–5.6)
HDLC SERPL-MCNC: 39 MG/DL (ref 40–75)
HDLC SERPL: 23.2 % (ref 20–50)
LDLC SERPL CALC-MCNC: 99.8 MG/DL (ref 63–159)
NONHDLC SERPL-MCNC: 129 MG/DL
POTASSIUM SERPL-SCNC: 3.9 MMOL/L (ref 3.5–5.1)
PROT SERPL-MCNC: 6.5 G/DL (ref 6–8.4)
SODIUM SERPL-SCNC: 143 MMOL/L (ref 136–145)
TRIGL SERPL-MCNC: 146 MG/DL (ref 30–150)

## 2021-09-24 PROCEDURE — 1159F MED LIST DOCD IN RCRD: CPT | Mod: CPTII,S$GLB,, | Performed by: INTERNAL MEDICINE

## 2021-09-24 PROCEDURE — 3008F PR BODY MASS INDEX (BMI) DOCUMENTED: ICD-10-PCS | Mod: CPTII,S$GLB,, | Performed by: INTERNAL MEDICINE

## 2021-09-24 PROCEDURE — 99214 OFFICE O/P EST MOD 30 MIN: CPT | Mod: S$GLB,,, | Performed by: INTERNAL MEDICINE

## 2021-09-24 PROCEDURE — 3044F PR MOST RECENT HEMOGLOBIN A1C LEVEL <7.0%: ICD-10-PCS | Mod: CPTII,S$GLB,, | Performed by: INTERNAL MEDICINE

## 2021-09-24 PROCEDURE — 3008F BODY MASS INDEX DOCD: CPT | Mod: CPTII,S$GLB,, | Performed by: INTERNAL MEDICINE

## 2021-09-24 PROCEDURE — 3077F PR MOST RECENT SYSTOLIC BLOOD PRESSURE >= 140 MM HG: ICD-10-PCS | Mod: CPTII,S$GLB,, | Performed by: INTERNAL MEDICINE

## 2021-09-24 PROCEDURE — 80053 COMPREHEN METABOLIC PANEL: CPT | Performed by: INTERNAL MEDICINE

## 2021-09-24 PROCEDURE — 99999 PR PBB SHADOW E&M-EST. PATIENT-LVL IV: ICD-10-PCS | Mod: PBBFAC,,, | Performed by: INTERNAL MEDICINE

## 2021-09-24 PROCEDURE — 3079F DIAST BP 80-89 MM HG: CPT | Mod: CPTII,S$GLB,, | Performed by: INTERNAL MEDICINE

## 2021-09-24 PROCEDURE — 99214 PR OFFICE/OUTPT VISIT, EST, LEVL IV, 30-39 MIN: ICD-10-PCS | Mod: S$GLB,,, | Performed by: INTERNAL MEDICINE

## 2021-09-24 PROCEDURE — 80061 LIPID PANEL: CPT | Performed by: INTERNAL MEDICINE

## 2021-09-24 PROCEDURE — 99999 PR PBB SHADOW E&M-EST. PATIENT-LVL IV: CPT | Mod: PBBFAC,,, | Performed by: INTERNAL MEDICINE

## 2021-09-24 PROCEDURE — 1159F PR MEDICATION LIST DOCUMENTED IN MEDICAL RECORD: ICD-10-PCS | Mod: CPTII,S$GLB,, | Performed by: INTERNAL MEDICINE

## 2021-09-24 PROCEDURE — 3044F HG A1C LEVEL LT 7.0%: CPT | Mod: CPTII,S$GLB,, | Performed by: INTERNAL MEDICINE

## 2021-09-24 PROCEDURE — 36415 COLL VENOUS BLD VENIPUNCTURE: CPT | Performed by: INTERNAL MEDICINE

## 2021-09-24 PROCEDURE — 83036 HEMOGLOBIN GLYCOSYLATED A1C: CPT | Performed by: INTERNAL MEDICINE

## 2021-09-24 PROCEDURE — 3077F SYST BP >= 140 MM HG: CPT | Mod: CPTII,S$GLB,, | Performed by: INTERNAL MEDICINE

## 2021-09-24 PROCEDURE — 3079F PR MOST RECENT DIASTOLIC BLOOD PRESSURE 80-89 MM HG: ICD-10-PCS | Mod: CPTII,S$GLB,, | Performed by: INTERNAL MEDICINE

## 2021-09-24 RX ORDER — DICLOFENAC SODIUM 50 MG/1
50 TABLET, DELAYED RELEASE ORAL 2 TIMES DAILY
Qty: 60 TABLET | Refills: 1 | Status: SHIPPED | OUTPATIENT
Start: 2021-09-24 | End: 2021-09-24 | Stop reason: SDUPTHER

## 2021-09-27 RX ORDER — DICLOFENAC SODIUM 50 MG/1
50 TABLET, DELAYED RELEASE ORAL 2 TIMES DAILY
Qty: 60 TABLET | Refills: 1 | Status: SHIPPED | OUTPATIENT
Start: 2021-09-27 | End: 2022-01-24 | Stop reason: SDUPTHER

## 2021-09-30 ENCOUNTER — HOSPITAL ENCOUNTER (OUTPATIENT)
Dept: RADIOLOGY | Facility: HOSPITAL | Age: 56
Discharge: HOME | End: 2021-09-30
Attending: INTERNAL MEDICINE
Payer: COMMERCIAL

## 2021-09-30 DIAGNOSIS — Z87.891 PERSONAL HISTORY OF NICOTINE DEPENDENCE: ICD-10-CM

## 2021-09-30 PROCEDURE — 71271 CT THORAX LUNG CANCER SCR C-: CPT | Mod: 26,,, | Performed by: RADIOLOGY

## 2021-09-30 PROCEDURE — 71271 CT CHEST LUNG SCREENING LOW DOSE: ICD-10-PCS | Mod: 26,,, | Performed by: RADIOLOGY

## 2021-09-30 PROCEDURE — 71271 CT THORAX LUNG CANCER SCR C-: CPT | Mod: TC

## 2021-10-04 ENCOUNTER — PATIENT MESSAGE (OUTPATIENT)
Dept: ADMINISTRATIVE | Facility: HOSPITAL | Age: 56
End: 2021-10-04

## 2021-10-13 ENCOUNTER — PATIENT OUTREACH (OUTPATIENT)
Dept: ADMINISTRATIVE | Facility: OTHER | Age: 56
End: 2021-10-13

## 2021-10-14 ENCOUNTER — TELEPHONE (OUTPATIENT)
Dept: SMOKING CESSATION | Facility: CLINIC | Age: 56
End: 2021-10-14

## 2022-01-04 DIAGNOSIS — G44.011 INTRACTABLE EPISODIC CLUSTER HEADACHE: ICD-10-CM

## 2022-01-04 RX ORDER — DIVALPROEX SODIUM 500 MG/1
500 TABLET, DELAYED RELEASE ORAL 2 TIMES DAILY
Qty: 60 TABLET | Refills: 11 | Status: SHIPPED | OUTPATIENT
Start: 2022-01-04 | End: 2023-01-11 | Stop reason: SDUPTHER

## 2022-01-10 ENCOUNTER — LAB VISIT (OUTPATIENT)
Dept: FAMILY MEDICINE | Facility: CLINIC | Age: 57
End: 2022-01-10
Payer: COMMERCIAL

## 2022-01-10 ENCOUNTER — PATIENT MESSAGE (OUTPATIENT)
Dept: INTERNAL MEDICINE | Facility: CLINIC | Age: 57
End: 2022-01-10
Payer: COMMERCIAL

## 2022-01-10 ENCOUNTER — OFFICE VISIT (OUTPATIENT)
Dept: FAMILY MEDICINE | Facility: CLINIC | Age: 57
End: 2022-01-10
Payer: COMMERCIAL

## 2022-01-10 DIAGNOSIS — R50.9 FEVER, UNSPECIFIED FEVER CAUSE: ICD-10-CM

## 2022-01-10 DIAGNOSIS — R05.9 COUGH: Primary | ICD-10-CM

## 2022-01-10 DIAGNOSIS — R05.9 COUGH: ICD-10-CM

## 2022-01-10 PROCEDURE — 1159F MED LIST DOCD IN RCRD: CPT | Mod: CPTII,95,, | Performed by: FAMILY MEDICINE

## 2022-01-10 PROCEDURE — 99213 OFFICE O/P EST LOW 20 MIN: CPT | Mod: 95,,, | Performed by: FAMILY MEDICINE

## 2022-01-10 PROCEDURE — U0003 INFECTIOUS AGENT DETECTION BY NUCLEIC ACID (DNA OR RNA); SEVERE ACUTE RESPIRATORY SYNDROME CORONAVIRUS 2 (SARS-COV-2) (CORONAVIRUS DISEASE [COVID-19]), AMPLIFIED PROBE TECHNIQUE, MAKING USE OF HIGH THROUGHPUT TECHNOLOGIES AS DESCRIBED BY CMS-2020-01-R: HCPCS | Performed by: FAMILY MEDICINE

## 2022-01-10 PROCEDURE — U0005 INFEC AGEN DETEC AMPLI PROBE: HCPCS | Performed by: FAMILY MEDICINE

## 2022-01-10 PROCEDURE — 1160F PR REVIEW ALL MEDS BY PRESCRIBER/CLIN PHARMACIST DOCUMENTED: ICD-10-PCS | Mod: CPTII,95,, | Performed by: FAMILY MEDICINE

## 2022-01-10 PROCEDURE — 1159F PR MEDICATION LIST DOCUMENTED IN MEDICAL RECORD: ICD-10-PCS | Mod: CPTII,95,, | Performed by: FAMILY MEDICINE

## 2022-01-10 PROCEDURE — 1160F RVW MEDS BY RX/DR IN RCRD: CPT | Mod: CPTII,95,, | Performed by: FAMILY MEDICINE

## 2022-01-10 PROCEDURE — 99213 PR OFFICE/OUTPT VISIT, EST, LEVL III, 20-29 MIN: ICD-10-PCS | Mod: 95,,, | Performed by: FAMILY MEDICINE

## 2022-01-10 RX ORDER — PROMETHAZINE HYDROCHLORIDE AND DEXTROMETHORPHAN HYDROBROMIDE 6.25; 15 MG/5ML; MG/5ML
5 SYRUP ORAL EVERY 4 HOURS PRN
Qty: 120 ML | Refills: 0 | Status: SHIPPED | OUTPATIENT
Start: 2022-01-10 | End: 2023-06-29

## 2022-01-10 NOTE — PROGRESS NOTES
"Subjective:       Patient ID: Jovan Mcwilliams is a 56 y.o. male.    Chief Complaint: No chief complaint on file.    The patient location is: [place], [place]  The chief complaint leading to consultation is: COVID symptoms    Visit type: TELE AUDIOVISUAL    Face to Face time with patient: 11 minutes  13 minutes of total time spent on the encounter, which includes face to face time and non-face to face time preparing to see the patient (eg, review of tests), Obtaining and/or reviewing separately obtained history, Documenting clinical information in the electronic or other health record, Independently interpreting results (not separately reported) and communicating results to the patient/family/caregiver, or Care coordination (not separately reported).         Each patient to whom he or she provides medical services by telemedicine is:  (1) informed of the relationship between the physician and patient and the respective role of any other health care provider with respect to management of the patient; and (2) notified that he or she may decline to receive medical services by telemedicine and may withdraw from such care at any time.    Notes:   This pt is new to me. The pt reports a 2 day history of sore throat, hoarseness, headaches, cough and wheezing.  He went to  and tested neg the after his symptoms started.  He had fever on 1/5 but non since then.  The pt has a hx of "mild COPD" and is currently a smoker.    Cough  This is a recurrent problem. The current episode started in the past 7 days. The problem has been waxing and waning. The problem occurs every few hours. The cough is productive of sputum. Associated symptoms include headaches, myalgias, postnasal drip, a sore throat and wheezing. Pertinent negatives include no chest pain, chills, ear congestion, ear pain, fever, heartburn, hemoptysis, nasal congestion, rash, rhinorrhea, shortness of breath, sweats or weight loss. Nothing aggravates the symptoms. Risk factors " for lung disease include smoking/tobacco exposure. He has tried OTC cough suppressant, prescription cough suppressant and rest for the symptoms. The treatment provided mild relief. His past medical history is significant for COPD. There is no history of asthma, bronchiectasis, bronchitis, emphysema, environmental allergies or pneumonia.     Review of Systems   Constitutional: Negative for chills, fever and weight loss.   HENT: Positive for postnasal drip and sore throat. Negative for ear pain and rhinorrhea.    Respiratory: Positive for cough and wheezing. Negative for hemoptysis and shortness of breath.    Cardiovascular: Negative for chest pain.   Gastrointestinal: Negative for heartburn.   Musculoskeletal: Positive for myalgias.   Skin: Negative for rash.   Allergic/Immunologic: Negative for environmental allergies.   Neurological: Positive for headaches.       Objective:      Physical Exam  Constitutional:       General: He is not in acute distress.     Appearance: Normal appearance. He is not ill-appearing.   Pulmonary:      Effort: Pulmonary effort is normal.      Comments: Occasional cough  Neurological:      Mental Status: He is alert and oriented to person, place, and time.   Psychiatric:         Behavior: Behavior normal.         Assessment:       1. Cough    2. Fever, unspecified fever cause        Plan:       Diagnoses and all orders for this visit:    Cough  -     COVID-19 Routine Screening; Future  -     promethazine-dextromethorphan (PROMETHAZINE-DM) 6.25-15 mg/5 mL Syrp; Take 5 mLs by mouth every 4 (four) hours as needed (cough).    Fever, unspecified fever cause  -     COVID-19 Routine Screening; Future      During this visit, I reviewed the pt's history, medications, allergies, and problem list.

## 2022-01-11 LAB
SARS-COV-2 RNA RESP QL NAA+PROBE: NOT DETECTED
SARS-COV-2- CYCLE NUMBER: NORMAL

## 2022-01-12 ENCOUNTER — PATIENT MESSAGE (OUTPATIENT)
Dept: FAMILY MEDICINE | Facility: CLINIC | Age: 57
End: 2022-01-12
Payer: COMMERCIAL

## 2022-01-12 DIAGNOSIS — J00 ACUTE NASOPHARYNGITIS: Primary | ICD-10-CM

## 2022-01-12 RX ORDER — METHYLPREDNISOLONE 4 MG/1
TABLET ORAL
Qty: 1 EACH | Refills: 0 | Status: SHIPPED | OUTPATIENT
Start: 2022-01-12 | End: 2022-02-02

## 2022-01-18 ENCOUNTER — PATIENT MESSAGE (OUTPATIENT)
Dept: ADMINISTRATIVE | Facility: HOSPITAL | Age: 57
End: 2022-01-18
Payer: COMMERCIAL

## 2022-01-25 RX ORDER — DICLOFENAC SODIUM 50 MG/1
50 TABLET, DELAYED RELEASE ORAL 2 TIMES DAILY
Qty: 60 TABLET | Refills: 1 | Status: SHIPPED | OUTPATIENT
Start: 2022-01-25 | End: 2023-06-29

## 2022-02-15 ENCOUNTER — PATIENT MESSAGE (OUTPATIENT)
Dept: ADMINISTRATIVE | Facility: HOSPITAL | Age: 57
End: 2022-02-15
Payer: COMMERCIAL

## 2022-02-16 ENCOUNTER — PATIENT OUTREACH (OUTPATIENT)
Dept: ADMINISTRATIVE | Facility: HOSPITAL | Age: 57
End: 2022-02-16
Payer: COMMERCIAL

## 2022-02-16 NOTE — PROGRESS NOTES
Health Maintenance Due   Topic Date Due    Pneumococcal Vaccines (Age 0-64) (1 of 4 - PCV13) Never done    Colorectal Cancer Screening  03/10/2020    Influenza Vaccine (1) 09/01/2021    COVID-19 Vaccine (3 - Booster for Pfizer series) 02/19/2022     Triggered LINKS & reconciled immunizations.  Updated Care Everywhere.  [employer] was given to patient on 2/16/2022 8:51 AM      Chart review completed.

## 2022-02-24 ENCOUNTER — LAB VISIT (OUTPATIENT)
Dept: LAB | Facility: HOSPITAL | Age: 57
End: 2022-02-24
Attending: INTERNAL MEDICINE
Payer: COMMERCIAL

## 2022-02-24 DIAGNOSIS — Z12.11 ENCOUNTER FOR FECAL IMMUNOCHEMICAL TEST SCREENING: ICD-10-CM

## 2022-02-24 PROCEDURE — 82274 ASSAY TEST FOR BLOOD FECAL: CPT | Performed by: INTERNAL MEDICINE

## 2022-03-03 LAB — HEMOCCULT STL QL IA: NEGATIVE

## 2022-03-24 ENCOUNTER — OFFICE VISIT (OUTPATIENT)
Dept: INTERNAL MEDICINE | Facility: CLINIC | Age: 57
End: 2022-03-24
Payer: COMMERCIAL

## 2022-03-24 VITALS
BODY MASS INDEX: 34.52 KG/M2 | HEART RATE: 66 BPM | WEIGHT: 254.88 LBS | OXYGEN SATURATION: 97 % | SYSTOLIC BLOOD PRESSURE: 136 MMHG | HEIGHT: 72 IN | DIASTOLIC BLOOD PRESSURE: 90 MMHG

## 2022-03-24 DIAGNOSIS — E78.5 HYPERLIPIDEMIA, UNSPECIFIED HYPERLIPIDEMIA TYPE: ICD-10-CM

## 2022-03-24 DIAGNOSIS — K21.9 GASTROESOPHAGEAL REFLUX DISEASE, UNSPECIFIED WHETHER ESOPHAGITIS PRESENT: ICD-10-CM

## 2022-03-24 DIAGNOSIS — Z87.891 HISTORY OF TOBACCO ABUSE: ICD-10-CM

## 2022-03-24 DIAGNOSIS — R73.09 ELEVATED GLUCOSE: ICD-10-CM

## 2022-03-24 DIAGNOSIS — I15.9 SECONDARY HYPERTENSION: Primary | ICD-10-CM

## 2022-03-24 PROCEDURE — 3008F BODY MASS INDEX DOCD: CPT | Mod: CPTII,S$GLB,, | Performed by: INTERNAL MEDICINE

## 2022-03-24 PROCEDURE — 99999 PR PBB SHADOW E&M-EST. PATIENT-LVL IV: ICD-10-PCS | Mod: PBBFAC,,, | Performed by: INTERNAL MEDICINE

## 2022-03-24 PROCEDURE — 99214 PR OFFICE/OUTPT VISIT, EST, LEVL IV, 30-39 MIN: ICD-10-PCS | Mod: S$GLB,,, | Performed by: INTERNAL MEDICINE

## 2022-03-24 PROCEDURE — 3075F PR MOST RECENT SYSTOLIC BLOOD PRESS GE 130-139MM HG: ICD-10-PCS | Mod: CPTII,S$GLB,, | Performed by: INTERNAL MEDICINE

## 2022-03-24 PROCEDURE — 1159F PR MEDICATION LIST DOCUMENTED IN MEDICAL RECORD: ICD-10-PCS | Mod: CPTII,S$GLB,, | Performed by: INTERNAL MEDICINE

## 2022-03-24 PROCEDURE — 99999 PR PBB SHADOW E&M-EST. PATIENT-LVL IV: CPT | Mod: PBBFAC,,, | Performed by: INTERNAL MEDICINE

## 2022-03-24 PROCEDURE — 3080F DIAST BP >= 90 MM HG: CPT | Mod: CPTII,S$GLB,, | Performed by: INTERNAL MEDICINE

## 2022-03-24 PROCEDURE — 1159F MED LIST DOCD IN RCRD: CPT | Mod: CPTII,S$GLB,, | Performed by: INTERNAL MEDICINE

## 2022-03-24 PROCEDURE — 3008F PR BODY MASS INDEX (BMI) DOCUMENTED: ICD-10-PCS | Mod: CPTII,S$GLB,, | Performed by: INTERNAL MEDICINE

## 2022-03-24 PROCEDURE — 3080F PR MOST RECENT DIASTOLIC BLOOD PRESSURE >= 90 MM HG: ICD-10-PCS | Mod: CPTII,S$GLB,, | Performed by: INTERNAL MEDICINE

## 2022-03-24 PROCEDURE — 3075F SYST BP GE 130 - 139MM HG: CPT | Mod: CPTII,S$GLB,, | Performed by: INTERNAL MEDICINE

## 2022-03-24 PROCEDURE — 99214 OFFICE O/P EST MOD 30 MIN: CPT | Mod: S$GLB,,, | Performed by: INTERNAL MEDICINE

## 2022-03-24 RX ORDER — TRIAMTERENE AND HYDROCHLOROTHIAZIDE 37.5; 25 MG/1; MG/1
1 CAPSULE ORAL EVERY MORNING
Qty: 30 CAPSULE | Refills: 11 | Status: SHIPPED | OUTPATIENT
Start: 2022-03-24 | End: 2022-05-12

## 2022-03-24 NOTE — PROGRESS NOTES
He is a 56  -year-old gentleman coming in today to follow up his ongoing   medical problems including hyperlipidemia, Gerd, and reflux, tobacco abuse,  and some glucose intolerance.                    He has been having headaches. He Has had cluster headaches. These have been very minimal.    He saw a neurologist, Dr Helene Griffith<  on the [place] and was dx with migraines. He has been using his   Depakote.  He headaches are a pain.  He  is starting Galcanezumab injections every 4 weeks but he has not had to take this recently.         History of Brugada syndrome with abnormal EKG. He saw arrhythmia back in   2008, Dr. Merritt. He is currently doing well. He denies any chest   pain or short-windedness.       Tobacco abuse: He has stopped smoking since Sept 2020-but is back to smoking less than a ppd.      He went through smoking cessation. He has gained about    .         Reflux:  He's had reflux for about 5 years,. He states it worsens with some of the foods he eats. He has been on omeprazole, but was not taking it regularly and had a lot of breakthrough. Now that   has been more regular taking his meds- he is doing much better.          Hyperlipidemia-- started on lipitor 40 mg.  Recently lipid panel reviewed.  His glucose was elevated at 118 last year but 70 with hgb A1c of 5.5.            ROS : Gen - no fatigue -- he has lost 9 # since last visit.    Eyes - no eye pain or visual changes  ENT - no hoarseness or sore throat  CV - No chest pain or SOB.  NO palpitations.  Pulm - no cough or wheezing  GI - no N/V/D--    no dysuria or incontinence  MS - no joint pain or muscle pain  Skin - no rash, or c/o of skin lesions  Neuro - no HA, dizziness--- memory is doing well.   Heme - no abnormal bleeding or bruising  Endo - no polydipsia, or temperature changes  Psych - no anxiety or depression        PHYSICAL EXAMINATION:        GENERAL:  A well-appearing 55-year-old gentleman, in no acute distress.   BP (!)  136/90 (BP Location: Right arm, Patient Position: Sitting, BP Method: Large (Manual))   Pulse 66   Ht 6' (1.829 m)   Wt 115.6 kg (254 lb 13.6 oz)   SpO2 97%   BMI 34.56 kg/m²     General appearance: alert, appears stated age and cooperative  Head: Normocephalic, without obvious abnormality, atraumatic  Eyes: conjunctivae/corneas clear. PERRL, EOM's intact. Fundi benign.  Throat: lips, mucosa, and tongue normal; teeth and gums normal  Neck: no adenopathy, no carotid bruit, no JVD, supple, symmetrical, trachea midline and thyroid not enlarged, symmetric, no tenderness/mass/nodules  Back: symmetric, no curvature. ROM normal. No CVA tenderness.  Lungs: clear to auscultation bilaterally  Chest wall: no tenderness  Heart: regular rate and rhythm, S1, S2 normal, no murmur, click, rub or gallop  Abdomen: soft, non-tender; bowel sounds normal; no masses,  no organomegaly  Extremities: extremities normal, atraumatic, no cyanosis or edema  Skin: Skin color, texture, turgor normal. No rashes or lesions  Lymph nodes: Cervical, supraclavicular, and axillary nodes normal.  Neurologic: Grossly normal           ASSESSMENT:  1.  Hypertension, Need better control .  He will work on weight loss.     Continue verapamil. Add dyazide and recheck in 1 month   2.  Hyperlipidemia.  We will continue atorvastatin 40 mg a day.    3.  Reflux.  Needs regular PPI. Doing well.    4. COPD and tobacco abuse- stop  smoking  ---   5. Obesity -  BMI - 34.56 - he and wife working on diet--   6. Elevated glu-  Will recheck-- discussed low carb diet.       Follow up in 1 month with labs

## 2022-04-18 ENCOUNTER — PATIENT MESSAGE (OUTPATIENT)
Dept: NEUROLOGY | Facility: CLINIC | Age: 57
End: 2022-04-18
Payer: COMMERCIAL

## 2022-04-18 DIAGNOSIS — G44.011 INTRACTABLE EPISODIC CLUSTER HEADACHE: ICD-10-CM

## 2022-04-19 RX ORDER — GALCANEZUMAB 100 MG/ML
300 INJECTION, SOLUTION SUBCUTANEOUS
Qty: 3 ML | Refills: 11 | Status: SHIPPED | OUTPATIENT
Start: 2022-04-19 | End: 2022-05-17

## 2022-04-19 RX ORDER — UBROGEPANT 100 MG/1
TABLET ORAL
Qty: 8 TABLET | Refills: 2 | Status: SHIPPED | OUTPATIENT
Start: 2022-04-19 | End: 2022-06-07 | Stop reason: SDUPTHER

## 2022-04-21 ENCOUNTER — PATIENT MESSAGE (OUTPATIENT)
Dept: NEUROLOGY | Facility: CLINIC | Age: 57
End: 2022-04-21
Payer: COMMERCIAL

## 2022-04-21 RX ORDER — PREDNISONE 10 MG/1
TABLET ORAL
Qty: 20 TABLET | Refills: 0 | Status: SHIPPED | OUTPATIENT
Start: 2022-04-21 | End: 2022-05-12

## 2022-04-29 NOTE — TELEPHONE ENCOUNTER
No new care gaps identified.  Powered by [employer]. Reference number: [id number].   4/29/2022 4:50:46 PM CDT

## 2022-05-02 RX ORDER — OMEPRAZOLE 40 MG/1
40 CAPSULE, DELAYED RELEASE ORAL DAILY
Qty: 90 CAPSULE | Refills: 6 | Status: SHIPPED | OUTPATIENT
Start: 2022-05-02 | End: 2023-05-11

## 2022-05-09 ENCOUNTER — PATIENT MESSAGE (OUTPATIENT)
Dept: SMOKING CESSATION | Facility: CLINIC | Age: 57
End: 2022-05-09
Payer: COMMERCIAL

## 2022-05-12 ENCOUNTER — OFFICE VISIT (OUTPATIENT)
Dept: INTERNAL MEDICINE | Facility: CLINIC | Age: 57
End: 2022-05-12
Payer: COMMERCIAL

## 2022-05-12 VITALS
WEIGHT: 252.44 LBS | OXYGEN SATURATION: 98 % | HEART RATE: 64 BPM | SYSTOLIC BLOOD PRESSURE: 132 MMHG | DIASTOLIC BLOOD PRESSURE: 82 MMHG | BODY MASS INDEX: 34.19 KG/M2 | HEIGHT: 72 IN

## 2022-05-12 DIAGNOSIS — Z87.891 HISTORY OF TOBACCO ABUSE: ICD-10-CM

## 2022-05-12 DIAGNOSIS — R73.09 ELEVATED GLUCOSE: Primary | ICD-10-CM

## 2022-05-12 DIAGNOSIS — I15.9 SECONDARY HYPERTENSION: ICD-10-CM

## 2022-05-12 PROBLEM — J44.1 CHRONIC OBSTRUCTIVE PULMONARY DISEASE WITH ACUTE EXACERBATION: Status: RESOLVED | Noted: 2019-01-28 | Resolved: 2022-05-12

## 2022-05-12 PROCEDURE — 3075F SYST BP GE 130 - 139MM HG: CPT | Mod: CPTII,S$GLB,, | Performed by: INTERNAL MEDICINE

## 2022-05-12 PROCEDURE — 99213 OFFICE O/P EST LOW 20 MIN: CPT | Mod: S$GLB,,, | Performed by: INTERNAL MEDICINE

## 2022-05-12 PROCEDURE — 1159F MED LIST DOCD IN RCRD: CPT | Mod: CPTII,S$GLB,, | Performed by: INTERNAL MEDICINE

## 2022-05-12 PROCEDURE — 3008F PR BODY MASS INDEX (BMI) DOCUMENTED: ICD-10-PCS | Mod: CPTII,S$GLB,, | Performed by: INTERNAL MEDICINE

## 2022-05-12 PROCEDURE — 99999 PR PBB SHADOW E&M-EST. PATIENT-LVL IV: ICD-10-PCS | Mod: PBBFAC,,, | Performed by: INTERNAL MEDICINE

## 2022-05-12 PROCEDURE — 1159F PR MEDICATION LIST DOCUMENTED IN MEDICAL RECORD: ICD-10-PCS | Mod: CPTII,S$GLB,, | Performed by: INTERNAL MEDICINE

## 2022-05-12 PROCEDURE — 99213 PR OFFICE/OUTPT VISIT, EST, LEVL III, 20-29 MIN: ICD-10-PCS | Mod: S$GLB,,, | Performed by: INTERNAL MEDICINE

## 2022-05-12 PROCEDURE — 3079F DIAST BP 80-89 MM HG: CPT | Mod: CPTII,S$GLB,, | Performed by: INTERNAL MEDICINE

## 2022-05-12 PROCEDURE — 3008F BODY MASS INDEX DOCD: CPT | Mod: CPTII,S$GLB,, | Performed by: INTERNAL MEDICINE

## 2022-05-12 PROCEDURE — 3079F PR MOST RECENT DIASTOLIC BLOOD PRESSURE 80-89 MM HG: ICD-10-PCS | Mod: CPTII,S$GLB,, | Performed by: INTERNAL MEDICINE

## 2022-05-12 PROCEDURE — 99999 PR PBB SHADOW E&M-EST. PATIENT-LVL IV: CPT | Mod: PBBFAC,,, | Performed by: INTERNAL MEDICINE

## 2022-05-12 PROCEDURE — 3075F PR MOST RECENT SYSTOLIC BLOOD PRESS GE 130-139MM HG: ICD-10-PCS | Mod: CPTII,S$GLB,, | Performed by: INTERNAL MEDICINE

## 2022-05-12 RX ORDER — ATORVASTATIN CALCIUM 40 MG/1
40 TABLET, FILM COATED ORAL DAILY
Qty: 90 TABLET | Refills: 3 | Status: SHIPPED | OUTPATIENT
Start: 2022-05-12 | End: 2023-06-09

## 2022-05-12 RX ORDER — TRIAMTERENE AND HYDROCHLOROTHIAZIDE 37.5; 25 MG/1; MG/1
1 CAPSULE ORAL EVERY MORNING
Qty: 90 CAPSULE | Refills: 3 | Status: SHIPPED | OUTPATIENT
Start: 2022-05-12 | End: 2023-06-09

## 2022-05-12 NOTE — PROGRESS NOTES
He is a 56  -year-old gentleman coming in today to follow up his ongoing   medical problems including hyperlipidemia, Gerd, and reflux, tobacco abuse,  and some glucose intolerance.        htn -- on verapmil.  I sent him in some dyazide  last visit - a month ago.  If has    been better.              He has been having headaches. He Has had cluster headaches. These have been very minimal.    He is saw  a neurologist, Dr Helene Griffith<  on the [facility] and was dx with migraines. He has been using his   Depakote.  He headaches are a pain.  He was Galcanezumab injections every 4 weeks but he has not had to take this recently.     He Speaking with Dr Potter.      History of Brugada syndrome with abnormal EKG. He saw arrhythmia back in   2008, Dr. Merritt. He is currently doing well. He denies any chest   pain or short-windedness.       Tobacco abuse: He has stopped smoking since Sept 2020-but is back to smoking less than a ppd.      He went through smoking cessation. He has gained about    .         Reflux:  He's had reflux for about 5 years,. He states it worsens with some of the foods he eats. He has been on omeprazole, but was not taking it regularly and had a lot of breakthrough. Now that   has been more regular taking his meds- he is doing much better.          Hyperlipidemia-- started on lipitor 40 mg.  Recently lipid panel reviewed.  His glucose was elevated at 118 last year but 70 with hgb A1c of 5.5.            ROS : Gen - no fatigue --  Eyes - no eye pain or visual changes  ENT - no hoarseness or sore throat  CV - No chest pain or SOB.  NO palpitations.  Pulm - no cough or wheezing  GI - no N/V/D--    no dysuria or incontinence  MS - no joint pain or muscle pain  Skin - no rash, or c/o of skin lesions  Neuro - no HA, dizziness--- memory is doing well.   Heme - no abnormal bleeding or bruising  Endo - no polydipsia, or temperature changes  Psych - no anxiety or depression        PHYSICAL  EXAMINATION:         GENERAL:  A well-appearing 56-year-old gentleman, in no acute distress.       /82   Pulse 64   Ht 6' (1.829 m)   Wt 114.5 kg (252 lb 6.8 oz)   SpO2 98%   BMI 34.24 kg/m²     General appearance: alert, appears stated age and cooperative  Head: Normocephalic, without obvious abnormality, atraumatic  Eyes: conjunctivae/corneas clear. PERRL, EOM's intact. Fundi benign.  Throat: lips, mucosa, and tongue normal; teeth and gums normal  Neck: no adenopathy, no carotid bruit, no JVD, supple, symmetrical, trachea midline and thyroid not enlarged, symmetric, no tenderness/mass/nodules  Back: symmetric, no curvature. ROM normal. No CVA tenderness.  Lungs: clear to auscultation bilaterally  Chest wall: no tenderness  Heart: regular rate and rhythm, S1, S2 normal, no murmur, click, rub or gallop  Abdomen: soft, non-tender; bowel sounds normal; no masses,  no organomegaly  Extremities: extremities normal, atraumatic, no cyanosis or edema  Skin: Skin color, texture, turgor normal. No rashes or lesions  Lymph nodes: Cervical, supraclavicular, and axillary nodes normal.  Neurologic: Grossly normal           ASSESSMENT:  1.  Hypertension, Need better control .  He will work on weight loss.     Continue verapamil. and  dyazide  2.  Hyperlipidemia.  We will continue atorvastatin 40 mg a day.    3.  Reflux.  Needs regular PPI. Doing well.    4. COPD and tobacco abuse- stop  smoking  ---   5. Obesity -  BMI - 34.24 - he and wife working on diet--   6. Elevated glu-  Will recheck-- discussed low carb diet.        Follow up in 6 month with labs

## 2022-05-17 ENCOUNTER — OFFICE VISIT (OUTPATIENT)
Dept: NEUROLOGY | Facility: CLINIC | Age: 57
End: 2022-05-17
Payer: COMMERCIAL

## 2022-05-17 VITALS
DIASTOLIC BLOOD PRESSURE: 90 MMHG | RESPIRATION RATE: 17 BRPM | HEART RATE: 71 BPM | SYSTOLIC BLOOD PRESSURE: 145 MMHG | HEIGHT: 72 IN | TEMPERATURE: 98 F | BODY MASS INDEX: 33.83 KG/M2 | WEIGHT: 249.81 LBS

## 2022-05-17 DIAGNOSIS — G43.719 INTRACTABLE CHRONIC MIGRAINE WITHOUT AURA AND WITHOUT STATUS MIGRAINOSUS: ICD-10-CM

## 2022-05-17 DIAGNOSIS — G44.011 INTRACTABLE EPISODIC CLUSTER HEADACHE: ICD-10-CM

## 2022-05-17 PROCEDURE — 99214 OFFICE O/P EST MOD 30 MIN: CPT | Mod: S$GLB,,, | Performed by: NURSE PRACTITIONER

## 2022-05-17 PROCEDURE — 99214 PR OFFICE/OUTPT VISIT, EST, LEVL IV, 30-39 MIN: ICD-10-PCS | Mod: S$GLB,,, | Performed by: NURSE PRACTITIONER

## 2022-05-17 PROCEDURE — 3008F BODY MASS INDEX DOCD: CPT | Mod: CPTII,S$GLB,, | Performed by: NURSE PRACTITIONER

## 2022-05-17 PROCEDURE — 3008F PR BODY MASS INDEX (BMI) DOCUMENTED: ICD-10-PCS | Mod: CPTII,S$GLB,, | Performed by: NURSE PRACTITIONER

## 2022-05-17 PROCEDURE — 99999 PR PBB SHADOW E&M-EST. PATIENT-LVL IV: CPT | Mod: PBBFAC,,, | Performed by: NURSE PRACTITIONER

## 2022-05-17 PROCEDURE — 3077F PR MOST RECENT SYSTOLIC BLOOD PRESSURE >= 140 MM HG: ICD-10-PCS | Mod: CPTII,S$GLB,, | Performed by: NURSE PRACTITIONER

## 2022-05-17 PROCEDURE — 1159F PR MEDICATION LIST DOCUMENTED IN MEDICAL RECORD: ICD-10-PCS | Mod: CPTII,S$GLB,, | Performed by: NURSE PRACTITIONER

## 2022-05-17 PROCEDURE — 1159F MED LIST DOCD IN RCRD: CPT | Mod: CPTII,S$GLB,, | Performed by: NURSE PRACTITIONER

## 2022-05-17 PROCEDURE — 99999 PR PBB SHADOW E&M-EST. PATIENT-LVL IV: ICD-10-PCS | Mod: PBBFAC,,, | Performed by: NURSE PRACTITIONER

## 2022-05-17 PROCEDURE — 3080F DIAST BP >= 90 MM HG: CPT | Mod: CPTII,S$GLB,, | Performed by: NURSE PRACTITIONER

## 2022-05-17 PROCEDURE — 3080F PR MOST RECENT DIASTOLIC BLOOD PRESSURE >= 90 MM HG: ICD-10-PCS | Mod: CPTII,S$GLB,, | Performed by: NURSE PRACTITIONER

## 2022-05-17 PROCEDURE — 1160F RVW MEDS BY RX/DR IN RCRD: CPT | Mod: CPTII,S$GLB,, | Performed by: NURSE PRACTITIONER

## 2022-05-17 PROCEDURE — 3077F SYST BP >= 140 MM HG: CPT | Mod: CPTII,S$GLB,, | Performed by: NURSE PRACTITIONER

## 2022-05-17 PROCEDURE — 1160F PR REVIEW ALL MEDS BY PRESCRIBER/CLIN PHARMACIST DOCUMENTED: ICD-10-PCS | Mod: CPTII,S$GLB,, | Performed by: NURSE PRACTITIONER

## 2022-05-17 RX ORDER — BUTALBITAL, ACETAMINOPHEN AND CAFFEINE 50; 325; 40 MG/1; MG/1; MG/1
TABLET ORAL
Qty: 14 TABLET | Refills: 1 | Status: SHIPPED | OUTPATIENT
Start: 2022-05-17 | End: 2022-06-07 | Stop reason: SDUPTHER

## 2022-05-17 RX ORDER — PROCHLORPERAZINE MALEATE 10 MG
10 TABLET ORAL EVERY 6 HOURS PRN
Qty: 60 TABLET | Refills: 11 | Status: SHIPPED | OUTPATIENT
Start: 2022-05-17

## 2022-05-17 RX ORDER — GALCANEZUMAB 100 MG/ML
300 INJECTION, SOLUTION SUBCUTANEOUS
Qty: 3 ML | Refills: 11 | Status: SHIPPED | OUTPATIENT
Start: 2022-05-17 | End: 2023-09-14 | Stop reason: SDUPTHER

## 2022-05-17 NOTE — PROGRESS NOTES
Subjective:       Patient ID: Jovan Mcwilliams is a 51 y.o. male.    Chief Complaint: Headache    INTERVAL HISTORY 5/17/22  Patient presents for follow up. Last visit was 10 months ago and at that time he was much better. He was in the beginning of a cluster cycle. Plan was to resume Emgality 300 mg monthly until cluster headaches broke. Added Nurtec for acute escalations.     Today he reports he is about the same to little worse. Headaches are becoming more frequent. They occur over the left eye and left side. Current pain 0. He has 6-7 migraine days per week. He is taking OTC medication, Fioricet, compazine and Ublrelvy as needed. He is currently on verapamil and Depakote. Dyazide was recently added for his blood pressure. Lidocaine NS does not seem to help. Otherwise information below is reviewed and verified with no changes made unless noted.     INTERVAL HISTORY 7/14/21  Patient presents for follow up. Last visit was 7 months ago and at that time he was cluster headache free. He last took Emgality 300 mg was August or September 2020.    Today he reports he is much better. However, the frequency is coming back. Headaches occur behind left eye, left side of head, left side on top of head. Headaches have been daily for the past three days. Lasts minutes to hours. Current pain 2 with range 0-10. He takes compazine and Ubrelvy. He states sumatriptan intensifies headache. He did take Fioricet last night.      INTERVAL HISTORY 12/14/2020  Patient presents for virtual follow up.  The patient location is: [place], [place]   The chief complaint leading to consultation is: follow up  Visit type: audiovisual  Face to Face time with patient: 15  20 minutes of total time spent on the encounter, which includes face to face time and non-face to face time preparing to see the patient (eg, review of tests), Obtaining and/or reviewing separately obtained history, Documenting clinical information in the electronic or other health record,  Independently interpreting results (not separately reported) and communicating results to the patient/family/caregiver, or Care coordination (not separately reported).   Each patient to whom he or she provides medical services by telemedicine is:  (1) informed of the relationship between the physician and patient and the respective role of any other health care provider with respect to management of the patient; and (2) notified that he or she may decline to receive medical services by telemedicine and may withdraw from such care at any time.    Notes:     Last visit was 11 months ago and at that time he was doing worse on Aimovg. We switched to Emgality.    Today he reports he is doing much better. He took Emgality for 6-8 months and did very well. Last injection was 3 months ago. He has not had any cluster migraines in about 5-6 months. He has not had to use Ubrelvy or Fioricet in months. He has not used compazine.     INTERVAL HISTORY 1/20/2020  Patient presents for follow up. He has been on Aimovig 70 mg since October 2018. He is also on Depakote and verapamil. He stopped depakote 6 months ago but resumed when cluster cycle about 5 weeks ago. Wife present today reports patient started taking Aimovig every other month about 6 months ago as well.  He has Imitrex IM, Zomig NS and Fioricet for escalations. He reports he has not used the Imitrex or Zomig in close to two years. When he last used the Imitrex, he reports the headache worsened for 5 minutes and then improved. He has HTN, HLD and history of chest pain.     Today he reports he is a little worse. Headaches are starting again after 18 months of little to no headaches. They are behind the left eye and left side of the head. current pain 3 with range 0-10. This cluster began about 5 weeks ago. Lidocaine NS used as first line but not effective. Fioricet second line and somewhat effective.     INTERVAL HISTORY  The patient is added to the clinic schedule as his  "headaches persist. He complains of a new symptom consisting of blurry vision with the left eye. He denies a specific description of a visual migraine aura in the form of fortification spectra, scotomata, hemianopsia, photopsia or transient loss of vision. He uses Imitrex injectable with good benefit but states that he is "drained" and unable to work the next day. He is tired because of lack of sleep.   He underwent peripheral nerve blocks last week including GLORIA, auriculotemporal and supraorbital. He stated that initially he felt significant benefit but when the pain came back, it came back much worse, he almost went to the ED.   He is on Depakote 500 mg BID. Otherwise headache characteristics are unchanged.  HPI  The patient is a pleasant 52 y/o male with chief complaint of headaches. He has been getting them in the fall (Octobe-November) since 2012. Next cool season in 2013, they recurred. He underwent an MRI of the brain with not significant findings (my interpretation). This last fall he did not get the headaches, instead they came on 1/3/2017. The headache is strictly unilateral, originating in a discrete area in the left posterior region and projecting to the left orbital and periorbital region. He went to the ED where he was given a combination of steroids, benadryl and compazine as well as a supraorbital block with good response. He is followed by Dr. Velasquez who has recommended prevention with verapamil and Depakote and transitional treatment with steroids. Unfortunately, verapamil causes decreased libido. He was switched to Amlodipine. He has never tried oxygen. He treats acutely with Zomig NS, previously with SQ sumatriptan    Review of Systems   Constitutional: Negative for activity change, appetite change, chills, fatigue and fever.   HENT: Positive for congestion, ear pain, facial swelling, postnasal drip, sinus pressure, sneezing and tinnitus. Negative for dental problem, hearing loss, trouble " swallowing and voice change.    Eyes: Positive for photophobia, pain and redness. Negative for visual disturbance.   Respiratory: Positive for cough and wheezing. Negative for chest tightness and shortness of breath.    Cardiovascular: Negative for chest pain, palpitations and leg swelling.   Gastrointestinal: Positive for nausea and vomiting. Negative for abdominal pain, blood in stool, constipation and diarrhea.   Endocrine: Negative for cold intolerance and heat intolerance.   Genitourinary: Negative for difficulty urinating, dysuria and urgency.   Musculoskeletal: Positive for neck stiffness. Negative for arthralgias, back pain, gait problem, myalgias and neck pain.   Skin: Negative for color change, pallor and rash.   Neurological: Positive for headaches. Negative for dizziness, tremors, seizures, syncope, facial asymmetry, speech difficulty, weakness, light-headedness and numbness.   Hematological: Negative for adenopathy. Does not bruise/bleed easily.   Psychiatric/Behavioral: Positive for agitation, decreased concentration and sleep disturbance. Negative for behavioral problems, confusion, self-injury and suicidal ideas. The patient is not nervous/anxious.          Past Medical History   Diagnosis Date    Additional heart attack      10 years ago    Eye pain      left eye    GERD (gastroesophageal reflux disease)     Hyperlipemia      Past Surgical History   Procedure Laterality Date    Back surgery      Hernia repair       Family History   Problem Relation Age of Onset    Hypertension Mother     Hyperlipidemia Mother     Diabetes Father     Cancer Maternal Aunt     Cancer Paternal Aunt     Cancer Maternal Grandmother     Cancer Maternal Grandfather     Cancer Paternal Grandfather      Social History     Social History    Marital status: Single     Spouse name: N/A    Number of children: N/A    Years of education: N/A     Occupational History    Not on file.     Social History Main Topics     Smoking status: Current Every Day Smoker     Packs/day: 1.00     Years: 15.00    Smokeless tobacco: Not on file      Comment: is about to quit    Alcohol use 0.6 oz/week     1 Cans of beer per week      Comment: per week    Drug use: No    Sexual activity: Not on file     Other Topics Concern    Not on file     Social History Narrative     Review of patient's allergies indicates:   Allergen Reactions    Doxycycline      Other reaction(s): nausea    Erythromycin      Other reaction(s): Nausea       Current Outpatient Prescriptions   Medication Sig    ascorbic acid (VITAMIN C) 1000 MG tablet     aspirin (ECOTRIN) 81 MG EC tablet Take 81 mg by mouth Daily. 1 Tablet, Delayed Release (E.C.) Oral Every day    ibuprofen (ADVIL,MOTRIN) 800 MG tablet 1 tqab po q 8 h prn pain    omeprazole (PRILOSEC) 40 MG capsule Take 1 capsule (40 mg total) by mouth once daily.    predniSONE (DELTASONE) 10 mg tablet pack Prednisone 10 mg tabs (40 mg for 2 days, 30 mg po for 2 days, 20 mg po for 2 days and 10 mg po for 2 days. Omeprazol 20 mg daily for gastro protection) Total #20 tabs    amlodipine (NORVASC) 5 MG tablet Take 1 tablet (5 mg total) by mouth once daily.    atorvastatin (LIPITOR) 40 MG tablet Take 1 tablet (40 mg total) by mouth once daily.    divalproex (DEPAKOTE) 500 MG TbEC Take 500 mg by mouth 2 (two) times daily.    hydrocodone-acetaminophen 7.5-325mg (NORCO) 7.5-325 mg per tablet Take 1 tablet by mouth once daily.    multivitamin (THERAGRAN) per tablet Take by mouth. 1 Tablet Oral Every day     No current facility-administered medications for this visit.            Objective:      Vitals:    05/17/22 0911   BP: (!) 145/90   Pulse: 71   Resp: 17   Temp: 98 °F (36.7 °C)        Physical Exam    5/17/22  Constitutional:   He appears well-developed and well-nourished. He is well groomed     Neurological Exam:  General: well-developed, well-nourished, no distress  Mental status: Awake and alert  Speech  language: No dysarthria or aphasia on conversation  Cranial nerves: Face symmetric  Motor: Moves all extremities well  Coordination: No ataxia. No tremor.    Previous  Constitutional:   He appears well-developed and well-nourished. He is well groomed  HENT:    Head: Atraumatic, oral and nasal mucosa intact  Eyes: Conjunctivae and EOM are normal. Pupils are equal, round, and reactive to light OU  Neck: Neck supple. No thyromegaly present  Cardiovascular: Normal rate and normal heart sounds  No murmur heard  Pulmonary/Chest: Effort normal and breath sounds normal  Musculoskeletal: Normal range of motion. No joint stiffness. No vertebral point tenderness  Skin: Skin is warm and dry  Psychiatric: Normal mood and affect     Neuro exam:    Mental status:  Awake, attentive, Alert, oriented to self, place, year and month  Language function is intact    Cranial Nerves:  Smell was not formally evaluated  Cranial Nerves II - XII: intact  Pursuits were smooth, normal saccades, no nystagmus OU  Funduscopic exam - disc were flat and pink, no exudates or hemorrhages OU  Motor - facial movement was symmetrical and normal     Palate moved well and was symmetrical with normal palatal and oral sensation  Tongue movements were full    Coordination:     Rapid alternating movements and rapid finger tapping - normal bilaterally  Finger to nose - normal and symmetric bilaterally   No intentional or positional tremor.     Motor:  Normal muscle bulk and symmetry. No fasciculations were noted    No pronator drift  Strength 5/5 bilaterally     Reflexes:  Tendon reflexes were 2 + at biceps, triceps, brachioradialis, patellar, and Achilles bilaterally  No clonus was noted     Sensory: Intact to light touch, pin prick in all extremities. Vibration and proprioception intact in all extremities.     Gait: Romberg absent. Normal gait. Normal arm swing and turns.     Review of Data: I have reviewed images of an MRI from 2013 and see no abnormal  findings        Assessment and Plan   Cluster-Migraine syndrome. While there is a component of migraine, the vast majority of the headache phenotype is undoubtedly Cluster headache as evidenced by the headache characteristics, the circannual and circadian pattern and the response to medication and the tearing of the left eye.    Emgality previously put him in remission. Will resume as cluster headaches are returning.    Failed  aimovig.  RESUME Emgality cluster dosing of 300 mg monthly during cluster cycle.   Continue Depakote and Verapamil. Continue magnesium to counteract constipation  For acute attacks use Lidocaine IN.  Avoid Imitrex 6 mg SQ injection as he has HTN, HLD, smoker, and has recurrent chest pain.  Fioricet for rescue  Continue compazine for nausea and mild headache relief.   Continue Nurtec if Fioricet not effective.       Face to Face time with patient: 20  30 minutes of total time spent on the encounter, which includes face to face time and non-face to face time on day of visit preparing to see the patient (eg, review of tests), Obtaining and/or reviewing separately obtained history, Documenting clinical information in the electronic or other health record, Independently interpreting results (not separately reported) and communicating results to the patient/family/caregiver, or Care coordination (not separately reported).     KELLY Hilario

## 2022-05-18 ENCOUNTER — OFFICE VISIT (OUTPATIENT)
Dept: OTOLARYNGOLOGY | Facility: CLINIC | Age: 57
End: 2022-05-18
Payer: COMMERCIAL

## 2022-05-18 ENCOUNTER — LAB VISIT (OUTPATIENT)
Dept: LAB | Facility: HOSPITAL | Age: 57
End: 2022-05-18
Attending: NURSE PRACTITIONER
Payer: COMMERCIAL

## 2022-05-18 ENCOUNTER — HOSPITAL ENCOUNTER (OUTPATIENT)
Dept: RADIOLOGY | Facility: HOSPITAL | Age: 57
Discharge: HOME | End: 2022-05-18
Attending: NURSE PRACTITIONER
Payer: COMMERCIAL

## 2022-05-18 ENCOUNTER — CLINICAL SUPPORT (OUTPATIENT)
Dept: AUDIOLOGY | Facility: CLINIC | Age: 57
End: 2022-05-18
Payer: COMMERCIAL

## 2022-05-18 VITALS — HEIGHT: 72 IN | TEMPERATURE: 99 F | BODY MASS INDEX: 33.74 KG/M2 | WEIGHT: 249.13 LBS

## 2022-05-18 DIAGNOSIS — H93.13 BILATERAL TINNITUS: Primary | ICD-10-CM

## 2022-05-18 DIAGNOSIS — G89.29 CHRONIC LEFT-SIDED HEADACHES: Primary | ICD-10-CM

## 2022-05-18 DIAGNOSIS — H90.3 BILATERAL SENSORINEURAL HEARING LOSS: ICD-10-CM

## 2022-05-18 DIAGNOSIS — R06.83 SNORING: ICD-10-CM

## 2022-05-18 DIAGNOSIS — R51.9 CHRONIC LEFT-SIDED HEADACHES: Primary | ICD-10-CM

## 2022-05-18 DIAGNOSIS — H91.90 PERCEIVED HEARING CHANGES: ICD-10-CM

## 2022-05-18 DIAGNOSIS — G89.29 CHRONIC LEFT-SIDED HEADACHES: ICD-10-CM

## 2022-05-18 DIAGNOSIS — R51.9 CHRONIC LEFT-SIDED HEADACHES: ICD-10-CM

## 2022-05-18 PROCEDURE — 70220 X-RAY EXAM OF SINUSES: CPT | Mod: 26,,, | Performed by: RADIOLOGY

## 2022-05-18 PROCEDURE — 99999 PR PBB SHADOW E&M-EST. PATIENT-LVL I: ICD-10-PCS | Mod: PBBFAC,,,

## 2022-05-18 PROCEDURE — 3008F BODY MASS INDEX DOCD: CPT | Mod: CPTII,S$GLB,, | Performed by: NURSE PRACTITIONER

## 2022-05-18 PROCEDURE — 86003 ALLG SPEC IGE CRUDE XTRC EA: CPT | Mod: 59 | Performed by: NURSE PRACTITIONER

## 2022-05-18 PROCEDURE — 92567 PR TYMPA2METRY: ICD-10-PCS | Mod: S$GLB,,, | Performed by: AUDIOLOGIST

## 2022-05-18 PROCEDURE — 92557 PR COMPREHENSIVE HEARING TEST: ICD-10-PCS | Mod: S$GLB,,, | Performed by: AUDIOLOGIST

## 2022-05-18 PROCEDURE — 1160F RVW MEDS BY RX/DR IN RCRD: CPT | Mod: CPTII,S$GLB,, | Performed by: NURSE PRACTITIONER

## 2022-05-18 PROCEDURE — 99999 PR PBB SHADOW E&M-EST. PATIENT-LVL V: CPT | Mod: PBBFAC,,, | Performed by: NURSE PRACTITIONER

## 2022-05-18 PROCEDURE — 99214 OFFICE O/P EST MOD 30 MIN: CPT | Mod: S$GLB,,, | Performed by: NURSE PRACTITIONER

## 2022-05-18 PROCEDURE — 70220 XR SINUSES MIN 3 VIEWS: ICD-10-PCS | Mod: 26,,, | Performed by: RADIOLOGY

## 2022-05-18 PROCEDURE — 99214 PR OFFICE/OUTPT VISIT, EST, LEVL IV, 30-39 MIN: ICD-10-PCS | Mod: S$GLB,,, | Performed by: NURSE PRACTITIONER

## 2022-05-18 PROCEDURE — 92567 TYMPANOMETRY: CPT | Mod: S$GLB,,, | Performed by: AUDIOLOGIST

## 2022-05-18 PROCEDURE — 99999 PR PBB SHADOW E&M-EST. PATIENT-LVL I: CPT | Mod: PBBFAC,,,

## 2022-05-18 PROCEDURE — 99999 PR PBB SHADOW E&M-EST. PATIENT-LVL V: ICD-10-PCS | Mod: PBBFAC,,, | Performed by: NURSE PRACTITIONER

## 2022-05-18 PROCEDURE — 70220 X-RAY EXAM OF SINUSES: CPT | Mod: TC,FY,PO

## 2022-05-18 PROCEDURE — 1160F PR REVIEW ALL MEDS BY PRESCRIBER/CLIN PHARMACIST DOCUMENTED: ICD-10-PCS | Mod: CPTII,S$GLB,, | Performed by: NURSE PRACTITIONER

## 2022-05-18 PROCEDURE — 1159F PR MEDICATION LIST DOCUMENTED IN MEDICAL RECORD: ICD-10-PCS | Mod: CPTII,S$GLB,, | Performed by: NURSE PRACTITIONER

## 2022-05-18 PROCEDURE — 3008F PR BODY MASS INDEX (BMI) DOCUMENTED: ICD-10-PCS | Mod: CPTII,S$GLB,, | Performed by: NURSE PRACTITIONER

## 2022-05-18 PROCEDURE — 92557 COMPREHENSIVE HEARING TEST: CPT | Mod: S$GLB,,, | Performed by: AUDIOLOGIST

## 2022-05-18 PROCEDURE — 1159F MED LIST DOCD IN RCRD: CPT | Mod: CPTII,S$GLB,, | Performed by: NURSE PRACTITIONER

## 2022-05-18 PROCEDURE — 36415 COLL VENOUS BLD VENIPUNCTURE: CPT | Mod: PO | Performed by: NURSE PRACTITIONER

## 2022-05-18 PROCEDURE — 86003 ALLG SPEC IGE CRUDE XTRC EA: CPT | Performed by: NURSE PRACTITIONER

## 2022-05-18 NOTE — PROGRESS NOTES
"Jovan Mcwilliams was seen 05/18/2022 for an audiological evaluation.     Pt reported a history of loud noise exposure and bilateral "static noise" tinnitus.      Otoscopy revealed clear view of both external ear canals and tympanic membranes.  No obstructive cerumen present in either ear canal.       Audiogram results revealed a mild sensorineural hearing loss at 250Hz for the right ear and 4Khz for the left ear with otherwise normal hearing.  Speech Reception Thresholds were  15 dBHL for the right ear and 20 dBHL for the left ear.    Word recognition scores were excellent for the right ear and excellent for the left ear.   Tympanograms were Type A for the right ear and Type A for the left ear.    Audiogram results were reviewed in detail with patient and all questions were answered. Results will be reviewed by ENT at the completion of this note.     Recommend use of hearing protection devices when in the presence of loud sounds and annual audiograms to monitor asymmetrical mild SNHL.             "

## 2022-05-18 NOTE — PROGRESS NOTES
Subjective:       Patient ID: Jovan Mcwilliams is a 56 y.o. male.    Chief Complaint: Headache and Snoring    HPI   Patient saw ENT Dr. Ingram on 06/27/2018 for headaches.  Dr. Ingram did not feel the patient's headaches were related to nasal spur or nasal obstruction.  Dr. Ingram recommended daily saline rinsing and continuation of neurology evaluation.  Patient sees Neurology for left-sided cluster headaches and migraine headaches.  Patient reports headache started in 2011 and have been occurring once a year since then.  Patient would like to rule out allergies and/or sinuses as potential contributor to headaches.  Patient reports he only gets the headaches at night.  He uses a sinus saline lavage daily.  He has a lidocaine nasal spray which he uses to abort headaches.  Patient states his wife complains of his snoring.  He tried to undergo a sleep study many years ago but was and able to fall asleep in the unfamiliar setting of the sleep lab. Lastly patient reports gradual decline in hearing acuity and would like an audiogram.    Review of Systems   Constitutional: Negative.    HENT: Positive for hearing loss.    Eyes: Negative.    Respiratory: Negative.    Cardiovascular: Negative.    Gastrointestinal: Negative.    Musculoskeletal: Negative.    Integumentary:  Negative.   Neurological: Positive for headaches.   Hematological: Negative.    Psychiatric/Behavioral: Negative.          Objective:      Physical Exam  Vitals and nursing note reviewed.   Constitutional:       General: He is not in acute distress.     Appearance: He is well-developed. He is not ill-appearing or diaphoretic.   HENT:      Head: Normocephalic and atraumatic.      Right Ear: Hearing, tympanic membrane, ear canal and external ear normal. No middle ear effusion. Tympanic membrane is not erythematous.      Left Ear: Hearing, tympanic membrane, ear canal and external ear normal.  No middle ear effusion. Tympanic membrane is not erythematous.       Nose: Nose normal. No mucosal edema or rhinorrhea.      Right Sinus: No maxillary sinus tenderness or frontal sinus tenderness.      Left Sinus: No maxillary sinus tenderness or frontal sinus tenderness.      Mouth/Throat:      Mouth: Mucous membranes are not pale, not dry and not cyanotic. No oral lesions.      Pharynx: Uvula midline. No oropharyngeal exudate or posterior oropharyngeal erythema.   Eyes:      General: Lids are normal. No scleral icterus.        Right eye: No discharge.         Left eye: No discharge.      Conjunctiva/sclera: Conjunctivae normal.      Pupils: Pupils are equal, round, and reactive to light.   Neck:      Thyroid: No thyroid mass or thyromegaly.      Trachea: Trachea normal. No tracheal deviation.   Cardiovascular:      Rate and Rhythm: Normal rate.   Pulmonary:      Effort: Pulmonary effort is normal. No respiratory distress.      Breath sounds: No stridor. No wheezing.   Musculoskeletal:         General: Normal range of motion.      Cervical back: Normal range of motion and neck supple.   Lymphadenopathy:      Head:      Right side of head: No submental, submandibular, tonsillar, preauricular or posterior auricular adenopathy.      Left side of head: No submental, submandibular, tonsillar, preauricular or posterior auricular adenopathy.      Cervical: No cervical adenopathy.      Right cervical: No superficial or posterior cervical adenopathy.     Left cervical: No superficial or posterior cervical adenopathy.   Skin:     General: Skin is warm and dry.      Coloration: Skin is not pale.      Findings: No lesion or rash.   Neurological:      Mental Status: He is alert and oriented to person, place, and time.      Coordination: Coordination normal.      Gait: Gait normal.   Psychiatric:         Speech: Speech normal.         Behavior: Behavior normal. Behavior is cooperative.         Thought Content: Thought content normal.         Judgment: Judgment normal.         Assessment:        Problem List Items Addressed This Visit    None     Visit Diagnoses     Chronic left-sided headaches    -  Primary    Relevant Orders    X-Ray Sinuses Min 3 Views (Completed)    Allergen, Cocklebur    Allergen, Elm [place]    Allergen, Meadow Grass (adRisey Blue)    Allergen, Mucor Racemosus    Allergen, Pecan Tree IgE    Allergen, White Rakesh    Allergen-Alternaria Alternata    Allergen-[place]    Allergen-Common Pigweed    Allergen-[place]    Aspergillus fumagatus IgE    Bermuda grass IgE    Cat epithelium IgE    Cladosporium IgE    Cockroach, American IgE    [place], bald IgE    D. farinae IgE    D. pteronyssinus IgE    Dog dander IgE    Feather Panel #2    Christian grass IgE    Marsh elder, rough IgE    Mugwort IgE    Nettle IgE    Oak, white IgE    Penicillium IgE    Plantain, English IgE    RAST Allergen Maple ([place])    RAST Allergen [place]    RAST Allergen for [place]    RAST Allergen, Lamb's Quarters    RAST Allergen, Sheep Sorrel([place])    Ragweed, short, common IgE    Tony IgE    Ambulatory referral/consult to Sleep Disorders    Snoring        Relevant Orders    Ambulatory referral/consult to Sleep Disorders    Perceived hearing changes              Plan:     Sinus x-rays today    RAST testing and consideration of possible referral to allergy dept   Audiogram essentially normal.  Referred to sleep medicine for possible at-home PSG  Patient encouraged to return to clinic if symptoms worsen/persist and as needed for further ENT symptoms or concerns.

## 2022-05-18 NOTE — PATIENT INSTRUCTIONS
You saw one of our ENT doctors (Dr. Ingram) in 2018 for headaches. Dr. Ingram did not feel that your nose was the cause of your headaches, and encouraged you to continue with neurology and to continue rinsing your sinuses out daily with distilled salt water.     Your most recent imaging 2 years ago showed only trace mucosal thickening within your sinuses. This is indicative of slight allergic inflammation.  The spaces behind your ear drums was open and clear.  No recent imaging within the past 2 years.     We can obtain imaging today, and if it reveals that your sinuses/ears are the cause of your headaches, then ENT is responsible for resolving it. However if imaging is negative for any ENT cause, then we would encourage you to return to neurology.

## 2022-05-20 LAB
AMER SYCAMORE IGE QN: <0.35 KU/L
FEATHER PANEL #2: <0.35 KU/L

## 2022-05-24 LAB
A ALTERNATA IGE QN: <0.1 KU/L
A FUMIGATUS IGE QN: <0.1 KU/L
ALLERGEN BOXELDER MAPLE TREE IGE: <0.1 KU/L
ALLERGEN MAPLE (BOX ELDER) CLASS: NORMAL
ALLERGEN MULBERRY CLASS: NORMAL
ALLERGEN MULBERRY TREE IGE: <0.1 KU/L
ALLERGEN PIGWEED IGE: <0.1 KU/L
ALLERGEN WHITE ASH TREE IGE: <0.1 KU/L
BALD CYPRESS IGE QN: <0.1 KU/L
BERMUDA GRASS IGE QN: <0.1 KU/L
C HERBARUM IGE QN: <0.1 KU/L
CAT DANDER IGE QN: <0.1 KU/L
CEDAR IGE QN: <0.1 KU/L
COCKLEBUR IGE QN: <0.1 KU/L
COMMON PIGWEED CLASS: NORMAL
COMMON RAGWEED IGE QN: <0.1 KU/L
D FARINAE IGE QN: <0.1 KU/L
D PTERONYSS IGE QN: <0.1 KU/L
DEPRECATED A ALTERNATA IGE RAST QL: NORMAL
DEPRECATED A FUMIGATUS IGE RAST QL: NORMAL
DEPRECATED BALD CYPRESS IGE RAST QL: NORMAL
DEPRECATED BERMUDA GRASS IGE RAST QL: NORMAL
DEPRECATED C HERBARUM IGE RAST QL: NORMAL
DEPRECATED CAT DANDER IGE RAST QL: NORMAL
DEPRECATED CEDAR IGE RAST QL: NORMAL
DEPRECATED COCKLEBUR IGE RAST QL: NORMAL
DEPRECATED COMMON RAGWEED IGE RAST QL: NORMAL
DEPRECATED D FARINAE IGE RAST QL: NORMAL
DEPRECATED D PTERONYSS IGE RAST QL: NORMAL
DEPRECATED DOG DANDER IGE RAST QL: NORMAL
DEPRECATED ELDER IGE RAST QL: NORMAL
DEPRECATED ENGL PLANTAIN IGE RAST QL: NORMAL
DEPRECATED GOOSEFOOT IGE RAST QL: NORMAL
DEPRECATED JOHNSON GRASS IGE RAST QL: NORMAL
DEPRECATED KENT BLUE GRASS IGE RAST QL: NORMAL
DEPRECATED M RACEMOSUS IGE RAST QL: NORMAL
DEPRECATED MUGWORT IGE RAST QL: NORMAL
DEPRECATED NETTLE IGE RAST QL: NORMAL
DEPRECATED P NOTATUM IGE RAST QL: NORMAL
DEPRECATED PECAN/HICK TREE IGE RAST QL: NORMAL
DEPRECATED ROACH IGE RAST QL: NORMAL
DEPRECATED SHEEP SORREL IGE RAST QL: NORMAL
DEPRECATED SILVER BIRCH IGE RAST QL: NORMAL
DEPRECATED TIMOTHY IGE RAST QL: NORMAL
DEPRECATED WHITE OAK IGE RAST QL: NORMAL
DOG DANDER IGE QN: <0.1 KU/L
ELDER IGE QN: <0.1 KU/L
ELM CEDAR CLASS: NORMAL
ELM CEDAR, IGE: <0.1 KU/L
ENGL PLANTAIN IGE QN: <0.1 KU/L
GOOSEFOOT IGE QN: <0.1 KU/L
JOHNSON GRASS IGE QN: <0.1 KU/L
KENT BLUE GRASS IGE QN: <0.1 KU/L
M RACEMOSUS IGE QN: <0.1 KU/L
MUGWORT IGE QN: <0.1 KU/L
NETTLE IGE QN: <0.1 KU/L
P NOTATUM IGE QN: <0.1 KU/L
PECAN/HICK TREE IGE QN: <0.1 KU/L
ROACH IGE QN: <0.1 KU/L
SHEEP SORREL IGE QN: <0.1 KU/L
SILVER BIRCH IGE QN: <0.1 KU/L
TIMOTHY IGE QN: <0.1 KU/L
WHITE ASH CLASS: NORMAL
WHITE OAK IGE QN: <0.1 KU/L

## 2022-06-07 ENCOUNTER — PATIENT MESSAGE (OUTPATIENT)
Dept: NEUROLOGY | Facility: CLINIC | Age: 57
End: 2022-06-07
Payer: COMMERCIAL

## 2022-06-07 DIAGNOSIS — G44.011 INTRACTABLE EPISODIC CLUSTER HEADACHE: ICD-10-CM

## 2022-06-07 DIAGNOSIS — G44.011 INTRACTABLE EPISODIC CLUSTER HEADACHE: Primary | ICD-10-CM

## 2022-06-08 ENCOUNTER — PATIENT MESSAGE (OUTPATIENT)
Dept: NEUROLOGY | Facility: CLINIC | Age: 57
End: 2022-06-08
Payer: COMMERCIAL

## 2022-06-08 RX ORDER — BUTALBITAL, ACETAMINOPHEN AND CAFFEINE 50; 325; 40 MG/1; MG/1; MG/1
TABLET ORAL
Qty: 14 TABLET | Refills: 1 | Status: SHIPPED | OUTPATIENT
Start: 2022-06-08 | End: 2023-09-14 | Stop reason: SDUPTHER

## 2022-06-08 RX ORDER — KETOROLAC TROMETHAMINE 10 MG/1
10 TABLET, FILM COATED ORAL EVERY 6 HOURS PRN
Qty: 10 TABLET | Refills: 0 | Status: SHIPPED | OUTPATIENT
Start: 2022-06-08 | End: 2023-01-14 | Stop reason: ALTCHOICE

## 2022-06-08 RX ORDER — UBROGEPANT 100 MG/1
TABLET ORAL
Qty: 8 TABLET | Refills: 2 | Status: SHIPPED | OUTPATIENT
Start: 2022-06-08 | End: 2022-08-17 | Stop reason: ALTCHOICE

## 2022-08-11 ENCOUNTER — TELEPHONE (OUTPATIENT)
Dept: PHARMACY | Facility: CLINIC | Age: 57
End: 2022-08-11
Payer: COMMERCIAL

## 2022-08-11 NOTE — TELEPHONE ENCOUNTER
DOCUMENTATION ONLY  Emgality 100mg/mL syringes   Approval date: 8/10/2022 to 8/10/2023  Case ID# [id number]

## 2022-08-17 ENCOUNTER — TELEPHONE (OUTPATIENT)
Dept: PHARMACY | Facility: CLINIC | Age: 57
End: 2022-08-17
Payer: COMMERCIAL

## 2022-08-17 ENCOUNTER — OFFICE VISIT (OUTPATIENT)
Dept: NEUROLOGY | Facility: CLINIC | Age: 57
End: 2022-08-17
Payer: COMMERCIAL

## 2022-08-17 VITALS
HEART RATE: 66 BPM | BODY MASS INDEX: 33.18 KG/M2 | RESPIRATION RATE: 17 BRPM | SYSTOLIC BLOOD PRESSURE: 147 MMHG | WEIGHT: 245 LBS | DIASTOLIC BLOOD PRESSURE: 103 MMHG | HEIGHT: 72 IN

## 2022-08-17 DIAGNOSIS — G44.011 INTRACTABLE EPISODIC CLUSTER HEADACHE: Primary | ICD-10-CM

## 2022-08-17 DIAGNOSIS — G43.719 INTRACTABLE CHRONIC MIGRAINE WITHOUT AURA AND WITHOUT STATUS MIGRAINOSUS: ICD-10-CM

## 2022-08-17 PROCEDURE — 3008F BODY MASS INDEX DOCD: CPT | Mod: CPTII,S$GLB,, | Performed by: NURSE PRACTITIONER

## 2022-08-17 PROCEDURE — 99214 PR OFFICE/OUTPT VISIT, EST, LEVL IV, 30-39 MIN: ICD-10-PCS | Mod: S$GLB,,, | Performed by: NURSE PRACTITIONER

## 2022-08-17 PROCEDURE — 1159F PR MEDICATION LIST DOCUMENTED IN MEDICAL RECORD: ICD-10-PCS | Mod: CPTII,S$GLB,, | Performed by: NURSE PRACTITIONER

## 2022-08-17 PROCEDURE — 3080F PR MOST RECENT DIASTOLIC BLOOD PRESSURE >= 90 MM HG: ICD-10-PCS | Mod: CPTII,S$GLB,, | Performed by: NURSE PRACTITIONER

## 2022-08-17 PROCEDURE — 1159F MED LIST DOCD IN RCRD: CPT | Mod: CPTII,S$GLB,, | Performed by: NURSE PRACTITIONER

## 2022-08-17 PROCEDURE — 3077F PR MOST RECENT SYSTOLIC BLOOD PRESSURE >= 140 MM HG: ICD-10-PCS | Mod: CPTII,S$GLB,, | Performed by: NURSE PRACTITIONER

## 2022-08-17 PROCEDURE — 3008F PR BODY MASS INDEX (BMI) DOCUMENTED: ICD-10-PCS | Mod: CPTII,S$GLB,, | Performed by: NURSE PRACTITIONER

## 2022-08-17 PROCEDURE — 99999 PR PBB SHADOW E&M-EST. PATIENT-LVL IV: ICD-10-PCS | Mod: PBBFAC,,, | Performed by: NURSE PRACTITIONER

## 2022-08-17 PROCEDURE — 3077F SYST BP >= 140 MM HG: CPT | Mod: CPTII,S$GLB,, | Performed by: NURSE PRACTITIONER

## 2022-08-17 PROCEDURE — 3080F DIAST BP >= 90 MM HG: CPT | Mod: CPTII,S$GLB,, | Performed by: NURSE PRACTITIONER

## 2022-08-17 PROCEDURE — 1160F RVW MEDS BY RX/DR IN RCRD: CPT | Mod: CPTII,S$GLB,, | Performed by: NURSE PRACTITIONER

## 2022-08-17 PROCEDURE — 1160F PR REVIEW ALL MEDS BY PRESCRIBER/CLIN PHARMACIST DOCUMENTED: ICD-10-PCS | Mod: CPTII,S$GLB,, | Performed by: NURSE PRACTITIONER

## 2022-08-17 PROCEDURE — 99214 OFFICE O/P EST MOD 30 MIN: CPT | Mod: S$GLB,,, | Performed by: NURSE PRACTITIONER

## 2022-08-17 PROCEDURE — 99999 PR PBB SHADOW E&M-EST. PATIENT-LVL IV: CPT | Mod: PBBFAC,,, | Performed by: NURSE PRACTITIONER

## 2022-08-17 RX ORDER — LASMIDITAN 100 MG/1
100 TABLET ORAL DAILY PRN
Qty: 8 TABLET | Refills: 1 | Status: SHIPPED | OUTPATIENT
Start: 2022-08-17

## 2022-08-17 NOTE — PROGRESS NOTES
Subjective:       Patient ID: Jovan Mcwilliams is a 51 y.o. male.    Chief Complaint: Headache    INTERVAL HISTORY 8/17/22  Patient presents for follow up. Last visit was three months ago and at that time headaches were more frequent. Plan was to resume Emgality.    Today he reports he is much better. He did four months of Emgality 300 mg dosing which seemed to have broken the cluster cycle. Headaches are less intense and a little less frequent. headaches are mostly left sided, behind eye. Current pain 1 with range 1-10. He reports slight headaches occasionally at bedtime. He takes Fioricet and Toradol as needed. He will also take Excedrin migraine at bedtime rarely. Ubrelvy was not effective. He had one ER visit since last visit to help with pain. Otherwise information below is reviewed and verified with no changes made     INTERVAL HISTORY 5/17/22  Patient presents for follow up. Last visit was 10 months ago and at that time he was much better. He was in the beginning of a cluster cycle. Plan was to resume Emgality 300 mg monthly until cluster headaches broke. Added Nurtec for acute escalations.     Today he reports he is about the same to little worse. Headaches are becoming more frequent. They occur over the left eye and left side. Current pain 0. He has 6-7 migraine days per week. He is taking OTC medication, Fioricet, compazine and Ublrelvy as needed. He is currently on verapamil and Depakote. Dyazide was recently added for his blood pressure. Lidocaine NS does not seem to help. Otherwise information below is reviewed and verified with no changes made unless noted.     INTERVAL HISTORY 7/14/21  Patient presents for follow up. Last visit was 7 months ago and at that time he was cluster headache free. He last took Emgality 300 mg was August or September 2020.    Today he reports he is much better. However, the frequency is coming back. Headaches occur behind left eye, left side of head, left side on top of head.  Headaches have been daily for the past three days. Lasts minutes to hours. Current pain 2 with range 0-10. He takes compazine and Ubrelvy. He states sumatriptan intensifies headache. He did take Fioricet last night.      INTERVAL HISTORY 12/14/2020  Patient presents for virtual follow up.  The patient location is: [place], [place]   The chief complaint leading to consultation is: follow up  Visit type: audiovisual  Face to Face time with patient: 15  20 minutes of total time spent on the encounter, which includes face to face time and non-face to face time preparing to see the patient (eg, review of tests), Obtaining and/or reviewing separately obtained history, Documenting clinical information in the electronic or other health record, Independently interpreting results (not separately reported) and communicating results to the patient/family/caregiver, or Care coordination (not separately reported).   Each patient to whom he or she provides medical services by telemedicine is:  (1) informed of the relationship between the physician and patient and the respective role of any other health care provider with respect to management of the patient; and (2) notified that he or she may decline to receive medical services by telemedicine and may withdraw from such care at any time.    Notes:     Last visit was 11 months ago and at that time he was doing worse on Aimovg. We switched to Emgality.    Today he reports he is doing much better. He took Emgality for 6-8 months and did very well. Last injection was 3 months ago. He has not had any cluster migraines in about 5-6 months. He has not had to use Ubrelvy or Fioricet in months. He has not used compazine.     INTERVAL HISTORY 1/20/2020  Patient presents for follow up. He has been on Aimovig 70 mg since October 2018. He is also on Depakote and verapamil. He stopped depakote 6 months ago but resumed when cluster cycle about 5 weeks ago. Wife present today reports patient  "started taking Aimovig every other month about 6 months ago as well.  He has Imitrex IM, Zomig NS and Fioricet for escalations. He reports he has not used the Imitrex or Zomig in close to two years. When he last used the Imitrex, he reports the headache worsened for 5 minutes and then improved. He has HTN, HLD and history of chest pain.     Today he reports he is a little worse. Headaches are starting again after 18 months of little to no headaches. They are behind the left eye and left side of the head. current pain 3 with range 0-10. This cluster began about 5 weeks ago. Lidocaine NS used as first line but not effective. Fioricet second line and somewhat effective.     INTERVAL HISTORY  The patient is added to the clinic schedule as his headaches persist. He complains of a new symptom consisting of blurry vision with the left eye. He denies a specific description of a visual migraine aura in the form of fortification spectra, scotomata, hemianopsia, photopsia or transient loss of vision. He uses Imitrex injectable with good benefit but states that he is "drained" and unable to work the next day. He is tired because of lack of sleep.   He underwent peripheral nerve blocks last week including GLORIA, auriculotemporal and supraorbital. He stated that initially he felt significant benefit but when the pain came back, it came back much worse, he almost went to the ED.   He is on Depakote 500 mg BID. Otherwise headache characteristics are unchanged.  HPI  The patient is a pleasant 50 y/o male with chief complaint of headaches. He has been getting them in the fall (Octobe-November) since 2012. Next cool season in 2013, they recurred. He underwent an MRI of the brain with not significant findings (my interpretation). This last fall he did not get the headaches, instead they came on 1/3/2017. The headache is strictly unilateral, originating in a discrete area in the left posterior region and projecting to the left orbital and " periorbital region. He went to the ED where he was given a combination of steroids, benadryl and compazine as well as a supraorbital block with good response. He is followed by Dr. Velasquez who has recommended prevention with verapamil and Depakote and transitional treatment with steroids. Unfortunately, verapamil causes decreased libido. He was switched to Amlodipine. He has never tried oxygen. He treats acutely with Zomig NS, previously with SQ sumatriptan    Review of Systems   Constitutional: Negative for chills, diaphoresis, fever, malaise/fatigue and weight loss.   HENT: Negative for nosebleeds and tinnitus.    Eyes: Negative for blurred vision and double vision.   Respiratory: Negative for cough and wheezing.    Cardiovascular: Negative for chest pain and palpitations.   Genitourinary: Negative for dysuria.   Musculoskeletal: Negative for joint pain and myalgias.   Skin: Negative for rash.   Neurological: Positive for headaches. Negative for weakness.   Endo/Heme/Allergies: Does not bruise/bleed easily.   Psychiatric/Behavioral: Negative for depression.             Past Medical History   Diagnosis Date    Additional heart attack      10 years ago    Eye pain      left eye    GERD (gastroesophageal reflux disease)     Hyperlipemia      Past Surgical History   Procedure Laterality Date    Back surgery      Hernia repair       Family History   Problem Relation Age of Onset    Hypertension Mother     Hyperlipidemia Mother     Diabetes Father     Cancer Maternal Aunt     Cancer Paternal Aunt     Cancer Maternal Grandmother     Cancer Maternal Grandfather     Cancer Paternal Grandfather      Social History     Social History    Marital status: Single     Spouse name: N/A    Number of children: N/A    Years of education: N/A     Occupational History    Not on file.     Social History Main Topics    Smoking status: Current Every Day Smoker     Packs/day: 1.00     Years: 15.00    Smokeless tobacco:  Not on file      Comment: is about to quit    Alcohol use 0.6 oz/week     1 Cans of beer per week      Comment: per week    Drug use: No    Sexual activity: Not on file     Other Topics Concern    Not on file     Social History Narrative     Review of patient's allergies indicates:   Allergen Reactions    Doxycycline      Other reaction(s): nausea    Erythromycin      Other reaction(s): Nausea       Current Outpatient Prescriptions   Medication Sig    ascorbic acid (VITAMIN C) 1000 MG tablet     aspirin (ECOTRIN) 81 MG EC tablet Take 81 mg by mouth Daily. 1 Tablet, Delayed Release (E.C.) Oral Every day    ibuprofen (ADVIL,MOTRIN) 800 MG tablet 1 tqab po q 8 h prn pain    omeprazole (PRILOSEC) 40 MG capsule Take 1 capsule (40 mg total) by mouth once daily.    predniSONE (DELTASONE) 10 mg tablet pack Prednisone 10 mg tabs (40 mg for 2 days, 30 mg po for 2 days, 20 mg po for 2 days and 10 mg po for 2 days. Omeprazol 20 mg daily for gastro protection) Total #20 tabs    amlodipine (NORVASC) 5 MG tablet Take 1 tablet (5 mg total) by mouth once daily.    atorvastatin (LIPITOR) 40 MG tablet Take 1 tablet (40 mg total) by mouth once daily.    divalproex (DEPAKOTE) 500 MG TbEC Take 500 mg by mouth 2 (two) times daily.    hydrocodone-acetaminophen 7.5-325mg (NORCO) 7.5-325 mg per tablet Take 1 tablet by mouth once daily.    multivitamin (THERAGRAN) per tablet Take by mouth. 1 Tablet Oral Every day     No current facility-administered medications for this visit.            Objective:      Vitals:    08/17/22 0856   BP: (!) 147/103   Pulse: 66   Resp: 17        Physical Exam    8/17/22  Constitutional:   He appears well-developed and well-nourished. He is well groomed     Neurological Exam:  General: well-developed, well-nourished, no distress  Mental status: Awake and alert  Speech language: No dysarthria or aphasia on conversation  Cranial nerves: Face symmetric  Motor: Moves all extremities well  Coordination:  No ataxia. No tremor.    Previous  Constitutional:   He appears well-developed and well-nourished. He is well groomed  HENT:    Head: Atraumatic, oral and nasal mucosa intact  Eyes: Conjunctivae and EOM are normal. Pupils are equal, round, and reactive to light OU  Neck: Neck supple. No thyromegaly present  Cardiovascular: Normal rate and normal heart sounds  No murmur heard  Pulmonary/Chest: Effort normal and breath sounds normal  Musculoskeletal: Normal range of motion. No joint stiffness. No vertebral point tenderness  Skin: Skin is warm and dry  Psychiatric: Normal mood and affect     Neuro exam:    Mental status:  Awake, attentive, Alert, oriented to self, place, year and month  Language function is intact    Cranial Nerves:  Smell was not formally evaluated  Cranial Nerves II - XII: intact  Pursuits were smooth, normal saccades, no nystagmus OU  Funduscopic exam - disc were flat and pink, no exudates or hemorrhages OU  Motor - facial movement was symmetrical and normal     Palate moved well and was symmetrical with normal palatal and oral sensation  Tongue movements were full    Coordination:     Rapid alternating movements and rapid finger tapping - normal bilaterally  Finger to nose - normal and symmetric bilaterally   No intentional or positional tremor.     Motor:  Normal muscle bulk and symmetry. No fasciculations were noted    No pronator drift  Strength 5/5 bilaterally     Reflexes:  Tendon reflexes were 2 + at biceps, triceps, brachioradialis, patellar, and Achilles bilaterally  No clonus was noted     Sensory: Intact to light touch, pin prick in all extremities. Vibration and proprioception intact in all extremities.     Gait: Romberg absent. Normal gait. Normal arm swing and turns.     Review of Data: I have reviewed images of an MRI from 2013 and see no abnormal findings        Assessment and Plan   Cluster-Migraine syndrome. While there is a component of migraine, the vast majority of the headache  phenotype is undoubtedly Cluster headache as evidenced by the headache characteristics, the circannual and circadian pattern and the response to medication and the tearing of the left eye.    Emgality  put him in remission after 4 months. Will resume as cluster headaches are returning.   Failed aimovig.  When needed, he can resume Emgality cluster dosing of 300 mg monthly during cluster cycle.   Continue Depakote and Verapamil. Continue magnesium to counteract constipation  For acute attacks use Lidocaine IN.  Avoid Imitrex 6 mg SQ injection as he has HTN, HLD, smoker, and has recurrent chest pain.  Fioricet for rescue  Continue compazine for nausea and mild headache relief.   Ubrelvy not very effective. Will trial Reyvow.        Face to Face time with patient: 20  30 minutes of total time spent on the encounter, which includes face to face time and non-face to face time on day of visit preparing to see the patient (eg, review of tests), Obtaining and/or reviewing separately obtained history, Documenting clinical information in the electronic or other health record, Independently interpreting results (not separately reported) and communicating results to the patient/family/caregiver, or Care coordination (not separately reported).     KELLY Hilario

## 2022-08-17 NOTE — PATIENT INSTRUCTIONS
Reyvow is a new class of acute management medication for migraine known as ditans.                  A- Known side effects dizziness, somnolance, feeling fatigued, foggy, numbness, tingling     B- After taking Reyvow no driving or operating heavy machinery or getting on tub or swimming alone as It does impair you for 8 hours even if you feel well enough to drive it is impairing for at least 8 hours after taking.      C- no alcohol use or use of other medications along with Reyvow                 D-  Medication overuse headache can occur                  E- Discussed serotonin syndrome if symptoms concerning discontinue Reyvow use and contact physician or if severe proceed for emergency evaluation.

## 2022-11-03 ENCOUNTER — LAB VISIT (OUTPATIENT)
Dept: LAB | Facility: HOSPITAL | Age: 57
End: 2022-11-03
Attending: INTERNAL MEDICINE
Payer: COMMERCIAL

## 2022-11-03 DIAGNOSIS — R73.09 ELEVATED GLUCOSE: ICD-10-CM

## 2022-11-03 LAB
ALBUMIN SERPL BCP-MCNC: 3.9 G/DL (ref 3.5–5.2)
ALP SERPL-CCNC: 51 U/L (ref 55–135)
ALT SERPL W/O P-5'-P-CCNC: 32 U/L (ref 10–44)
ANION GAP SERPL CALC-SCNC: 8 MMOL/L (ref 8–16)
AST SERPL-CCNC: 22 U/L (ref 10–40)
BILIRUB SERPL-MCNC: 0.7 MG/DL (ref 0.1–1)
BUN SERPL-MCNC: 20 MG/DL (ref 6–20)
CALCIUM SERPL-MCNC: 9.5 MG/DL (ref 8.7–10.5)
CHLORIDE SERPL-SCNC: 101 MMOL/L (ref 95–110)
CO2 SERPL-SCNC: 31 MMOL/L (ref 23–29)
CREAT SERPL-MCNC: 1.5 MG/DL (ref 0.5–1.4)
EST. GFR  (NO RACE VARIABLE): 55 ML/MIN/1.73 M^2
ESTIMATED AVG GLUCOSE: 114 MG/DL (ref 68–131)
GLUCOSE SERPL-MCNC: 112 MG/DL (ref 70–110)
HBA1C MFR BLD: 5.6 % (ref 4–5.6)
POTASSIUM SERPL-SCNC: 4.3 MMOL/L (ref 3.5–5.1)
PROT SERPL-MCNC: 6.8 G/DL (ref 6–8.4)
SODIUM SERPL-SCNC: 140 MMOL/L (ref 136–145)

## 2022-11-03 PROCEDURE — 80053 COMPREHEN METABOLIC PANEL: CPT | Performed by: INTERNAL MEDICINE

## 2022-11-03 PROCEDURE — 36415 COLL VENOUS BLD VENIPUNCTURE: CPT | Mod: PO | Performed by: INTERNAL MEDICINE

## 2022-11-03 PROCEDURE — 83036 HEMOGLOBIN GLYCOSYLATED A1C: CPT | Performed by: INTERNAL MEDICINE

## 2022-11-22 ENCOUNTER — OFFICE VISIT (OUTPATIENT)
Dept: NEUROLOGY | Facility: CLINIC | Age: 57
End: 2022-11-22
Payer: COMMERCIAL

## 2022-11-22 VITALS
DIASTOLIC BLOOD PRESSURE: 92 MMHG | RESPIRATION RATE: 17 BRPM | HEART RATE: 62 BPM | WEIGHT: 258.69 LBS | BODY MASS INDEX: 35.04 KG/M2 | SYSTOLIC BLOOD PRESSURE: 152 MMHG | HEIGHT: 72 IN

## 2022-11-22 DIAGNOSIS — G43.719 INTRACTABLE CHRONIC MIGRAINE WITHOUT AURA AND WITHOUT STATUS MIGRAINOSUS: ICD-10-CM

## 2022-11-22 DIAGNOSIS — G44.011 INTRACTABLE EPISODIC CLUSTER HEADACHE: Primary | ICD-10-CM

## 2022-11-22 PROCEDURE — 3044F HG A1C LEVEL LT 7.0%: CPT | Mod: CPTII,S$GLB,, | Performed by: NURSE PRACTITIONER

## 2022-11-22 PROCEDURE — 3008F PR BODY MASS INDEX (BMI) DOCUMENTED: ICD-10-PCS | Mod: CPTII,S$GLB,, | Performed by: NURSE PRACTITIONER

## 2022-11-22 PROCEDURE — 3077F SYST BP >= 140 MM HG: CPT | Mod: CPTII,S$GLB,, | Performed by: NURSE PRACTITIONER

## 2022-11-22 PROCEDURE — 1160F RVW MEDS BY RX/DR IN RCRD: CPT | Mod: CPTII,S$GLB,, | Performed by: NURSE PRACTITIONER

## 2022-11-22 PROCEDURE — 3008F BODY MASS INDEX DOCD: CPT | Mod: CPTII,S$GLB,, | Performed by: NURSE PRACTITIONER

## 2022-11-22 PROCEDURE — 3077F PR MOST RECENT SYSTOLIC BLOOD PRESSURE >= 140 MM HG: ICD-10-PCS | Mod: CPTII,S$GLB,, | Performed by: NURSE PRACTITIONER

## 2022-11-22 PROCEDURE — 3044F PR MOST RECENT HEMOGLOBIN A1C LEVEL <7.0%: ICD-10-PCS | Mod: CPTII,S$GLB,, | Performed by: NURSE PRACTITIONER

## 2022-11-22 PROCEDURE — 99999 PR PBB SHADOW E&M-EST. PATIENT-LVL IV: CPT | Mod: PBBFAC,,, | Performed by: NURSE PRACTITIONER

## 2022-11-22 PROCEDURE — 1159F PR MEDICATION LIST DOCUMENTED IN MEDICAL RECORD: ICD-10-PCS | Mod: CPTII,S$GLB,, | Performed by: NURSE PRACTITIONER

## 2022-11-22 PROCEDURE — 99999 PR PBB SHADOW E&M-EST. PATIENT-LVL IV: ICD-10-PCS | Mod: PBBFAC,,, | Performed by: NURSE PRACTITIONER

## 2022-11-22 PROCEDURE — 99213 PR OFFICE/OUTPT VISIT, EST, LEVL III, 20-29 MIN: ICD-10-PCS | Mod: S$GLB,,, | Performed by: NURSE PRACTITIONER

## 2022-11-22 PROCEDURE — 1159F MED LIST DOCD IN RCRD: CPT | Mod: CPTII,S$GLB,, | Performed by: NURSE PRACTITIONER

## 2022-11-22 PROCEDURE — 99213 OFFICE O/P EST LOW 20 MIN: CPT | Mod: S$GLB,,, | Performed by: NURSE PRACTITIONER

## 2022-11-22 PROCEDURE — 3080F DIAST BP >= 90 MM HG: CPT | Mod: CPTII,S$GLB,, | Performed by: NURSE PRACTITIONER

## 2022-11-22 PROCEDURE — 1160F PR REVIEW ALL MEDS BY PRESCRIBER/CLIN PHARMACIST DOCUMENTED: ICD-10-PCS | Mod: CPTII,S$GLB,, | Performed by: NURSE PRACTITIONER

## 2022-11-22 PROCEDURE — 3080F PR MOST RECENT DIASTOLIC BLOOD PRESSURE >= 90 MM HG: ICD-10-PCS | Mod: CPTII,S$GLB,, | Performed by: NURSE PRACTITIONER

## 2022-11-22 NOTE — PROGRESS NOTES
Subjective:       Patient ID: Jovan Mcwilliams is a 51 y.o. male.    Chief Complaint: Headache    INTERVAL HISTORY 11/22/22  Patient presents for follow up. Lat visit was three months ago and at that time he was doing very well on Emgality for cluster migraine.     Today he reports he is much better. Emgality breaks the cycle. Pain is behind left eye and left side of head. Current pain 0 with range 0-10. Cluster cycles will last 2-4 months. Last cycle he took Emgality for 3 months. He takes Fioricet as needed. He has not tried Reyvow but has a prescription if needed. He also has a supply of Emgality on hand if needed. Otherwise information below is reviewed and verified with no changes made     INTERVAL HISTORY 8/17/22  Patient presents for follow up. Last visit was three months ago and at that time headaches were more frequent. Plan was to resume Emgality.    Today he reports he is much better. He did four months of Emgality 300 mg dosing which seemed to have broken the cluster cycle. Headaches are less intense and a little less frequent. headaches are mostly left sided, behind eye. Current pain 1 with range 1-10. He reports slight headaches occasionally at bedtime. He takes Fioricet and Toradol as needed. He will also take Excedrin migraine at bedtime rarely. Ubrelvy was not effective. He had one ER visit since last visit to help with pain. Otherwise information below is reviewed and verified with no changes made     INTERVAL HISTORY 5/17/22  Patient presents for follow up. Last visit was 10 months ago and at that time he was much better. He was in the beginning of a cluster cycle. Plan was to resume Emgality 300 mg monthly until cluster headaches broke. Added Nurtec for acute escalations.     Today he reports he is about the same to little worse. Headaches are becoming more frequent. They occur over the left eye and left side. Current pain 0. He has 6-7 migraine days per week. He is taking OTC medication, Fioricet,  compazine and Ublrelvy as needed. He is currently on verapamil and Depakote. Dyazide was recently added for his blood pressure. Lidocaine NS does not seem to help. Otherwise information below is reviewed and verified with no changes made unless noted.     INTERVAL HISTORY 7/14/21  Patient presents for follow up. Last visit was 7 months ago and at that time he was cluster headache free. He last took Emgality 300 mg was August or September 2020.    Today he reports he is much better. However, the frequency is coming back. Headaches occur behind left eye, left side of head, left side on top of head. Headaches have been daily for the past three days. Lasts minutes to hours. Current pain 2 with range 0-10. He takes compazine and Ubrelvy. He states sumatriptan intensifies headache. He did take Fioricet last night.      INTERVAL HISTORY 12/14/2020  Patient presents for virtual follow up.  The patient location is: [place], [place]   The chief complaint leading to consultation is: follow up  Visit type: audiovisual  Face to Face time with patient: 15  20 minutes of total time spent on the encounter, which includes face to face time and non-face to face time preparing to see the patient (eg, review of tests), Obtaining and/or reviewing separately obtained history, Documenting clinical information in the electronic or other health record, Independently interpreting results (not separately reported) and communicating results to the patient/family/caregiver, or Care coordination (not separately reported).   Each patient to whom he or she provides medical services by telemedicine is:  (1) informed of the relationship between the physician and patient and the respective role of any other health care provider with respect to management of the patient; and (2) notified that he or she may decline to receive medical services by telemedicine and may withdraw from such care at any time.    Notes:     Last visit was 11 months ago and at  "that time he was doing worse on Aimovg. We switched to Emgality.    Today he reports he is doing much better. He took Emgality for 6-8 months and did very well. Last injection was 3 months ago. He has not had any cluster migraines in about 5-6 months. He has not had to use Ubrelvy or Fioricet in months. He has not used compazine.     INTERVAL HISTORY 1/20/2020  Patient presents for follow up. He has been on Aimovig 70 mg since October 2018. He is also on Depakote and verapamil. He stopped depakote 6 months ago but resumed when cluster cycle about 5 weeks ago. Wife present today reports patient started taking Aimovig every other month about 6 months ago as well.  He has Imitrex IM, Zomig NS and Fioricet for escalations. He reports he has not used the Imitrex or Zomig in close to two years. When he last used the Imitrex, he reports the headache worsened for 5 minutes and then improved. He has HTN, HLD and history of chest pain.     Today he reports he is a little worse. Headaches are starting again after 18 months of little to no headaches. They are behind the left eye and left side of the head. current pain 3 with range 0-10. This cluster began about 5 weeks ago. Lidocaine NS used as first line but not effective. Fioricet second line and somewhat effective.     INTERVAL HISTORY  The patient is added to the clinic schedule as his headaches persist. He complains of a new symptom consisting of blurry vision with the left eye. He denies a specific description of a visual migraine aura in the form of fortification spectra, scotomata, hemianopsia, photopsia or transient loss of vision. He uses Imitrex injectable with good benefit but states that he is "drained" and unable to work the next day. He is tired because of lack of sleep.   He underwent peripheral nerve blocks last week including GLORIA, auriculotemporal and supraorbital. He stated that initially he felt significant benefit but when the pain came back, it came back " much worse, he almost went to the ED.   He is on Depakote 500 mg BID. Otherwise headache characteristics are unchanged.  HPI  The patient is a pleasant 52 y/o male with chief complaint of headaches. He has been getting them in the fall (Octobe-November) since 2012. Next cool season in 2013, they recurred. He underwent an MRI of the brain with not significant findings (my interpretation). This last fall he did not get the headaches, instead they came on 1/3/2017. The headache is strictly unilateral, originating in a discrete area in the left posterior region and projecting to the left orbital and periorbital region. He went to the ED where he was given a combination of steroids, benadryl and compazine as well as a supraorbital block with good response. He is followed by Dr. Velasquez who has recommended prevention with verapamil and Depakote and transitional treatment with steroids. Unfortunately, verapamil causes decreased libido. He was switched to Amlodipine. He has never tried oxygen. He treats acutely with Zomig NS, previously with SQ sumatriptan    Review of Systems   Constitutional:  Negative for chills, diaphoresis, fever, malaise/fatigue and weight loss.   HENT:  Negative for nosebleeds and tinnitus.    Eyes:  Negative for blurred vision and double vision.   Respiratory:  Negative for cough and wheezing.    Cardiovascular:  Negative for chest pain and palpitations.   Genitourinary:  Negative for dysuria.   Musculoskeletal:  Negative for joint pain and myalgias.   Skin:  Negative for rash.   Neurological:  Positive for headaches. Negative for weakness.   Endo/Heme/Allergies:  Does not bruise/bleed easily.   Psychiatric/Behavioral:  Negative for depression.            Past Medical History   Diagnosis Date    Additional heart attack      10 years ago    Eye pain      left eye    GERD (gastroesophageal reflux disease)     Hyperlipemia      Past Surgical History   Procedure Laterality Date    Back surgery       Hernia repair       Family History   Problem Relation Age of Onset    Hypertension Mother     Hyperlipidemia Mother     Diabetes Father     Cancer Maternal Aunt     Cancer Paternal Aunt     Cancer Maternal Grandmother     Cancer Maternal Grandfather     Cancer Paternal Grandfather      Social History     Social History    Marital status: Single     Spouse name: N/A    Number of children: N/A    Years of education: N/A     Occupational History    Not on file.     Social History Main Topics    Smoking status: Current Every Day Smoker     Packs/day: 1.00     Years: 15.00    Smokeless tobacco: Not on file      Comment: is about to quit    Alcohol use 0.6 oz/week     1 Cans of beer per week      Comment: per week    Drug use: No    Sexual activity: Not on file     Other Topics Concern    Not on file     Social History Narrative     Review of patient's allergies indicates:   Allergen Reactions    Doxycycline      Other reaction(s): nausea    Erythromycin      Other reaction(s): Nausea       Current Outpatient Prescriptions   Medication Sig    ascorbic acid (VITAMIN C) 1000 MG tablet     aspirin (ECOTRIN) 81 MG EC tablet Take 81 mg by mouth Daily. 1 Tablet, Delayed Release (E.C.) Oral Every day    ibuprofen (ADVIL,MOTRIN) 800 MG tablet 1 tqab po q 8 h prn pain    omeprazole (PRILOSEC) 40 MG capsule Take 1 capsule (40 mg total) by mouth once daily.    predniSONE (DELTASONE) 10 mg tablet pack Prednisone 10 mg tabs (40 mg for 2 days, 30 mg po for 2 days, 20 mg po for 2 days and 10 mg po for 2 days. Omeprazol 20 mg daily for gastro protection) Total #20 tabs    amlodipine (NORVASC) 5 MG tablet Take 1 tablet (5 mg total) by mouth once daily.    atorvastatin (LIPITOR) 40 MG tablet Take 1 tablet (40 mg total) by mouth once daily.    divalproex (DEPAKOTE) 500 MG TbEC Take 500 mg by mouth 2 (two) times daily.    hydrocodone-acetaminophen 7.5-325mg (NORCO) 7.5-325 mg per tablet Take 1 tablet by mouth once daily.    multivitamin  (THERAGRAN) per tablet Take by mouth. 1 Tablet Oral Every day     No current facility-administered medications for this visit.            Objective:      Vitals:    11/22/22 0904   BP: (!) 152/92   Pulse: 62   Resp: 17          Physical Exam    11/22/22  Constitutional:   He appears well-developed and well-nourished. He is well groomed     Neurological Exam:  General: well-developed, well-nourished, no distress  Mental status: Awake and alert  Speech language: No dysarthria or aphasia on conversation  Cranial nerves: Face symmetric  Motor: Moves all extremities well  Coordination: No ataxia. No tremor.    Previous  Constitutional:   He appears well-developed and well-nourished. He is well groomed  HENT:    Head: Atraumatic, oral and nasal mucosa intact  Eyes: Conjunctivae and EOM are normal. Pupils are equal, round, and reactive to light OU  Neck: Neck supple. No thyromegaly present  Cardiovascular: Normal rate and normal heart sounds  No murmur heard  Pulmonary/Chest: Effort normal and breath sounds normal  Musculoskeletal: Normal range of motion. No joint stiffness. No vertebral point tenderness  Skin: Skin is warm and dry  Psychiatric: Normal mood and affect     Neuro exam:    Mental status:  Awake, attentive, Alert, oriented to self, place, year and month  Language function is intact    Cranial Nerves:  Smell was not formally evaluated  Cranial Nerves II - XII: intact  Pursuits were smooth, normal saccades, no nystagmus OU  Funduscopic exam - disc were flat and pink, no exudates or hemorrhages OU  Motor - facial movement was symmetrical and normal     Palate moved well and was symmetrical with normal palatal and oral sensation  Tongue movements were full    Coordination:     Rapid alternating movements and rapid finger tapping - normal bilaterally  Finger to nose - normal and symmetric bilaterally   No intentional or positional tremor.     Motor:  Normal muscle bulk and symmetry. No fasciculations were noted     No pronator drift  Strength 5/5 bilaterally     Reflexes:  Tendon reflexes were 2 + at biceps, triceps, brachioradialis, patellar, and Achilles bilaterally  No clonus was noted     Sensory: Intact to light touch, pin prick in all extremities. Vibration and proprioception intact in all extremities.     Gait: Romberg absent. Normal gait. Normal arm swing and turns.     Review of Data: I have reviewed images of an MRI from 2013 and see no abnormal findings        Assessment and Plan   Cluster-Migraine syndrome. While there is a component of migraine, the vast majority of the headache phenotype is undoubtedly Cluster headache as evidenced by the headache characteristics, the circannual and circadian pattern and the response to medication and the tearing of the left eye.    Emgality has been effective at shortening cluster cycles. Currently doing well but will restart injectiosn monthly when cycle returns.   Failed aimovig.  When needed, he can resume Emgality cluster dosing of 300 mg monthly during cluster cycle.   Continue Depakote and Verapamil. Continue magnesium to counteract constipation  For acute attacks use Lidocaine IN.  Avoid Imitrex 6 mg SQ injection as he has HTN, HLD, smoker, and has recurrent chest pain.  Fioricet for rescue. Ok to trial Reyvow with next cycle.   Continue compazine for nausea and mild headache relief.   Ubrelvy not very effective. Will trial Reyvow.          KELLY Hilario

## 2022-12-05 ENCOUNTER — IMMUNIZATION (OUTPATIENT)
Dept: INTERNAL MEDICINE | Facility: CLINIC | Age: 57
End: 2022-12-05
Payer: COMMERCIAL

## 2022-12-05 ENCOUNTER — OFFICE VISIT (OUTPATIENT)
Dept: INTERNAL MEDICINE | Facility: CLINIC | Age: 57
End: 2022-12-05
Payer: COMMERCIAL

## 2022-12-05 VITALS
DIASTOLIC BLOOD PRESSURE: 86 MMHG | WEIGHT: 252.63 LBS | HEART RATE: 72 BPM | OXYGEN SATURATION: 96 % | SYSTOLIC BLOOD PRESSURE: 134 MMHG | BODY MASS INDEX: 34.22 KG/M2 | HEIGHT: 72 IN

## 2022-12-05 DIAGNOSIS — Z12.5 SCREENING PSA (PROSTATE SPECIFIC ANTIGEN): ICD-10-CM

## 2022-12-05 DIAGNOSIS — Z23 NEEDS FLU SHOT: Primary | ICD-10-CM

## 2022-12-05 DIAGNOSIS — R73.09 ELEVATED GLUCOSE: ICD-10-CM

## 2022-12-05 DIAGNOSIS — E78.5 HYPERLIPIDEMIA, UNSPECIFIED HYPERLIPIDEMIA TYPE: ICD-10-CM

## 2022-12-05 DIAGNOSIS — Z12.11 COLON CANCER SCREENING: ICD-10-CM

## 2022-12-05 DIAGNOSIS — Z87.891 HISTORY OF TOBACCO ABUSE: Primary | ICD-10-CM

## 2022-12-05 DIAGNOSIS — I15.9 SECONDARY HYPERTENSION: ICD-10-CM

## 2022-12-05 PROCEDURE — 90686 IIV4 VACC NO PRSV 0.5 ML IM: CPT | Mod: S$GLB,,, | Performed by: INTERNAL MEDICINE

## 2022-12-05 PROCEDURE — 3044F PR MOST RECENT HEMOGLOBIN A1C LEVEL <7.0%: ICD-10-PCS | Mod: CPTII,S$GLB,, | Performed by: INTERNAL MEDICINE

## 2022-12-05 PROCEDURE — 3075F SYST BP GE 130 - 139MM HG: CPT | Mod: CPTII,S$GLB,, | Performed by: INTERNAL MEDICINE

## 2022-12-05 PROCEDURE — 99999 PR PBB SHADOW E&M-EST. PATIENT-LVL IV: CPT | Mod: PBBFAC,,, | Performed by: INTERNAL MEDICINE

## 2022-12-05 PROCEDURE — 99214 OFFICE O/P EST MOD 30 MIN: CPT | Mod: 25,S$GLB,, | Performed by: INTERNAL MEDICINE

## 2022-12-05 PROCEDURE — 3008F PR BODY MASS INDEX (BMI) DOCUMENTED: ICD-10-PCS | Mod: CPTII,S$GLB,, | Performed by: INTERNAL MEDICINE

## 2022-12-05 PROCEDURE — 99214 PR OFFICE/OUTPT VISIT, EST, LEVL IV, 30-39 MIN: ICD-10-PCS | Mod: 25,S$GLB,, | Performed by: INTERNAL MEDICINE

## 2022-12-05 PROCEDURE — 3079F DIAST BP 80-89 MM HG: CPT | Mod: CPTII,S$GLB,, | Performed by: INTERNAL MEDICINE

## 2022-12-05 PROCEDURE — 1159F MED LIST DOCD IN RCRD: CPT | Mod: CPTII,S$GLB,, | Performed by: INTERNAL MEDICINE

## 2022-12-05 PROCEDURE — 90471 FLU VACCINE (QUAD) GREATER THAN OR EQUAL TO 3YO PRESERVATIVE FREE IM: ICD-10-PCS | Mod: S$GLB,,, | Performed by: INTERNAL MEDICINE

## 2022-12-05 PROCEDURE — 1159F PR MEDICATION LIST DOCUMENTED IN MEDICAL RECORD: ICD-10-PCS | Mod: CPTII,S$GLB,, | Performed by: INTERNAL MEDICINE

## 2022-12-05 PROCEDURE — 3008F BODY MASS INDEX DOCD: CPT | Mod: CPTII,S$GLB,, | Performed by: INTERNAL MEDICINE

## 2022-12-05 PROCEDURE — 90686 FLU VACCINE (QUAD) GREATER THAN OR EQUAL TO 3YO PRESERVATIVE FREE IM: ICD-10-PCS | Mod: S$GLB,,, | Performed by: INTERNAL MEDICINE

## 2022-12-05 PROCEDURE — 3075F PR MOST RECENT SYSTOLIC BLOOD PRESS GE 130-139MM HG: ICD-10-PCS | Mod: CPTII,S$GLB,, | Performed by: INTERNAL MEDICINE

## 2022-12-05 PROCEDURE — 90471 IMMUNIZATION ADMIN: CPT | Mod: S$GLB,,, | Performed by: INTERNAL MEDICINE

## 2022-12-05 PROCEDURE — 3044F HG A1C LEVEL LT 7.0%: CPT | Mod: CPTII,S$GLB,, | Performed by: INTERNAL MEDICINE

## 2022-12-05 PROCEDURE — 99999 PR PBB SHADOW E&M-EST. PATIENT-LVL IV: ICD-10-PCS | Mod: PBBFAC,,, | Performed by: INTERNAL MEDICINE

## 2022-12-05 PROCEDURE — 3079F PR MOST RECENT DIASTOLIC BLOOD PRESSURE 80-89 MM HG: ICD-10-PCS | Mod: CPTII,S$GLB,, | Performed by: INTERNAL MEDICINE

## 2022-12-05 RX ORDER — SILDENAFIL 100 MG/1
100 TABLET, FILM COATED ORAL DAILY PRN
Qty: 10 TABLET | Refills: 10 | Status: SHIPPED | OUTPATIENT
Start: 2022-12-05 | End: 2024-02-09

## 2022-12-05 NOTE — PROGRESS NOTES
He is a 56  -year-old gentleman coming in today to follow up his ongoing   medical problems including hyperlipidemia, Gerd, and reflux, tobacco abuse,  and some glucose intolerance.        htn -- on verapmil.  I sent him in some dyazide  last visit - a month ago.  If has    been better.    /86.              He has been having cluster headaches. He Has had cluster headaches. These have been very minimal.    He is saw  a neurologist, Dr Helene Griffith<  on the [place] and was dx with migraines. He has been using his   Depakote twice daily and emgality.  He headaches are much better. .  He was Galcanezumab injections every 4 weeks but he has not had to take this recently.     He Speaking with Dr Potter.      History of Brugada syndrome with abnormal EKG. He saw arrhythmia back in   2008, Dr. Merritt. He is currently doing well. He denies any chest   pain or short-windedness.       Tobacco abuse: He has stopped smoking since Sept 2020-but is back to smoking less than a ppd.      He went through smoking cessation. He has gained about    .         Reflux:  He's had reflux for about 5 years,. He states it worsens with some of the foods he eats. He has been on omeprazole, but was not taking it regularly and had a lot of breakthrough. Now that   has been more regular taking his meds- he is doing much better.          Hyperlipidemia-- started on lipitor 40 mg.  Recently lipid panel reviewed.  His glucose was elevated at 112  with hgb A1c of 5.6.            ROS : Gen - no fatigue --  Eyes - no eye pain or visual changes  ENT - no hoarseness or sore throat  CV - No chest pain or SOB.  NO palpitations.  Pulm - no cough or wheezing  GI - no N/V/D--    no dysuria or incontinence  MS - no joint pain or muscle pain  Skin - no rash, or c/o of skin lesions  Neuro - no HA, dizziness--- memory is doing well.   Heme - no abnormal bleeding or bruising  Endo - no polydipsia, or temperature changes  Psych - no anxiety or  depression        PHYSICAL EXAMINATION:         GENERAL:  A well-appearing 56-year-old gentleman, in no acute distress.         /86 (BP Location: Right arm, Patient Position: Sitting, BP Method: Large (Manual))   Pulse 72   Ht 6' (1.829 m)   Wt 114.6 kg (252 lb 10.4 oz)   SpO2 96%   BMI 34.27 kg/m²        General appearance: alert, appears stated age and cooperative  Head: Normocephalic, without obvious abnormality, atraumatic  Eyes: conjunctivae/corneas clear. PERRL, EOM's intact. Fundi benign.  Throat: lips, mucosa, and tongue normal; teeth and gums normal  Neck: no adenopathy, no carotid bruit, no JVD, supple, symmetrical, trachea midline and thyroid not enlarged, symmetric, no tenderness/mass/nodules  Back: symmetric, no curvature. ROM normal. No CVA tenderness.  Lungs: clear to auscultation bilaterally  Chest wall: no tenderness  Heart: regular rate and rhythm, S1, S2 normal, no murmur, click, rub or gallop  Abdomen: soft, non-tender; bowel sounds normal; no masses,  no organomegaly  Extremities: extremities normal, atraumatic, no cyanosis or edema  Skin: Skin color, texture, turgor normal. No rashes or lesions  Lymph nodes: Cervical, supraclavicular, and axillary nodes normal.  Neurologic: Grossly normal           ASSESSMENT:  1.  Hypertension, Need better control .  He will work on weight loss.     Continue verapamil. and  dyazide  2.  Hyperlipidemia.  We will continue atorvastatin 40 mg a day.    3.  Reflux.  Needs regular PPI. Doing well.    4. COPD and tobacco abuse- stop  smoking  ---   5. Obesity -  BMI - 34.24 - he and wife working on diet--   6. Elevated glu-  Will recheck-- discussed low carb diet.    7.Recent creatine was 1.5-- will monitor-- will recheck in 6 months   .   8.  Discussed cvid vaccine    9. Stop smoking.  Will get ct.         Follow up in 4 month with labs

## 2022-12-05 NOTE — PROGRESS NOTES
Two pt identifier verified. Pt tolerated well. Advised pt to wait 15 minutes post immunization. Pt verbalized understanding.    Krishna JOLLY

## 2022-12-12 ENCOUNTER — HOSPITAL ENCOUNTER (OUTPATIENT)
Dept: RADIOLOGY | Facility: HOSPITAL | Age: 57
Discharge: HOME | End: 2022-12-12
Attending: INTERNAL MEDICINE
Payer: COMMERCIAL

## 2022-12-12 DIAGNOSIS — Z87.891 HISTORY OF TOBACCO ABUSE: ICD-10-CM

## 2022-12-12 PROCEDURE — 71271 CT THORAX LUNG CANCER SCR C-: CPT | Mod: TC,PO

## 2022-12-12 PROCEDURE — 71271 CT CHEST LUNG SCREENING LOW DOSE: ICD-10-PCS | Mod: 26,,, | Performed by: RADIOLOGY

## 2022-12-12 PROCEDURE — 71271 CT THORAX LUNG CANCER SCR C-: CPT | Mod: 26,,, | Performed by: RADIOLOGY

## 2022-12-19 ENCOUNTER — TELEPHONE (OUTPATIENT)
Dept: INTERNAL MEDICINE | Facility: CLINIC | Age: 57
End: 2022-12-19
Payer: COMMERCIAL

## 2022-12-19 DIAGNOSIS — R19.5 POSITIVE FIT (FECAL IMMUNOCHEMICAL TEST): Primary | ICD-10-CM

## 2022-12-19 LAB — NONINV COLON CA DNA+OCC BLD SCRN STL QL: POSITIVE

## 2022-12-19 NOTE — TELEPHONE ENCOUNTER
Please call.  His fit test was positive so he needs to have a colonoscopy.  No great RUSH but I like to get in next couple months.  I did put orders in.  Please help him get set up for colonoscopy.

## 2022-12-20 NOTE — TELEPHONE ENCOUNTER
Spoke to patient and advised of FIT KIT results and recommendation. Patient verbalized understanding.

## 2023-01-04 ENCOUNTER — TELEPHONE (OUTPATIENT)
Dept: GASTROENTEROLOGY | Facility: CLINIC | Age: 58
End: 2023-01-04
Payer: COMMERCIAL

## 2023-01-04 NOTE — TELEPHONE ENCOUNTER
----- Message from Car Breaux sent at 1/4/2023 11:41 AM CST -----  Type:  Sooner Appointment Request    Caller is requesting a sooner appointment.  Caller declined first available appointment listed below.  Caller will not accept being placed on the waitlist and is requesting a message be sent to doctor.    Name of Caller:  Patient  When is the first available appointment?  New Patient  Symptoms:  Endoscopy  Best Call Back Number:  [phone]  Additional Information:

## 2023-01-04 NOTE — TELEPHONE ENCOUNTER
Returned call to the patient, patient was set up for a colonoscopy, my chart message sent to patient with prep instructions, patient verbalized understanding of this.

## 2023-01-14 ENCOUNTER — NURSE TRIAGE (OUTPATIENT)
Dept: ADMINISTRATIVE | Facility: CLINIC | Age: 58
End: 2023-01-14
Payer: COMMERCIAL

## 2023-01-14 NOTE — TELEPHONE ENCOUNTER
"Patient states he has been experiencing "excruciating" lower back pain for the last three days. It hurts to stand, bend, and lie down. He does have a history of two back surgeries. Currently rating back pain at an 8/10 on the numerical pain scale. Care advice is to go to ED now. Advised to call back for further concerns.     Reason for Disposition   [1] SEVERE pain (e.g., excruciating, scale 8-10) AND [2] present > 1 hour    Additional Information   Negative: Passed out (i.e., lost consciousness, collapsed and was not responding)   Negative: Shock suspected (e.g., cold/pale/clammy skin, too weak to stand, low BP, rapid pulse)   Negative: Sounds like a life-threatening emergency to the triager   Pain mainly in flank (i.e., in the side, over the lower ribs or just below the ribs)   Negative: Passed out (i.e., lost consciousness, collapsed and was not responding)   Negative: Shock suspected (e.g., cold/pale/clammy skin, too weak to stand, low BP, rapid pulse)   Negative: Difficult to awaken or acting confused (e.g., disoriented, slurred speech)   Negative: Sounds like a life-threatening emergency to the triager   Negative: Followed a major injury to the back (e.g., MVA, fall > 10 feet or 3 meters, penetrating injury, etc.)    Protocols used: Back Pain-A-AH, Flank Pain-A-AH    "

## 2023-01-17 ENCOUNTER — TELEPHONE (OUTPATIENT)
Dept: INTERNAL MEDICINE | Facility: CLINIC | Age: 58
End: 2023-01-17
Payer: COMMERCIAL

## 2023-01-20 ENCOUNTER — OFFICE VISIT (OUTPATIENT)
Dept: INTERNAL MEDICINE | Facility: CLINIC | Age: 58
End: 2023-01-20
Payer: COMMERCIAL

## 2023-01-20 VITALS
HEIGHT: 72 IN | DIASTOLIC BLOOD PRESSURE: 86 MMHG | WEIGHT: 258.63 LBS | HEART RATE: 69 BPM | OXYGEN SATURATION: 98 % | BODY MASS INDEX: 35.03 KG/M2 | SYSTOLIC BLOOD PRESSURE: 122 MMHG

## 2023-01-20 DIAGNOSIS — M62.830 LUMBAR PARASPINAL MUSCLE SPASM: Primary | ICD-10-CM

## 2023-01-20 PROCEDURE — 1160F RVW MEDS BY RX/DR IN RCRD: CPT | Mod: CPTII,S$GLB,, | Performed by: INTERNAL MEDICINE

## 2023-01-20 PROCEDURE — 99212 PR OFFICE/OUTPT VISIT, EST, LEVL II, 10-19 MIN: ICD-10-PCS | Mod: S$GLB,,, | Performed by: INTERNAL MEDICINE

## 2023-01-20 PROCEDURE — 1159F PR MEDICATION LIST DOCUMENTED IN MEDICAL RECORD: ICD-10-PCS | Mod: CPTII,S$GLB,, | Performed by: INTERNAL MEDICINE

## 2023-01-20 PROCEDURE — 99212 OFFICE O/P EST SF 10 MIN: CPT | Mod: S$GLB,,, | Performed by: INTERNAL MEDICINE

## 2023-01-20 PROCEDURE — 99999 PR PBB SHADOW E&M-EST. PATIENT-LVL IV: ICD-10-PCS | Mod: PBBFAC,,, | Performed by: INTERNAL MEDICINE

## 2023-01-20 PROCEDURE — 3074F SYST BP LT 130 MM HG: CPT | Mod: CPTII,S$GLB,, | Performed by: INTERNAL MEDICINE

## 2023-01-20 PROCEDURE — 99999 PR PBB SHADOW E&M-EST. PATIENT-LVL IV: CPT | Mod: PBBFAC,,, | Performed by: INTERNAL MEDICINE

## 2023-01-20 PROCEDURE — 3079F PR MOST RECENT DIASTOLIC BLOOD PRESSURE 80-89 MM HG: ICD-10-PCS | Mod: CPTII,S$GLB,, | Performed by: INTERNAL MEDICINE

## 2023-01-20 PROCEDURE — 3008F PR BODY MASS INDEX (BMI) DOCUMENTED: ICD-10-PCS | Mod: CPTII,S$GLB,, | Performed by: INTERNAL MEDICINE

## 2023-01-20 PROCEDURE — 1159F MED LIST DOCD IN RCRD: CPT | Mod: CPTII,S$GLB,, | Performed by: INTERNAL MEDICINE

## 2023-01-20 PROCEDURE — 3074F PR MOST RECENT SYSTOLIC BLOOD PRESSURE < 130 MM HG: ICD-10-PCS | Mod: CPTII,S$GLB,, | Performed by: INTERNAL MEDICINE

## 2023-01-20 PROCEDURE — 3079F DIAST BP 80-89 MM HG: CPT | Mod: CPTII,S$GLB,, | Performed by: INTERNAL MEDICINE

## 2023-01-20 PROCEDURE — 3008F BODY MASS INDEX DOCD: CPT | Mod: CPTII,S$GLB,, | Performed by: INTERNAL MEDICINE

## 2023-01-20 PROCEDURE — 1160F PR REVIEW ALL MEDS BY PRESCRIBER/CLIN PHARMACIST DOCUMENTED: ICD-10-PCS | Mod: CPTII,S$GLB,, | Performed by: INTERNAL MEDICINE
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
## 2023-01-25 NOTE — PROGRESS NOTES
I have personally taken the history and examined this patient and agree with the resident's note as stated above with the following thoughts:      Recommend conservative management as per the resident's note.  If this does not improve or worsens acutely he is going let me know.

## 2023-02-06 ENCOUNTER — TELEPHONE (OUTPATIENT)
Dept: OTOLARYNGOLOGY | Facility: CLINIC | Age: 58
End: 2023-02-06
Payer: COMMERCIAL

## 2023-02-06 NOTE — TELEPHONE ENCOUNTER
----- Message from Darrick Arrington sent at 2/6/2023 10:24 AM CST -----  Who Called:patient         What is the reqeust in detail: Requesting call back to discuss pt is going out of town and will call back to schedule when he returns          Can the clinic reply by [facility]? no         Best Call Back Number: [phone]           Additional Information:

## 2023-03-22 ENCOUNTER — TELEPHONE (OUTPATIENT)
Dept: GASTROENTEROLOGY | Facility: CLINIC | Age: 58
End: 2023-03-22
Payer: COMMERCIAL

## 2023-03-24 ENCOUNTER — PATIENT MESSAGE (OUTPATIENT)
Dept: GASTROENTEROLOGY | Facility: CLINIC | Age: 58
End: 2023-03-24
Payer: COMMERCIAL

## 2023-03-30 ENCOUNTER — LAB VISIT (OUTPATIENT)
Dept: LAB | Facility: HOSPITAL | Age: 58
End: 2023-03-30
Attending: INTERNAL MEDICINE
Payer: COMMERCIAL

## 2023-03-30 DIAGNOSIS — R73.09 ELEVATED GLUCOSE: ICD-10-CM

## 2023-03-30 DIAGNOSIS — E78.5 HYPERLIPIDEMIA, UNSPECIFIED HYPERLIPIDEMIA TYPE: ICD-10-CM

## 2023-03-30 DIAGNOSIS — Z12.5 SCREENING PSA (PROSTATE SPECIFIC ANTIGEN): ICD-10-CM

## 2023-03-30 LAB
ALBUMIN SERPL BCP-MCNC: 3.9 G/DL (ref 3.5–5.2)
ALP SERPL-CCNC: 51 U/L (ref 55–135)
ALT SERPL W/O P-5'-P-CCNC: 33 U/L (ref 10–44)
ANION GAP SERPL CALC-SCNC: 8 MMOL/L (ref 8–16)
AST SERPL-CCNC: 22 U/L (ref 10–40)
BILIRUB SERPL-MCNC: 0.6 MG/DL (ref 0.1–1)
BUN SERPL-MCNC: 15 MG/DL (ref 6–20)
CALCIUM SERPL-MCNC: 9.7 MG/DL (ref 8.7–10.5)
CHLORIDE SERPL-SCNC: 105 MMOL/L (ref 95–110)
CHOLEST SERPL-MCNC: 203 MG/DL (ref 120–199)
CHOLEST/HDLC SERPL: 5.3 {RATIO} (ref 2–5)
CO2 SERPL-SCNC: 28 MMOL/L (ref 23–29)
COMPLEXED PSA SERPL-MCNC: 0.64 NG/ML (ref 0–4)
CREAT SERPL-MCNC: 1.2 MG/DL (ref 0.5–1.4)
EST. GFR  (NO RACE VARIABLE): >60 ML/MIN/1.73 M^2
ESTIMATED AVG GLUCOSE: 105 MG/DL (ref 68–131)
GLUCOSE SERPL-MCNC: 102 MG/DL (ref 70–110)
HBA1C MFR BLD: 5.3 % (ref 4–5.6)
HDLC SERPL-MCNC: 38 MG/DL (ref 40–75)
HDLC SERPL: 18.7 % (ref 20–50)
LDLC SERPL CALC-MCNC: 141 MG/DL (ref 63–159)
NONHDLC SERPL-MCNC: 165 MG/DL
POTASSIUM SERPL-SCNC: 4.3 MMOL/L (ref 3.5–5.1)
PROT SERPL-MCNC: 6.9 G/DL (ref 6–8.4)
SODIUM SERPL-SCNC: 141 MMOL/L (ref 136–145)
TRIGL SERPL-MCNC: 120 MG/DL (ref 30–150)

## 2023-03-30 PROCEDURE — 80053 COMPREHEN METABOLIC PANEL: CPT | Performed by: INTERNAL MEDICINE

## 2023-03-30 PROCEDURE — 83036 HEMOGLOBIN GLYCOSYLATED A1C: CPT | Performed by: INTERNAL MEDICINE

## 2023-03-30 PROCEDURE — 36415 COLL VENOUS BLD VENIPUNCTURE: CPT | Mod: PO | Performed by: INTERNAL MEDICINE

## 2023-03-30 PROCEDURE — 80061 LIPID PANEL: CPT | Performed by: INTERNAL MEDICINE

## 2023-03-30 PROCEDURE — 84153 ASSAY OF PSA TOTAL: CPT | Performed by: INTERNAL MEDICINE
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
## 2023-04-03 ENCOUNTER — HOSPITAL ENCOUNTER (OUTPATIENT)
Facility: HOSPITAL | Age: 58
Discharge: HOME | End: 2023-04-03
Attending: INTERNAL MEDICINE | Admitting: INTERNAL MEDICINE
Payer: COMMERCIAL

## 2023-04-03 ENCOUNTER — ANESTHESIA EVENT (OUTPATIENT)
Dept: ENDOSCOPY | Facility: HOSPITAL | Age: 58
End: 2023-04-03
Payer: COMMERCIAL

## 2023-04-03 ENCOUNTER — ANESTHESIA (OUTPATIENT)
Dept: ENDOSCOPY | Facility: HOSPITAL | Age: 58
End: 2023-04-03
Payer: COMMERCIAL

## 2023-04-03 VITALS
WEIGHT: 258 LBS | RESPIRATION RATE: 16 BRPM | HEIGHT: 72 IN | DIASTOLIC BLOOD PRESSURE: 85 MMHG | OXYGEN SATURATION: 99 % | HEART RATE: 51 BPM | SYSTOLIC BLOOD PRESSURE: 135 MMHG | BODY MASS INDEX: 34.95 KG/M2 | TEMPERATURE: 98 F

## 2023-04-03 DIAGNOSIS — R19.5 POSITIVE COLORECTAL CANCER SCREENING USING COLOGUARD TEST: ICD-10-CM

## 2023-04-03 PROCEDURE — D9220A PRA ANESTHESIA: Mod: 33,ANES,, | Performed by: ANESTHESIOLOGY

## 2023-04-03 PROCEDURE — D9220A PRA ANESTHESIA: Mod: 33,CRNA,, | Performed by: NURSE ANESTHETIST, CERTIFIED REGISTERED

## 2023-04-03 PROCEDURE — 88305 TISSUE EXAM BY PATHOLOGIST: CPT | Mod: PO | Performed by: PATHOLOGY

## 2023-04-03 PROCEDURE — 88305 TISSUE EXAM BY PATHOLOGIST: CPT | Mod: 26,,, | Performed by: PATHOLOGY

## 2023-04-03 PROCEDURE — 88305 TISSUE EXAM BY PATHOLOGIST: ICD-10-PCS | Mod: 26,,, | Performed by: PATHOLOGY

## 2023-04-03 PROCEDURE — 45380 COLONOSCOPY AND BIOPSY: CPT | Mod: 33,59,, | Performed by: INTERNAL MEDICINE

## 2023-04-03 PROCEDURE — 45385 PR COLONOSCOPY,REMV LESN,SNARE: ICD-10-PCS | Mod: 33,,, | Performed by: INTERNAL MEDICINE

## 2023-04-03 PROCEDURE — 37000009 HC ANESTHESIA EA ADD 15 MINS: Mod: PO | Performed by: INTERNAL MEDICINE

## 2023-04-03 PROCEDURE — 88342 CHG IMMUNOCYTOCHEMISTRY: ICD-10-PCS | Mod: 26,,, | Performed by: PATHOLOGY

## 2023-04-03 PROCEDURE — 27201089 HC SNARE, DISP (ANY): Mod: PO | Performed by: INTERNAL MEDICINE

## 2023-04-03 PROCEDURE — 63600175 PHARM REV CODE 636 W HCPCS: Mod: PO | Performed by: INTERNAL MEDICINE

## 2023-04-03 PROCEDURE — 88342 IMHCHEM/IMCYTCHM 1ST ANTB: CPT | Mod: PO | Performed by: PATHOLOGY

## 2023-04-03 PROCEDURE — 45380 COLONOSCOPY AND BIOPSY: CPT | Mod: PT,59,PO | Performed by: INTERNAL MEDICINE

## 2023-04-03 PROCEDURE — D9220A PRA ANESTHESIA: ICD-10-PCS | Mod: 33,CRNA,, | Performed by: NURSE ANESTHETIST, CERTIFIED REGISTERED

## 2023-04-03 PROCEDURE — 45385 COLONOSCOPY W/LESION REMOVAL: CPT | Mod: PT,PO | Performed by: INTERNAL MEDICINE

## 2023-04-03 PROCEDURE — 63600175 PHARM REV CODE 636 W HCPCS: Mod: PO | Performed by: NURSE ANESTHETIST, CERTIFIED REGISTERED

## 2023-04-03 PROCEDURE — 27201012 HC FORCEPS, HOT/COLD, DISP: Mod: PO | Performed by: INTERNAL MEDICINE

## 2023-04-03 PROCEDURE — 37000008 HC ANESTHESIA 1ST 15 MINUTES: Mod: PO | Performed by: INTERNAL MEDICINE

## 2023-04-03 PROCEDURE — 45380 PR COLONOSCOPY,BIOPSY: ICD-10-PCS | Mod: 33,59,, | Performed by: INTERNAL MEDICINE

## 2023-04-03 PROCEDURE — 88342 IMHCHEM/IMCYTCHM 1ST ANTB: CPT | Mod: 26,,, | Performed by: PATHOLOGY

## 2023-04-03 PROCEDURE — D9220A PRA ANESTHESIA: ICD-10-PCS | Mod: 33,ANES,, | Performed by: ANESTHESIOLOGY

## 2023-04-03 PROCEDURE — 25000003 PHARM REV CODE 250: Mod: PO | Performed by: NURSE ANESTHETIST, CERTIFIED REGISTERED

## 2023-04-03 PROCEDURE — 45385 COLONOSCOPY W/LESION REMOVAL: CPT | Mod: 33,,, | Performed by: INTERNAL MEDICINE

## 2023-04-03 RX ORDER — SODIUM CHLORIDE, SODIUM LACTATE, POTASSIUM CHLORIDE, CALCIUM CHLORIDE 600; 310; 30; 20 MG/100ML; MG/100ML; MG/100ML; MG/100ML
INJECTION, SOLUTION INTRAVENOUS CONTINUOUS
Status: DISCONTINUED | OUTPATIENT
Start: 2023-04-03 | End: 2023-04-03 | Stop reason: HOSPADM

## 2023-04-03 RX ORDER — LIDOCAINE HYDROCHLORIDE 20 MG/ML
INJECTION INTRAVENOUS
Status: DISCONTINUED | OUTPATIENT
Start: 2023-04-03 | End: 2023-04-03

## 2023-04-03 RX ORDER — SODIUM CHLORIDE 0.9 % (FLUSH) 0.9 %
10 SYRINGE (ML) INJECTION
Status: DISCONTINUED | OUTPATIENT
Start: 2023-04-03 | End: 2023-04-03 | Stop reason: HOSPADM

## 2023-04-03 RX ORDER — PROPOFOL 10 MG/ML
VIAL (ML) INTRAVENOUS
Status: DISCONTINUED | OUTPATIENT
Start: 2023-04-03 | End: 2023-04-03

## 2023-04-03 RX ADMIN — PROPOFOL 50 MG: 10 INJECTION, EMULSION INTRAVENOUS at 12:04

## 2023-04-03 RX ADMIN — PROPOFOL 100 MG: 10 INJECTION, EMULSION INTRAVENOUS at 12:04

## 2023-04-03 RX ADMIN — SODIUM CHLORIDE, POTASSIUM CHLORIDE, SODIUM LACTATE AND CALCIUM CHLORIDE: 600; 310; 30; 20 INJECTION, SOLUTION INTRAVENOUS at 12:04

## 2023-04-03 RX ADMIN — LIDOCAINE HYDROCHLORIDE 100 MG: 20 INJECTION INTRAVENOUS at 12:04

## 2023-04-03 NOTE — BRIEF OP NOTE
Discharge Note  Short Stay      SUMMARY     Admit Date: 4/3/2023    Attending Physician: Leonardo Bright Jr., MD     Discharge Physician: Leonardo Bright Jr., MD    Discharge Date: 4/3/2023 1:10 PM    Final Diagnosis: Blood in stool [K92.1]    Impression:            - Non-bleeding internal hemorrhoids.                          - The rectum and recto-sigmoid colon are normal.                          - One 2 to 3 mm polyp in the mid descending colon,                          removed with a cold snare. Resected and retrieved.                          - Medium-sized lipoma at the hepatic flexure.                          Biopsied.                          - Two 4 to 5 mm polyps in the cecum, removed with                          a cold snare. Resected and retrieved.                          - The examination was otherwise normal.                          - The entire examined colon is normal.                          - The examined portion of the ileum was normal.   Recommendation:        - Discharge patient to home.                          - Await pathology results.                          - Repeat colonoscopy in 5 years for surveillance.                          - High fiber diet and low fat diet.                          - Call the G.I[facility] clinic in 2 weeks for reports (if                          you haven't heard from us sooner) [phone].                          - Continue present medications.                          - Patient has a contact number available for                          emergencies. The signs and symptoms of potential                          delayed complications were discussed with the                          patient. Return to normal activities tomorrow.                          Written discharge instructions were provided to                          the patient.                          - Return to normal activities tomorrow.   Leonardo Bright MD   4/3/2023       Disposition: HOME OR  SELF CARE    Patient Instructions:   Current Discharge Medication List        CONTINUE these medications which have NOT CHANGED    Details   albuterol (PROVENTIL/VENTOLIN HFA) 90 mcg/actuation inhaler Inhale 2 puffs into the lungs every 4 (four) hours as needed for Wheezing or Shortness of Breath.  Qty: 18 g, Refills: 5    Associated Diagnoses: Chronic obstructive pulmonary disease with acute exacerbation; Smoker; Acute bacterial bronchitis      atorvastatin (LIPITOR) 40 MG tablet Take 1 tablet (40 mg total) by mouth once daily.  Qty: 90 tablet, Refills: 3      butalbital-acetaminophen-caffeine -40 mg (FIORICET, ESGIC) -40 mg per tablet Take 2 tablets by mouth every 12 hours as needed for rescue if lidocaine fails  Qty: 14 tablet, Refills: 1    Associated Diagnoses: Intractable episodic cluster headache      clotrimazole-betamethasone 1-0.05% (LOTRISONE) cream Apply topically 2 (two) times daily.  Qty: 15 g, Refills: 1      cyclobenzaprine (FLEXERIL) 10 MG tablet Take 1 tablet (10 mg total) by mouth 3 (three) times daily as needed for Muscle spasms.  Qty: 15 tablet, Refills: 0      diclofenac (VOLTAREN) 50 MG EC tablet Take 1 tablet (50 mg total) by mouth 2 (two) times daily.  Qty: 60 tablet, Refills: 1      divalproex (DEPAKOTE) 500 MG TbEC Take 1 tablet (500 mg total) by mouth 2 (two) times daily.  Qty: 60 tablet, Refills: 11    Associated Diagnoses: Intractable episodic cluster headache      naproxen (NAPROSYN) 500 MG tablet Take 1 tablet (500 mg total) by mouth 2 (two) times daily as needed (Pain). Make sure you take this medication with meals  Qty: 15 tablet, Refills: 0      omeprazole (PRILOSEC) 40 MG capsule Take 1 capsule (40 mg total) by mouth once daily.  Qty: 90 capsule, Refills: 6      prochlorperazine (COMPAZINE) 10 MG tablet Take 1 tablet (10 mg total) by mouth every 6 (six) hours as needed (migraine or nausea).  Qty: 60 tablet, Refills: 11    Associated Diagnoses: Intractable episodic  cluster headache; Intractable chronic migraine without aura and without status migrainosus      promethazine-dextromethorphan (PROMETHAZINE-DM) 6.25-15 mg/5 mL Syrp Take 5 mLs by mouth every 4 (four) hours as needed (cough).  Qty: 120 mL, Refills: 0    Associated Diagnoses: Cough      triamterene-hydrochlorothiazide 37.5-25 mg (DYAZIDE) 37.5-25 mg per capsule Take 1 capsule by mouth every morning.  Qty: 90 capsule, Refills: 3    Comments: .      verapamiL (CALAN-SR) 240 MG CR tablet TAKE ONE TABLET BY MOUTH EVERY EVENING  Qty: 90 tablet, Refills: 3    Associated Diagnoses: Intractable chronic migraine without aura and without status migrainosus      aspirin (ECOTRIN) 81 MG EC tablet Take 81 mg by mouth Daily. 1 Tablet, Delayed Release (E.C.) Oral Every day      galcanezumab-gnlm 300 mg/3 mL (100 mg/mL x 3) Syrg Inject 3 mLs (300 mg total) into the skin every 28 days.  Qty: 3 mL, Refills: 11    Associated Diagnoses: Intractable episodic cluster headache      lasmiditan (REYVOW) tablet 100 mg Take 1 tablet (100 mg) by mouth daily as needed (migraine).  Qty: 8 tablet, Refills: 1    Associated Diagnoses: Intractable chronic migraine without aura and without status migrainosus      LIDOcaine (LIDODERM) 5 % Place 1 patch onto the skin daily as needed (Pain). Remove & Discard patch within 12 hours or as directed by MD  Qty: 15 patch, Refills: 0      sildenafiL (VIAGRA) 100 MG tablet Take 1 tablet (100 mg total) by mouth daily as needed for Erectile Dysfunction.  Qty: 10 tablet, Refills: 10             Discharge Procedure Orders (must include Diet, Follow-up, Activity)    Follow Up:  Follow up with PCP as per your routine.  Please follow a high fiber diet.  Activity as tolerated.    No driving day of procedure.

## 2023-04-03 NOTE — [employer] PATIENT INSTRUCTIONS
Discharge Summary/Instructions for after Colonoscopy with   Biopsy/Polypectomy  Jovan Mcwilliams    Monday, April 3, 2023  Leonardo Bright MD  RESTRICTIONS ON ACTIVITY:  - Do not drive a car or operate machinery until the day after the procedure.      - The following day: return to full activity including work.  - For  3 days: No heavy lifting, straining or running.  - Diet: You can have solid foods, but no gassy foods (i.e. beans, broccoli,   cabbage, etc).  TREATMENT FOR COMMON SIDE EFFECTS:  - Mild abdominal pain and bloating or excessive gas: rest, eat lightly and   use a heating pad.  SYMPTOMS TO WATCH FOR AND REPORT TO YOUR PHYSICIAN:  1. Severe abdominal pain.  2. Fever within 24 hours after a procedure.  3. A large amount of rectal bleeding. (A small amount of blood from the   rectum is not serious, especially if hemorrhoids are present.  3.  Because air was put into your colon during the procedure, expelling   large amounts of air from your rectum is normal.  4.  You may not have a bowel movement for 1-3 days because of the   colonoscopy prep.  This is normal.  5.  Call immediately if you notice any of the following:   Chills and/or fever over 101   Persistent vomiting   Severe abdominal pain, other than gas cramps   Severe chest pain   Black, tarry stools   Any bleeding - exceeding one tablespoon  Your doctor recommends these additional instructions:  We are waiting for your pathology results.   Your physician has recommended a repeat colonoscopy in 5 years for   surveillance.   Eat a high fiber diet and eat a low fat diet.   Call the G.I[facility] clinic in 2 weeks for reports (if you haven't heard from us   sooner)  [phone].  None  If you have any questions or problems, please call your physician.  EMERGENCY PHONE NUMBER: [phone]  LAB RESULTS: Call in two (2) weeks for lab results, [phone]  ___________________________________________  Nurse  Signature  ___________________________________________  Patient/Designated Responsible Party Signature  Leonardo Bright MD  4/3/2023 1:09:20 PM  This report has been verified and signed electronically.  Dear patient,  As a result of recent federal legislation (The Federal Cures Act), you may   receive lab or pathology results from your procedure in your [facility]   account before your physician is able to contact you. Your physician or   their representative will relay the results to you with their   recommendations at their soonest availability.  Thank you.  [employer]

## 2023-04-03 NOTE — ANESTHESIA PREPROCEDURE EVALUATION
04/03/2023  Jovan Mcwilliams is a 57 y.o., male.      Pre-op Assessment    I have reviewed the Patient Summary Reports.     I have reviewed the Nursing Notes.       Review of Systems  Anesthesia Hx:  No problems with previous Anesthesia    Cardiovascular:   Hypertension Past MI     Hepatic/GI:   GERD    Neurological:   Headaches        Physical Exam  General: Well nourished        Anesthesia Plan  Type of Anesthesia, risks & benefits discussed:    Anesthesia Type: Gen Natural Airway  Intra-op Monitoring Plan: Standard ASA Monitors  Induction:  IV  Informed Consent: Informed consent signed with the Patient and all parties understand the risks and agree with anesthesia plan.  All questions answered.   ASA Score: 2  Day of Surgery Review of History & Physical: H&P Update referred to the surgeon/provider.    Ready For Surgery From Anesthesia Perspective.     .

## 2023-04-03 NOTE — TRANSFER OF CARE
Anesthesia Transfer of Care Note    Patient: Jovan Mcwilliams    Procedure(s) Performed: Procedure(s) (LRB):  COLONOSCOPY (N/A)    Patient location: PACU    Anesthesia Type: general    Transport from OR: Transported from OR on 2-3 L/min O2 by NC with adequate spontaneous ventilation    Post pain: adequate analgesia    Post assessment: no apparent anesthetic complications and tolerated procedure well    Post vital signs: stable    Level of consciousness: sedated    Nausea/Vomiting: no nausea/vomiting    Complications: none    Transfer of care protocol was followed      Last vitals:   Visit Vitals  /60   Pulse (!) 55   Temp 36.2 °C (97.1 °F) (Skin)   Resp 18   Ht 6' (1.829 m)   Wt 117 kg (258 lb)   SpO2 100%   BMI 34.99 kg/m²

## 2023-04-04 NOTE — ANESTHESIA POSTPROCEDURE EVALUATION
Anesthesia Post Evaluation    Patient: Jovan Mcwilliams    Procedure(s) Performed: Procedure(s) (LRB):  COLONOSCOPY (N/A)    Final Anesthesia Type: general      Patient location during evaluation: PACU  Patient participation: Yes- Able to Participate  Level of consciousness: awake and alert  Post-procedure vital signs: reviewed and stable  Pain management: adequate  Airway patency: patent    PONV status at discharge: No PONV  Anesthetic complications: no      Cardiovascular status: blood pressure returned to baseline  Respiratory status: unassisted and room air  Hydration status: euvolemic  Follow-up not needed.          Vitals Value Taken Time   /85 04/03/23 1317   Temp 36.5 °C (97.7 °F) 04/03/23 1259   Pulse 51 04/03/23 1317   Resp 16 04/03/23 1317   SpO2 99 % 04/03/23 1317         Event Time   Out of Recovery 13:32:45         Pain/Shu Score: Shu Score: 10 (4/3/2023  1:11 PM)

## 2023-04-05 ENCOUNTER — PATIENT MESSAGE (OUTPATIENT)
Dept: INTERNAL MEDICINE | Facility: CLINIC | Age: 58
End: 2023-04-05
Payer: COMMERCIAL

## 2023-04-17 LAB
FINAL PATHOLOGIC DIAGNOSIS: NORMAL
GROSS: NORMAL
Lab: NORMAL

## 2023-05-02 ENCOUNTER — TELEPHONE (OUTPATIENT)
Dept: GASTROENTEROLOGY | Facility: CLINIC | Age: 58
End: 2023-05-02
Payer: COMMERCIAL

## 2023-05-11 RX ORDER — OMEPRAZOLE 40 MG/1
CAPSULE, DELAYED RELEASE ORAL
Qty: 90 CAPSULE | Refills: 2 | Status: SHIPPED | OUTPATIENT
Start: 2023-05-11 | End: 2024-02-15

## 2023-05-11 NOTE — TELEPHONE ENCOUNTER
No care due was identified.  Health [facility] Embedded Care Due Messages. Reference number: [id number].   5/11/2023 8:52:08 AM CDT

## 2023-05-11 NOTE — TELEPHONE ENCOUNTER
Refill Decision Note   Jovan Mcwilliams  is requesting a refill authorization.  Brief Assessment and Rationale for Refill:  Approve     Medication Therapy Plan:       Medication Reconciliation Completed: No   Comments:     No Care Gaps recommended.     Note composed:11:05 AM 05/11/2023

## 2023-07-03 ENCOUNTER — TELEPHONE (OUTPATIENT)
Dept: INTERNAL MEDICINE | Facility: CLINIC | Age: 58
End: 2023-07-03
Payer: COMMERCIAL

## 2023-07-03 NOTE — TELEPHONE ENCOUNTER
----- Message from Alexandrea Millard sent at 7/3/2023  9:21 AM CDT -----  Regarding: pt called  Name of Who is Calling: JEREMIAH CORTES [[id number]]      What is the request in detail: pt is requesting to be seen today virtually for back pain. Please advise       Can the clinic reply by [facility]: No      What [facility]: Telephone Information:  Mobile          [phone]

## 2023-07-03 NOTE — TELEPHONE ENCOUNTER
Pt stated that he had did some shopping, bags were pretty heavy and he woke up the next day with some back pain. He wanted to be seen, but there's nothing available soon. He wants to know should he get some rest and take it easy that it'll subside over time?    Krishna JOLLY

## 2023-07-14 ENCOUNTER — LAB VISIT (OUTPATIENT)
Dept: LAB | Facility: HOSPITAL | Age: 58
End: 2023-07-14
Payer: COMMERCIAL

## 2023-07-14 DIAGNOSIS — Z51.81 THERAPEUTIC DRUG MONITORING: ICD-10-CM

## 2023-07-14 DIAGNOSIS — G43.719 INTRACTABLE CHRONIC MIGRAINE WITHOUT AURA AND WITHOUT STATUS MIGRAINOSUS: ICD-10-CM

## 2023-07-14 LAB
ALBUMIN SERPL BCP-MCNC: 4.1 G/DL (ref 3.5–5.2)
ALP SERPL-CCNC: 46 U/L (ref 55–135)
ALT SERPL W/O P-5'-P-CCNC: 39 U/L (ref 10–44)
ANION GAP SERPL CALC-SCNC: 13 MMOL/L (ref 8–16)
AST SERPL-CCNC: 25 U/L (ref 10–40)
BILIRUB SERPL-MCNC: 0.5 MG/DL (ref 0.1–1)
BUN SERPL-MCNC: 13 MG/DL (ref 6–20)
CALCIUM SERPL-MCNC: 9.3 MG/DL (ref 8.7–10.5)
CHLORIDE SERPL-SCNC: 105 MMOL/L (ref 95–110)
CO2 SERPL-SCNC: 25 MMOL/L (ref 23–29)
CREAT SERPL-MCNC: 1.1 MG/DL (ref 0.5–1.4)
EST. GFR  (NO RACE VARIABLE): >60 ML/MIN/1.73 M^2
GLUCOSE SERPL-MCNC: 85 MG/DL (ref 70–110)
POTASSIUM SERPL-SCNC: 3.8 MMOL/L (ref 3.5–5.1)
PROT SERPL-MCNC: 6.9 G/DL (ref 6–8.4)
SODIUM SERPL-SCNC: 143 MMOL/L (ref 136–145)

## 2023-07-14 PROCEDURE — 36415 COLL VENOUS BLD VENIPUNCTURE: CPT | Performed by: DERMATOLOGY

## 2023-07-14 PROCEDURE — 80053 COMPREHEN METABOLIC PANEL: CPT | Performed by: DERMATOLOGY

## 2023-07-14 RX ORDER — VERAPAMIL HYDROCHLORIDE 240 MG/1
TABLET, FILM COATED, EXTENDED RELEASE ORAL
Qty: 90 TABLET | Refills: 1 | Status: SHIPPED | OUTPATIENT
Start: 2023-07-14 | End: 2024-01-16

## 2023-07-14 NOTE — TELEPHONE ENCOUNTER
No care due was identified.  Health [facility] Embedded Care Due Messages. Reference number: [id number].   7/14/2023 9:06:08 AM CDT

## 2023-07-14 NOTE — TELEPHONE ENCOUNTER
Refill Decision Note   Jovan Mcwilliams  is requesting a refill authorization.  Brief Assessment and Rationale for Refill:  Approve     Medication Therapy Plan:       Medication Reconciliation Completed: No   Comments:     No Care Gaps recommended.     Note composed:12:26 PM 07/14/2023

## 2023-08-16 ENCOUNTER — PATIENT MESSAGE (OUTPATIENT)
Dept: ADMINISTRATIVE | Facility: OTHER | Age: 58
End: 2023-08-16
Payer: COMMERCIAL

## 2023-09-13 ENCOUNTER — PATIENT MESSAGE (OUTPATIENT)
Dept: NEUROLOGY | Facility: CLINIC | Age: 58
End: 2023-09-13
Payer: COMMERCIAL

## 2023-09-13 DIAGNOSIS — G44.011 INTRACTABLE EPISODIC CLUSTER HEADACHE: ICD-10-CM

## 2023-09-14 RX ORDER — GALCANEZUMAB 100 MG/ML
300 INJECTION, SOLUTION SUBCUTANEOUS
Qty: 3 ML | Refills: 11 | Status: ACTIVE | OUTPATIENT
Start: 2023-09-14

## 2023-09-14 RX ORDER — BUTALBITAL, ACETAMINOPHEN AND CAFFEINE 50; 325; 40 MG/1; MG/1; MG/1
TABLET ORAL
Qty: 14 TABLET | Refills: 1 | Status: SHIPPED | OUTPATIENT
Start: 2023-09-14

## 2023-11-09 ENCOUNTER — TELEPHONE (OUTPATIENT)
Dept: FAMILY MEDICINE | Facility: CLINIC | Age: 58
End: 2023-11-09
Payer: COMMERCIAL

## 2023-11-09 NOTE — TELEPHONE ENCOUNTER
"Returned patient call, assisted in scheduling ECA appointment.     Patient stated that he is "pretty healthy" has just noticed feeling more fatigued and not as motivated to do things.     "

## 2023-11-09 NOTE — TELEPHONE ENCOUNTER
----- Message from Ritika Sim sent at 11/9/2023 10:43 AM CST -----  Regarding: eca  Contact: patient  Type:  Sooner Appointment Request    Caller is requesting a sooner appointment.  Caller declined first available appointment listed below.  Caller will not accept being placed on the waitlist and is requesting a message be sent to doctor.    Name of Caller:  Patient  When is the first available appointment?    Symptoms:  eca  Would the patient rather a call back or a response via [facility]? Call back  Best Call Back Number:  [phone]    Additional Information:  Seeking to be seen this year call to advise and schedule thanks!

## 2023-12-10 ENCOUNTER — PATIENT MESSAGE (OUTPATIENT)
Dept: INTERNAL MEDICINE | Facility: CLINIC | Age: 58
End: 2023-12-10
Payer: COMMERCIAL

## 2023-12-10 DIAGNOSIS — Z72.0 TOBACCO ABUSE: Primary | ICD-10-CM

## 2023-12-12 ENCOUNTER — OFFICE VISIT (OUTPATIENT)
Dept: PAIN MEDICINE | Facility: CLINIC | Age: 58
End: 2023-12-12
Payer: COMMERCIAL

## 2023-12-12 ENCOUNTER — PATIENT MESSAGE (OUTPATIENT)
Dept: PAIN MEDICINE | Facility: CLINIC | Age: 58
End: 2023-12-12
Payer: COMMERCIAL

## 2023-12-12 ENCOUNTER — HOSPITAL ENCOUNTER (OUTPATIENT)
Dept: RADIOLOGY | Facility: HOSPITAL | Age: 58
Discharge: HOME | End: 2023-12-12
Attending: PHYSICIAN ASSISTANT
Payer: COMMERCIAL

## 2023-12-12 ENCOUNTER — TELEPHONE (OUTPATIENT)
Dept: PAIN MEDICINE | Facility: CLINIC | Age: 58
End: 2023-12-12
Payer: COMMERCIAL

## 2023-12-12 VITALS
OXYGEN SATURATION: 100 % | BODY MASS INDEX: 35.67 KG/M2 | WEIGHT: 263 LBS | SYSTOLIC BLOOD PRESSURE: 164 MMHG | DIASTOLIC BLOOD PRESSURE: 99 MMHG | HEART RATE: 57 BPM

## 2023-12-12 DIAGNOSIS — Z98.1 HISTORY OF LUMBAR FUSION: Primary | ICD-10-CM

## 2023-12-12 DIAGNOSIS — M54.16 LUMBAR RADICULOPATHY: ICD-10-CM

## 2023-12-12 DIAGNOSIS — Z98.1 HISTORY OF LUMBAR FUSION: ICD-10-CM

## 2023-12-12 PROCEDURE — 1159F PR MEDICATION LIST DOCUMENTED IN MEDICAL RECORD: ICD-10-PCS | Mod: CPTII,S$GLB,, | Performed by: PHYSICIAN ASSISTANT

## 2023-12-12 PROCEDURE — 3077F SYST BP >= 140 MM HG: CPT | Mod: CPTII,S$GLB,, | Performed by: PHYSICIAN ASSISTANT

## 2023-12-12 PROCEDURE — 3080F DIAST BP >= 90 MM HG: CPT | Mod: CPTII,S$GLB,, | Performed by: PHYSICIAN ASSISTANT

## 2023-12-12 PROCEDURE — 3044F HG A1C LEVEL LT 7.0%: CPT | Mod: CPTII,S$GLB,, | Performed by: PHYSICIAN ASSISTANT

## 2023-12-12 PROCEDURE — 72114 X-RAY EXAM L-S SPINE BENDING: CPT | Mod: 26,,, | Performed by: RADIOLOGY

## 2023-12-12 PROCEDURE — 99203 PR OFFICE/OUTPT VISIT, NEW, LEVL III, 30-44 MIN: ICD-10-PCS | Mod: S$GLB,,, | Performed by: PHYSICIAN ASSISTANT

## 2023-12-12 PROCEDURE — 3008F PR BODY MASS INDEX (BMI) DOCUMENTED: ICD-10-PCS | Mod: CPTII,S$GLB,, | Performed by: PHYSICIAN ASSISTANT

## 2023-12-12 PROCEDURE — 1160F PR REVIEW ALL MEDS BY PRESCRIBER/CLIN PHARMACIST DOCUMENTED: ICD-10-PCS | Mod: CPTII,S$GLB,, | Performed by: PHYSICIAN ASSISTANT

## 2023-12-12 PROCEDURE — 3080F PR MOST RECENT DIASTOLIC BLOOD PRESSURE >= 90 MM HG: ICD-10-PCS | Mod: CPTII,S$GLB,, | Performed by: PHYSICIAN ASSISTANT

## 2023-12-12 PROCEDURE — 1160F RVW MEDS BY RX/DR IN RCRD: CPT | Mod: CPTII,S$GLB,, | Performed by: PHYSICIAN ASSISTANT

## 2023-12-12 PROCEDURE — 3008F BODY MASS INDEX DOCD: CPT | Mod: CPTII,S$GLB,, | Performed by: PHYSICIAN ASSISTANT

## 2023-12-12 PROCEDURE — 3077F PR MOST RECENT SYSTOLIC BLOOD PRESSURE >= 140 MM HG: ICD-10-PCS | Mod: CPTII,S$GLB,, | Performed by: PHYSICIAN ASSISTANT

## 2023-12-12 PROCEDURE — 72114 XR LUMBAR SPINE 5 VIEW WITH FLEX AND EXT: ICD-10-PCS | Mod: 26,,, | Performed by: RADIOLOGY

## 2023-12-12 PROCEDURE — 72114 X-RAY EXAM L-S SPINE BENDING: CPT | Mod: TC,PO

## 2023-12-12 PROCEDURE — 3044F PR MOST RECENT HEMOGLOBIN A1C LEVEL <7.0%: ICD-10-PCS | Mod: CPTII,S$GLB,, | Performed by: PHYSICIAN ASSISTANT

## 2023-12-12 PROCEDURE — 99999 PR PBB SHADOW E&M-EST. PATIENT-LVL V: CPT | Mod: PBBFAC,,, | Performed by: PHYSICIAN ASSISTANT

## 2023-12-12 PROCEDURE — 99203 OFFICE O/P NEW LOW 30 MIN: CPT | Mod: S$GLB,,, | Performed by: PHYSICIAN ASSISTANT

## 2023-12-12 PROCEDURE — 1159F MED LIST DOCD IN RCRD: CPT | Mod: CPTII,S$GLB,, | Performed by: PHYSICIAN ASSISTANT

## 2023-12-12 PROCEDURE — 99999 PR PBB SHADOW E&M-EST. PATIENT-LVL V: ICD-10-PCS | Mod: PBBFAC,,, | Performed by: PHYSICIAN ASSISTANT

## 2023-12-12 RX ORDER — METHYLPREDNISOLONE 4 MG/1
TABLET ORAL
Qty: 1 EACH | Refills: 0 | Status: SHIPPED | OUTPATIENT
Start: 2023-12-12 | End: 2024-01-02

## 2023-12-12 RX ORDER — CYCLOBENZAPRINE HCL 10 MG
10 TABLET ORAL NIGHTLY PRN
Qty: 40 TABLET | Refills: 0 | Status: SHIPPED | OUTPATIENT
Start: 2023-12-12 | End: 2023-12-13

## 2023-12-12 NOTE — TELEPHONE ENCOUNTER
----- Message from Sadia Shankar PA-C sent at 12/12/2023  3:29 PM CST -----  Results Reviewed.  Please call with below:    Lower back xrays reviewed.  There is L4 to S1 fusion with hardware intact and no compilications.  The disk heights and spaces look healthy above the fusion.      As of this time, recommend proceeding with medication and PT.  If no improvement or worsening of pain then obtain MRI.

## 2023-12-13 RX ORDER — CYCLOBENZAPRINE HCL 10 MG
10 TABLET ORAL NIGHTLY PRN
Qty: 40 TABLET | Refills: 0 | Status: SHIPPED | OUTPATIENT
Start: 2023-12-13 | End: 2024-02-09

## 2023-12-13 NOTE — PROGRESS NOTES
[facility] New Patient Evaluation      Referred by: Aaareferral Self    PCP:  Amauri Dickerson MD    CC:   Chief Complaint   Patient presents with    Back Pain          HPI:   Jovan Mcwilliams is a 57 y.o. year old male patient who has a past medical history of Additional heart attack, Basal cell carcinoma, Eye pain, GERD (gastroesophageal reflux disease), Headache, Hyperlipemia, and Hypertension. He presents in self referral for lower back pain.  He has history of two lumbar spine surgeries.  1995 diskectomy that left him with chronic right anterior thigh numbness.  2005 lumbar fusion after which he did really well with no complications or long term issues.  Current pain started 2-3 months ago.  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  He has pain in the left lower back to the left posterior thigh to the outer left knee.  The left leg has given out on him due to pain.  He has tried ibuprofen and tylenol.  He is taking motrin and tried flexeril in the past.     Denies bowel/ bladder incontinence.    Past and current medications:  Antineuropathics:  NSAIDs: motrin  Antidepressants:  Muscle relaxers: (past - flexeril)  Opioids:  Antiplatelets/Anticoagulants:    Physical Therapy/ Chiropractic care:  none    Pain Intervention History:  none    Past Spine Surgical History:  1995 diskectomy that left him with chronic right anterior thigh numbness.    2005 lumbar fusion after which he did really well with no complications or long term issues; RIK So        History:    Current Outpatient Medications:     albuterol (PROVENTIL/VENTOLIN HFA) 90 mcg/actuation inhaler, Inhale 2 puffs into the lungs every 4 (four) hours as needed for Wheezing or Shortness of Breath., Disp: 18 g, Rfl: 5    atorvastatin (LIPITOR) 40 MG tablet, TAKE ONE TABLET BY MOUTH DAILY, Disp: 90 tablet, Rfl: 2    butalbital-acetaminophen-caffeine -40 mg  (FIORICET, ESGIC) -40 mg per tablet, Take 2 tablets by mouth every 12 hours as needed for rescue if lidocaine fails, Disp: 14 tablet, Rfl: 1    clotrimazole-betamethasone 1-0.05% (LOTRISONE) cream, Apply topically 2 (two) times daily., Disp: 15 g, Rfl: 1    divalproex (DEPAKOTE) 500 MG TbEC, Take 1 tablet (500 mg total) by mouth 2 (two) times daily., Disp: 60 tablet, Rfl: 11    galcanezumab-gnlm 300 mg/3 mL (100 mg/mL x 3) Syrg, Inject 3 mLs (300 mg total) into the skin every 28 days., Disp: 3 mL, Rfl: 11    HYDROcodone-acetaminophen (NORCO) 7.5-325 mg per tablet, hydrocodone 7.5 mg-acetaminophen 325 mg tablet  TAKE ONE TABLET BY MOUTH EVERY 4 HOURS AS NEEDED FOR PAIN, Disp: , Rfl:     ibuprofen (ADVIL,MOTRIN) 600 MG tablet, ibuprofen 600 mg tablet  TAKE ONE TABLET BY MOUTH EVERY 6 HOURS AS NEEDED FOR PAIN, Disp: , Rfl:     ketoconazole (NIZORAL) 2 % cream, Apply to the feet and groin BID until clear then PRN for flares., Disp: 60 g, Rfl: 2    ketorolac (TORADOL) 10 mg tablet, ketorolac 10 mg tablet  TAKE ONE TABLET BY MOUTH EVERY 6 HOURS AS NEEDED FOR PAIN - cluster headache, Disp: , Rfl:     lasmiditan (REYVOW) tablet 100 mg, Take 1 tablet (100 mg) by mouth daily as needed (migraine)., Disp: 8 tablet, Rfl: 1    LIDOcaine (LIDODERM) 5 %, Place 1 patch onto the skin daily as needed (Pain). Remove & Discard patch within 12 hours or as directed by MD, Disp: 15 patch, Rfl: 0    omeprazole (PRILOSEC) 40 MG capsule, TAKE ONE CAPSULE BY MOUTH ONCE A DAY, Disp: 90 capsule, Rfl: 2    prochlorperazine (COMPAZINE) 10 MG tablet, Take 1 tablet (10 mg total) by mouth every 6 (six) hours as needed (migraine or nausea)., Disp: 60 tablet, Rfl: 11    sildenafiL (VIAGRA) 100 MG tablet, Take 1 tablet (100 mg total) by mouth daily as needed for Erectile Dysfunction., Disp: 10 tablet, Rfl: 10    triamterene-hydrochlorothiazide 37.5-25 mg (DYAZIDE) 37.5-25 mg per capsule, TAKE ONE CAPSULE BY MOUTH EVERY MORNING, Disp: 90 capsule,  Rfl: 2    ubrogepant (UBRELVY) 100 mg tablet, Ubrelvy 100 mg tablet, Disp: , Rfl:     verapamiL (CALAN-SR) 240 MG CR tablet, TAKE ONE TABLET BY MOUTH EVERY EVENING, Disp: 90 tablet, Rfl: 1    cyclobenzaprine (FLEXERIL) 10 MG tablet, Take 1 tablet (10 mg total) by mouth nightly as needed for Muscle spasms., Disp: 40 tablet, Rfl: 0    methylPREDNISolone (MEDROL, CARRILLO,) 4 mg tablet, use as directed, Disp: 1 each, Rfl: 0    Past Medical History:   Diagnosis Date    Additional heart attack     10 years ago    Basal cell carcinoma 12/03/2020    right forearm- JAM     Eye pain     left eye    GERD (gastroesophageal reflux disease)     Headache     Hyperlipemia     Hypertension        Past Surgical History:   Procedure Laterality Date    BACK SURGERY      COLONOSCOPY N/A 4/3/2023    Procedure: COLONOSCOPY;  Surgeon: Leonardo Bright Jr., MD;  Location: [facility];  Service: Endoscopy;  Laterality: N/A;    HERNIA REPAIR      KNEE SURGERY Left     SHOULDER SURGERY Right     SKIN BIOPSY  12/03/2020       Family History   Problem Relation Age of Onset    Hypertension Mother     Hyperlipidemia Mother     Diabetes Father     Cancer Maternal Aunt     Cancer Paternal Aunt     Cancer Maternal Grandmother     Melanoma Maternal Grandmother     Cancer Maternal Grandfather     Colon cancer Paternal Grandfather        Social History     Socioeconomic History    Marital status:    Tobacco Use    Smoking status: Every Day     Current packs/day: 0.50     Average packs/day: 0.5 packs/day for 34.0 years (17.0 ttl pk-yrs)     Types: Cigarettes    Smokeless tobacco: Never    Tobacco comments:     is about to quit   Substance and Sexual Activity    Alcohol use: Yes     Alcohol/week: 1.0 standard drink of alcohol     Types: 1 Cans of beer per week     Comment: per week    Drug use: No    Sexual activity: Yes     Partners: Female     Social Determinants of Health     Financial Resource Strain: Low Risk  (1/10/2022)    Overall Financial  Resource Strain (CARDIA)     Difficulty of Paying Living Expenses: Not hard at all   Food Insecurity: No Food Insecurity (1/10/2022)    Hunger Vital Sign     Worried About Running Out of Food in the Last Year: Never true     Ran Out of Food in the Last Year: Never true   Transportation Needs: No Transportation Needs (1/10/2022)    PRAPARE - Transportation     Lack of Transportation (Medical): No     Lack of Transportation (Non-Medical): No   Physical Activity: Insufficiently Active (1/10/2022)    Exercise Vital Sign     Days of Exercise per Week: 3 days     Minutes of Exercise per Session: 30 min   Stress: No Stress Concern Present (1/10/2022)    [employer] - Occupational Stress Questionnaire     Feeling of Stress : Only a little   Social Connections: Unknown (1/10/2022)    Social Connection and Isolation Panel [NHANES]     Frequency of Communication with Friends and Family: More than three times a week     Frequency of Social Gatherings with Friends and Family: Once a week     Active Member of Clubs or Organizations: No     Attends Club or Organization Meetings: Never     Marital Status:    Housing Stability: Low Risk  (1/10/2022)    Housing Stability Vital Sign     Unable to Pay for Housing in the Last Year: No     Number of Places Lived in the Last Year: 1     Unstable Housing in the Last Year: No       Review of patient's allergies indicates:   Allergen Reactions    Doxycycline Anaphylaxis     Other reaction(s): nausea  Other reaction(s): Not Indicated    Azithromycin      Other reaction(s): Not Indicated    Erythromycin Other (See Comments)     Other reaction(s): Nausea  Pain with IV use       Labs:  Lab Results   Component Value Date    HGBA1C 5.3 03/30/2023       Lab Results   Component Value Date    WBC 7.96 03/03/2017    HGB 15.9 03/03/2017    HCT 45.5 03/03/2017    MCV 91 03/03/2017     03/03/2017           Review of Systems:  Low back pain.  Left leg pain.   Balance of review of systems is negative.    Physical Exam:  Vitals:    12/12/23 1045   BP: (!) 164/99   Pulse: (!) 57   SpO2: 100%   Weight: 119.3 kg (263 lb 0.1 oz)   PainSc:   6   PainLoc: Back     Body mass index is 35.67 kg/m².    Pain disability index:       No data to display                Gen: NAD  Psych: mood appropriate for given condition  HEENT: eyes anicteric   CV: RRR, 2+ radial pulse  Respiratory: non-labored, no signs of respiratory distress  Abd: non-distended  Skin: warm, dry and intact.  Gait: Able to heel walk, toe walk. No antalgic gait.     Coordination:   Tandem walking coordination: normal    Cervical spine: ROM is full in flexion, extension and lateral rotation without increased pain.  Spurling's maneuver causes no neck pain to either side.  Myofascial exam: No Tenderness to palpation across cervical paraspinous region bilaterally.    Lumbar spine:  Lumbar spine: ROM is full with flexion extension and oblique extension with no increased pain.    Kostas's test causes no increased pain on either side.    Supine straight leg raise is negative bilaterally.    Internal and external rotation of the hip causes no increased pain on either side.  Myofascial exam: No tenderness to palpation across lumbar paraspinous muscles. No tenderness to palpation over the bilateral greater trochanters and bilateral SI joint    Sensory:  Intact and symmetrical to light touch in C4-T1 dermatomes bilaterally. Intact and symmetrical to light touch in L1-S1 dermatomes bilaterally.; isolated right atnerior thigh numbness in a small circular area.     Motor:    Right Left   C4 Shoulder Abduction  5  5   C5 Elbow Flexion    5  5   C6 Wrist Extension  5  5   C7 Elbow Extension   5  5   C8/T1 Hand Intrinsics   5  5        Right Left   L2/3 Iliacus Hip flexion  5  5   L3/4 Qudratus Femoris Knee Extension  5  5   L4/5 Tib Anterior Ankle Dorsiflexion   5  5   L5/S1 Extensor Hallicus Longus Great toe extension  5  5   S1/S2  Gastroc/Soleus Plantar Flexion  5  5      Right Left   Triceps DTR 2+ 2+   Biceps DTR 2+ 2+   Brachioradialis DTR 2+ 2+   Patellar DTR 2+ 2+   Achilles DTR 2+ 2+   Vincent Absent  Absent   Clonus Absent Absent   Babinski Absent Absent       Imaging:  No current spine imaging to review.      Assessment:   Jovan Mcwilliams is a 57 y.o. year old male patient who has a past medical history of Additional heart attack, Basal cell carcinoma, Eye pain, GERD (gastroesophageal reflux disease), Headache, Hyperlipemia, and Hypertension. He presents in self referral for lower back and left thigh pain.  Possible myofacial pain vs new radiculoapthy from disk hernia vs neural compression from adjacent level dissesa depending on wehre the fusion was.    Plan:  - recommend xrays today to assess fusion level, alignement for any significant degenerative chagnes.  - will call with xray results  - PT  - try medrol taper and zanaflex to help with pain.     Problem List Items Addressed This Visit    None  Visit Diagnoses       History of lumbar fusion    -  Primary    Relevant Medications    methylPREDNISolone (MEDROL, CARRILLO,) 4 mg tablet    cyclobenzaprine (FLEXERIL) 10 MG tablet    Other Relevant Orders    X-Ray Lumbar Complete Including Flex And Ext (Completed)    Ambulatory referral/consult to Physical/Occupational Therapy    Lumbar radiculopathy        Relevant Medications    methylPREDNISolone (MEDROL, CARRILLO,) 4 mg tablet    cyclobenzaprine (FLEXERIL) 10 MG tablet    Other Relevant Orders    X-Ray Lumbar Complete Including Flex And Ext (Completed)    Ambulatory referral/consult to Physical/Occupational Therapy            Follow Up: RTC in 4 weeks to monitor progress    : Reviewed and consistent with medication use as prescribed.           Sadia Shankar PA-C  [facility]

## 2024-01-15 DIAGNOSIS — G43.719 INTRACTABLE CHRONIC MIGRAINE WITHOUT AURA AND WITHOUT STATUS MIGRAINOSUS: ICD-10-CM

## 2024-01-15 NOTE — TELEPHONE ENCOUNTER
Care Due:                  Date            Visit Type   Department     Provider  --------------------------------------------------------------------------------                                SAME DAY -                              ESTABLISHED   [facility] INTERNAL  Last Visit: 01-      PATIENT      MEDICINE       Amauri Dickerson  Next Visit: None Scheduled  None         None Found                                                            Last  Test          Frequency    Reason                     Performed    Due Date  --------------------------------------------------------------------------------    Office Visit  15 months..  atorvastatin, omeprazole,   01- 04-                             sildenafiL,                             triamterene-hydrochloroth                             iazide...................    Lipid Panel.  12 months..  atorvastatin.............  03- 03-    [facility] Embedded Care Due Messages. Reference number: [id number].   1/15/2024 7:30:59 AM CST

## 2024-01-16 ENCOUNTER — TELEPHONE (OUTPATIENT)
Dept: PAIN MEDICINE | Facility: CLINIC | Age: 59
End: 2024-01-16
Payer: COMMERCIAL

## 2024-01-16 RX ORDER — VERAPAMIL HYDROCHLORIDE 240 MG/1
TABLET, FILM COATED, EXTENDED RELEASE ORAL
Qty: 90 TABLET | Refills: 1 | Status: SHIPPED | OUTPATIENT
Start: 2024-01-16 | End: 2024-02-27

## 2024-01-16 NOTE — TELEPHONE ENCOUNTER
----- Message from Radha Tucker, Patient Care Assistant sent at 1/16/2024  8:47 AM CST -----  Regarding: returning call  Contact: pt  Type:  Patient Returning Call    Who Called:  pt     Who Left Message for Patient:  not sure     Does the patient know what this is regarding?:  yes     Best Call Back Number:  [phone] (home)     Additional Information:  please call to advise. Thanks!

## 2024-01-16 NOTE — TELEPHONE ENCOUNTER
Refill Routing Note   Medication(s) are not appropriate for processing by [facility] for the following reason(s):        Required vitals abnormal    [facility] action(s):  Defer   Requires appointment : Yes     Requires labs : Yes      Medication Therapy Plan: James Shanks MD; BP 12/12/23 (!) 164/99      Appointments  past 12m or future 3m with PCP    Date Provider   Last Visit   1/20/2023 Amauri Dickerson Jr., MD   Next Visit   Visit date not found Amauri Dickerson Jr., MD   ED visits in past 90 days: 0        Note composed:12:40 PM 01/16/2024

## 2024-01-17 ENCOUNTER — OFFICE VISIT (OUTPATIENT)
Dept: SLEEP MEDICINE | Facility: CLINIC | Age: 59
End: 2024-01-17
Payer: COMMERCIAL

## 2024-01-17 ENCOUNTER — TELEPHONE (OUTPATIENT)
Dept: SLEEP MEDICINE | Facility: CLINIC | Age: 59
End: 2024-01-17

## 2024-01-17 ENCOUNTER — OFFICE VISIT (OUTPATIENT)
Dept: PAIN MEDICINE | Facility: CLINIC | Age: 59
End: 2024-01-17
Payer: COMMERCIAL

## 2024-01-17 VITALS
BODY MASS INDEX: 35.8 KG/M2 | WEIGHT: 264 LBS | DIASTOLIC BLOOD PRESSURE: 115 MMHG | HEART RATE: 77 BPM | SYSTOLIC BLOOD PRESSURE: 177 MMHG

## 2024-01-17 VITALS — WEIGHT: 260 LBS | HEIGHT: 72 IN | BODY MASS INDEX: 35.21 KG/M2

## 2024-01-17 DIAGNOSIS — R53.83 FATIGUE, UNSPECIFIED TYPE: ICD-10-CM

## 2024-01-17 DIAGNOSIS — G47.33 OSA (OBSTRUCTIVE SLEEP APNEA): Primary | ICD-10-CM

## 2024-01-17 DIAGNOSIS — M54.16 LUMBAR RADICULOPATHY, CHRONIC: ICD-10-CM

## 2024-01-17 DIAGNOSIS — E66.9 OBESITY, CLASS II, BMI 35-39.9: ICD-10-CM

## 2024-01-17 DIAGNOSIS — M54.9 DORSALGIA, UNSPECIFIED: Primary | ICD-10-CM

## 2024-01-17 PROCEDURE — 3008F BODY MASS INDEX DOCD: CPT | Mod: CPTII,95,, | Performed by: NURSE PRACTITIONER

## 2024-01-17 PROCEDURE — 1160F RVW MEDS BY RX/DR IN RCRD: CPT | Mod: CPTII,S$GLB,, | Performed by: PHYSICIAN ASSISTANT

## 2024-01-17 PROCEDURE — 99203 OFFICE O/P NEW LOW 30 MIN: CPT | Mod: 95,,, | Performed by: NURSE PRACTITIONER

## 2024-01-17 PROCEDURE — 3077F SYST BP >= 140 MM HG: CPT | Mod: CPTII,S$GLB,, | Performed by: PHYSICIAN ASSISTANT

## 2024-01-17 PROCEDURE — 3008F BODY MASS INDEX DOCD: CPT | Mod: CPTII,S$GLB,, | Performed by: PHYSICIAN ASSISTANT

## 2024-01-17 PROCEDURE — 1160F RVW MEDS BY RX/DR IN RCRD: CPT | Mod: CPTII,95,, | Performed by: NURSE PRACTITIONER

## 2024-01-17 PROCEDURE — 3080F DIAST BP >= 90 MM HG: CPT | Mod: CPTII,S$GLB,, | Performed by: PHYSICIAN ASSISTANT

## 2024-01-17 PROCEDURE — 1159F MED LIST DOCD IN RCRD: CPT | Mod: CPTII,S$GLB,, | Performed by: PHYSICIAN ASSISTANT

## 2024-01-17 PROCEDURE — 1159F MED LIST DOCD IN RCRD: CPT | Mod: CPTII,95,, | Performed by: NURSE PRACTITIONER

## 2024-01-17 PROCEDURE — 99213 OFFICE O/P EST LOW 20 MIN: CPT | Mod: S$GLB,,, | Performed by: PHYSICIAN ASSISTANT

## 2024-01-17 PROCEDURE — 99999 PR PBB SHADOW E&M-EST. PATIENT-LVL IV: CPT | Mod: PBBFAC,,, | Performed by: PHYSICIAN ASSISTANT

## 2024-01-17 NOTE — PROGRESS NOTES
Subjective:      Patient ID: oJvan Mcwilliams is a 58 y.o. male.    Chief Complaint: Sleep Apnea  The patient location is: [place]  The chief complaint leading to consultation is: sleep apnea  Visit type: Virtual visit with synchronous audio and video  Total time spent with patient: 20 min   Each patient to whom he or she provides medical services by telemedicine is:  (1) informed of the relationship between the physician and patient and the respective role of any other health care provider with respect to management of the patient; and (2) notified that he or she may decline to receive medical services by telemedicine and may withdraw from such care at any time.    Apnea  Associated symptoms include fatigue.       Patient presents to the office today for evaluation of sleep apnea.  Patient with snoring and witnessed apneas. Patient not having problems falling asleep, but wakes up frequently throughout the night.  Patient does not wake up feeling refreshed in the morning.  Patient with daytime hypersomnolence.  Sunspot Sleepiness Scale score 6.  Patient has had symptoms for 20 years. Attempted past sleep study but unable to sleep and left. He had a mouthguard made for snoring without much improvement.  Comorbidities include BMI 35, HTN   Bedtime: 10 PM- asleep 10:30 PM  Wake time: 8 AM    STOP - BANG Questionnaire:     1. Snoring : Do you snore loudly ?    Yes    2. Tired : Do you often feel tired, fatigued, or sleepy during daytime?   Yes    3. Observed: Has anyone observed you stop breathing during your sleep?   Yes    4. Blood pressure : Do you have or are you being treated for high blood pressure?   Yes    5. BMI :BMI more than 35 kg/m2?   Yes    6. Age : Age over 50 yr old?   Yes    7. Neck circumference: Neck circumference greater than 40 cm?   No    8. Gender: Gender male?   Yes    High risk of DELMY: Yes 5 - 8  Intermediate risk of DELMY: Yes 3 - 4  Low risk of DELMY: Yes 0 - 2      References:   STOP Questionnaire    A Tool to Screen Patients for Obstructive Sleep Apnea: ANNELIESE Pascual.P.C., EV AllanB.B.S., Malini Iverson M.D.,Brigid Conn, Ph.D., DONNIE Rutledge.B.S.,_ Ambreen Castorena.,_ Norma Miguel M.D., SARAH HiltonRWarrenC.P.C.; Anesthesiology 2008; 108:812-21 Copyright © 2008, the American Society of Anesthesiologists, Inc. [employer]    Patient Active Problem List   Diagnosis    Hyperlipemia    GERD (gastroesophageal reflux disease)    HTN (hypertension)    History of tobacco abuse    Class 2 obesity without serious comorbidity in adult    Elevated glucose    Positive colorectal cancer screening using Cologuard test         Ht 6' (1.829 m)   Wt 117.9 kg (260 lb)   BMI 35.26 kg/m²   Body mass index is 35.26 kg/m².    Review of Systems   Constitutional:  Positive for fatigue.   Respiratory:  Positive for snoring and somnolence.    Psychiatric/Behavioral:  Positive for sleep disturbance.    All other systems reviewed and are negative.        Objective:      Physical Exam  Constitutional:       Appearance: He is well-developed. He is obese.   HENT:      Head: Normocephalic and atraumatic.      Right Ear: External ear normal.      Left Ear: External ear normal.      Nose: Nose normal.   Eyes:      General: Lids are normal.      Conjunctiva/sclera: Conjunctivae normal.   Pulmonary:      Effort: Pulmonary effort is normal. No tachypnea, bradypnea, accessory muscle usage or respiratory distress.   Musculoskeletal:      Cervical back: Normal range of motion.   Skin:     Findings: No rash.   Neurological:      Mental Status: He is alert and oriented to person, place, and time.   Psychiatric:         Behavior: Behavior normal.         Thought Content: Thought content normal.         Judgment: Judgment normal.         Assessment:     1. DELMY (obstructive sleep apnea)    2. Obesity, Class II, BMI 35-39.9    3. Fatigue, unspecified type       Outpatient Encounter  Medications as of 1/17/2024   Medication Sig Dispense Refill    albuterol (PROVENTIL/VENTOLIN HFA) 90 mcg/actuation inhaler Inhale 2 puffs into the lungs every 4 (four) hours as needed for Wheezing or Shortness of Breath. 18 g 5    atorvastatin (LIPITOR) 40 MG tablet TAKE ONE TABLET BY MOUTH DAILY 90 tablet 2    butalbital-acetaminophen-caffeine -40 mg (FIORICET, ESGIC) -40 mg per tablet Take 2 tablets by mouth every 12 hours as needed for rescue if lidocaine fails 14 tablet 1    clotrimazole-betamethasone 1-0.05% (LOTRISONE) cream Apply topically 2 (two) times daily. 15 g 1    cyclobenzaprine (FLEXERIL) 10 MG tablet Take 1 tablet (10 mg total) by mouth nightly as needed for Muscle spasms. 40 tablet 0    divalproex (DEPAKOTE) 500 MG TbEC Take 1 tablet (500 mg total) by mouth 2 (two) times daily. 60 tablet 1    galcanezumab-gnlm 300 mg/3 mL (100 mg/mL x 3) Syrg Inject 3 mLs (300 mg total) into the skin every 28 days. 3 mL 11    HYDROcodone-acetaminophen (NORCO) 7.5-325 mg per tablet hydrocodone 7.5 mg-acetaminophen 325 mg tablet   TAKE ONE TABLET BY MOUTH EVERY 4 HOURS AS NEEDED FOR PAIN      ibuprofen (ADVIL,MOTRIN) 600 MG tablet ibuprofen 600 mg tablet   TAKE ONE TABLET BY MOUTH EVERY 6 HOURS AS NEEDED FOR PAIN      ketoconazole (NIZORAL) 2 % cream Apply to the feet and groin BID until clear then PRN for flares. 60 g 2    ketorolac (TORADOL) 10 mg tablet ketorolac 10 mg tablet   TAKE ONE TABLET BY MOUTH EVERY 6 HOURS AS NEEDED FOR PAIN - cluster headache      lasmiditan (REYVOW) tablet 100 mg Take 1 tablet (100 mg) by mouth daily as needed (migraine). 8 tablet 1    LIDOcaine (LIDODERM) 5 % Place 1 patch onto the skin daily as needed (Pain). Remove & Discard patch within 12 hours or as directed by MD 15 patch 0    omeprazole (PRILOSEC) 40 MG capsule TAKE ONE CAPSULE BY MOUTH ONCE A DAY 90 capsule 2    prochlorperazine (COMPAZINE) 10 MG tablet Take 1 tablet (10 mg total) by mouth every 6 (six) hours as  needed (migraine or nausea). 60 tablet 11    sildenafiL (VIAGRA) 100 MG tablet Take 1 tablet (100 mg total) by mouth daily as needed for Erectile Dysfunction. 10 tablet 10    triamterene-hydrochlorothiazide 37.5-25 mg (DYAZIDE) 37.5-25 mg per capsule TAKE ONE CAPSULE BY MOUTH EVERY MORNING 90 capsule 2    ubrogepant (UBRELVY) 100 mg tablet Ubrelvy 100 mg tablet      verapamiL (CALAN-SR) 240 MG CR tablet TAKE ONE TABLET BY MOUTH EVERY EVENING 90 tablet 1    [DISCONTINUED] buPROPion (WELLBUTRIN XL) 150 MG TB24 tablet       [DISCONTINUED] divalproex (DEPAKOTE) 500 MG TbEC Take 1 tablet (500 mg total) by mouth 2 (two) times daily. 60 tablet 11    [DISCONTINUED] valacyclovir (VALTREX) 1000 MG tablet       [DISCONTINUED] verapamiL (CALAN-SR) 240 MG CR tablet TAKE ONE TABLET BY MOUTH EVERY EVENING 90 tablet 1     No facility-administered encounter medications on file as of 1/17/2024.     No orders of the defined types were placed in this encounter.    Plan:   Home sleep study.   Review when available.     Problem List Items Addressed This Visit    None  Visit Diagnoses       DELMY (obstructive sleep apnea)    -  Primary    Obesity, Class II, BMI 35-39.9        Fatigue, unspecified type

## 2024-01-17 NOTE — TELEPHONE ENCOUNTER
----- Message from Marvel Dangelo sent at 1/17/2024  1:39 PM CST -----  [facility]

## 2024-01-17 NOTE — PROGRESS NOTES
[facility] Established Patient        PCP:  Amauri Dickerson MD    CC:   Chief Complaint   Patient presents with    Follow-up          HPI:     Mr. Aldana presents for follow up of lower back and left leg pain.  He has tied medrol and flexeril without improvement.  Pain is the same.  He has pain in the left lower back, posterior thigh to the ankle.  Currently taking ibuprofen and tylenol..     Initial HPI:  Jovan Mcwilliams is a 58 y.o. year old male patient who has a past medical history of Additional heart attack, Basal cell carcinoma, Eye pain, GERD (gastroesophageal reflux disease), Headache, Hyperlipemia, and Hypertension. He presents in self referral for lower back pain.  He has history of two lumbar spine surgeries.  1995 diskectomy that left him with chronic right anterior thigh numbness.  2005 lumbar fusion after which he did really well with no complications or long term issues.  Current pain started 2-3 months ago.  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  He has pain in the left lower back to the left posterior thigh to the outer left knee.  The left leg has given out on him due to pain.  He has tried ibuprofen and tylenol.  He is taking motrin and tried flexeril in the past.     Denies bowel/ bladder incontinence.    Past and current medications:  Antineuropathics:  NSAIDs: motrin  Antidepressants:  Muscle relaxers: (past - flexeril)  Opioids:  Antiplatelets/Anticoagulants:  Others:  tylenol; tried medrol taper    Physical Therapy/ Chiropractic care:  PT - current trial with no improvement.     Pain Intervention History:  none    Past Spine Surgical History:  1995 diskectomy that left him with chronic right anterior thigh numbness.    2005 lumbar fusion after which he did really well with no complications or long term issues; RIK So        History:    Current Outpatient Medications:     albuterol  (PROVENTIL/VENTOLIN HFA) 90 mcg/actuation inhaler, Inhale 2 puffs into the lungs every 4 (four) hours as needed for Wheezing or Shortness of Breath., Disp: 18 g, Rfl: 5    atorvastatin (LIPITOR) 40 MG tablet, TAKE ONE TABLET BY MOUTH DAILY, Disp: 90 tablet, Rfl: 2    butalbital-acetaminophen-caffeine -40 mg (FIORICET, ESGIC) -40 mg per tablet, Take 2 tablets by mouth every 12 hours as needed for rescue if lidocaine fails, Disp: 14 tablet, Rfl: 1    clotrimazole-betamethasone 1-0.05% (LOTRISONE) cream, Apply topically 2 (two) times daily., Disp: 15 g, Rfl: 1    cyclobenzaprine (FLEXERIL) 10 MG tablet, Take 1 tablet (10 mg total) by mouth nightly as needed for Muscle spasms., Disp: 40 tablet, Rfl: 0    divalproex (DEPAKOTE) 500 MG TbEC, Take 1 tablet (500 mg total) by mouth 2 (two) times daily., Disp: 60 tablet, Rfl: 1    galcanezumab-gnlm 300 mg/3 mL (100 mg/mL x 3) Syrg, Inject 3 mLs (300 mg total) into the skin every 28 days., Disp: 3 mL, Rfl: 11    HYDROcodone-acetaminophen (NORCO) 7.5-325 mg per tablet, hydrocodone 7.5 mg-acetaminophen 325 mg tablet  TAKE ONE TABLET BY MOUTH EVERY 4 HOURS AS NEEDED FOR PAIN, Disp: , Rfl:     ibuprofen (ADVIL,MOTRIN) 600 MG tablet, ibuprofen 600 mg tablet  TAKE ONE TABLET BY MOUTH EVERY 6 HOURS AS NEEDED FOR PAIN, Disp: , Rfl:     ketoconazole (NIZORAL) 2 % cream, Apply to the feet and groin BID until clear then PRN for flares., Disp: 60 g, Rfl: 2    ketorolac (TORADOL) 10 mg tablet, ketorolac 10 mg tablet  TAKE ONE TABLET BY MOUTH EVERY 6 HOURS AS NEEDED FOR PAIN - cluster headache, Disp: , Rfl:     lasmiditan (REYVOW) tablet 100 mg, Take 1 tablet (100 mg) by mouth daily as needed (migraine)., Disp: 8 tablet, Rfl: 1    LIDOcaine (LIDODERM) 5 %, Place 1 patch onto the skin daily as needed (Pain). Remove & Discard patch within 12 hours or as directed by MD, Disp: 15 patch, Rfl: 0    omeprazole (PRILOSEC) 40 MG capsule, TAKE ONE CAPSULE BY MOUTH ONCE A DAY, Disp: 90  capsule, Rfl: 2    prochlorperazine (COMPAZINE) 10 MG tablet, Take 1 tablet (10 mg total) by mouth every 6 (six) hours as needed (migraine or nausea)., Disp: 60 tablet, Rfl: 11    sildenafiL (VIAGRA) 100 MG tablet, Take 1 tablet (100 mg total) by mouth daily as needed for Erectile Dysfunction., Disp: 10 tablet, Rfl: 10    triamterene-hydrochlorothiazide 37.5-25 mg (DYAZIDE) 37.5-25 mg per capsule, TAKE ONE CAPSULE BY MOUTH EVERY MORNING, Disp: 90 capsule, Rfl: 2    ubrogepant (UBRELVY) 100 mg tablet, Ubrelvy 100 mg tablet, Disp: , Rfl:     verapamiL (CALAN-SR) 240 MG CR tablet, TAKE ONE TABLET BY MOUTH EVERY EVENING, Disp: 90 tablet, Rfl: 1    Past Medical History:   Diagnosis Date    Additional heart attack     10 years ago    Basal cell carcinoma 12/03/2020    right forearm- JAM     Eye pain     left eye    GERD (gastroesophageal reflux disease)     Headache     Hyperlipemia     Hypertension        Past Surgical History:   Procedure Laterality Date    BACK SURGERY      COLONOSCOPY N/A 4/3/2023    Procedure: COLONOSCOPY;  Surgeon: Leonardo Bright Jr., MD;  Location: [facility];  Service: Endoscopy;  Laterality: N/A;    HERNIA REPAIR      KNEE SURGERY Left     SHOULDER SURGERY Right     SKIN BIOPSY  12/03/2020       Family History   Problem Relation Age of Onset    Hypertension Mother     Hyperlipidemia Mother     Diabetes Father     Cancer Maternal Aunt     Cancer Paternal Aunt     Cancer Maternal Grandmother     Melanoma Maternal Grandmother     Cancer Maternal Grandfather     Colon cancer Paternal Grandfather        Social History     Socioeconomic History    Marital status:    Tobacco Use    Smoking status: Every Day     Current packs/day: 0.50     Average packs/day: 0.5 packs/day for 34.0 years (17.0 ttl pk-yrs)     Types: Cigarettes    Smokeless tobacco: Never    Tobacco comments:     is about to quit   Substance and Sexual Activity    Alcohol use: Yes     Alcohol/week: 1.0 standard drink of alcohol      Types: 1 Cans of beer per week     Comment: per week    Drug use: No    Sexual activity: Yes     Partners: Female     Social Determinants of Health     Financial Resource Strain: Low Risk  (1/10/2022)    Overall Financial Resource Strain (CARDIA)     Difficulty of Paying Living Expenses: Not hard at all   Food Insecurity: No Food Insecurity (1/10/2022)    Hunger Vital Sign     Worried About Running Out of Food in the Last Year: Never true     Ran Out of Food in the Last Year: Never true   Transportation Needs: No Transportation Needs (1/10/2022)    PRAPARE - Transportation     Lack of Transportation (Medical): No     Lack of Transportation (Non-Medical): No   Physical Activity: Insufficiently Active (1/10/2022)    Exercise Vital Sign     Days of Exercise per Week: 3 days     Minutes of Exercise per Session: 30 min   Stress: No Stress Concern Present (1/10/2022)    [employer] [employer] - Occupational Stress Questionnaire     Feeling of Stress : Only a little   Social Connections: Unknown (1/10/2022)    Social Connection and Isolation Panel [NHANES]     Frequency of Communication with Friends and Family: More than three times a week     Frequency of Social Gatherings with Friends and Family: Once a week     Active Member of Clubs or Organizations: No     Attends Club or Organization Meetings: Never     Marital Status:    Housing Stability: Low Risk  (1/10/2022)    Housing Stability Vital Sign     Unable to Pay for Housing in the Last Year: No     Number of Places Lived in the Last Year: 1     Unstable Housing in the Last Year: No       Review of patient's allergies indicates:   Allergen Reactions    Doxycycline Anaphylaxis     Other reaction(s): nausea  Other reaction(s): Not Indicated    Azithromycin      Other reaction(s): Not Indicated    Erythromycin Other (See Comments)     Other reaction(s): Nausea  Pain with IV use       Labs:  Lab Results   Component Value Date    HGBA1C 5.3  03/30/2023       Lab Results   Component Value Date    WBC 7.96 03/03/2017    HGB 15.9 03/03/2017    HCT 45.5 03/03/2017    MCV 91 03/03/2017     03/03/2017           Review of Systems:  Low back pain.  Left leg pain.  Balance of review of systems is negative.    Physical Exam:  Vitals:    01/17/24 1121   BP: (!) 177/115   Pulse: 77   Weight: 119.7 kg (264 lb)   PainSc:   6   PainLoc: Back     Body mass index is 35.8 kg/m².    Pain disability index:      1/17/2024    11:24 AM   Last 3 PDI Scores   Pain Disability Index (PDI) 58       Gen: NAD  Psych: mood appropriate for given condition  HEENT: eyes anicteric   CV: RRR, 2+ radial pulse  Respiratory: non-labored, no signs of respiratory distress  Abd: non-distended  Skin: warm, dry and intact.  Gait: Able to heel walk, toe walk. No antalgic gait.     Coordination:   Tandem walking coordination: normal    Cervical spine: ROM is full in flexion, extension and lateral rotation without increased pain.  Spurling's maneuver causes no neck pain to either side.  Myofascial exam: No Tenderness to palpation across cervical paraspinous region bilaterally.    Lumbar spine:  Lumbar spine: ROM is full with flexion extension and oblique extension with no increased pain.    Kostas's test causes no increased pain on either side.    Supine straight leg raise is negative bilaterally.    Internal and external rotation of the hip causes no increased pain on either side.  Myofascial exam: No tenderness to palpation across lumbar paraspinous muscles. No tenderness to palpation over the bilateral greater trochanters and bilateral SI joint    Sensory:  Intact and symmetrical to light touch in C4-T1 dermatomes bilaterally. Intact and symmetrical to light touch in L1-S1 dermatomes bilaterally.; isolated right atnerior thigh numbness in a small circular area.     Motor:    Right Left   C4 Shoulder Abduction  5  5   C5 Elbow Flexion    5  5   C6 Wrist Extension  5  5   C7 Elbow Extension    5  5   C8/T1 Hand Intrinsics   5  5        Right Left   L2/3 Iliacus Hip flexion  5  5   L3/4 Qudratus Femoris Knee Extension  5  5   L4/5 Tib Anterior Ankle Dorsiflexion   5  5   L5/S1 Extensor Hallicus Longus Great toe extension  5  5   S1/S2 Gastroc/Soleus Plantar Flexion  5  5      Right Left   Triceps DTR 2+ 2+   Biceps DTR 2+ 2+   Brachioradialis DTR 2+ 2+   Patellar DTR 2+ 2+   Achilles DTR 2+ 2+   Vincent Absent  Absent   Clonus Absent Absent   Babinski Absent Absent       Imaging:  Xray lumbar spinef rom 12-12-23:  ostsurgical changes of posterior interbody fusion from L4 through S1.  There are bilateral L4 and S1 screws with the left-sided L5 screw.  There has been right-sided laminectomy at the levels of fusion.  Hardware is intact without fracture or loosening.  The lumbar vertebral bodies maintain normal height and alignment.  There is moderate to marked disc space narrowing at the L4-5 and L5-S1 postoperative levels.  No instability is demonstrated with flexion or extension.       Assessment:   Jovan Mcwilliams is a 58 y.o. year old male patient who has a past medical history of Additional heart attack, Basal cell carcinoma, Eye pain, GERD (gastroesophageal reflux disease), Headache, Hyperlipemia, and Hypertension. He presents in self referral for lower back and left thigh pain.  Possible myofacial pain vs new radiculoapthy from disk hernia vs neural compression from adjacent level dissesa depending on wehre the fusion was. No improvement with medications or PT.    Plan:  - recommend proceeding with MRI for further evaluation   - follow up after imaging to review results and determine if he is a candidate for RICHARD or other procedures.     Problem List Items Addressed This Visit    None  Visit Diagnoses       Dorsalgia, unspecified    -  Primary    Relevant Orders    MRI Lumbar Spine Without Contrast    Lumbar radiculopathy, chronic        Relevant Orders    MRI Lumbar Spine Without Contrast             Follow Up: RTC after imaging.     : Reviewed and consistent with medication use as prescribed.           Sadia Shankar PA-C  [facility]

## 2024-02-01 ENCOUNTER — HOSPITAL ENCOUNTER (OUTPATIENT)
Dept: RADIOLOGY | Facility: HOSPITAL | Age: 59
Discharge: HOME | End: 2024-02-01
Attending: PHYSICIAN ASSISTANT
Payer: COMMERCIAL

## 2024-02-01 DIAGNOSIS — M54.9 DORSALGIA, UNSPECIFIED: ICD-10-CM

## 2024-02-01 DIAGNOSIS — M54.16 LUMBAR RADICULOPATHY, CHRONIC: ICD-10-CM

## 2024-02-01 PROCEDURE — 72148 MRI LUMBAR SPINE W/O DYE: CPT | Mod: 26,,, | Performed by: RADIOLOGY

## 2024-02-01 PROCEDURE — 72148 MRI LUMBAR SPINE W/O DYE: CPT | Mod: TC,PO

## 2024-02-02 ENCOUNTER — HOSPITAL ENCOUNTER (OUTPATIENT)
Dept: SLEEP MEDICINE | Facility: HOSPITAL | Age: 59
Discharge: HOME | End: 2024-02-02
Attending: NURSE PRACTITIONER
Payer: COMMERCIAL

## 2024-02-02 DIAGNOSIS — G47.33 OSA (OBSTRUCTIVE SLEEP APNEA): ICD-10-CM

## 2024-02-02 PROCEDURE — 95806 SLEEP STUDY UNATT&RESP EFFT: CPT | Performed by: INTERNAL MEDICINE

## 2024-02-02 NOTE — Clinical Note
2 night study MODERATE OBSTRUCTIVE SLEEP APNEA with overall AHI 17.6/hr (94 events): night # 2 Oxygen desaturation: 81%. SpO2 between 85% to 89% for 9 min. Patient snored 97% time above 50 . Heart rate range: 56 bpm - 79bpm REC's: Therapy with APAP at 6-20 cm WP using mask of choice with heated humidification is an option. Please refer to sleep disorders clinic Weight loss/management. with regular exercise per direction of physician. Avoid drowsy driving. Follow up in sleep clinic to maximize adherence and ensure resolution of symptoms

## 2024-02-05 PROBLEM — G47.33 OSA (OBSTRUCTIVE SLEEP APNEA): Status: ACTIVE | Noted: 2024-02-05

## 2024-02-05 PROCEDURE — 95806 SLEEP STUDY UNATT&RESP EFFT: CPT | Mod: 26,,, | Performed by: INTERNAL MEDICINE

## 2024-02-05 NOTE — PROCEDURES
"2 night study  MODERATE OBSTRUCTIVE SLEEP APNEA with overall AHI 17.6/hr (94 events):  night # 2  Oxygen desaturation: 81%. SpO2 between 85% to 89% for 9 min.  Patient snored 97% time above 50 .  Heart rate range: 56 bpm - 79bpm  REC's:  Therapy with APAP at 6-20 cm WP using mask of choice with heated humidification is an option.  Please refer to sleep disorders clinic  Weight loss/management. with regular exercise per direction of physician.  Avoid drowsy driving.  Follow up in sleep clinic to maximize adherence and ensure resolution of symptoms      [place], [place]  [id number] [facility]  [place] [id number]/Amauri Dickerson Jr., MD         The sleep study that you ordered is complete.  You have ordered sleep LAB services to perform the sleep study for Jovan Mcwilliams .      Please find Sleep Study result in  the "Media tab" of Chart Review menu.        You can look  for the report in the  Media by the document type "Sleep Study Documents". Alphabetizing  "Document type" column helps to find the SLEEP STUDY report  Faster.       As the ordering provider, you are responsible for reviewing the results and implementing a treatment plan with your patient.    If you need a Sleep Medicine provider to explain the sleep study findings and arrange treatment for the patient, please refer patient for consultation to our Sleep Clinic via [facility] with Ambulatory Consult Sleep.     To do that please place an order for an  "Ambulatory Consult Sleep" -  order , it will go to our clinic work queue for our staff  to contact the patient for an appointment.      For any questions, please contact our sleep lab  staff at [phone] to talk to clinical staff          Olvin Remy MD   "

## 2024-02-06 ENCOUNTER — TELEPHONE (OUTPATIENT)
Dept: PAIN MEDICINE | Facility: CLINIC | Age: 59
End: 2024-02-06

## 2024-02-06 ENCOUNTER — OFFICE VISIT (OUTPATIENT)
Dept: PAIN MEDICINE | Facility: CLINIC | Age: 59
End: 2024-02-06
Payer: COMMERCIAL

## 2024-02-06 VITALS
SYSTOLIC BLOOD PRESSURE: 159 MMHG | DIASTOLIC BLOOD PRESSURE: 99 MMHG | WEIGHT: 264.69 LBS | BODY MASS INDEX: 35.89 KG/M2 | HEART RATE: 72 BPM

## 2024-02-06 DIAGNOSIS — Z98.1 HISTORY OF LUMBAR FUSION: ICD-10-CM

## 2024-02-06 DIAGNOSIS — M47.816 LUMBAR SPONDYLOSIS: ICD-10-CM

## 2024-02-06 DIAGNOSIS — M54.16 LUMBAR RADICULOPATHY: Primary | ICD-10-CM

## 2024-02-06 PROCEDURE — 1159F MED LIST DOCD IN RCRD: CPT | Mod: CPTII,S$GLB,, | Performed by: PHYSICIAN ASSISTANT

## 2024-02-06 PROCEDURE — 99214 OFFICE O/P EST MOD 30 MIN: CPT | Mod: S$GLB,,, | Performed by: PHYSICIAN ASSISTANT

## 2024-02-06 PROCEDURE — 99999 PR PBB SHADOW E&M-EST. PATIENT-LVL IV: CPT | Mod: PBBFAC,,, | Performed by: PHYSICIAN ASSISTANT

## 2024-02-06 PROCEDURE — 3077F SYST BP >= 140 MM HG: CPT | Mod: CPTII,S$GLB,, | Performed by: PHYSICIAN ASSISTANT

## 2024-02-06 PROCEDURE — 1160F RVW MEDS BY RX/DR IN RCRD: CPT | Mod: CPTII,S$GLB,, | Performed by: PHYSICIAN ASSISTANT

## 2024-02-06 PROCEDURE — 3008F BODY MASS INDEX DOCD: CPT | Mod: CPTII,S$GLB,, | Performed by: PHYSICIAN ASSISTANT

## 2024-02-06 PROCEDURE — 3080F DIAST BP >= 90 MM HG: CPT | Mod: CPTII,S$GLB,, | Performed by: PHYSICIAN ASSISTANT

## 2024-02-06 NOTE — TELEPHONE ENCOUNTER
Physician - Dr Trivedi    Type of Procedure/Injection - Lumbar Transforaminal Epidural  L3/4           Laterality - Left      Anxiolysis- Local      Need to hold medication - No      N/A          Clearance needed - No      Follow up - 2 week

## 2024-02-07 ENCOUNTER — PATIENT MESSAGE (OUTPATIENT)
Dept: PAIN MEDICINE | Facility: CLINIC | Age: 59
End: 2024-02-07
Payer: COMMERCIAL

## 2024-02-07 ENCOUNTER — PATIENT MESSAGE (OUTPATIENT)
Dept: SLEEP MEDICINE | Facility: CLINIC | Age: 59
End: 2024-02-07
Payer: COMMERCIAL

## 2024-02-07 DIAGNOSIS — M54.16 LUMBAR RADICULOPATHY: Primary | ICD-10-CM

## 2024-02-07 RX ORDER — ALPRAZOLAM 1 MG/1
1 TABLET, ORALLY DISINTEGRATING ORAL ONCE AS NEEDED
Status: CANCELLED | OUTPATIENT
Start: 2024-02-07 | End: 2035-07-06

## 2024-02-07 NOTE — H&P (VIEW-ONLY)
[facility] Established Patient        PCP:  Amauri Dickerson MD    CC:   Chief Complaint   Patient presents with    Follow-up          HPI:     Mr. Aldana presents for follow up of lower back and left leg pain after undergoing MRI.  No significant changes from prior visit.  He continues with pain in the left lower back to the mid thigh to the knee.  It is also associated with pain in the left ankle.  He has tied medrol and flexeril without improvement.   Currently taking ibuprofen and tylenol..     Initial HPI:  Jovan Mcwilliams is a 58 y.o. year old male patient who has a past medical history of Additional heart attack, Basal cell carcinoma, Eye pain, GERD (gastroesophageal reflux disease), Headache, Hyperlipemia, and Hypertension. He presents in self referral for lower back pain.  He has history of two lumbar spine surgeries.  1995 diskectomy that left him with chronic right anterior thigh numbness.  2005 lumbar fusion after which he did really well with no complications or long term issues.  Current pain started 2-3 months ago.  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  He has pain in the left lower back to the left posterior thigh to the outer left knee.  The left leg has given out on him due to pain.  He has tried ibuprofen and tylenol.  He is taking motrin and tried flexeril in the past.     Denies bowel/ bladder incontinence.    Past and current medications:  Antineuropathics:  NSAIDs: motrin  Antidepressants:  Muscle relaxers: (past - flexeril)  Opioids: has tramadol at home, not taking  Antiplatelets/Anticoagulants:  Others:  tylenol; tried medrol taper    Physical Therapy/ Chiropractic care:  PT - current trial with no improvement.     Pain Intervention History:  none    Past Spine Surgical History:  1995 diskectomy that left him with chronic right anterior thigh numbness.    2005 lumbar fusion after which he did  really well with no complications or long term issues; Dr. Dutch So        History:    Current Outpatient Medications:     albuterol (PROVENTIL/VENTOLIN HFA) 90 mcg/actuation inhaler, Inhale 2 puffs into the lungs every 4 (four) hours as needed for Wheezing or Shortness of Breath., Disp: 18 g, Rfl: 5    atorvastatin (LIPITOR) 40 MG tablet, TAKE ONE TABLET BY MOUTH DAILY, Disp: 90 tablet, Rfl: 2    butalbital-acetaminophen-caffeine -40 mg (FIORICET, ESGIC) -40 mg per tablet, Take 2 tablets by mouth every 12 hours as needed for rescue if lidocaine fails, Disp: 14 tablet, Rfl: 1    clotrimazole-betamethasone 1-0.05% (LOTRISONE) cream, Apply topically 2 (two) times daily., Disp: 15 g, Rfl: 1    cyclobenzaprine (FLEXERIL) 10 MG tablet, Take 1 tablet (10 mg total) by mouth nightly as needed for Muscle spasms., Disp: 40 tablet, Rfl: 0    divalproex (DEPAKOTE) 500 MG TbEC, Take 1 tablet (500 mg total) by mouth 2 (two) times daily., Disp: 60 tablet, Rfl: 1    galcanezumab-gnlm 300 mg/3 mL (100 mg/mL x 3) Syrg, Inject 3 mLs (300 mg total) into the skin every 28 days., Disp: 3 mL, Rfl: 11    HYDROcodone-acetaminophen (NORCO) 7.5-325 mg per tablet, hydrocodone 7.5 mg-acetaminophen 325 mg tablet  TAKE ONE TABLET BY MOUTH EVERY 4 HOURS AS NEEDED FOR PAIN, Disp: , Rfl:     ibuprofen (ADVIL,MOTRIN) 600 MG tablet, ibuprofen 600 mg tablet  TAKE ONE TABLET BY MOUTH EVERY 6 HOURS AS NEEDED FOR PAIN, Disp: , Rfl:     ketoconazole (NIZORAL) 2 % cream, Apply to the feet and groin BID until clear then PRN for flares., Disp: 60 g, Rfl: 2    ketorolac (TORADOL) 10 mg tablet, ketorolac 10 mg tablet  TAKE ONE TABLET BY MOUTH EVERY 6 HOURS AS NEEDED FOR PAIN - cluster headache, Disp: , Rfl:     lasmiditan (REYVOW) tablet 100 mg, Take 1 tablet (100 mg) by mouth daily as needed (migraine)., Disp: 8 tablet, Rfl: 1    LIDOcaine (LIDODERM) 5 %, Place 1 patch onto the skin daily as needed (Pain). Remove & Discard patch within 12  hours or as directed by MD, Disp: 15 patch, Rfl: 0    omeprazole (PRILOSEC) 40 MG capsule, TAKE ONE CAPSULE BY MOUTH ONCE A DAY, Disp: 90 capsule, Rfl: 2    prochlorperazine (COMPAZINE) 10 MG tablet, Take 1 tablet (10 mg total) by mouth every 6 (six) hours as needed (migraine or nausea)., Disp: 60 tablet, Rfl: 11    sildenafiL (VIAGRA) 100 MG tablet, Take 1 tablet (100 mg total) by mouth daily as needed for Erectile Dysfunction., Disp: 10 tablet, Rfl: 10    triamterene-hydrochlorothiazide 37.5-25 mg (DYAZIDE) 37.5-25 mg per capsule, TAKE ONE CAPSULE BY MOUTH EVERY MORNING, Disp: 90 capsule, Rfl: 2    ubrogepant (UBRELVY) 100 mg tablet, Ubrelvy 100 mg tablet, Disp: , Rfl:     verapamiL (CALAN-SR) 240 MG CR tablet, TAKE ONE TABLET BY MOUTH EVERY EVENING, Disp: 90 tablet, Rfl: 1    Past Medical History:   Diagnosis Date    Additional heart attack     10 years ago    Basal cell carcinoma 12/03/2020    right forearm- JAM     Eye pain     left eye    GERD (gastroesophageal reflux disease)     Headache     Hyperlipemia     Hypertension        Past Surgical History:   Procedure Laterality Date    BACK SURGERY      COLONOSCOPY N/A 4/3/2023    Procedure: COLONOSCOPY;  Surgeon: Leonardo Bright Jr., MD;  Location: [facility];  Service: Endoscopy;  Laterality: N/A;    HERNIA REPAIR      KNEE SURGERY Left     SHOULDER SURGERY Right     SKIN BIOPSY  12/03/2020       Family History   Problem Relation Age of Onset    Hypertension Mother     Hyperlipidemia Mother     Diabetes Father     Cancer Maternal Aunt     Cancer Paternal Aunt     Cancer Maternal Grandmother     Melanoma Maternal Grandmother     Cancer Maternal Grandfather     Colon cancer Paternal Grandfather        Social History     Socioeconomic History    Marital status:    Tobacco Use    Smoking status: Every Day     Current packs/day: 0.50     Average packs/day: 0.5 packs/day for 34.0 years (17.0 ttl pk-yrs)     Types: Cigarettes    Smokeless tobacco: Never     Tobacco comments:     is about to quit   Substance and Sexual Activity    Alcohol use: Yes     Alcohol/week: 1.0 standard drink of alcohol     Types: 1 Cans of beer per week     Comment: per week    Drug use: No    Sexual activity: Yes     Partners: Female     Social Determinants of Health     Financial Resource Strain: Low Risk  (1/10/2022)    Overall Financial Resource Strain (CARDIA)     Difficulty of Paying Living Expenses: Not hard at all   Food Insecurity: No Food Insecurity (1/10/2022)    Hunger Vital Sign     Worried About Running Out of Food in the Last Year: Never true     Ran Out of Food in the Last Year: Never true   Transportation Needs: No Transportation Needs (1/10/2022)    PRAPARE - Transportation     Lack of Transportation (Medical): No     Lack of Transportation (Non-Medical): No   Physical Activity: Insufficiently Active (1/10/2022)    Exercise Vital Sign     Days of Exercise per Week: 3 days     Minutes of Exercise per Session: 30 min   Stress: No Stress Concern Present (1/10/2022)    Citizen of the [place] [employer] Occup[employer] - Occupational Stress Questionnaire     Feeling of Stress : Only a little   Social Connections: Unknown (1/10/2022)    Social Connection and Isolation Panel [NHANES]     Frequency of Communication with Friends and Family: More than three times a week     Frequency of Social Gatherings with Friends and Family: Once a week     Active Member of Clubs or Organizations: No     Attends Club or Organization Meetings: Never     Marital Status:    Housing Stability: Low Risk  (1/10/2022)    Housing Stability Vital Sign     Unable to Pay for Housing in the Last Year: No     Number of Places Lived in the Last Year: 1     Unstable Housing in the Last Year: No       Review of patient's allergies indicates:   Allergen Reactions    Doxycycline Anaphylaxis     Other reaction(s): nausea  Other reaction(s): Not Indicated    Azithromycin      Other reaction(s): Not Indicated     Erythromycin Other (See Comments)     Other reaction(s): Nausea  Pain with IV use       Labs:  Lab Results   Component Value Date    HGBA1C 5.3 03/30/2023       Lab Results   Component Value Date    WBC 7.96 03/03/2017    HGB 15.9 03/03/2017    HCT 45.5 03/03/2017    MCV 91 03/03/2017     03/03/2017           Review of Systems:  Low back pain.  Left leg pain.  Balance of review of systems is negative.    Physical Exam:  Vitals:    02/06/24 1020   BP: (!) 159/99   Pulse: 72   Weight: 120.1 kg (264 lb 10.6 oz)   PainSc:   3   PainLoc: Back     Body mass index is 35.89 kg/m².    Pain disability index:      2/6/2024    10:20 AM 1/17/2024    11:24 AM   Last 3 PDI Scores   Pain Disability Index (PDI) 29 58       Gen: NAD  Psych: mood appropriate for given condition  HEENT: eyes anicteric   CV: RRR, 2+ radial pulse  Respiratory: non-labored, no signs of respiratory distress  Abd: non-distended  Skin: warm, dry and intact.  Gait: Able to heel walk, toe walk. No antalgic gait.     Coordination:   Tandem walking coordination: normal    Cervical spine: ROM is full in flexion, extension and lateral rotation without increased pain.  Spurling's maneuver causes no neck pain to either side.  Myofascial exam: No Tenderness to palpation across cervical paraspinous region bilaterally.    Lumbar spine:  Lumbar spine: ROM is full with flexion extension and oblique extension with no increased pain.    Kostas's test causes no increased pain on either side.    Supine straight leg raise is negative bilaterally.    Internal and external rotation of the hip causes no increased pain on either side.  Myofascial exam: No tenderness to palpation across lumbar paraspinous muscles. No tenderness to palpation over the bilateral greater trochanters and bilateral SI joint    Sensory:  Intact and symmetrical to light touch in C4-T1 dermatomes bilaterally. Intact and symmetrical to light touch in L1-S1 dermatomes bilaterally.; isolated right  atnerior thigh numbness in a small circular area.     Motor:    Right Left   C4 Shoulder Abduction  5  5   C5 Elbow Flexion    5  5   C6 Wrist Extension  5  5   C7 Elbow Extension   5  5   C8/T1 Hand Intrinsics   5  5        Right Left   L2/3 Iliacus Hip flexion  5  5   L3/4 Qudratus Femoris Knee Extension  5  5   L4/5 Tib Anterior Ankle Dorsiflexion   5  5   L5/S1 Extensor Hallicus Longus Great toe extension  5  5   S1/S2 Gastroc/Soleus Plantar Flexion  5  5      Right Left   Triceps DTR 2+ 2+   Biceps DTR 2+ 2+   Brachioradialis DTR 2+ 2+   Patellar DTR 2+ 2+   Achilles DTR 2+ 2+   Vincent Absent  Absent   Clonus Absent Absent   Babinski Absent Absent       Imaging:  Xray lumbar spinef rom 12-12-23:  postsurgical changes of posterior interbody fusion from L4 through S1.  There are bilateral L4 and S1 screws with the left-sided L5 screw.  There has been right-sided laminectomy at the levels of fusion.  Hardware is intact without fracture or loosening.  The lumbar vertebral bodies maintain normal height and alignment.  There is moderate to marked disc space narrowing at the L4-5 and L5-S1 postoperative levels.  No instability is demonstrated with flexion or extension.     MRI lumbar spine 2-1-24 independently reviewed.  L3/4 facet hypertrophy with bilateral foraminal narrowing.  L4/5 post op changes and right facet cyst.  No right L5 screw.  L5/S1 post op changes, facet arthropathy, with right foraminal narrowing.  Right kidney cyst.    Assessment:   Jovan Mcwilliams is a 58 y.o. year old male patient who has a past medical history of Additional heart attack, Basal cell carcinoma, Eye pain, GERD (gastroesophageal reflux disease), Headache, Hyperlipemia, and Hypertension. He presents in self referral for lower back and left thigh pain.  Possible myofacial pain vs left L3, L4 radiculoathy from L3/4 degerative change and foraminal narrowing from adjacent level disease.   No improvement with medications or PT.    Plan:  -  given no improvement with meds or PT thus far, we discussed interventional procedures.  - discussed left L3/4 transforaminal ES! To be both diagnostic and therapeutic vs caudal RICHARD.  - he would like to proceed with L3/4 left transforaminal RICHARD.  - follow with PCP for right kidney cyst; he has appt later this week.  - will try tramadol he has at home for further pain control until he has RICHARD.  May call if he needs refill of tramadol prior to RICHARD; he is unsure how many tablets he has at home.    Problem List Items Addressed This Visit    None  Visit Diagnoses       Lumbar radiculopathy    -  Primary    Relevant Orders    Procedure Order to Pain Management    History of lumbar fusion        Lumbar spondylosis        Relevant Orders    Procedure Order to Pain Management            Follow Up: RTC with pain management after RICHARD.    : Reviewed and consistent with medication use as prescribed.           Sadia Shankar PA-C  [facility]

## 2024-02-07 NOTE — TELEPHONE ENCOUNTER
Called and spoke with patient to schedule. Pre op information given and follow up appointment scheduled.   27-Oct-2022 01:44

## 2024-02-07 NOTE — PROGRESS NOTES
[facility] Established Patient        PCP:  Amauri Dickerson MD    CC:   Chief Complaint   Patient presents with    Follow-up          HPI:     Mr. Aldana presents for follow up of lower back and left leg pain after undergoing MRI.  No significant changes from prior visit.  He continues with pain in the left lower back to the mid thigh to the knee.  It is also associated with pain in the left ankle.  He has tied medrol and flexeril without improvement.   Currently taking ibuprofen and tylenol..     Initial HPI:  Jovan Mcwilliams is a 58 y.o. year old male patient who has a past medical history of Additional heart attack, Basal cell carcinoma, Eye pain, GERD (gastroesophageal reflux disease), Headache, Hyperlipemia, and Hypertension. He presents in self referral for lower back pain.  He has history of two lumbar spine surgeries.  1995 diskectomy that left him with chronic right anterior thigh numbness.  2005 lumbar fusion after which he did really well with no complications or long term issues.  Current pain started 2-3 months ago.  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  He has pain in the left lower back to the left posterior thigh to the outer left knee.  The left leg has given out on him due to pain.  He has tried ibuprofen and tylenol.  He is taking motrin and tried flexeril in the past.     Denies bowel/ bladder incontinence.    Past and current medications:  Antineuropathics:  NSAIDs: motrin  Antidepressants:  Muscle relaxers: (past - flexeril)  Opioids: has tramadol at home, not taking  Antiplatelets/Anticoagulants:  Others:  tylenol; tried medrol taper    Physical Therapy/ Chiropractic care:  PT - current trial with no improvement.     Pain Intervention History:  none    Past Spine Surgical History:  1995 diskectomy that left him with chronic right anterior thigh numbness.    2005 lumbar fusion after which he did  really well with no complications or long term issues; Dr. Dutch So        History:    Current Outpatient Medications:     albuterol (PROVENTIL/VENTOLIN HFA) 90 mcg/actuation inhaler, Inhale 2 puffs into the lungs every 4 (four) hours as needed for Wheezing or Shortness of Breath., Disp: 18 g, Rfl: 5    atorvastatin (LIPITOR) 40 MG tablet, TAKE ONE TABLET BY MOUTH DAILY, Disp: 90 tablet, Rfl: 2    butalbital-acetaminophen-caffeine -40 mg (FIORICET, ESGIC) -40 mg per tablet, Take 2 tablets by mouth every 12 hours as needed for rescue if lidocaine fails, Disp: 14 tablet, Rfl: 1    clotrimazole-betamethasone 1-0.05% (LOTRISONE) cream, Apply topically 2 (two) times daily., Disp: 15 g, Rfl: 1    cyclobenzaprine (FLEXERIL) 10 MG tablet, Take 1 tablet (10 mg total) by mouth nightly as needed for Muscle spasms., Disp: 40 tablet, Rfl: 0    divalproex (DEPAKOTE) 500 MG TbEC, Take 1 tablet (500 mg total) by mouth 2 (two) times daily., Disp: 60 tablet, Rfl: 1    galcanezumab-gnlm 300 mg/3 mL (100 mg/mL x 3) Syrg, Inject 3 mLs (300 mg total) into the skin every 28 days., Disp: 3 mL, Rfl: 11    HYDROcodone-acetaminophen (NORCO) 7.5-325 mg per tablet, hydrocodone 7.5 mg-acetaminophen 325 mg tablet  TAKE ONE TABLET BY MOUTH EVERY 4 HOURS AS NEEDED FOR PAIN, Disp: , Rfl:     ibuprofen (ADVIL,MOTRIN) 600 MG tablet, ibuprofen 600 mg tablet  TAKE ONE TABLET BY MOUTH EVERY 6 HOURS AS NEEDED FOR PAIN, Disp: , Rfl:     ketoconazole (NIZORAL) 2 % cream, Apply to the feet and groin BID until clear then PRN for flares., Disp: 60 g, Rfl: 2    ketorolac (TORADOL) 10 mg tablet, ketorolac 10 mg tablet  TAKE ONE TABLET BY MOUTH EVERY 6 HOURS AS NEEDED FOR PAIN - cluster headache, Disp: , Rfl:     lasmiditan (REYVOW) tablet 100 mg, Take 1 tablet (100 mg) by mouth daily as needed (migraine)., Disp: 8 tablet, Rfl: 1    LIDOcaine (LIDODERM) 5 %, Place 1 patch onto the skin daily as needed (Pain). Remove & Discard patch within 12  hours or as directed by MD, Disp: 15 patch, Rfl: 0    omeprazole (PRILOSEC) 40 MG capsule, TAKE ONE CAPSULE BY MOUTH ONCE A DAY, Disp: 90 capsule, Rfl: 2    prochlorperazine (COMPAZINE) 10 MG tablet, Take 1 tablet (10 mg total) by mouth every 6 (six) hours as needed (migraine or nausea)., Disp: 60 tablet, Rfl: 11    sildenafiL (VIAGRA) 100 MG tablet, Take 1 tablet (100 mg total) by mouth daily as needed for Erectile Dysfunction., Disp: 10 tablet, Rfl: 10    triamterene-hydrochlorothiazide 37.5-25 mg (DYAZIDE) 37.5-25 mg per capsule, TAKE ONE CAPSULE BY MOUTH EVERY MORNING, Disp: 90 capsule, Rfl: 2    ubrogepant (UBRELVY) 100 mg tablet, Ubrelvy 100 mg tablet, Disp: , Rfl:     verapamiL (CALAN-SR) 240 MG CR tablet, TAKE ONE TABLET BY MOUTH EVERY EVENING, Disp: 90 tablet, Rfl: 1    Past Medical History:   Diagnosis Date    Additional heart attack     10 years ago    Basal cell carcinoma 12/03/2020    right forearm- JAM     Eye pain     left eye    GERD (gastroesophageal reflux disease)     Headache     Hyperlipemia     Hypertension        Past Surgical History:   Procedure Laterality Date    BACK SURGERY      COLONOSCOPY N/A 4/3/2023    Procedure: COLONOSCOPY;  Surgeon: Leonardo Bright Jr., MD;  Location: [facility];  Service: Endoscopy;  Laterality: N/A;    HERNIA REPAIR      KNEE SURGERY Left     SHOULDER SURGERY Right     SKIN BIOPSY  12/03/2020       Family History   Problem Relation Age of Onset    Hypertension Mother     Hyperlipidemia Mother     Diabetes Father     Cancer Maternal Aunt     Cancer Paternal Aunt     Cancer Maternal Grandmother     Melanoma Maternal Grandmother     Cancer Maternal Grandfather     Colon cancer Paternal Grandfather        Social History     Socioeconomic History    Marital status:    Tobacco Use    Smoking status: Every Day     Current packs/day: 0.50     Average packs/day: 0.5 packs/day for 34.0 years (17.0 ttl pk-yrs)     Types: Cigarettes    Smokeless tobacco: Never     Tobacco comments:     is about to quit   Substance and Sexual Activity    Alcohol use: Yes     Alcohol/week: 1.0 standard drink of alcohol     Types: 1 Cans of beer per week     Comment: per week    Drug use: No    Sexual activity: Yes     Partners: Female     Social Determinants of Health     Financial Resource Strain: Low Risk  (1/10/2022)    Overall Financial Resource Strain (CARDIA)     Difficulty of Paying Living Expenses: Not hard at all   Food Insecurity: No Food Insecurity (1/10/2022)    Hunger Vital Sign     Worried About Running Out of Food in the Last Year: Never true     Ran Out of Food in the Last Year: Never true   Transportation Needs: No Transportation Needs (1/10/2022)    PRAPARE - Transportation     Lack of Transportation (Medical): No     Lack of Transportation (Non-Medical): No   Physical Activity: Insufficiently Active (1/10/2022)    Exercise Vital Sign     Days of Exercise per Week: 3 days     Minutes of Exercise per Session: 30 min   Stress: No Stress Concern Present (1/10/2022)    [employer] - Occupational Stress Questionnaire     Feeling of Stress : Only a little   Social Connections: Unknown (1/10/2022)    Social Connection and Isolation Panel [NHANES]     Frequency of Communication with Friends and Family: More than three times a week     Frequency of Social Gatherings with Friends and Family: Once a week     Active Member of Clubs or Organizations: No     Attends Club or Organization Meetings: Never     Marital Status:    Housing Stability: Low Risk  (1/10/2022)    Housing Stability Vital Sign     Unable to Pay for Housing in the Last Year: No     Number of Places Lived in the Last Year: 1     Unstable Housing in the Last Year: No       Review of patient's allergies indicates:   Allergen Reactions    Doxycycline Anaphylaxis     Other reaction(s): nausea  Other reaction(s): Not Indicated    Azithromycin      Other reaction(s): Not Indicated     Erythromycin Other (See Comments)     Other reaction(s): Nausea  Pain with IV use       Labs:  Lab Results   Component Value Date    HGBA1C 5.3 03/30/2023       Lab Results   Component Value Date    WBC 7.96 03/03/2017    HGB 15.9 03/03/2017    HCT 45.5 03/03/2017    MCV 91 03/03/2017     03/03/2017           Review of Systems:  Low back pain.  Left leg pain.  Balance of review of systems is negative.    Physical Exam:  Vitals:    02/06/24 1020   BP: (!) 159/99   Pulse: 72   Weight: 120.1 kg (264 lb 10.6 oz)   PainSc:   3   PainLoc: Back     Body mass index is 35.89 kg/m².    Pain disability index:      2/6/2024    10:20 AM 1/17/2024    11:24 AM   Last 3 PDI Scores   Pain Disability Index (PDI) 29 58       Gen: NAD  Psych: mood appropriate for given condition  HEENT: eyes anicteric   CV: RRR, 2+ radial pulse  Respiratory: non-labored, no signs of respiratory distress  Abd: non-distended  Skin: warm, dry and intact.  Gait: Able to heel walk, toe walk. No antalgic gait.     Coordination:   Tandem walking coordination: normal    Cervical spine: ROM is full in flexion, extension and lateral rotation without increased pain.  Spurling's maneuver causes no neck pain to either side.  Myofascial exam: No Tenderness to palpation across cervical paraspinous region bilaterally.    Lumbar spine:  Lumbar spine: ROM is full with flexion extension and oblique extension with no increased pain.    Kostas's test causes no increased pain on either side.    Supine straight leg raise is negative bilaterally.    Internal and external rotation of the hip causes no increased pain on either side.  Myofascial exam: No tenderness to palpation across lumbar paraspinous muscles. No tenderness to palpation over the bilateral greater trochanters and bilateral SI joint    Sensory:  Intact and symmetrical to light touch in C4-T1 dermatomes bilaterally. Intact and symmetrical to light touch in L1-S1 dermatomes bilaterally.; isolated right  atnerior thigh numbness in a small circular area.     Motor:    Right Left   C4 Shoulder Abduction  5  5   C5 Elbow Flexion    5  5   C6 Wrist Extension  5  5   C7 Elbow Extension   5  5   C8/T1 Hand Intrinsics   5  5        Right Left   L2/3 Iliacus Hip flexion  5  5   L3/4 Qudratus Femoris Knee Extension  5  5   L4/5 Tib Anterior Ankle Dorsiflexion   5  5   L5/S1 Extensor Hallicus Longus Great toe extension  5  5   S1/S2 Gastroc/Soleus Plantar Flexion  5  5      Right Left   Triceps DTR 2+ 2+   Biceps DTR 2+ 2+   Brachioradialis DTR 2+ 2+   Patellar DTR 2+ 2+   Achilles DTR 2+ 2+   Vincent Absent  Absent   Clonus Absent Absent   Babinski Absent Absent       Imaging:  Xray lumbar spinef rom 12-12-23:  postsurgical changes of posterior interbody fusion from L4 through S1.  There are bilateral L4 and S1 screws with the left-sided L5 screw.  There has been right-sided laminectomy at the levels of fusion.  Hardware is intact without fracture or loosening.  The lumbar vertebral bodies maintain normal height and alignment.  There is moderate to marked disc space narrowing at the L4-5 and L5-S1 postoperative levels.  No instability is demonstrated with flexion or extension.     MRI lumbar spine 2-1-24 independently reviewed.  L3/4 facet hypertrophy with bilateral foraminal narrowing.  L4/5 post op changes and right facet cyst.  No right L5 screw.  L5/S1 post op changes, facet arthropathy, with right foraminal narrowing.  Right kidney cyst.    Assessment:   Jovan Mcwilliams is a 58 y.o. year old male patient who has a past medical history of Additional heart attack, Basal cell carcinoma, Eye pain, GERD (gastroesophageal reflux disease), Headache, Hyperlipemia, and Hypertension. He presents in self referral for lower back and left thigh pain.  Possible myofacial pain vs left L3, L4 radiculoathy from L3/4 degerative change and foraminal narrowing from adjacent level disease.   No improvement with medications or PT.    Plan:  -  given no improvement with meds or PT thus far, we discussed interventional procedures.  - discussed left L3/4 transforaminal ES! To be both diagnostic and therapeutic vs caudal RICHARD.  - he would like to proceed with L3/4 left transforaminal RICHARD.  - follow with PCP for right kidney cyst; he has appt later this week.  - will try tramadol he has at home for further pain control until he has RICHARD.  May call if he needs refill of tramadol prior to RICHARD; he is unsure how many tablets he has at home.    Problem List Items Addressed This Visit    None  Visit Diagnoses       Lumbar radiculopathy    -  Primary    Relevant Orders    Procedure Order to Pain Management    History of lumbar fusion        Lumbar spondylosis        Relevant Orders    Procedure Order to Pain Management            Follow Up: RTC with pain management after RICHARD.    : Reviewed and consistent with medication use as prescribed.           Sadia Shankar PA-C  [facility]

## 2024-02-08 DIAGNOSIS — Z98.1 HISTORY OF LUMBAR FUSION: ICD-10-CM

## 2024-02-08 DIAGNOSIS — M47.816 LUMBAR SPONDYLOSIS: ICD-10-CM

## 2024-02-08 DIAGNOSIS — M54.16 LUMBAR RADICULOPATHY: Primary | ICD-10-CM

## 2024-02-08 RX ORDER — TRAMADOL HYDROCHLORIDE 50 MG/1
50 TABLET ORAL EVERY 8 HOURS PRN
Qty: 20 TABLET | Refills: 0 | Status: SHIPPED | OUTPATIENT
Start: 2024-02-08

## 2024-02-08 NOTE — TELEPHONE ENCOUNTER
Dr. Trivedi -     Please consiider tramadol to help with pain prior to lumbar RICHARD scheduled.    Kindly -   Sadia Shankar PA-C  [facility]

## 2024-02-09 ENCOUNTER — OFFICE VISIT (OUTPATIENT)
Dept: FAMILY MEDICINE | Facility: CLINIC | Age: 59
End: 2024-02-09
Payer: COMMERCIAL

## 2024-02-09 ENCOUNTER — HOSPITAL ENCOUNTER (OUTPATIENT)
Dept: RADIOLOGY | Facility: HOSPITAL | Age: 59
Discharge: HOME | End: 2024-02-09
Attending: STUDENT IN AN ORGANIZED HEALTH CARE EDUCATION/TRAINING PROGRAM
Payer: COMMERCIAL

## 2024-02-09 VITALS
BODY MASS INDEX: 35.92 KG/M2 | HEIGHT: 72 IN | OXYGEN SATURATION: 96 % | DIASTOLIC BLOOD PRESSURE: 98 MMHG | HEART RATE: 71 BPM | SYSTOLIC BLOOD PRESSURE: 156 MMHG | WEIGHT: 265.19 LBS

## 2024-02-09 DIAGNOSIS — G44.011 INTRACTABLE EPISODIC CLUSTER HEADACHE: ICD-10-CM

## 2024-02-09 DIAGNOSIS — I25.10 CORONARY ARTERY DISEASE INVOLVING NATIVE CORONARY ARTERY OF NATIVE HEART WITHOUT ANGINA PECTORIS: ICD-10-CM

## 2024-02-09 DIAGNOSIS — N28.1 CYST OF RIGHT KIDNEY: ICD-10-CM

## 2024-02-09 DIAGNOSIS — E78.01 FAMILIAL HYPERCHOLESTEROLEMIA: ICD-10-CM

## 2024-02-09 DIAGNOSIS — M54.16 LUMBAR RADICULOPATHY: ICD-10-CM

## 2024-02-09 DIAGNOSIS — I10 PRIMARY HYPERTENSION: Primary | ICD-10-CM

## 2024-02-09 DIAGNOSIS — E66.01 CLASS 2 SEVERE OBESITY DUE TO EXCESS CALORIES WITH SERIOUS COMORBIDITY AND BODY MASS INDEX (BMI) OF 35.0 TO 35.9 IN ADULT: ICD-10-CM

## 2024-02-09 DIAGNOSIS — G47.33 OSA (OBSTRUCTIVE SLEEP APNEA): ICD-10-CM

## 2024-02-09 DIAGNOSIS — F17.210 CIGARETTE NICOTINE DEPENDENCE WITHOUT COMPLICATION: ICD-10-CM

## 2024-02-09 PROCEDURE — 3077F SYST BP >= 140 MM HG: CPT | Mod: CPTII,S$GLB,, | Performed by: STUDENT IN AN ORGANIZED HEALTH CARE EDUCATION/TRAINING PROGRAM

## 2024-02-09 PROCEDURE — 76775 US EXAM ABDO BACK WALL LIM: CPT | Mod: 26,,, | Performed by: RADIOLOGY

## 2024-02-09 PROCEDURE — 3080F DIAST BP >= 90 MM HG: CPT | Mod: CPTII,S$GLB,, | Performed by: STUDENT IN AN ORGANIZED HEALTH CARE EDUCATION/TRAINING PROGRAM

## 2024-02-09 PROCEDURE — 99214 OFFICE O/P EST MOD 30 MIN: CPT | Mod: S$GLB,,, | Performed by: STUDENT IN AN ORGANIZED HEALTH CARE EDUCATION/TRAINING PROGRAM

## 2024-02-09 PROCEDURE — 99999 PR PBB SHADOW E&M-EST. PATIENT-LVL V: CPT | Mod: PBBFAC,,, | Performed by: STUDENT IN AN ORGANIZED HEALTH CARE EDUCATION/TRAINING PROGRAM

## 2024-02-09 PROCEDURE — 1159F MED LIST DOCD IN RCRD: CPT | Mod: CPTII,S$GLB,, | Performed by: STUDENT IN AN ORGANIZED HEALTH CARE EDUCATION/TRAINING PROGRAM

## 2024-02-09 PROCEDURE — 76775 US EXAM ABDO BACK WALL LIM: CPT | Mod: TC,PO

## 2024-02-09 PROCEDURE — 3008F BODY MASS INDEX DOCD: CPT | Mod: CPTII,S$GLB,, | Performed by: STUDENT IN AN ORGANIZED HEALTH CARE EDUCATION/TRAINING PROGRAM

## 2024-02-09 RX ORDER — TRIAMTERENE AND HYDROCHLOROTHIAZIDE 75; 50 MG/1; MG/1
1 TABLET ORAL DAILY
Qty: 30 TABLET | Refills: 11 | Status: ON HOLD | OUTPATIENT
Start: 2024-02-09 | End: 2024-02-16 | Stop reason: HOSPADM

## 2024-02-09 RX ORDER — ATORVASTATIN CALCIUM 40 MG/1
40 TABLET, FILM COATED ORAL DAILY
Qty: 90 TABLET | Refills: 3 | Status: SHIPPED | OUTPATIENT
Start: 2024-02-09

## 2024-02-09 NOTE — ASSESSMENT & PLAN NOTE
Briefly reviewed weight loss as a tool to control blood pressure. May also help with his chronic back pain. Information provided in After Visit Summary.

## 2024-02-09 NOTE — PROGRESS NOTES
Name: Jovan Mcwilliams  MRN: [id number]  : 1965    HPI  Patient presents to establish care.    Review of Systems   HENT:  Positive for hearing loss (believes it related to prior occupation).    Eyes:  Negative for visual disturbance.   Respiratory:  Positive for apnea (DELMY diagnosis a week ago). Negative for shortness of breath (hasn't used inhaler for about 10 months).    Cardiovascular:  Negative for chest pain.   Neurological:  Negative for dizziness and light-headedness.        Patient Active Problem List   Diagnosis    Familial hypercholesterolemia    GERD (gastroesophageal reflux disease)    Primary hypertension    Cigarette nicotine dependence without complication    Class 2 severe obesity due to excess calories with serious comorbidity and body mass index (BMI) of 35.0 to 35.9 in adult    Elevated glucose    Positive colorectal cancer screening using Cologuard test    DELMY (obstructive sleep apnea)    Coronary artery disease involving native coronary artery of native heart without angina pectoris    Lumbar radiculopathy    Cyst of right kidney    Intractable episodic cluster headache       Current Outpatient Medications on File Prior to Visit   Medication Sig Dispense Refill    albuterol (PROVENTIL/VENTOLIN HFA) 90 mcg/actuation inhaler Inhale 2 puffs into the lungs every 4 (four) hours as needed for Wheezing or Shortness of Breath. 18 g 5    butalbital-acetaminophen-caffeine -40 mg (FIORICET, ESGIC) -40 mg per tablet Take 2 tablets by mouth every 12 hours as needed for rescue if lidocaine fails 14 tablet 1    clotrimazole-betamethasone 1-0.05% (LOTRISONE) cream Apply topically 2 (two) times daily. 15 g 1    divalproex (DEPAKOTE) 500 MG TbEC Take 1 tablet (500 mg total) by mouth 2 (two) times daily. 60 tablet 1    galcanezumab-gnlm 300 mg/3 mL (100 mg/mL x 3) Syrg Inject 3 mLs (300 mg total) into the skin every 28 days. 3 mL 11    ibuprofen (ADVIL,MOTRIN) 600 MG tablet ibuprofen 600 mg tablet    TAKE ONE TABLET BY MOUTH EVERY 6 HOURS AS NEEDED FOR PAIN      ketoconazole (NIZORAL) 2 % cream Apply to the feet and groin BID until clear then PRN for flares. 60 g 2    lasmiditan (REYVOW) tablet 100 mg Take 1 tablet (100 mg) by mouth daily as needed (migraine). 8 tablet 1    LIDOcaine (LIDODERM) 5 % Place 1 patch onto the skin daily as needed (Pain). Remove & Discard patch within 12 hours or as directed by MD 15 patch 0    omeprazole (PRILOSEC) 40 MG capsule TAKE ONE CAPSULE BY MOUTH ONCE A DAY 90 capsule 2    prochlorperazine (COMPAZINE) 10 MG tablet Take 1 tablet (10 mg total) by mouth every 6 (six) hours as needed (migraine or nausea). 60 tablet 11    traMADoL (ULTRAM) 50 mg tablet Take 1 tablet (50 mg total) by mouth every 8 (eight) hours as needed for Pain. 20 tablet 0    ubrogepant (UBRELVY) 100 mg tablet Ubrelvy 100 mg tablet      verapamiL (CALAN-SR) 240 MG CR tablet TAKE ONE TABLET BY MOUTH EVERY EVENING 90 tablet 1    [DISCONTINUED] atorvastatin (LIPITOR) 40 MG tablet TAKE ONE TABLET BY MOUTH DAILY 90 tablet 2    [DISCONTINUED] triamterene-hydrochlorothiazide 37.5-25 mg (DYAZIDE) 37.5-25 mg per capsule TAKE ONE CAPSULE BY MOUTH EVERY MORNING 90 capsule 2    [DISCONTINUED] buPROPion (WELLBUTRIN XL) 150 MG TB24 tablet       [DISCONTINUED] cyclobenzaprine (FLEXERIL) 10 MG tablet Take 1 tablet (10 mg total) by mouth nightly as needed for Muscle spasms. (Patient not taking: Reported on 2/9/2024) 40 tablet 0    [DISCONTINUED] HYDROcodone-acetaminophen (NORCO) 7.5-325 mg per tablet hydrocodone 7.5 mg-acetaminophen 325 mg tablet   TAKE ONE TABLET BY MOUTH EVERY 4 HOURS AS NEEDED FOR PAIN      [DISCONTINUED] ketorolac (TORADOL) 10 mg tablet ketorolac 10 mg tablet   TAKE ONE TABLET BY MOUTH EVERY 6 HOURS AS NEEDED FOR PAIN - cluster headache      [DISCONTINUED] sildenafiL (VIAGRA) 100 MG tablet Take 1 tablet (100 mg total) by mouth daily as needed for Erectile Dysfunction. (Patient not taking: Reported on  2/9/2024) 10 tablet 10    [DISCONTINUED] valacyclovir (VALTREX) 1000 MG tablet        No current facility-administered medications on file prior to visit.       Past Medical History:   Diagnosis Date    Basal cell carcinoma 12/03/2020    right forearm- JAM     Eye pain     left eye       Past Surgical History:   Procedure Laterality Date    COLONOSCOPY N/A 04/03/2023    Procedure: COLONOSCOPY;  Surgeon: Leonardo Bright Jr., MD;  Location: [facility];  Service: Endoscopy;  Laterality: N/A;    HERNIA REPAIR Bilateral 1974    KNEE SURGERY Left     LUMBAR FUSION  2005    LUMBAR LAMINECTOMY  1995    SHOULDER SURGERY Right     SKIN BIOPSY  12/03/2020        Family History   Problem Relation Age of Onset    Hypertension Mother     Hyperlipidemia Mother     Diabetes Father     Cancer Maternal Aunt     Cancer Paternal Aunt     Cancer Maternal Grandmother     Melanoma Maternal Grandmother     Cancer Maternal Grandfather     Colon cancer Paternal Grandfather        Social History     Socioeconomic History    Marital status:    Occupational History    Occupation:      Comment: Retired   Tobacco Use    Smoking status: Every Day     Current packs/day: 1.00     Average packs/day: 1 pack/day for 37.1 years (37.1 ttl pk-yrs)     Types: Cigarettes     Start date: 1987    Smokeless tobacco: Never   Substance and Sexual Activity    Alcohol use: Yes     Alcohol/week: 10.0 standard drinks of alcohol     Types: 10 Cans of beer per week     Comment: Drinks on weekend - 10 beers all day on Saturdays    Drug use: No    Sexual activity: Yes     Partners: Female   Social History Narrative    Lives with wife and adopted daughter. One dog.      Social Determinants of Health     Financial Resource Strain: Low Risk  (1/10/2022)    Overall Financial Resource Strain (CARDIA)     Difficulty of Paying Living Expenses: Not hard at all   Food Insecurity: No Food Insecurity (1/10/2022)    Hunger Vital Sign     Worried About Running Out  of Food in the Last Year: Never true     Ran Out of Food in the Last Year: Never true   Transportation Needs: No Transportation Needs (1/10/2022)    PRAPARE - Transportation     Lack of Transportation (Medical): No     Lack of Transportation (Non-Medical): No   Physical Activity: Insufficiently Active (1/10/2022)    Exercise Vital Sign     Days of Exercise per Week: 3 days     Minutes of Exercise per Session: 30 min   Stress: No Stress Concern Present (1/10/2022)    [employer] - Occupational Stress Questionnaire     Feeling of Stress : Only a little   Social Connections: Unknown (1/10/2022)    Social Connection and Isolation Panel [NHANES]     Frequency of Communication with Friends and Family: More than three times a week     Frequency of Social Gatherings with Friends and Family: Once a week     Active Member of Clubs or Organizations: No     Attends Club or Organization Meetings: Never     Marital Status:    Housing Stability: Low Risk  (1/10/2022)    Housing Stability Vital Sign     Unable to Pay for Housing in the Last Year: No     Number of Places Lived in the Last Year: 1     Unstable Housing in the Last Year: No       Review of patient's allergies indicates:   Allergen Reactions    Doxycycline Anaphylaxis     Other reaction(s): nausea  Other reaction(s): Not Indicated    Azithromycin      Other reaction(s): Not Indicated    Erythromycin Other (See Comments)     Other reaction(s): Nausea  Pain with IV use        Health Maintenance Due   Topic Date Due    Pneumococcal Vaccines (Age 0-64) (1 of 2 - PCV) Never done    Influenza Vaccine (1) 09/01/2023    COVID-19 Vaccine (3 - 2023-24 season) 09/01/2023       Vitals:    02/09/24 0756   BP: (!) 156/98   Pulse:         Physical Exam  Constitutional:       General: He is not in acute distress.     Appearance: Normal appearance. He is well-developed.   HENT:      Head: Normocephalic and atraumatic.      Right Ear: External ear  normal.      Left Ear: External ear normal.   Eyes:      Conjunctiva/sclera: Conjunctivae normal.   Cardiovascular:      Rate and Rhythm: Normal rate and regular rhythm.      Heart sounds: No murmur heard.     No friction rub. No gallop.   Pulmonary:      Effort: Pulmonary effort is normal. No respiratory distress.      Breath sounds: No wheezing, rhonchi or rales.   Abdominal:      General: Abdomen is flat. There is no distension.   Musculoskeletal:         General: No swelling or deformity.      Right lower leg: No edema.      Left lower leg: No edema.   Skin:     General: Skin is warm and dry.      Coloration: Skin is not jaundiced.   Neurological:      Mental Status: He is alert and oriented to person, place, and time. Mental status is at baseline.   Psychiatric:         Attention and Perception: Attention and perception normal.         Mood and Affect: Mood normal.         Speech: Speech normal.         Behavior: Behavior normal. Behavior is cooperative.         Thought Content: Thought content normal.         Cognition and Memory: Cognition normal.         Judgment: Judgment normal.          1. Primary hypertension  Assessment & Plan:  Not at goal. We reviewed lifestyle modifications. Double triamterene-hydrochlorothiazide. 4 week nurse visit to measure blood pressure. I encouraged him to bring his home cuff to the nurse visit.    Orders:  -     triamterene-hydrochlorothiazide 75-50 mg (MAXZIDE) 75-50 mg per tablet; Take 1 tablet by mouth once daily.  Dispense: 30 tablet; Refill: 11    2. Cyst of right kidney  Assessment & Plan:  Incidentally discovered on lumbar MRI. Imaging to confirm simple vs. complex    Orders:  -     US Kidney; Future; Expected date: 02/09/2024    3. Coronary artery disease involving native coronary artery of native heart without angina pectoris  Assessment & Plan:  No clear history of MACE, but he was told he had a bout of pericarditis many years ago. Coronary artery disease is noted on  chest CT. Continue atorvastatin.    Orders:  -     atorvastatin (LIPITOR) 40 MG tablet; Take 1 tablet (40 mg total) by mouth once daily.  Dispense: 90 tablet; Refill: 3    4. Familial hypercholesterolemia  -     atorvastatin (LIPITOR) 40 MG tablet; Take 1 tablet (40 mg total) by mouth once daily.  Dispense: 90 tablet; Refill: 3    5. Class 2 severe obesity due to excess calories with serious comorbidity and body mass index (BMI) of 35.0 to 35.9 in adult  Assessment & Plan:  Briefly reviewed weight loss as a tool to control blood pressure. May also help with his chronic back pain. Information provided in After Visit Summary.      6. Cigarette nicotine dependence without complication  Assessment & Plan:  Patient is interested in quitting and has gone through our smoking cessation program in the past. Consider referral at next visit if he needs additional help.      7. DELMY (obstructive sleep apnea)  Overview:  Home sleep study 2/2024 showed AHI ~17/hr.    Assessment & Plan:  Diagnosed last week. He's not reviewed results with sleep medicine yet. I reviewed non-CPAP methods of improving AHI.      8. Lumbar radiculopathy    9. Intractable episodic cluster headache        Follow up in 3 months.     James Peña MD  02/09/2024

## 2024-02-09 NOTE — ASSESSMENT & PLAN NOTE
Not at goal. We reviewed lifestyle modifications. Double triamterene-hydrochlorothiazide. 4 week nurse visit to measure blood pressure. I encouraged him to bring his home cuff to the nurse visit.

## 2024-02-09 NOTE — PATIENT INSTRUCTIONS
For evidence-based information on weight loss through diet, visit the following website:  [email or web address].[email or web address]/WeightLossTips    To estimate the amount of calories you need in a day to maintain or lose weight:  https://www.calculator.net/calorie-calculator.html

## 2024-02-09 NOTE — ASSESSMENT & PLAN NOTE
Patient is interested in quitting and has gone through our smoking cessation program in the past. Consider referral at next visit if he needs additional help.

## 2024-02-09 NOTE — ASSESSMENT & PLAN NOTE
Diagnosed last week. He's not reviewed results with sleep medicine yet. I reviewed non-CPAP methods of improving AHI.

## 2024-02-09 NOTE — ASSESSMENT & PLAN NOTE
No clear history of MACE, but he was told he had a bout of pericarditis many years ago. Coronary artery disease is noted on chest CT. Continue atorvastatin.

## 2024-02-12 NOTE — TELEPHONE ENCOUNTER
No care due was identified.  [facility] Embedded Care Due Messages. Reference number: [id number].   2/12/2024 8:47:59 AM CST

## 2024-02-13 NOTE — TELEPHONE ENCOUNTER
Refill Routing Note   Medication(s) are not appropriate for processing by [facility] for the following reason(s):        No active prescription written by provider    [facility] action(s):  Defer               Appointments  past 12m or future 3m with PCP    Date Provider   Last Visit   2/9/2024 James Peña MD   Next Visit   5/9/2024 James Peña MD   ED visits in past 90 days: 0        Note composed:11:32 PM 02/12/2024

## 2024-02-15 ENCOUNTER — TELEPHONE (OUTPATIENT)
Dept: FAMILY MEDICINE | Facility: CLINIC | Age: 59
End: 2024-02-15
Payer: COMMERCIAL

## 2024-02-15 ENCOUNTER — PATIENT MESSAGE (OUTPATIENT)
Dept: FAMILY MEDICINE | Facility: CLINIC | Age: 59
End: 2024-02-15
Payer: COMMERCIAL

## 2024-02-15 PROBLEM — F17.200 NICOTINE DEPENDENCE: Status: ACTIVE | Noted: 2021-02-04

## 2024-02-15 PROBLEM — R07.9 CHEST PAIN: Status: ACTIVE | Noted: 2018-09-17

## 2024-02-15 PROBLEM — U07.1 COVID-19: Status: ACTIVE | Noted: 2024-02-15

## 2024-02-15 RX ORDER — OMEPRAZOLE 40 MG/1
CAPSULE, DELAYED RELEASE ORAL
Qty: 90 CAPSULE | Refills: 3 | Status: SHIPPED | OUTPATIENT
Start: 2024-02-15

## 2024-02-15 NOTE — TELEPHONE ENCOUNTER
Spoke with patient regarding sx of recent message.  Pt states that he tested positive for COVID.  Pt states that he is not having chest pains but is having shortness of breath.  I have advised pt to go to the ER.  Pt agreed and states he would go to [facility]

## 2024-02-16 NOTE — TELEPHONE ENCOUNTER
Patient's rash occurred after doubling a dose of a medication he was tolerating well. Patient recently diagnosed with COVID as well. We will need to determine if the medication is responsible.

## 2024-02-19 ENCOUNTER — TELEPHONE (OUTPATIENT)
Dept: CARDIOLOGY | Facility: CLINIC | Age: 59
End: 2024-02-19
Payer: COMMERCIAL

## 2024-02-19 NOTE — TELEPHONE ENCOUNTER
Spoke with Pt and he informed me that he needed a sooner appt due to not being margie to make appt for 2/26 due to another appt the same day. I scheduled pt for 2/22 at 10:15 am.

## 2024-02-19 NOTE — TELEPHONE ENCOUNTER
----- Message from Edelmira Cantu sent at 2/19/2024  9:01 AM CST -----  Contact: pt [phone]  Type:  Sooner Appointment Request    Caller is requesting a sooner appointment.  Caller declined first available appointment listed below.  Caller will not accept being placed on the waitlist and is requesting a message be sent to doctor.    Name of Caller:  Pt   When is the first available appointment?  Feb 26  Symptoms:  abnormal stress test   Would the patient rather a call back or a response via [facility]? Call   Best Call Back Number:  [phone]    Additional Information:  Appt was avail on 02/26 at 1pm but pt has epidural scheduled for that day. Pls call back and advise

## 2024-02-22 ENCOUNTER — OFFICE VISIT (OUTPATIENT)
Dept: CARDIOLOGY | Facility: CLINIC | Age: 59
End: 2024-02-22
Payer: COMMERCIAL

## 2024-02-22 VITALS
WEIGHT: 260.81 LBS | BODY MASS INDEX: 35.33 KG/M2 | DIASTOLIC BLOOD PRESSURE: 107 MMHG | HEIGHT: 72 IN | HEART RATE: 62 BPM | SYSTOLIC BLOOD PRESSURE: 151 MMHG

## 2024-02-22 DIAGNOSIS — R07.9 CHEST PAIN AT REST: Primary | ICD-10-CM

## 2024-02-22 DIAGNOSIS — E78.01 FAMILIAL HYPERCHOLESTEROLEMIA: ICD-10-CM

## 2024-02-22 DIAGNOSIS — G47.33 OSA (OBSTRUCTIVE SLEEP APNEA): ICD-10-CM

## 2024-02-22 DIAGNOSIS — F17.200 NICOTINE DEPENDENCE, UNCOMPLICATED, UNSPECIFIED NICOTINE PRODUCT TYPE: ICD-10-CM

## 2024-02-22 DIAGNOSIS — I10 PRIMARY HYPERTENSION: ICD-10-CM

## 2024-02-22 DIAGNOSIS — I25.10 CORONARY ARTERY DISEASE INVOLVING NATIVE CORONARY ARTERY OF NATIVE HEART WITHOUT ANGINA PECTORIS: ICD-10-CM

## 2024-02-22 PROCEDURE — 3080F DIAST BP >= 90 MM HG: CPT | Mod: CPTII,S$GLB,, | Performed by: INTERNAL MEDICINE

## 2024-02-22 PROCEDURE — 1159F MED LIST DOCD IN RCRD: CPT | Mod: CPTII,S$GLB,, | Performed by: INTERNAL MEDICINE

## 2024-02-22 PROCEDURE — 3008F BODY MASS INDEX DOCD: CPT | Mod: CPTII,S$GLB,, | Performed by: INTERNAL MEDICINE

## 2024-02-22 PROCEDURE — 3044F HG A1C LEVEL LT 7.0%: CPT | Mod: CPTII,S$GLB,, | Performed by: INTERNAL MEDICINE

## 2024-02-22 PROCEDURE — 99999 PR PBB SHADOW E&M-EST. PATIENT-LVL III: CPT | Mod: PBBFAC,,, | Performed by: INTERNAL MEDICINE

## 2024-02-22 PROCEDURE — 3077F SYST BP >= 140 MM HG: CPT | Mod: CPTII,S$GLB,, | Performed by: INTERNAL MEDICINE

## 2024-02-22 PROCEDURE — 99214 OFFICE O/P EST MOD 30 MIN: CPT | Mod: S$GLB,,, | Performed by: INTERNAL MEDICINE

## 2024-02-22 RX ORDER — LOSARTAN POTASSIUM AND HYDROCHLOROTHIAZIDE 12.5; 5 MG/1; MG/1
1 TABLET ORAL DAILY
Qty: 90 TABLET | Refills: 3 | Status: SHIPPED | OUTPATIENT
Start: 2024-02-22 | End: 2024-05-09

## 2024-02-22 NOTE — PROGRESS NOTES
Subjective:    Patient ID:  Jovan Mcwilliams is a 58 y.o. male patient here for evaluation Follow-up      History of Present Illness:  Cardiology follow-up, COVID-19 hospitalization for chest pain.  Labs negative for coronary syndrome.  Follow-up noninvasive cardiac assessment with equivocal nuclear study normal echo.  Patient remains chest pain-free.  History of hypertension, dyslipidemia.  Recently quit tobacco products.  No diabetes mellitus     No recurrent chest pain.  No dyspnea.  Current medications include Coreg 3.125 b.i.d., aspirin 81 daily, verapamil 240 daily.  Blood pressure still remains elevated at 150/106        Review of patient's allergies indicates:   Allergen Reactions    Doxycycline Anaphylaxis     Other reaction(s): nausea  Other reaction(s): Not Indicated    Azithromycin      Other reaction(s): Not Indicated    Erythromycin Other (See Comments)     Other reaction(s): Nausea  Pain with IV use    Hydrochlorothiazide Rash       Past Medical History:   Diagnosis Date    Basal cell carcinoma 12/03/2020    right forearm- JAM     Eye pain     left eye     Past Surgical History:   Procedure Laterality Date    COLONOSCOPY N/A 04/03/2023    Procedure: COLONOSCOPY;  Surgeon: Leonardo Bright Jr., MD;  [facility];  Service: Endoscopy;  Laterality: N/A;    HERNIA REPAIR Bilateral 1974    KNEE SURGERY Left     LUMBAR FUSION  2005    LUMBAR LAMINECTOMY  1995    SHOULDER SURGERY Right     SKIN BIOPSY  12/03/2020     Social History     Tobacco Use    Smoking status: Every Day     Current packs/day: 1.00     Average packs/day: 1 pack/day for 37.1 years (37.1 ttl pk-yrs)     Types: Cigarettes     Start date: 1987    Smokeless tobacco: Never   Substance Use Topics    Alcohol use: Yes     Alcohol/week: 10.0 standard drinks of alcohol     Types: 10 Cans of beer per week     Comment: Drinks on weekend - 10 beers all day on Saturdays    Drug use: No        Review of Systems:    As noted in HPI in addition       REVIEW OF SYSTEMS  Review of Systems   Constitutional: Negative for decreased appetite, diaphoresis, night sweats, weight gain and weight loss.   HENT:  Negative for nosebleeds and odynophagia.    Eyes:  Negative for double vision and photophobia.   Cardiovascular:  Negative for chest pain, claudication, cyanosis, dyspnea on exertion, irregular heartbeat, leg swelling, near-syncope, orthopnea, palpitations, paroxysmal nocturnal dyspnea and syncope.   Respiratory:  Negative for cough, hemoptysis, shortness of breath and wheezing.    Hematologic/Lymphatic: Negative for adenopathy.   Skin:  Negative for flushing, skin cancer and suspicious lesions.   Musculoskeletal:  Negative for gout, myalgias and neck pain.   Gastrointestinal:  Negative for abdominal pain, heartburn, hematemesis and hematochezia.   Genitourinary:  Negative for bladder incontinence, hesitancy and nocturia.   Neurological:  Negative for focal weakness, headaches, light-headedness and paresthesias.   Psychiatric/Behavioral:  Negative for memory loss and substance abuse.               Objective:        Vitals:    02/22/24 1006   BP: (!) 151/107   Pulse: 62       Lab Results   Component Value Date    WBC 6.72 02/15/2024    HGB 18.8 (H) 02/15/2024    HCT 52.4 02/15/2024     02/15/2024    CHOL 203 (H) 03/30/2023    TRIG 120 03/30/2023    HDL 38 (L) 03/30/2023    ALT 53 (H) 02/15/2024    AST 45 02/15/2024     02/16/2024    K 3.9 02/16/2024     02/16/2024    CREATININE 1.33 02/16/2024    BUN 27 (H) 02/16/2024    CO2 33 (H) 02/16/2024    TSH 0.660 07/20/2011    PSA 0.64 03/30/2023    INR 0.9 02/22/2008    HGBA1C 6.0 (H) 02/16/2024        ECHOCARDIOGRAM RESULTS  Results for orders placed during the hospital encounter of 02/15/24    Echo Saline Bubble? No    Interpretation Summary    Left Ventricle: There is normal systolic function with a visually estimated ejection fraction of 60 - 65%.    Right Ventricle: Normal right ventricular cavity  size. Wall thickness is normal. Right ventricle wall motion  is normal. Systolic function is normal.    Pulmonary Artery: No pulmonary hypertension. The estimated pulmonary artery systolic pressure is 19 mmHg.    IVC/SVC: Intermediate venous pressure at 8 mmHg.        CURRENT/PREVIOUS VISIT EKG  Results for orders placed or performed during the hospital encounter of 02/15/24   EKG 12-lead    Collection Time: 02/15/24  6:30 PM   Result Value Ref Range    QRS Duration 100 ms    [facility] QTC Calculation 452 ms    Narrative    Test Reason : R07.9,    Vent. Rate : 090 BPM     Atrial Rate : 090 BPM     P-R Int : 164 ms          QRS Dur : 100 ms      QT Int : 370 ms       P-R-T Axes : 071 -54 060 degrees     QTc Int : 452 ms    Normal sinus rhythm  Left axis deviation  Incomplete right bundle branch block  Cannot rule out Anterior infarct ,age undetermined  Abnormal ECG  When compared with ECG of 15-FEB-2024 16:42,  No significant change was found  Confirmed by Aron HOFFMAN, Stalin HARRIS ([id number]) on 2/16/2024 8:37:44 AM    Referred By: [email or web address]   SELF           Confirmed By:Stalin Sharp MD     No valid procedures specified.   Results for orders placed during the hospital encounter of 02/15/24    Nuclear Stress - Cardiology Interpreted    Interpretation Summary    Equivocal myocardial perfusion scan.    There is a moderate to severe intensity, reversible perfusion abnormality that is consistent with ischemia in the basal to mid inferior wall(s).    There is a  moderate intensity fixed perfusion abnormality in the  wall of the left ventricle secondary to diaphragm attenuation.    The gated perfusion images showed an ejection fraction of 59% post stress.    There is normal wall motion post stress.    LV cavity size is normal at rest and normal at stress.    The ECG portion of the study is negative for ischemia.    The patient reported no chest pain during the stress test.    There were no arrhythmias during stress.    Equivocal  nuclear study for ischemia.  Can not rule out diaphragmatic attenuation to account for inferior wall defect.  Clinical correlation warranted.  Consider cardiac PET or further workup if high index of suspicion for ischemia.    No valid procedures specified.    PHYSICAL EXAM  CONSTITUTIONAL: Well built, well nourished in no apparent distress  NECK: no carotid bruit, no JVD  LUNGS: CTA  CHEST WALL: no tenderness,  HEART: regular rate and rhythm, S1, S2 normal, no murmur, click, rub or gallop .  Sinus bradycardia.  ABDOMEN: soft, non-tender; bowel sounds normal; no masses,  no organomegaly  EXTREMITIES: Extremities normal, no edema, no calf tenderness noted  NEURO: AAO X 3    I HAVE REVIEWED :    The vital signs, nurses notes, and all the pertinent radiology and labs.         Current Outpatient Medications   Medication Instructions    albuterol (PROVENTIL/VENTOLIN HFA) 90 mcg/actuation inhaler 2 puffs, Inhalation, Every 4 hours PRN    aspirin (ECOTRIN) 81 mg, Oral, Daily    atorvastatin (LIPITOR) 40 mg, Oral, Daily    butalbital-acetaminophen-caffeine -40 mg (FIORICET, ESGIC) -40 mg per tablet Take 2 tablets by mouth every 12 hours as needed for rescue if lidocaine fails    carvediloL (COREG) 3.125 mg, Oral, 2 times daily    clotrimazole-betamethasone 1-0.05% (LOTRISONE) cream Topical (Top), 2 times daily    divalproex (DEPAKOTE) 500 mg, Oral, 2 times daily    galcanezumab-gnlm 300 mg, Subcutaneous, Every 28 days    ketoconazole (NIZORAL) 2 % cream Apply to the feet and groin BID until clear then PRN for flares.    lasmiditan (REYVOW) tablet 100 mg Take 1 tablet (100 mg) by mouth daily as needed (migraine).    LIDOcaine (LIDODERM) 5 % 1 patch, Transdermal, Daily PRN, Remove & Discard patch within 12 hours or as directed by MD    omeprazole (PRILOSEC) 40 MG capsule TAKE ONE CAPSULE BY MOUTH ONCE A DAY    prochlorperazine (COMPAZINE) 10 mg, Oral, Every 6 hours PRN    traMADoL (ULTRAM) 50 mg, Oral, Every 8  hours PRN    UBRELVY 100 mg, Oral, As needed (PRN)    verapamiL (CALAN-SR) 240 MG CR tablet TAKE ONE TABLET BY MOUTH EVERY EVENING          Assessment:   Resolved COVID-19 viral infection.  Chest pain.  Normal echo.  Equivocal nuclear study.  Hypertension, dyslipidemia.  Hospital follow-up visit.        Plan:   History of left heart catheterization number of years ago at [facility] with nonobstructive CAD.  Nuclear study equivocal.  Suggest continuation of Coreg, discontinue verapamil, add Hyzaar 50/12.5 daily.  Cardiac PET, follow up results.          No follow-ups on file.

## 2024-02-26 ENCOUNTER — HOSPITAL ENCOUNTER (OUTPATIENT)
Facility: HOSPITAL | Age: 59
Discharge: HOME | End: 2024-02-26
Attending: ANESTHESIOLOGY | Admitting: ANESTHESIOLOGY
Payer: COMMERCIAL

## 2024-02-26 ENCOUNTER — HOSPITAL ENCOUNTER (OUTPATIENT)
Dept: RADIOLOGY | Facility: HOSPITAL | Age: 59
Discharge: HOME | End: 2024-02-26
Attending: ANESTHESIOLOGY | Admitting: ANESTHESIOLOGY
Payer: COMMERCIAL

## 2024-02-26 VITALS
SYSTOLIC BLOOD PRESSURE: 182 MMHG | OXYGEN SATURATION: 95 % | DIASTOLIC BLOOD PRESSURE: 98 MMHG | HEART RATE: 62 BPM | BODY MASS INDEX: 33.86 KG/M2 | RESPIRATION RATE: 18 BRPM | TEMPERATURE: 98 F | HEIGHT: 72 IN | WEIGHT: 250 LBS

## 2024-02-26 DIAGNOSIS — M54.50 LOWER BACK PAIN: ICD-10-CM

## 2024-02-26 DIAGNOSIS — M54.16 LUMBAR RADICULOPATHY: ICD-10-CM

## 2024-02-26 PROCEDURE — 25500020 PHARM REV CODE 255: Mod: PO | Performed by: ANESTHESIOLOGY

## 2024-02-26 PROCEDURE — 76000 FLUOROSCOPY <1 HR PHYS/QHP: CPT | Mod: TC,PO

## 2024-02-26 PROCEDURE — 64483 NJX AA&/STRD TFRM EPI L/S 1: CPT | Mod: PO,LT | Performed by: ANESTHESIOLOGY

## 2024-02-26 PROCEDURE — 25000003 PHARM REV CODE 250: Mod: PO | Performed by: ANESTHESIOLOGY

## 2024-02-26 PROCEDURE — 64483 NJX AA&/STRD TFRM EPI L/S 1: CPT | Mod: LT,,, | Performed by: ANESTHESIOLOGY

## 2024-02-26 PROCEDURE — 63600175 PHARM REV CODE 636 W HCPCS: Mod: PO | Performed by: ANESTHESIOLOGY

## 2024-02-26 RX ORDER — ALPRAZOLAM 0.5 MG/1
1 TABLET, ORALLY DISINTEGRATING ORAL ONCE AS NEEDED
Status: COMPLETED | OUTPATIENT
Start: 2024-02-26 | End: 2024-02-26

## 2024-02-26 RX ORDER — LIDOCAINE HYDROCHLORIDE 10 MG/ML
INJECTION, SOLUTION EPIDURAL; INFILTRATION; INTRACAUDAL; PERINEURAL
Status: DISCONTINUED | OUTPATIENT
Start: 2024-02-26 | End: 2024-02-26 | Stop reason: HOSPADM

## 2024-02-26 RX ORDER — METHYLPREDNISOLONE ACETATE 40 MG/ML
INJECTION, SUSPENSION INTRA-ARTICULAR; INTRALESIONAL; INTRAMUSCULAR; SOFT TISSUE
Status: DISCONTINUED | OUTPATIENT
Start: 2024-02-26 | End: 2024-02-26 | Stop reason: HOSPADM

## 2024-02-26 RX ORDER — BUPIVACAINE HYDROCHLORIDE 2.5 MG/ML
INJECTION, SOLUTION EPIDURAL; INFILTRATION; INTRACAUDAL
Status: DISCONTINUED | OUTPATIENT
Start: 2024-02-26 | End: 2024-02-26 | Stop reason: HOSPADM

## 2024-02-26 RX ADMIN — ALPRAZOLAM 1 MG: 0.5 TABLET, ORALLY DISINTEGRATING ORAL at 04:02

## 2024-02-26 NOTE — OP NOTE
PROCEDURE DATE: 2/26/2024    PROCEDURE: Left L3/4 transforaminal epidural steroid injection under fluoroscopy    DIAGNOSIS: Lumbar  Radiculopathy    Post op diagnosis: Same    PHYSICIAN: Cam Trivedi MD    MEDICATIONS INJECTED:  Methylprednisolone 40mg (1ml) and 1ml 0.25% bupivicaine at each nerve root.     LOCAL ANESTHETIC INJECTED:  Lidocaine 1%. 4 ml per site.    SEDATION MEDICATIONS: none    ESTIMATED BLOOD LOSS:  none    COMPLICATIONS:  none    TECHNIQUE:   A time-out was taken to identify patient and procedure side prior to starting the procedure. The patient was placed in a prone position, prepped and draped in the usual sterile fashion using ChloraPrep and sterile towels.  The area to be injected was determined under fluoroscopic guidance in AP and oblique view.  Local anesthetic was given by raising a wheal and going down to the hub of a 25-gauge 1.5 inch needle.  In oblique view, a 5 inch 22-gauge bent-tip spinal needle was introduced towards 6 oclock position of the pedicle of each above named nerve root level.  The needle was walked medially then hinged into the neural foramen and position was confirmed in AP and lateral views.  Omnipaque contrast dye was injected to confirm appropriate placement and that there was no vascular uptake.  After negative aspiration for blood or CSF, the medication was then injected. This was performed at the left L3/4 level(s). The patient tolerated the procedure well.    The patient was monitored after the procedure.  Patient was given post procedure and discharge instructions to follow at home. The patient was discharged in a stable condition.

## 2024-02-26 NOTE — DISCHARGE SUMMARY
[facility] - [facility]  Discharge Note  Short Stay    Procedure(s) (LRB):  Injection,steroid,epidural,transforaminal approach       L3/4 (Left)      OUTCOME: Patient tolerated treatment/procedure well without complication and is now ready for discharge.    DISPOSITION: Home or Self Care    FINAL DIAGNOSIS:  Lumbar radiculopathy    FOLLOWUP: In clinic    DISCHARGE INSTRUCTIONS:    Discharge Procedure Orders   Diet Adult Regular     No dressing needed     Notify your health care provider if you experience any of the following:  temperature >100.4     Activity as tolerated

## 2024-02-27 ENCOUNTER — OFFICE VISIT (OUTPATIENT)
Dept: FAMILY MEDICINE | Facility: CLINIC | Age: 59
End: 2024-02-27
Payer: COMMERCIAL

## 2024-02-27 VITALS
SYSTOLIC BLOOD PRESSURE: 144 MMHG | WEIGHT: 261.94 LBS | HEART RATE: 65 BPM | BODY MASS INDEX: 35.52 KG/M2 | DIASTOLIC BLOOD PRESSURE: 92 MMHG | OXYGEN SATURATION: 95 %

## 2024-02-27 DIAGNOSIS — G43.719 INTRACTABLE CHRONIC MIGRAINE WITHOUT AURA AND WITHOUT STATUS MIGRAINOSUS: ICD-10-CM

## 2024-02-27 DIAGNOSIS — I10 PRIMARY HYPERTENSION: ICD-10-CM

## 2024-02-27 DIAGNOSIS — E66.01 CLASS 2 SEVERE OBESITY DUE TO EXCESS CALORIES WITH SERIOUS COMORBIDITY AND BODY MASS INDEX (BMI) OF 35.0 TO 35.9 IN ADULT: ICD-10-CM

## 2024-02-27 DIAGNOSIS — Z09 HOSPITAL DISCHARGE FOLLOW-UP: Primary | ICD-10-CM

## 2024-02-27 DIAGNOSIS — G47.33 OSA (OBSTRUCTIVE SLEEP APNEA): ICD-10-CM

## 2024-02-27 PROCEDURE — 99213 OFFICE O/P EST LOW 20 MIN: CPT | Mod: S$GLB,,,

## 2024-02-27 PROCEDURE — 3008F BODY MASS INDEX DOCD: CPT | Mod: CPTII,S$GLB,,

## 2024-02-27 PROCEDURE — 3077F SYST BP >= 140 MM HG: CPT | Mod: CPTII,S$GLB,,

## 2024-02-27 PROCEDURE — 3044F HG A1C LEVEL LT 7.0%: CPT | Mod: CPTII,S$GLB,,

## 2024-02-27 PROCEDURE — 99999 PR PBB SHADOW E&M-EST. PATIENT-LVL IV: CPT | Mod: PBBFAC,,,

## 2024-02-27 PROCEDURE — 1159F MED LIST DOCD IN RCRD: CPT | Mod: CPTII,S$GLB,,

## 2024-02-27 PROCEDURE — 3080F DIAST BP >= 90 MM HG: CPT | Mod: CPTII,S$GLB,,

## 2024-02-27 NOTE — PROGRESS NOTES
"  Name: Jovan Mcwilliams  MRN: [id number]  : 1965  PCP: James Peña MD    HPI    Patient follows with Dr. Peña, new to me. He presents for hospital follow up. He denies any more chest discomfort at this time. They did not perform LCH in the hospital due to him being COVID positive. He is going for cardiac PET scan in March per cardiology. He reports intermittent SOB at rest for a few seconds then resolves. He uses his inhaler as needed. Cardiology recommends continuing Coreg and Hyzaar for now. Verapamil discontinued per cardiology.     Hospital course below:    "Hospital Course:   Patient presented with chest pain. Troponins were negative. A1c was 6.0. NM stress was positive for evere intensity, reversible perfusion abnormality that is consistent with ischemia in the basal to mid inferior wall(s). This was discussed with cardiology (Nuha Link) who recommended follow up with Dr. Daugherty or Dr. Sharp in 3 weeks for consideration of OP LHC.      Medication Changes:  Start aspirin 81mg daily  Start coreg 3.125mg BID  Stop triamterene-hctz   Restart previous verapamil     Outpatient follow up needs:  PCP in 1 week  Dr. Sharp or Dr. Daugherty in 3 weeks"    Review of Systems   Constitutional:  Negative for fatigue and fever.   Respiratory:  Positive for shortness of breath and wheezing.    Cardiovascular:  Negative for chest pain and leg swelling.   Gastrointestinal:  Negative for nausea and vomiting.   Musculoskeletal:  Negative for arthralgias and myalgias.       Patient Active Problem List   Diagnosis    Familial hypercholesterolemia    GERD (gastroesophageal reflux disease)    Primary hypertension    Chest pain at rest    Nicotine dependence    Class 2 severe obesity due to excess calories with serious comorbidity and body mass index (BMI) of 35.0 to 35.9 in adult    Elevated glucose    Positive colorectal cancer screening using Cologuard test    DELMY (obstructive sleep apnea)    Coronary artery " disease involving native coronary artery of native heart without angina pectoris    Lumbar radiculopathy    Cyst of right kidney    Intractable episodic cluster headache    COVID-19       Vitals:    02/27/24 1018   BP: (!) 144/92   Pulse: 65       Physical Exam  Constitutional:       General: He is not in acute distress.     Appearance: He is well-developed.   HENT:      Head: Normocephalic and atraumatic.      Right Ear: External ear normal.      Left Ear: External ear normal.   Eyes:      Conjunctiva/sclera: Conjunctivae normal.      Pupils: Pupils are equal, round, and reactive to light.   Neck:      Thyroid: No thyromegaly.   Cardiovascular:      Rate and Rhythm: Normal rate and regular rhythm.      Pulses: Normal pulses.      Heart sounds: Normal heart sounds, S1 normal and S2 normal.   Pulmonary:      Effort: Pulmonary effort is normal. No respiratory distress.      Breath sounds: Normal breath sounds.   Chest:      Chest wall: No tenderness.   Musculoskeletal:         General: No swelling or tenderness. Normal range of motion.      Cervical back: Normal range of motion and neck supple.   Skin:     General: Skin is warm and dry.      Coloration: Skin is not jaundiced or pale.   Neurological:      General: No focal deficit present.      Mental Status: He is alert and oriented to person, place, and time.      Cranial Nerves: No cranial nerve deficit.   Psychiatric:         Mood and Affect: Mood normal.         Behavior: Behavior normal.         1. Hospital discharge follow-up   Follow up for cardiac PET scan    2. Intractable chronic migraine without aura and without status migrainosus   Continue with current medications for migraine prevention    3. Primary hypertension   BP better controlled. Mildly elevated in clinic. Continue meds per cardiology    4. DELMY (obstructive sleep apnea)  Overview:  Home sleep study 2/2024 showed AHI ~17/hr.      5. Class 2 severe obesity due to excess calories with serious  comorbidity and body mass index (BMI) of 35.0 to 35.9 in adult  Body mass index is 35.52 kg/m². Morbid obesity complicates all aspects of disease management from diagnostic modalities to treatment. Weight loss encouraged and health benefits explained to patient.        Follow up as scheduled with PCP       RODOLFO Cedeño  02/27/2024

## 2024-03-04 ENCOUNTER — OFFICE VISIT (OUTPATIENT)
Dept: PULMONOLOGY | Facility: CLINIC | Age: 59
End: 2024-03-04
Payer: COMMERCIAL

## 2024-03-04 VITALS
HEART RATE: 63 BPM | RESPIRATION RATE: 18 BRPM | BODY MASS INDEX: 35.41 KG/M2 | WEIGHT: 261.44 LBS | SYSTOLIC BLOOD PRESSURE: 132 MMHG | DIASTOLIC BLOOD PRESSURE: 88 MMHG | HEIGHT: 72 IN | OXYGEN SATURATION: 97 %

## 2024-03-04 DIAGNOSIS — R06.83 SNORING: ICD-10-CM

## 2024-03-04 DIAGNOSIS — G47.33 OSA (OBSTRUCTIVE SLEEP APNEA): Primary | ICD-10-CM

## 2024-03-04 DIAGNOSIS — R53.83 FATIGUE, UNSPECIFIED TYPE: ICD-10-CM

## 2024-03-04 DIAGNOSIS — E66.01 SEVERE OBESITY (BMI 35.0-39.9) WITH COMORBIDITY: ICD-10-CM

## 2024-03-04 PROCEDURE — 3079F DIAST BP 80-89 MM HG: CPT | Mod: CPTII,S$GLB,, | Performed by: NURSE PRACTITIONER

## 2024-03-04 PROCEDURE — 3044F HG A1C LEVEL LT 7.0%: CPT | Mod: CPTII,S$GLB,, | Performed by: NURSE PRACTITIONER

## 2024-03-04 PROCEDURE — 99214 OFFICE O/P EST MOD 30 MIN: CPT | Mod: S$GLB,,, | Performed by: NURSE PRACTITIONER

## 2024-03-04 PROCEDURE — 1159F MED LIST DOCD IN RCRD: CPT | Mod: CPTII,S$GLB,, | Performed by: NURSE PRACTITIONER

## 2024-03-04 PROCEDURE — 3008F BODY MASS INDEX DOCD: CPT | Mod: CPTII,S$GLB,, | Performed by: NURSE PRACTITIONER

## 2024-03-04 PROCEDURE — 3075F SYST BP GE 130 - 139MM HG: CPT | Mod: CPTII,S$GLB,, | Performed by: NURSE PRACTITIONER

## 2024-03-04 PROCEDURE — 1160F RVW MEDS BY RX/DR IN RCRD: CPT | Mod: CPTII,S$GLB,, | Performed by: NURSE PRACTITIONER

## 2024-03-04 PROCEDURE — 99999 PR PBB SHADOW E&M-EST. PATIENT-LVL IV: CPT | Mod: PBBFAC,,, | Performed by: NURSE PRACTITIONER

## 2024-03-04 NOTE — PROGRESS NOTES
Subjective:      Patient ID: Jovan Mcwilliams is a 58 y.o. male.    Chief Complaint: Apnea    Apnea  Associated symptoms include fatigue.     Patient presents to the office today for evaluation of sleep apnea.  Patient with snoring and witnessed apneas. Patient not having problems falling asleep, but wakes up frequently throughout the night.  Patient does not wake up feeling refreshed in the morning.  Patient with daytime hypersomnolence.  [place] Sleepiness Scale score 6.  Patient has had symptoms for 20 years. Attempted past sleep study but unable to sleep and left. He had a mouthguard made for snoring without much improvement.  Comorbidities include BMI 35, HTN   Bedtime: 10 PM- asleep 10:30 PM  Wake time: 8 AM  STOP - BANG Questionnaire:      1. Snoring : Do you snore loudly ?    Yes     2. Tired : Do you often feel tired, fatigued, or sleepy during daytime?   Yes     3. Observed: Has anyone observed you stop breathing during your sleep?   Yes     4. Blood pressure : Do you have or are you being treated for high blood pressure?   Yes     5. BMI :BMI more than 35 kg/m2?   Yes     6. Age : Age over 50 yr old?   Yes     7. Neck circumference: Neck circumference greater than 40 cm?   No     8. Gender: Gender male?   Yes     High risk of DELMY: Yes 5 - 8  Intermediate risk of DELMY: Yes 3 - 4  Low risk of DELMY: Yes 0 - 2          [place] Sleepiness scale score: 6       Patient Active Problem List   Diagnosis    Familial hypercholesterolemia    GERD (gastroesophageal reflux disease)    Primary hypertension    Chest pain at rest    Nicotine dependence    Class 2 severe obesity due to excess calories with serious comorbidity and body mass index (BMI) of 35.0 to 35.9 in adult    Elevated glucose    Positive colorectal cancer screening using Cologuard test    DELMY (obstructive sleep apnea)    Coronary artery disease involving native coronary artery of native heart without angina pectoris    Lumbar radiculopathy    Cyst of right  kidney    Intractable episodic cluster headache    COVID-19     /88 (BP Location: Right arm, Patient Position: Sitting, BP Method: Large (Manual))   Pulse 63   Resp 18   Ht 6' (1.829 m)   Wt 118.6 kg (261 lb 7.5 oz)   SpO2 97%   BMI 35.46 kg/m²   Body mass index is 35.46 kg/m².    Review of Systems   Constitutional:  Positive for fatigue.   Respiratory:  Positive for snoring and somnolence.    Psychiatric/Behavioral:  Positive for sleep disturbance.    All other systems reviewed and are negative.    Objective:      Physical Exam  Constitutional:       Appearance: He is obese.   HENT:      Head: Normocephalic and atraumatic.      Nose: Nose normal.   Cardiovascular:      Rate and Rhythm: Normal rate and regular rhythm.   Pulmonary:      Effort: Pulmonary effort is normal.      Breath sounds: Normal breath sounds.   Abdominal:      Palpations: Abdomen is soft.      Tenderness: There is no abdominal tenderness.   Musculoskeletal:         General: Normal range of motion.      Cervical back: Normal range of motion and neck supple.   Skin:     General: Skin is warm and dry.   Neurological:      General: No focal deficit present.      Mental Status: He is alert and oriented to person, place, and time.   Psychiatric:         Mood and Affect: Mood normal.         Behavior: Behavior normal.       Personal Diagnostic Review  HST with moderate DELMY      Assessment:       1. DELMY (obstructive sleep apnea)    2. Severe obesity (BMI 35.0-39.9) with comorbidity    3. Snoring    4. Fatigue, unspecified type        Outpatient Encounter Medications as of 3/4/2024   Medication Sig Dispense Refill    aspirin (ECOTRIN) 81 MG EC tablet Take 1 tablet (81 mg total) by mouth once daily. 30 tablet 1    atorvastatin (LIPITOR) 40 MG tablet Take 1 tablet (40 mg total) by mouth once daily. (Patient taking differently: Take 40 mg by mouth every evening.) 90 tablet 3    butalbital-acetaminophen-caffeine -40 mg (FIORICET, ESGIC)  -40 mg per tablet Take 2 tablets by mouth every 12 hours as needed for rescue if lidocaine fails (Patient taking differently: Take 2 tablets by mouth every 12 (twelve) hours as needed for Headaches. for rescue if lidocaine fails) 14 tablet 1    carvediloL (COREG) 3.125 MG tablet Take 1 tablet (3.125 mg total) by mouth 2 (two) times daily. 60 tablet 1    divalproex (DEPAKOTE) 500 MG TbEC Take 1 tablet (500 mg total) by mouth 2 (two) times daily. 60 tablet 1    galcanezumab-gnlm 300 mg/3 mL (100 mg/mL x 3) Syrg Inject 3 mLs (300 mg total) into the skin every 28 days. 3 mL 11    lasmiditan (REYVOW) tablet 100 mg Take 1 tablet (100 mg) by mouth daily as needed (migraine). 8 tablet 1    LIDOcaine (LIDODERM) 5 % Place 1 patch onto the skin daily as needed (Pain). Remove & Discard patch within 12 hours or as directed by MD 15 patch 0    losartan-hydrochlorothiazide 50-12.5 mg (HYZAAR) 50-12.5 mg per tablet Take 1 tablet by mouth once daily. 90 tablet 3    omeprazole (PRILOSEC) 40 MG capsule TAKE ONE CAPSULE BY MOUTH ONCE A DAY (Patient taking differently: Take 40 mg by mouth every morning.) 90 capsule 3    prochlorperazine (COMPAZINE) 10 MG tablet Take 1 tablet (10 mg total) by mouth every 6 (six) hours as needed (migraine or nausea). 60 tablet 11    traMADoL (ULTRAM) 50 mg tablet Take 1 tablet (50 mg total) by mouth every 8 (eight) hours as needed for Pain. 20 tablet 0    ubrogepant (UBRELVY) 100 mg tablet Take 100 mg by mouth as needed for Migraine.      albuterol (PROVENTIL/VENTOLIN HFA) 90 mcg/actuation inhaler Inhale 2 puffs into the lungs every 4 (four) hours as needed for Wheezing or Shortness of Breath. (Patient not taking: Reported on 2/27/2024) 18 g 5    clotrimazole-betamethasone 1-0.05% (LOTRISONE) cream Apply topically 2 (two) times daily. (Patient not taking: Reported on 2/27/2024) 15 g 1    ketoconazole (NIZORAL) 2 % cream Apply to the feet and groin BID until clear then PRN for flares. (Patient not  taking: Reported on 2/27/2024) 60 g 2    [DISCONTINUED] buPROPion (WELLBUTRIN XL) 150 MG TB24 tablet       [DISCONTINUED] valacyclovir (VALTREX) 1000 MG tablet       [DISCONTINUED] verapamiL (CALAN-SR) 240 MG CR tablet TAKE ONE TABLET BY MOUTH EVERY EVENING (Patient not taking: Reported on 2/27/2024) 90 tablet 1     No facility-administered encounter medications on file as of 3/4/2024.     Orders Placed This Encounter   Procedures    CPAP FOR HOME USE     Order Specific Question:   Length of need (1-99 months):     Answer:   99     Order Specific Question:   Type ():     Answer:   Auto CPAP     Order Specific Question:   Auto CPAP pressure setting range (cmH20):     Answer:   5-16     Order Specific Question:   Fulfillment Priority:     Answer:   Level 4:  all others     Order Specific Question:   Humidification ():     Answer:   Heated     Order Specific Question:   Choose ONE mask type and its corresponding cushions and/or pillows:     Answer:    Full Face Mask, 1 per 90 days:  Full Face Cushion, (3 per 90 days)     Order Specific Question:   Choose EITHER Heated or Non-Heated Tubjing     Answer:    Non-Heated Tubing, 1 per 90 days     Order Specific Question:   All other supplies as needed as listed below:     Answer:    Headgear, 1 per 180 days     Order Specific Question:   All other supplies as needed as listed below:     Answer:    Disposable Filter, 6 per 90 days     Order Specific Question:   All other supplies as needed as listed below:     Answer:    Non-Disposable Filter, 1 per 180 days     Order Specific Question:   All other supplies as needed as listed below:     Answer:    Humidifier Chamber, 1 per 180 days     Order Specific Question:   All other supplies as needed as listed below:     Answer:    Chin Strap, 1 per 180 days     Plan:   Discussed treatment options  Dental device not effective  Inspire: needs to try/fail CPAP and BMI greater than  32  AutoPAP ordered and follow up in 10 weeks with download of data card and review of symptoms.   Weight loss and exercise to improve overall health.    Problem List Items Addressed This Visit          Other    DELMY (obstructive sleep apnea) - Primary    Overview     Home sleep study 2/2024 showed AHI ~17/hr.         Relevant Orders    CPAP FOR HOME USE     Other Visit Diagnoses       Severe obesity (BMI 35.0-39.9) with comorbidity        Snoring        Fatigue, unspecified type                             Elizabeth LeJeune, ACNP, ANP

## 2024-03-06 ENCOUNTER — CLINICAL SUPPORT (OUTPATIENT)
Dept: FAMILY MEDICINE | Facility: CLINIC | Age: 59
End: 2024-03-06
Payer: COMMERCIAL

## 2024-03-06 VITALS — SYSTOLIC BLOOD PRESSURE: 130 MMHG | HEART RATE: 60 BPM | DIASTOLIC BLOOD PRESSURE: 86 MMHG | OXYGEN SATURATION: 95 %

## 2024-03-06 DIAGNOSIS — I10 PRIMARY HYPERTENSION: Primary | ICD-10-CM

## 2024-03-06 PROCEDURE — 99999 PR PBB SHADOW E&M-EST. PATIENT-LVL III: CPT | Mod: PBBFAC,,,

## 2024-03-06 NOTE — PROGRESS NOTES
Jovan Mcwilliams 58 y.o. male is here today for Blood Pressure check.   History of HTN yes.    Review of patient's allergies indicates:   Allergen Reactions    Doxycycline Anaphylaxis     Other reaction(s): nausea  Other reaction(s): Not Indicated    Azithromycin      Other reaction(s): Not Indicated    Erythromycin Other (See Comments)     Other reaction(s): Nausea  Pain with IV use    Hydrochlorothiazide Rash     Creatinine   Date Value Ref Range Status   02/16/2024 1.33 0.50 - 1.40 mg/dL Final     Sodium   Date Value Ref Range Status   02/16/2024 141 136 - 145 mmol/L Final     Potassium   Date Value Ref Range Status   02/16/2024 3.9 3.5 - 5.1 mmol/L Final     Comment:     Anion Gap reference range revised on 4/28/2023   ]  Patient verifies taking blood pressure medications on a regular basis at the same time of the day.     Current Outpatient Medications:     aspirin (ECOTRIN) 81 MG EC tablet, Take 1 tablet (81 mg total) by mouth once daily., Disp: 30 tablet, Rfl: 1    atorvastatin (LIPITOR) 40 MG tablet, Take 1 tablet (40 mg total) by mouth once daily. (Patient taking differently: Take 40 mg by mouth every evening.), Disp: 90 tablet, Rfl: 3    carvediloL (COREG) 3.125 MG tablet, Take 1 tablet (3.125 mg total) by mouth 2 (two) times daily., Disp: 60 tablet, Rfl: 1    divalproex (DEPAKOTE) 500 MG TbEC, Take 1 tablet (500 mg total) by mouth 2 (two) times daily., Disp: 60 tablet, Rfl: 1    losartan-hydrochlorothiazide 50-12.5 mg (HYZAAR) 50-12.5 mg per tablet, Take 1 tablet by mouth once daily., Disp: 90 tablet, Rfl: 3    omeprazole (PRILOSEC) 40 MG capsule, TAKE ONE CAPSULE BY MOUTH ONCE A DAY (Patient taking differently: Take 40 mg by mouth every morning.), Disp: 90 capsule, Rfl: 3    albuterol (PROVENTIL/VENTOLIN HFA) 90 mcg/actuation inhaler, Inhale 2 puffs into the lungs every 4 (four) hours as needed for Wheezing or Shortness of Breath. (Patient not taking: Reported on 2/27/2024), Disp: 18 g, Rfl: 5     butalbital-acetaminophen-caffeine -40 mg (FIORICET, ESGIC) -40 mg per tablet, Take 2 tablets by mouth every 12 hours as needed for rescue if lidocaine fails (Patient not taking: Reported on 3/6/2024), Disp: 14 tablet, Rfl: 1    clotrimazole-betamethasone 1-0.05% (LOTRISONE) cream, Apply topically 2 (two) times daily. (Patient not taking: Reported on 2/27/2024), Disp: 15 g, Rfl: 1    galcanezumab-gnlm 300 mg/3 mL (100 mg/mL x 3) Syrg, Inject 3 mLs (300 mg total) into the skin every 28 days. (Patient not taking: Reported on 3/6/2024), Disp: 3 mL, Rfl: 11    ketoconazole (NIZORAL) 2 % cream, Apply to the feet and groin BID until clear then PRN for flares. (Patient not taking: Reported on 2/27/2024), Disp: 60 g, Rfl: 2    lasmiditan (REYVOW) tablet 100 mg, Take 1 tablet (100 mg) by mouth daily as needed (migraine). (Patient not taking: Reported on 3/6/2024), Disp: 8 tablet, Rfl: 1    LIDOcaine (LIDODERM) 5 %, Place 1 patch onto the skin daily as needed (Pain). Remove & Discard patch within 12 hours or as directed by MD (Patient not taking: Reported on 3/6/2024), Disp: 15 patch, Rfl: 0    prochlorperazine (COMPAZINE) 10 MG tablet, Take 1 tablet (10 mg total) by mouth every 6 (six) hours as needed (migraine or nausea). (Patient not taking: Reported on 3/6/2024), Disp: 60 tablet, Rfl: 11    traMADoL (ULTRAM) 50 mg tablet, Take 1 tablet (50 mg total) by mouth every 8 (eight) hours as needed for Pain. (Patient not taking: Reported on 3/6/2024), Disp: 20 tablet, Rfl: 0    ubrogepant (UBRELVY) 100 mg tablet, Take 100 mg by mouth as needed for Migraine., Disp: , Rfl:   Does patient have record of home blood pressure readings yes. Average 170/100  Last dose of blood pressure medication was taken yesterday.  Patient is asymptomatic.       BP: 130/86 , Pulse: 60 .

## 2024-03-11 ENCOUNTER — OFFICE VISIT (OUTPATIENT)
Dept: PAIN MEDICINE | Facility: CLINIC | Age: 59
End: 2024-03-11
Payer: COMMERCIAL

## 2024-03-11 ENCOUNTER — TELEPHONE (OUTPATIENT)
Dept: PAIN MEDICINE | Facility: CLINIC | Age: 59
End: 2024-03-11

## 2024-03-11 VITALS
WEIGHT: 266.13 LBS | HEIGHT: 72 IN | DIASTOLIC BLOOD PRESSURE: 81 MMHG | BODY MASS INDEX: 36.04 KG/M2 | HEART RATE: 61 BPM | SYSTOLIC BLOOD PRESSURE: 158 MMHG

## 2024-03-11 DIAGNOSIS — M47.816 LUMBAR SPONDYLOSIS: ICD-10-CM

## 2024-03-11 DIAGNOSIS — M54.16 LUMBAR RADICULOPATHY: Primary | ICD-10-CM

## 2024-03-11 DIAGNOSIS — Z98.1 HISTORY OF LUMBAR FUSION: ICD-10-CM

## 2024-03-11 PROCEDURE — 3008F BODY MASS INDEX DOCD: CPT | Mod: CPTII,S$GLB,, | Performed by: ANESTHESIOLOGY

## 2024-03-11 PROCEDURE — 1159F MED LIST DOCD IN RCRD: CPT | Mod: CPTII,S$GLB,, | Performed by: ANESTHESIOLOGY

## 2024-03-11 PROCEDURE — 3077F SYST BP >= 140 MM HG: CPT | Mod: CPTII,S$GLB,, | Performed by: ANESTHESIOLOGY

## 2024-03-11 PROCEDURE — 99999 PR PBB SHADOW E&M-EST. PATIENT-LVL III: CPT | Mod: PBBFAC,,, | Performed by: ANESTHESIOLOGY

## 2024-03-11 PROCEDURE — 99214 OFFICE O/P EST MOD 30 MIN: CPT | Mod: S$GLB,,, | Performed by: ANESTHESIOLOGY

## 2024-03-11 PROCEDURE — 3079F DIAST BP 80-89 MM HG: CPT | Mod: CPTII,S$GLB,, | Performed by: ANESTHESIOLOGY

## 2024-03-11 PROCEDURE — 3044F HG A1C LEVEL LT 7.0%: CPT | Mod: CPTII,S$GLB,, | Performed by: ANESTHESIOLOGY

## 2024-03-11 NOTE — TELEPHONE ENCOUNTER
Physician - Dr Trivedi    Type of Procedure/Injection - Lumbar Transforaminal Epidural  L3/4           Laterality - Left      Anxiolysis- Local      Need to hold medication - Yes      Aspirin for 5 days          Clearance needed - Yes      Follow up - 3 week

## 2024-03-11 NOTE — PROGRESS NOTES
[facility] Established Patient        PCP:  Amauri Dickerson MD    CC:   Chief Complaint   Patient presents with    Low-back Pain     Sciatic knee. ankle          Jovan Mcwilliams is a 58 y.o. year old male patient who has a past medical history of Basal cell carcinoma and Eye pain. He presents in self referral for lower back pain.  Status post left L3/4 transforaminal injection on 02/26/2024 with what sounds like 60% relief but starting to wear off somewhat.  He continues to complain of pain in the left buttock radiating down the posterior thigh.  It then crosses over in front of his knee down to the medial ankle in what seems to be an L4 distribution.  The pain is worse with standing and walking, twisting and turning, sometimes with lying down.  He states that he can get in certain positions that help but sitting too long bothers it more.  He denies any new weakness.  He denies any bowel or bladder incontinence.        Previous history:  He has history of two lumbar spine surgeries.  1995 diskectomy that left him with chronic right anterior thigh numbness.  2005 lumbar fusion after which he did really well with no complications or long term issues.  Current pain started 2-3 months ago.  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  He has pain in the left lower back to the left posterior thigh to the outer left knee.  The left leg has given out on him due to pain.  He has tried ibuprofen and tylenol.  He is taking motrin and tried flexeril in the past.     Denies bowel/ bladder incontinence.    Past and current medications:  Antineuropathics:  NSAIDs: motrin  Antidepressants:  Muscle relaxers: (past - flexeril)  Opioids: has tramadol at home, not taking  Antiplatelets/Anticoagulants:  Others:  tylenol; tried medrol taper    Physical Therapy/ Chiropractic care:  PT - current trial with no improvement.     Pain Intervention  History:  Status post left L3/4 transforaminal injection on 02/26/2024 with what sounds like 60% relief but starting to wear off somewhat.     Past Spine Surgical History:  1995 diskectomy that left him with chronic right anterior thigh numbness.    2005 lumbar fusion after which he did really well with no complications or long term issues; Dr. Dutch So        History:    Current Outpatient Medications:     albuterol (PROVENTIL/VENTOLIN HFA) 90 mcg/actuation inhaler, Inhale 2 puffs into the lungs every 4 (four) hours as needed for Wheezing or Shortness of Breath., Disp: 18 g, Rfl: 5    aspirin (ECOTRIN) 81 MG EC tablet, Take 1 tablet (81 mg total) by mouth once daily., Disp: 30 tablet, Rfl: 1    atorvastatin (LIPITOR) 40 MG tablet, Take 1 tablet (40 mg total) by mouth once daily. (Patient taking differently: Take 40 mg by mouth every evening.), Disp: 90 tablet, Rfl: 3    butalbital-acetaminophen-caffeine -40 mg (FIORICET, ESGIC) -40 mg per tablet, Take 2 tablets by mouth every 12 hours as needed for rescue if lidocaine fails, Disp: 14 tablet, Rfl: 1    carvediloL (COREG) 3.125 MG tablet, Take 1 tablet (3.125 mg total) by mouth 2 (two) times daily., Disp: 60 tablet, Rfl: 1    clotrimazole-betamethasone 1-0.05% (LOTRISONE) cream, Apply topically 2 (two) times daily., Disp: 15 g, Rfl: 1    divalproex (DEPAKOTE) 500 MG TbEC, Take 1 tablet (500 mg total) by mouth 2 (two) times daily., Disp: 60 tablet, Rfl: 1    galcanezumab-gnlm 300 mg/3 mL (100 mg/mL x 3) Syrg, Inject 3 mLs (300 mg total) into the skin every 28 days., Disp: 3 mL, Rfl: 11    ketoconazole (NIZORAL) 2 % cream, Apply to the feet and groin BID until clear then PRN for flares., Disp: 60 g, Rfl: 2    lasmiditan (REYVOW) tablet 100 mg, Take 1 tablet (100 mg) by mouth daily as needed (migraine)., Disp: 8 tablet, Rfl: 1    LIDOcaine (LIDODERM) 5 %, Place 1 patch onto the skin daily as needed (Pain). Remove & Discard patch within 12 hours or as  directed by MD, Disp: 15 patch, Rfl: 0    losartan-hydrochlorothiazide 50-12.5 mg (HYZAAR) 50-12.5 mg per tablet, Take 1 tablet by mouth once daily., Disp: 90 tablet, Rfl: 3    omeprazole (PRILOSEC) 40 MG capsule, TAKE ONE CAPSULE BY MOUTH ONCE A DAY (Patient taking differently: Take 40 mg by mouth every morning.), Disp: 90 capsule, Rfl: 3    prochlorperazine (COMPAZINE) 10 MG tablet, Take 1 tablet (10 mg total) by mouth every 6 (six) hours as needed (migraine or nausea)., Disp: 60 tablet, Rfl: 11    traMADoL (ULTRAM) 50 mg tablet, Take 1 tablet (50 mg total) by mouth every 8 (eight) hours as needed for Pain., Disp: 20 tablet, Rfl: 0    ubrogepant (UBRELVY) 100 mg tablet, Take 100 mg by mouth as needed for Migraine., Disp: , Rfl:     Past Medical History:   Diagnosis Date    Basal cell carcinoma 12/03/2020    right forearm- JAM     Eye pain     left eye       Past Surgical History:   Procedure Laterality Date    COLONOSCOPY N/A 04/03/2023    Procedure: COLONOSCOPY;  Surgeon: Leonardo Bright Jr., MD;  Location: [facility] ENDO;  Service: Endoscopy;  Laterality: N/A;    HERNIA REPAIR Bilateral 1974    KNEE SURGERY Left     LUMBAR FUSION  2005    LUMBAR LAMINECTOMY  1995    SHOULDER SURGERY Right     SKIN BIOPSY  12/03/2020    TRANSFORAMINAL EPIDURAL INJECTION OF STEROID Left 2/26/2024    Procedure: Injection,steroid,epidural,transforaminal approach       L3/4;  Surgeon: Cam Trivedi MD;  Location: [facility] OR;  Service: Pain Management;  Laterality: Left;       Family History   Problem Relation Age of Onset    Hypertension Mother     Hyperlipidemia Mother     Diabetes Father     Cancer Maternal Aunt     Cancer Paternal Aunt     Cancer Maternal Grandmother     Melanoma Maternal Grandmother     Cancer Maternal Grandfather     Colon cancer Paternal Grandfather        Social History     Socioeconomic History    Marital status:    Occupational History    Occupation:      Comment: Retired   Tobacco Use     Smoking status: Former     Current packs/day: 0.00     Average packs/day: 1 pack/day for 37.1 years (37.1 ttl pk-yrs)     Types: Cigarettes     Start date:      Quit date: 2024     Years since quittin.0    Smokeless tobacco: Never    Tobacco comments:     Pt hasn't smoked since 2/10   Substance and Sexual Activity    Alcohol use: Yes     Alcohol/week: 10.0 standard drinks of alcohol     Types: 10 Cans of beer per week     Comment: Drinks on weekend - 10 beers all day on     Drug use: No    Sexual activity: Yes     Partners: Female   Social History Narrative    Lives with wife and adopted daughter. One dog.      Social Determinants of Health     Financial Resource Strain: Low Risk  (3/6/2024)    Overall Financial Resource Strain (CARDIA)     Difficulty of Paying Living Expenses: Not hard at all   Food Insecurity: Unknown (3/6/2024)    Hunger Vital Sign     Worried About Running Out of Food in the Last Year: Never true     Ran Out of Food in the Last Year: Patient declined   Transportation Needs: Patient Declined (3/6/2024)    PRAPARE - Transportation     Lack of Transportation (Medical): Patient declined     Lack of Transportation (Non-Medical): Patient declined   Physical Activity: Inactive (3/6/2024)    Exercise Vital Sign     Days of Exercise per Week: 0 days     Minutes of Exercise per Session: 0 min   Stress: Stress Concern Present (3/6/2024)    [employer] [place] [employer] - Occupational Stress Questionnaire     Feeling of Stress : To some extent   Social Connections: Unknown (3/6/2024)    Social Connection and Isolation Panel [NHANES]     Frequency of Communication with Friends and Family: Three times a week     Frequency of Social Gatherings with Friends and Family: Twice a week     Active Member of Clubs or Organizations: No     Attends Club or Organization Meetings: Never     Marital Status:    Housing Stability: Patient Declined (3/6/2024)    Housing Stability Vital  Sign     Unable to Pay for Housing in the Last Year: Patient declined     Number of Places Lived in the Last Year: 1     Unstable Housing in the Last Year: Patient declined       Review of patient's allergies indicates:   Allergen Reactions    Doxycycline Anaphylaxis     Other reaction(s): nausea  Other reaction(s): Not Indicated    Azithromycin      Other reaction(s): Not Indicated    Erythromycin Other (See Comments)     Other reaction(s): Nausea  Pain with IV use    Hydrochlorothiazide Rash       Labs:  Lab Results   Component Value Date    HGBA1C 6.0 (H) 02/16/2024       Lab Results   Component Value Date    WBC 6.72 02/15/2024    HGB 18.8 (H) 02/15/2024    HCT 52.4 02/15/2024    MCV 91 02/15/2024     02/15/2024           Review of Systems:  Low back pain.  Left leg pain.  Balance of review of systems is negative.    Physical Exam:  Vitals:    03/11/24 0930   BP: (!) 158/81   Pulse: 61   Weight: 120.7 kg (266 lb 1.5 oz)   Height: 6' (1.829 m)   PainSc:   3   PainLoc: Back     Body mass index is 36.09 kg/m².    Pain disability index:      3/11/2024     9:28 AM 2/6/2024    10:20 AM 1/17/2024    11:24 AM   Last 3 PDI Scores   Pain Disability Index (PDI) 26 29 58       Gen: NAD  Psych: mood appropriate for given condition  HEENT: eyes anicteric   CV: RRR, 2+ radial pulse  Respiratory: non-labored, no signs of respiratory distress  Abd: non-distended  Skin: warm, dry and intact.  Gait: Able to heel walk, toe walk. No antalgic gait.     Coordination:   Tandem walking coordination: normal    Cervical spine: ROM is full in flexion, extension and lateral rotation without increased pain.  Spurling's maneuver causes no neck pain to either side.  Myofascial exam: No Tenderness to palpation across cervical paraspinous region bilaterally.    Lumbar spine:  Lumbar spine: ROM is mildly reduced with flexion extension and oblique extension with increased pain on flexion.  Kostas's test causes left low back pain.    Supine  straight leg raise is mildly positive for left leg pain at 60°    Internal and external rotation of the hip causes no increased pain on either side.  Myofascial exam:  Tenderness palpation over the SI joint    Sensory:  Intact and symmetrical to light touch in C4-T1 dermatomes bilaterally. Intact and symmetrical to light touch in L1-S1 dermatomes bilaterally.; isolated right atnerior thigh numbness in a small circular area.     Motor:     Right Left   L2/3 Iliacus Hip flexion  5  5   L3/4 Qudratus Femoris Knee Extension  5  5   L4/5 Tib Anterior Ankle Dorsiflexion   5  5   L5/S1 Extensor Hallicus Longus Great toe extension  5  5   S1/S2 Gastroc/Soleus Plantar Flexion  5  5      Right Left                  Patellar DTR 2+ 2+   Achilles DTR 2+ 2+   Vincent Absent  Absent   Clonus Absent Absent   Babinski Absent Absent       Imaging:  Xray lumbar spinef rom 12-12-23:  postsurgical changes of posterior interbody fusion from L4 through S1.  There are bilateral L4 and S1 screws with the left-sided L5 screw.  There has been right-sided laminectomy at the levels of fusion.  Hardware is intact without fracture or loosening.  The lumbar vertebral bodies maintain normal height and alignment.  There is moderate to marked disc space narrowing at the L4-5 and L5-S1 postoperative levels.  No instability is demonstrated with flexion or extension.     MRI lumbar spine 2-1-24 independently reviewed.  L3/4 facet hypertrophy with bilateral foraminal narrowing.  L4/5 post op changes and right facet cyst.  No right L5 screw.  L5/S1 post op changes, facet arthropathy, with right foraminal narrowing.  Right kidney cyst.    Assessment:   Jovan Mcwilliams is a 58 y.o. year old male patient who has a past medical history of Basal cell carcinoma and Eye pain. He presents in self referral for lower back and left thigh pain.       1. Lumbar radiculopathy        2. History of lumbar fusion        3. Lumbar spondylosis              Plan:  1.  We  reviewed his symptoms, reviewed his physical exam and imaging.  He does have stenosis at L3/4 which could definitely account for the symptoms he is having, appears to encroach the descending L4 nerve root at that level which would explain his lower leg pain.  We discussed that we could try 1 more injection to see if we get him closer to 100% relief.  If that is not providing significant relief, he does have some symptoms of SI joint pain as well which could be expected with his L5/S1 fusion.  I am going to set him up with a left L3/4 transforaminal injection and have him follow up in 3-4 weeks after the procedure or sooner as needed.

## 2024-03-11 NOTE — H&P (VIEW-ONLY)
[facility] Established Patient        PCP:  Amauri Dickerson MD    CC:   Chief Complaint   Patient presents with    Low-back Pain     Sciatic knee. ankle          Jovan Mcwilliams is a 58 y.o. year old male patient who has a past medical history of Basal cell carcinoma and Eye pain. He presents in self referral for lower back pain.  Status post left L3/4 transforaminal injection on 02/26/2024 with what sounds like 60% relief but starting to wear off somewhat.  He continues to complain of pain in the left buttock radiating down the posterior thigh.  It then crosses over in front of his knee down to the medial ankle in what seems to be an L4 distribution.  The pain is worse with standing and walking, twisting and turning, sometimes with lying down.  He states that he can get in certain positions that help but sitting too long bothers it more.  He denies any new weakness.  He denies any bowel or bladder incontinence.        Previous history:  He has history of two lumbar spine surgeries.  1995 diskectomy that left him with chronic right anterior thigh numbness.  2005 lumbar fusion after which he did really well with no complications or long term issues.  Current pain started 2-3 months ago.  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  He has pain in the left lower back to the left posterior thigh to the outer left knee.  The left leg has given out on him due to pain.  He has tried ibuprofen and tylenol.  He is taking motrin and tried flexeril in the past.     Denies bowel/ bladder incontinence.    Past and current medications:  Antineuropathics:  NSAIDs: motrin  Antidepressants:  Muscle relaxers: (past - flexeril)  Opioids: has tramadol at home, not taking  Antiplatelets/Anticoagulants:  Others:  tylenol; tried medrol taper    Physical Therapy/ Chiropractic care:  PT - current trial with no improvement.     Pain Intervention  History:  Status post left L3/4 transforaminal injection on 02/26/2024 with what sounds like 60% relief but starting to wear off somewhat.     Past Spine Surgical History:  1995 diskectomy that left him with chronic right anterior thigh numbness.    2005 lumbar fusion after which he did really well with no complications or long term issues; Dr. Dutch So        History:    Current Outpatient Medications:     albuterol (PROVENTIL/VENTOLIN HFA) 90 mcg/actuation inhaler, Inhale 2 puffs into the lungs every 4 (four) hours as needed for Wheezing or Shortness of Breath., Disp: 18 g, Rfl: 5    aspirin (ECOTRIN) 81 MG EC tablet, Take 1 tablet (81 mg total) by mouth once daily., Disp: 30 tablet, Rfl: 1    atorvastatin (LIPITOR) 40 MG tablet, Take 1 tablet (40 mg total) by mouth once daily. (Patient taking differently: Take 40 mg by mouth every evening.), Disp: 90 tablet, Rfl: 3    butalbital-acetaminophen-caffeine -40 mg (FIORICET, ESGIC) -40 mg per tablet, Take 2 tablets by mouth every 12 hours as needed for rescue if lidocaine fails, Disp: 14 tablet, Rfl: 1    carvediloL (COREG) 3.125 MG tablet, Take 1 tablet (3.125 mg total) by mouth 2 (two) times daily., Disp: 60 tablet, Rfl: 1    clotrimazole-betamethasone 1-0.05% (LOTRISONE) cream, Apply topically 2 (two) times daily., Disp: 15 g, Rfl: 1    divalproex (DEPAKOTE) 500 MG TbEC, Take 1 tablet (500 mg total) by mouth 2 (two) times daily., Disp: 60 tablet, Rfl: 1    galcanezumab-gnlm 300 mg/3 mL (100 mg/mL x 3) Syrg, Inject 3 mLs (300 mg total) into the skin every 28 days., Disp: 3 mL, Rfl: 11    ketoconazole (NIZORAL) 2 % cream, Apply to the feet and groin BID until clear then PRN for flares., Disp: 60 g, Rfl: 2    lasmiditan (REYVOW) tablet 100 mg, Take 1 tablet (100 mg) by mouth daily as needed (migraine)., Disp: 8 tablet, Rfl: 1    LIDOcaine (LIDODERM) 5 %, Place 1 patch onto the skin daily as needed (Pain). Remove & Discard patch within 12 hours or as  directed by MD, Disp: 15 patch, Rfl: 0    losartan-hydrochlorothiazide 50-12.5 mg (HYZAAR) 50-12.5 mg per tablet, Take 1 tablet by mouth once daily., Disp: 90 tablet, Rfl: 3    omeprazole (PRILOSEC) 40 MG capsule, TAKE ONE CAPSULE BY MOUTH ONCE A DAY (Patient taking differently: Take 40 mg by mouth every morning.), Disp: 90 capsule, Rfl: 3    prochlorperazine (COMPAZINE) 10 MG tablet, Take 1 tablet (10 mg total) by mouth every 6 (six) hours as needed (migraine or nausea)., Disp: 60 tablet, Rfl: 11    traMADoL (ULTRAM) 50 mg tablet, Take 1 tablet (50 mg total) by mouth every 8 (eight) hours as needed for Pain., Disp: 20 tablet, Rfl: 0    ubrogepant (UBRELVY) 100 mg tablet, Take 100 mg by mouth as needed for Migraine., Disp: , Rfl:     Past Medical History:   Diagnosis Date    Basal cell carcinoma 12/03/2020    right forearm- JAM     Eye pain     left eye       Past Surgical History:   Procedure Laterality Date    COLONOSCOPY N/A 04/03/2023    Procedure: COLONOSCOPY;  Surgeon: Leonardo Bright Jr., MD;  Location: [facility] ENDO;  Service: Endoscopy;  Laterality: N/A;    HERNIA REPAIR Bilateral 1974    KNEE SURGERY Left     LUMBAR FUSION  2005    LUMBAR LAMINECTOMY  1995    SHOULDER SURGERY Right     SKIN BIOPSY  12/03/2020    TRANSFORAMINAL EPIDURAL INJECTION OF STEROID Left 2/26/2024    Procedure: Injection,steroid,epidural,transforaminal approach       L3/4;  Surgeon: Cam Trivedi MD;  Location: [facility] OR;  Service: Pain Management;  Laterality: Left;       Family History   Problem Relation Age of Onset    Hypertension Mother     Hyperlipidemia Mother     Diabetes Father     Cancer Maternal Aunt     Cancer Paternal Aunt     Cancer Maternal Grandmother     Melanoma Maternal Grandmother     Cancer Maternal Grandfather     Colon cancer Paternal Grandfather        Social History     Socioeconomic History    Marital status:    Occupational History    Occupation:      Comment: Retired   Tobacco Use     Smoking status: Former     Current packs/day: 0.00     Average packs/day: 1 pack/day for 37.1 years (37.1 ttl pk-yrs)     Types: Cigarettes     Start date:      Quit date: 2024     Years since quittin.0    Smokeless tobacco: Never    Tobacco comments:     Pt hasn't smoked since 2/10   Substance and Sexual Activity    Alcohol use: Yes     Alcohol/week: 10.0 standard drinks of alcohol     Types: 10 Cans of beer per week     Comment: Drinks on weekend - 10 beers all day on     Drug use: No    Sexual activity: Yes     Partners: Female   Social History Narrative    Lives with wife and adopted daughter. One dog.      Social Determinants of Health     Financial Resource Strain: Low Risk  (3/6/2024)    Overall Financial Resource Strain (CARDIA)     Difficulty of Paying Living Expenses: Not hard at all   Food Insecurity: Unknown (3/6/2024)    Hunger Vital Sign     Worried About Running Out of Food in the Last Year: Never true     Ran Out of Food in the Last Year: Patient declined   Transportation Needs: Patient Declined (3/6/2024)    PRAPARE - Transportation     Lack of Transportation (Medical): Patient declined     Lack of Transportation (Non-Medical): Patient declined   Physical Activity: Inactive (3/6/2024)    Exercise Vital Sign     Days of Exercise per Week: 0 days     Minutes of Exercise per Session: 0 min   Stress: Stress Concern Present (3/6/2024)    [employer] - Occupational Stress Questionnaire     Feeling of Stress : To some extent   Social Connections: Unknown (3/6/2024)    Social Connection and Isolation Panel [NHANES]     Frequency of Communication with Friends and Family: Three times a week     Frequency of Social Gatherings with Friends and Family: Twice a week     Active Member of Clubs or Organizations: No     Attends Club or Organization Meetings: Never     Marital Status:    Housing Stability: Patient Declined (3/6/2024)    Housing Stability Vital  Sign     Unable to Pay for Housing in the Last Year: Patient declined     Number of Places Lived in the Last Year: 1     Unstable Housing in the Last Year: Patient declined       Review of patient's allergies indicates:   Allergen Reactions    Doxycycline Anaphylaxis     Other reaction(s): nausea  Other reaction(s): Not Indicated    Azithromycin      Other reaction(s): Not Indicated    Erythromycin Other (See Comments)     Other reaction(s): Nausea  Pain with IV use    Hydrochlorothiazide Rash       Labs:  Lab Results   Component Value Date    HGBA1C 6.0 (H) 02/16/2024       Lab Results   Component Value Date    WBC 6.72 02/15/2024    HGB 18.8 (H) 02/15/2024    HCT 52.4 02/15/2024    MCV 91 02/15/2024     02/15/2024           Review of Systems:  Low back pain.  Left leg pain.  Balance of review of systems is negative.    Physical Exam:  Vitals:    03/11/24 0930   BP: (!) 158/81   Pulse: 61   Weight: 120.7 kg (266 lb 1.5 oz)   Height: 6' (1.829 m)   PainSc:   3   PainLoc: Back     Body mass index is 36.09 kg/m².    Pain disability index:      3/11/2024     9:28 AM 2/6/2024    10:20 AM 1/17/2024    11:24 AM   Last 3 PDI Scores   Pain Disability Index (PDI) 26 29 58       Gen: NAD  Psych: mood appropriate for given condition  HEENT: eyes anicteric   CV: RRR, 2+ radial pulse  Respiratory: non-labored, no signs of respiratory distress  Abd: non-distended  Skin: warm, dry and intact.  Gait: Able to heel walk, toe walk. No antalgic gait.     Coordination:   Tandem walking coordination: normal    Cervical spine: ROM is full in flexion, extension and lateral rotation without increased pain.  Spurling's maneuver causes no neck pain to either side.  Myofascial exam: No Tenderness to palpation across cervical paraspinous region bilaterally.    Lumbar spine:  Lumbar spine: ROM is mildly reduced with flexion extension and oblique extension with increased pain on flexion.  Kostas's test causes left low back pain.    Supine  straight leg raise is mildly positive for left leg pain at 60°    Internal and external rotation of the hip causes no increased pain on either side.  Myofascial exam:  Tenderness palpation over the SI joint    Sensory:  Intact and symmetrical to light touch in C4-T1 dermatomes bilaterally. Intact and symmetrical to light touch in L1-S1 dermatomes bilaterally.; isolated right atnerior thigh numbness in a small circular area.     Motor:     Right Left   L2/3 Iliacus Hip flexion  5  5   L3/4 Qudratus Femoris Knee Extension  5  5   L4/5 Tib Anterior Ankle Dorsiflexion   5  5   L5/S1 Extensor Hallicus Longus Great toe extension  5  5   S1/S2 Gastroc/Soleus Plantar Flexion  5  5      Right Left                  Patellar DTR 2+ 2+   Achilles DTR 2+ 2+   Vincent Absent  Absent   Clonus Absent Absent   Babinski Absent Absent       Imaging:  Xray lumbar spinef rom 12-12-23:  postsurgical changes of posterior interbody fusion from L4 through S1.  There are bilateral L4 and S1 screws with the left-sided L5 screw.  There has been right-sided laminectomy at the levels of fusion.  Hardware is intact without fracture or loosening.  The lumbar vertebral bodies maintain normal height and alignment.  There is moderate to marked disc space narrowing at the L4-5 and L5-S1 postoperative levels.  No instability is demonstrated with flexion or extension.     MRI lumbar spine 2-1-24 independently reviewed.  L3/4 facet hypertrophy with bilateral foraminal narrowing.  L4/5 post op changes and right facet cyst.  No right L5 screw.  L5/S1 post op changes, facet arthropathy, with right foraminal narrowing.  Right kidney cyst.    Assessment:   Jovan Mcwilliams is a 58 y.o. year old male patient who has a past medical history of Basal cell carcinoma and Eye pain. He presents in self referral for lower back and left thigh pain.       1. Lumbar radiculopathy        2. History of lumbar fusion        3. Lumbar spondylosis              Plan:  1.  We  reviewed his symptoms, reviewed his physical exam and imaging.  He does have stenosis at L3/4 which could definitely account for the symptoms he is having, appears to encroach the descending L4 nerve root at that level which would explain his lower leg pain.  We discussed that we could try 1 more injection to see if we get him closer to 100% relief.  If that is not providing significant relief, he does have some symptoms of SI joint pain as well which could be expected with his L5/S1 fusion.  I am going to set him up with a left L3/4 transforaminal injection and have him follow up in 3-4 weeks after the procedure or sooner as needed.

## 2024-03-13 DIAGNOSIS — Z79.02 ANTIPLATELET OR ANTITHROMBOTIC LONG-TERM USE: Primary | ICD-10-CM

## 2024-03-13 DIAGNOSIS — M54.16 LUMBAR RADICULOPATHY: Primary | ICD-10-CM

## 2024-03-13 RX ORDER — ALPRAZOLAM 1 MG/1
1 TABLET, ORALLY DISINTEGRATING ORAL ONCE AS NEEDED
Status: CANCELLED | OUTPATIENT
Start: 2024-03-13 | End: 2035-08-10

## 2024-03-13 NOTE — TELEPHONE ENCOUNTER
Spoke with patient and scheduled. Per provider patient to hold ASA x 5 days prior. E Consult to Cardiology for clearance.Pre op information given and follow up appointment scheduled.

## 2024-03-15 ENCOUNTER — E-CONSULT (OUTPATIENT)
Dept: CARDIOLOGY | Facility: CLINIC | Age: 59
End: 2024-03-15
Payer: COMMERCIAL

## 2024-03-15 DIAGNOSIS — Z01.810 PREOP CARDIOVASCULAR EXAM: Primary | ICD-10-CM

## 2024-03-15 PROCEDURE — 99451 NTRPROF PH1/NTRNET/EHR 5/>: CPT | Mod: S$GLB,,, | Performed by: INTERNAL MEDICINE

## 2024-03-15 NOTE — CONSULTS
[facility]  Response for E-Consult     Patient Name: Jovan Mcwilliams  MRN: [id number]  Primary Care Provider: James Peña MD   Requesting Provider: Cam Trivedi MD  Consults       59 YO MALE, E consult for preop clearance of lumbar RICHARD  The chart reviewed.  PMH    Plan  Elevated periop risk of CV events for non-high risk procedure.  Ok to proceed the scheduled procedure without further cardiac study.  OK to hold                 before the procedure and resume postop once hemodynamically stable      T    *This eConsult is based on the clinical data available to me and is furnished without benefit of a physical examination. The eConsult will need to be interpreted in light of any clinical issues or changes in patient status not available to me at the time of filing this eConsults. Significant changes in patient condition or level of acuity should result in immediate formal consultation and reevaluation. Please alert me if you have further questions.    Thank you for this eConsult referral.     Kaden Tian MD  [facility]

## 2024-03-20 DIAGNOSIS — G44.011 INTRACTABLE EPISODIC CLUSTER HEADACHE: ICD-10-CM

## 2024-03-20 RX ORDER — DIVALPROEX SODIUM 500 MG/1
500 TABLET, DELAYED RELEASE ORAL 2 TIMES DAILY
Qty: 60 TABLET | Refills: 0 | Status: SHIPPED | OUTPATIENT
Start: 2024-03-20 | End: 2024-04-17

## 2024-03-21 ENCOUNTER — OFFICE VISIT (OUTPATIENT)
Dept: FAMILY MEDICINE | Facility: CLINIC | Age: 59
End: 2024-03-21
Payer: COMMERCIAL

## 2024-03-21 DIAGNOSIS — R19.7 DIARRHEA OF PRESUMED INFECTIOUS ORIGIN: Primary | ICD-10-CM

## 2024-03-21 DIAGNOSIS — L23.7 POISON IVY DERMATITIS: ICD-10-CM

## 2024-03-21 PROCEDURE — 99213 OFFICE O/P EST LOW 20 MIN: CPT | Mod: 95,,, | Performed by: STUDENT IN AN ORGANIZED HEALTH CARE EDUCATION/TRAINING PROGRAM

## 2024-03-21 PROCEDURE — 3044F HG A1C LEVEL LT 7.0%: CPT | Mod: CPTII,95,, | Performed by: STUDENT IN AN ORGANIZED HEALTH CARE EDUCATION/TRAINING PROGRAM

## 2024-03-21 NOTE — ASSESSMENT & PLAN NOTE
Present for the past 3-4 days. Normally goes 3-4 times per day but has increased to 5-6 times per day. He has fecal urgency but he mentions some level of this in the past. He does wake up to go stool. Has been taking imodium once daily. No blood in stool. Increase to three times a day imodium. Take daily fiber supplement. Return precautions given. I would like to discuss possible IBS-D at next visit.

## 2024-03-21 NOTE — PROGRESS NOTES
The patient location is: [place]  The chief complaint leading to consultation is: diarrhea    Visit type: audiovisual    Notes:    HPI  Patient presents with diarrhea and rash.     Answers submitted by the patient for this visit:  Rash Questionnaire (Submitted on 3/20/2024)  Chief Complaint: Rash  Chronicity: new  Onset: in the past 7 days  Progression since onset: unchanged  Affected locations: left arm, right arm  Characteristics: blistering, pain, redness, itchiness  Exposed to: plant contact, unknown  anorexia: Yes  facial edema: No  joint pain: No  nail changes: No  Treatments tried: anti-itch cream, antihistamine  Improvement on treatment: no relief  asthma: No  allergies: Yes  eczema: No  varicella: Yes      Review of Systems   Constitutional:  Positive for fatigue. Negative for fever.   HENT:  Negative for congestion, rhinorrhea and sore throat.    Eyes:  Negative for pain.   Respiratory:  Positive for shortness of breath. Negative for cough.    Gastrointestinal:  Positive for diarrhea (Going 5-6x daily with fecal urgency). Negative for blood in stool and vomiting.   Skin:  Positive for rash (bilateral arms - appeared after doing yardwork; pruritic).   Psychiatric/Behavioral:  Positive for sleep disturbance (Fecal urgency will awaken him).        Objective:  Physical Exam  Constitutional:       General: He is not in acute distress.     Appearance: Normal appearance. He is well-developed.   HENT:      Head: Normocephalic and atraumatic.   Pulmonary:      Effort: Pulmonary effort is normal. No respiratory distress.   Neurological:      Mental Status: He is alert and oriented to person, place, and time. Mental status is at baseline.   Psychiatric:         Attention and Perception: Attention and perception normal.         Mood and Affect: Mood normal.         Speech: Speech normal.         Behavior: Behavior normal. Behavior is cooperative.         Thought Content: Thought content normal.         Cognition and  Memory: Cognition normal.         Judgment: Judgment normal.         Plan:  1. Diarrhea of presumed infectious origin  Assessment & Plan:  Present for the past 3-4 days. Normally goes 3-4 times per day but has increased to 5-6 times per day. He has fecal urgency but he mentions some level of this in the past. He does wake up to go stool. Has been taking imodium once daily. No blood in stool. Increase to three times a day imodium. Take daily fiber supplement. Return precautions given. I would like to discuss possible IBS-D at next visit.      2. Poison ivy dermatitis  Assessment & Plan:  Appeared last week, a few days after working in the yard. Bilateral forearms. Itching controlled with over-the-counter topical medications. Continue such.          Follow up in 2 months as previously scheduled.     Face to Face time with patient: 14 minutes  17 minutes of total time spent on the encounter, which includes face to face time and non-face to face time preparing to see the patient (eg, review of tests), Obtaining and/or reviewing separately obtained history, Documenting clinical information in the electronic or other health record, Independently interpreting results (not separately reported) and communicating results to the patient/family/caregiver, or Care coordination (not separately reported).     Each patient to whom he or she provides medical services by telemedicine is:  (1) informed of the relationship between the physician and patient and the respective role of any other health care provider with respect to management of the patient; and (2) notified that he or she may decline to receive medical services by telemedicine and may withdraw from such care at any time.

## 2024-03-25 NOTE — CONSULTS
[facility]  Response for E-Consult     Patient Name: Jovan Mcwilliams  MRN: [id number]  Primary Care Provider: James Peña MD   Requesting Provider: Cam Trivedi MD  E-Consult to Cardiology  Consult performed by: Kaden Tian MD  Consult ordered by: Cam Trivedi MD           57 yo male, E consult for preop clearance of lumbar RICHARD  The chart reviewed.  PMH CAD    Plan  Elevated periop risk of CV events for non-high risk procedure.  Ok to proceed the scheduled procedure without further cardiac study.  OK to hold ASA 7 days before the procedure and resume postop once hemodynamically stable      Total time of Consultation: 10 minute    I did not speak to the requesting provider verbally about this.     *This eConsult is based on the clinical data available to me and is furnished without benefit of a physical examination. The eConsult will need to be interpreted in light of any clinical issues or changes in patient status not available to me at the time of filing this eConsults. Significant changes in patient condition or level of acuity should result in immediate formal consultation and reevaluation. Please alert me if you have further questions.    Thank you for this eConsult referral.     Kaden Tian MD  [facility]

## 2024-04-04 ENCOUNTER — TELEPHONE (OUTPATIENT)
Dept: CARDIOLOGY | Facility: HOSPITAL | Age: 59
End: 2024-04-04
Payer: COMMERCIAL

## 2024-04-08 ENCOUNTER — HOSPITAL ENCOUNTER (OUTPATIENT)
Dept: CARDIOLOGY | Facility: HOSPITAL | Age: 59
Discharge: HOME | End: 2024-04-08
Attending: INTERNAL MEDICINE
Payer: COMMERCIAL

## 2024-04-08 VITALS
DIASTOLIC BLOOD PRESSURE: 75 MMHG | HEART RATE: 67 BPM | SYSTOLIC BLOOD PRESSURE: 149 MMHG | RESPIRATION RATE: 16 BRPM | BODY MASS INDEX: 36.03 KG/M2 | WEIGHT: 266 LBS | HEIGHT: 72 IN

## 2024-04-08 DIAGNOSIS — G47.33 OSA (OBSTRUCTIVE SLEEP APNEA): ICD-10-CM

## 2024-04-08 DIAGNOSIS — I25.10 CORONARY ARTERY DISEASE INVOLVING NATIVE CORONARY ARTERY OF NATIVE HEART WITHOUT ANGINA PECTORIS: ICD-10-CM

## 2024-04-08 DIAGNOSIS — F17.200 NICOTINE DEPENDENCE, UNCOMPLICATED, UNSPECIFIED NICOTINE PRODUCT TYPE: ICD-10-CM

## 2024-04-08 DIAGNOSIS — E78.01 FAMILIAL HYPERCHOLESTEROLEMIA: ICD-10-CM

## 2024-04-08 DIAGNOSIS — I10 PRIMARY HYPERTENSION: ICD-10-CM

## 2024-04-08 LAB
CFR FLOW - ANTERIOR: 1.31
CFR FLOW - INFERIOR: 1.28
CFR FLOW - LATERAL: 1.49
CFR FLOW - MAX: 1.82
CFR FLOW - MIN: 0.67
CFR FLOW - SEPTAL: 1.28
CFR FLOW - WHOLE HEART: 1.34
CV STRESS BASE HR: 63 BPM
DIASTOLIC BLOOD PRESSURE: 104 MMHG
EJECTION FRACTION- HIGH: 59 %
END DIASTOLIC INDEX-HIGH: 155 ML/M2
END DIASTOLIC INDEX-LOW: 91 ML/M2
END SYSTOLIC INDEX-HIGH: 78 ML/M2
END SYSTOLIC INDEX-LOW: 40 ML/M2
NUC REST DIASTOLIC VOLUME INDEX: 123
NUC REST EJECTION FRACTION: 62
NUC REST SYSTOLIC VOLUME INDEX: 47
NUC STRESS DIASTOLIC VOLUME INDEX: 130
NUC STRESS EJECTION FRACTION: 69 %
NUC STRESS SYSTOLIC VOLUME INDEX: 41
OHS CV CPX 1 MINUTE RECOVERY HEART RATE: 80 BPM
OHS CV CPX 85 PERCENT MAX PREDICTED HEART RATE MALE: 139
OHS CV CPX MAX PREDICTED HEART RATE: 164
OHS CV CPX PATIENT IS FEMALE: 0
OHS CV CPX PATIENT IS MALE: 1
OHS CV CPX PEAK DIASTOLIC BLOOD PRESSURE: 90 MMHG
OHS CV CPX PEAK HEAR RATE: 64 BPM
OHS CV CPX PEAK RATE PRESSURE PRODUCT: 9152
OHS CV CPX PEAK SYSTOLIC BLOOD PRESSURE: 143 MMHG
OHS CV CPX PERCENT MAX PREDICTED HEART RATE ACHIEVED: 39
OHS CV CPX RATE PRESSURE PRODUCT PRESENTING: NORMAL
REST FLOW - ANTERIOR: 0.51 CC/MIN/G
REST FLOW - INFERIOR: 0.5 CC/MIN/G
REST FLOW - LATERAL: 0.61 CC/MIN/G
REST FLOW - MAX: 0.76 CC/MIN/G
REST FLOW - MIN: 0.25 CC/MIN/G
REST FLOW - SEPTAL: 0.47 CC/MIN/G
REST FLOW - WHOLE HEART: 0.52 CC/MIN/G
RETIRED EF AND QEF - SEE NOTES: 47 %
STRESS FLOW - ANTERIOR: 1.31 CC/MIN/G
STRESS FLOW - INFERIOR: 1.28 CC/MIN/G
STRESS FLOW - LATERAL: 1.49 CC/MIN/G
STRESS FLOW - MAX: 1.82 CC/MIN/G
STRESS FLOW - MIN: 0.67 CC/MIN/G
STRESS FLOW - SEPTAL: 1.28 CC/MIN/G
STRESS FLOW - WHOLE HEART: 1.34 CC/MIN/G
SYSTOLIC BLOOD PRESSURE: 171 MMHG

## 2024-04-08 PROCEDURE — 78431 MYOCRD IMG PET RST&STRS CT: CPT

## 2024-04-08 PROCEDURE — 93018 CV STRESS TEST I&R ONLY: CPT | Mod: ,,, | Performed by: INTERNAL MEDICINE

## 2024-04-08 PROCEDURE — 93016 CV STRESS TEST SUPVJ ONLY: CPT | Mod: ,,, | Performed by: INTERNAL MEDICINE

## 2024-04-08 PROCEDURE — 78431 MYOCRD IMG PET RST&STRS CT: CPT | Mod: 26,,, | Performed by: INTERNAL MEDICINE

## 2024-04-08 PROCEDURE — 78434 AQMBF PET REST & RX STRESS: CPT | Mod: 26,,, | Performed by: INTERNAL MEDICINE

## 2024-04-08 PROCEDURE — 63600175 PHARM REV CODE 636 W HCPCS: Performed by: INTERNAL MEDICINE

## 2024-04-08 PROCEDURE — A9555 RB82 RUBIDIUM: HCPCS | Performed by: INTERNAL MEDICINE

## 2024-04-08 PROCEDURE — 93017 CV STRESS TEST TRACING ONLY: CPT

## 2024-04-08 RX ORDER — REGADENOSON 0.08 MG/ML
0.4 INJECTION, SOLUTION INTRAVENOUS
Status: COMPLETED | OUTPATIENT
Start: 2024-04-08 | End: 2024-04-08

## 2024-04-08 RX ORDER — AMINOPHYLLINE 25 MG/ML
75 INJECTION, SOLUTION INTRAVENOUS
Status: COMPLETED | OUTPATIENT
Start: 2024-04-08 | End: 2024-04-08

## 2024-04-08 RX ADMIN — AMINOPHYLLINE 75 MG: 25 INJECTION, SOLUTION INTRAVENOUS at 09:04

## 2024-04-08 RX ADMIN — REGADENOSON 0.4 MG: 0.08 INJECTION, SOLUTION INTRAVENOUS at 09:04

## 2024-04-08 RX ADMIN — RUBIDIUM CHLORIDE RB-82 37 MILLICURIE: 150 INJECTION, SOLUTION INTRAVENOUS at 09:04

## 2024-04-08 RX ADMIN — RUBIDIUM CHLORIDE RB-82 36.7 MILLICURIE: 150 INJECTION, SOLUTION INTRAVENOUS at 09:04

## 2024-04-10 ENCOUNTER — HOSPITAL ENCOUNTER (OUTPATIENT)
Facility: HOSPITAL | Age: 59
Discharge: HOME | End: 2024-04-10
Attending: ANESTHESIOLOGY | Admitting: ANESTHESIOLOGY
Payer: COMMERCIAL

## 2024-04-10 ENCOUNTER — HOSPITAL ENCOUNTER (OUTPATIENT)
Dept: RADIOLOGY | Facility: HOSPITAL | Age: 59
Discharge: HOME | End: 2024-04-10
Attending: ANESTHESIOLOGY | Admitting: ANESTHESIOLOGY
Payer: COMMERCIAL

## 2024-04-10 VITALS
BODY MASS INDEX: 36.03 KG/M2 | HEIGHT: 72 IN | SYSTOLIC BLOOD PRESSURE: 162 MMHG | WEIGHT: 266 LBS | HEART RATE: 59 BPM | OXYGEN SATURATION: 95 % | RESPIRATION RATE: 16 BRPM | TEMPERATURE: 99 F | DIASTOLIC BLOOD PRESSURE: 83 MMHG

## 2024-04-10 DIAGNOSIS — M54.50 LOWER BACK PAIN: ICD-10-CM

## 2024-04-10 DIAGNOSIS — M54.16 LUMBAR RADICULOPATHY: ICD-10-CM

## 2024-04-10 PROCEDURE — 63600175 PHARM REV CODE 636 W HCPCS: Mod: PO | Performed by: ANESTHESIOLOGY

## 2024-04-10 PROCEDURE — 64483 NJX AA&/STRD TFRM EPI L/S 1: CPT | Mod: LT,,, | Performed by: ANESTHESIOLOGY

## 2024-04-10 PROCEDURE — 76000 FLUOROSCOPY <1 HR PHYS/QHP: CPT | Mod: TC,PO

## 2024-04-10 PROCEDURE — 64483 NJX AA&/STRD TFRM EPI L/S 1: CPT | Mod: PO,LT | Performed by: ANESTHESIOLOGY

## 2024-04-10 PROCEDURE — 25500020 PHARM REV CODE 255: Mod: PO | Performed by: ANESTHESIOLOGY

## 2024-04-10 PROCEDURE — 25000003 PHARM REV CODE 250: Mod: PO | Performed by: ANESTHESIOLOGY

## 2024-04-10 RX ORDER — ALPRAZOLAM 0.5 MG/1
1 TABLET, ORALLY DISINTEGRATING ORAL ONCE AS NEEDED
Status: COMPLETED | OUTPATIENT
Start: 2024-04-10 | End: 2024-04-10

## 2024-04-10 RX ORDER — METHYLPREDNISOLONE ACETATE 40 MG/ML
INJECTION, SUSPENSION INTRA-ARTICULAR; INTRALESIONAL; INTRAMUSCULAR; SOFT TISSUE
Status: DISCONTINUED | OUTPATIENT
Start: 2024-04-10 | End: 2024-04-10 | Stop reason: HOSPADM

## 2024-04-10 RX ORDER — LIDOCAINE HYDROCHLORIDE 10 MG/ML
INJECTION, SOLUTION EPIDURAL; INFILTRATION; INTRACAUDAL; PERINEURAL
Status: DISCONTINUED | OUTPATIENT
Start: 2024-04-10 | End: 2024-04-10 | Stop reason: HOSPADM

## 2024-04-10 RX ORDER — BUPIVACAINE HYDROCHLORIDE 2.5 MG/ML
INJECTION, SOLUTION EPIDURAL; INFILTRATION; INTRACAUDAL
Status: DISCONTINUED | OUTPATIENT
Start: 2024-04-10 | End: 2024-04-10 | Stop reason: HOSPADM

## 2024-04-10 RX ADMIN — ALPRAZOLAM 1 MG: 0.5 TABLET, ORALLY DISINTEGRATING ORAL at 03:04

## 2024-04-10 NOTE — OP NOTE
PROCEDURE DATE: 4/10/2024    PROCEDURE: Left L3/4 transforaminal epidural steroid injection under fluoroscopy    DIAGNOSIS: Lumbar  Radiculopathy    Post op diagnosis: Same    PHYSICIAN: Cam Trivedi MD    MEDICATIONS INJECTED:  Methylprednisolone 40mg (1ml) and 1ml 0.25% bupivicaine at each nerve root.     LOCAL ANESTHETIC INJECTED:  Lidocaine 1%. 4 ml per site.    SEDATION MEDICATIONS: none    ESTIMATED BLOOD LOSS:  none    COMPLICATIONS:  none    TECHNIQUE:   A time-out was taken to identify patient and procedure side prior to starting the procedure. The patient was placed in a prone position, prepped and draped in the usual sterile fashion using ChloraPrep and sterile towels.  The area to be injected was determined under fluoroscopic guidance in AP and oblique view.  Local anesthetic was given by raising a wheal and going down to the hub of a 25-gauge 1.5 inch needle.  In oblique view, a 3.5 inch 22-gauge bent-tip spinal needle was introduced towards 6 oclock position of the pedicle of each above named nerve root level.  The needle was walked medially then hinged into the neural foramen and position was confirmed in AP and lateral views.  Omnipaque contrast dye was injected to confirm appropriate placement and that there was no vascular uptake.  After negative aspiration for blood or CSF, the medication was then injected. This was performed at the left L3/4 level(s). The patient tolerated the procedure well.    The patient was monitored after the procedure.  Patient was given post procedure and discharge instructions to follow at home. The patient was discharged in a stable condition.

## 2024-04-13 DIAGNOSIS — G44.011 INTRACTABLE EPISODIC CLUSTER HEADACHE: ICD-10-CM

## 2024-04-15 RX ORDER — BUTALBITAL, ACETAMINOPHEN AND CAFFEINE 50; 325; 40 MG/1; MG/1; MG/1
TABLET ORAL
Qty: 14 TABLET | Refills: 1 | Status: SHIPPED | OUTPATIENT
Start: 2024-04-15

## 2024-04-17 DIAGNOSIS — G44.011 INTRACTABLE EPISODIC CLUSTER HEADACHE: ICD-10-CM

## 2024-04-17 RX ORDER — DIVALPROEX SODIUM 500 MG/1
500 TABLET, DELAYED RELEASE ORAL 2 TIMES DAILY
Qty: 60 TABLET | Refills: 0 | Status: SHIPPED | OUTPATIENT
Start: 2024-04-17 | End: 2024-05-07 | Stop reason: SDUPTHER

## 2024-04-30 ENCOUNTER — OFFICE VISIT (OUTPATIENT)
Dept: PAIN MEDICINE | Facility: CLINIC | Age: 59
End: 2024-04-30
Payer: COMMERCIAL

## 2024-04-30 VITALS
DIASTOLIC BLOOD PRESSURE: 93 MMHG | HEIGHT: 72 IN | WEIGHT: 260.13 LBS | SYSTOLIC BLOOD PRESSURE: 142 MMHG | HEART RATE: 63 BPM | BODY MASS INDEX: 35.23 KG/M2

## 2024-04-30 DIAGNOSIS — M54.16 LUMBAR RADICULOPATHY: Primary | ICD-10-CM

## 2024-04-30 DIAGNOSIS — M51.36 DDD (DEGENERATIVE DISC DISEASE), LUMBAR: ICD-10-CM

## 2024-04-30 PROCEDURE — 3080F DIAST BP >= 90 MM HG: CPT | Mod: CPTII,S$GLB,, | Performed by: PHYSICIAN ASSISTANT

## 2024-04-30 PROCEDURE — 99213 OFFICE O/P EST LOW 20 MIN: CPT | Mod: S$GLB,,, | Performed by: PHYSICIAN ASSISTANT

## 2024-04-30 PROCEDURE — 99999 PR PBB SHADOW E&M-EST. PATIENT-LVL IV: CPT | Mod: PBBFAC,,, | Performed by: PHYSICIAN ASSISTANT

## 2024-04-30 PROCEDURE — 3077F SYST BP >= 140 MM HG: CPT | Mod: CPTII,S$GLB,, | Performed by: PHYSICIAN ASSISTANT

## 2024-04-30 PROCEDURE — 3044F HG A1C LEVEL LT 7.0%: CPT | Mod: CPTII,S$GLB,, | Performed by: PHYSICIAN ASSISTANT

## 2024-04-30 PROCEDURE — 1159F MED LIST DOCD IN RCRD: CPT | Mod: CPTII,S$GLB,, | Performed by: PHYSICIAN ASSISTANT

## 2024-04-30 PROCEDURE — 3008F BODY MASS INDEX DOCD: CPT | Mod: CPTII,S$GLB,, | Performed by: PHYSICIAN ASSISTANT

## 2024-04-30 PROCEDURE — 1160F RVW MEDS BY RX/DR IN RCRD: CPT | Mod: CPTII,S$GLB,, | Performed by: PHYSICIAN ASSISTANT

## 2024-04-30 RX ORDER — PREDNISONE 10 MG/1
TABLET ORAL
Qty: 20 TABLET | Refills: 0 | Status: SHIPPED | OUTPATIENT
Start: 2024-04-30

## 2024-05-07 ENCOUNTER — OFFICE VISIT (OUTPATIENT)
Dept: NEUROLOGY | Facility: CLINIC | Age: 59
End: 2024-05-07
Payer: COMMERCIAL

## 2024-05-07 VITALS
BODY MASS INDEX: 35.86 KG/M2 | WEIGHT: 264.75 LBS | HEIGHT: 72 IN | HEART RATE: 68 BPM | DIASTOLIC BLOOD PRESSURE: 98 MMHG | RESPIRATION RATE: 17 BRPM | TEMPERATURE: 97 F | SYSTOLIC BLOOD PRESSURE: 157 MMHG

## 2024-05-07 DIAGNOSIS — M79.18 CERVICAL MYOFASCIAL PAIN SYNDROME: ICD-10-CM

## 2024-05-07 DIAGNOSIS — G44.011 INTRACTABLE EPISODIC CLUSTER HEADACHE: ICD-10-CM

## 2024-05-07 DIAGNOSIS — G43.719 INTRACTABLE CHRONIC MIGRAINE WITHOUT AURA AND WITHOUT STATUS MIGRAINOSUS: Primary | ICD-10-CM

## 2024-05-07 PROCEDURE — 3077F SYST BP >= 140 MM HG: CPT | Mod: CPTII,S$GLB,, | Performed by: NURSE PRACTITIONER

## 2024-05-07 PROCEDURE — 1159F MED LIST DOCD IN RCRD: CPT | Mod: CPTII,S$GLB,, | Performed by: NURSE PRACTITIONER

## 2024-05-07 PROCEDURE — 99214 OFFICE O/P EST MOD 30 MIN: CPT | Mod: S$GLB,,, | Performed by: NURSE PRACTITIONER

## 2024-05-07 PROCEDURE — 99999 PR PBB SHADOW E&M-EST. PATIENT-LVL V: CPT | Mod: PBBFAC,,, | Performed by: NURSE PRACTITIONER

## 2024-05-07 PROCEDURE — 3044F HG A1C LEVEL LT 7.0%: CPT | Mod: CPTII,S$GLB,, | Performed by: NURSE PRACTITIONER

## 2024-05-07 PROCEDURE — 1160F RVW MEDS BY RX/DR IN RCRD: CPT | Mod: CPTII,S$GLB,, | Performed by: NURSE PRACTITIONER

## 2024-05-07 PROCEDURE — 3008F BODY MASS INDEX DOCD: CPT | Mod: CPTII,S$GLB,, | Performed by: NURSE PRACTITIONER

## 2024-05-07 PROCEDURE — 3080F DIAST BP >= 90 MM HG: CPT | Mod: CPTII,S$GLB,, | Performed by: NURSE PRACTITIONER

## 2024-05-07 RX ORDER — PROCHLORPERAZINE MALEATE 10 MG
10 TABLET ORAL EVERY 6 HOURS PRN
Qty: 60 TABLET | Refills: 11 | Status: SHIPPED | OUTPATIENT
Start: 2024-05-07

## 2024-05-07 RX ORDER — DIVALPROEX SODIUM 500 MG/1
500 TABLET, DELAYED RELEASE ORAL 2 TIMES DAILY
Qty: 60 TABLET | Refills: 11 | Status: SHIPPED | OUTPATIENT
Start: 2024-05-07

## 2024-05-07 RX ORDER — TIZANIDINE 4 MG/1
TABLET ORAL
Qty: 30 TABLET | Refills: 11 | Status: SHIPPED | OUTPATIENT
Start: 2024-05-07

## 2024-05-07 RX ORDER — RIMEGEPANT SULFATE 75 MG/75MG
75 TABLET, ORALLY DISINTEGRATING ORAL DAILY PRN
Qty: 16 TABLET | Refills: 11 | Status: SHIPPED | OUTPATIENT
Start: 2024-05-07

## 2024-05-07 NOTE — PATIENT INSTRUCTIONS
Continue Emgality every 28 until cycle breaks.  Compazine every day, twice daily during cycle.  Nurtec first line, Fioricet second, RESCUE with Fioricet.

## 2024-05-09 ENCOUNTER — OFFICE VISIT (OUTPATIENT)
Dept: FAMILY MEDICINE | Facility: CLINIC | Age: 59
End: 2024-05-09
Payer: COMMERCIAL

## 2024-05-09 ENCOUNTER — LAB VISIT (OUTPATIENT)
Dept: LAB | Facility: HOSPITAL | Age: 59
End: 2024-05-09
Attending: STUDENT IN AN ORGANIZED HEALTH CARE EDUCATION/TRAINING PROGRAM
Payer: COMMERCIAL

## 2024-05-09 VITALS
WEIGHT: 259.06 LBS | OXYGEN SATURATION: 95 % | SYSTOLIC BLOOD PRESSURE: 148 MMHG | BODY MASS INDEX: 35.09 KG/M2 | HEIGHT: 72 IN | HEART RATE: 72 BPM | DIASTOLIC BLOOD PRESSURE: 92 MMHG

## 2024-05-09 DIAGNOSIS — R73.09 ELEVATED HEMOGLOBIN A1C: ICD-10-CM

## 2024-05-09 DIAGNOSIS — K58.9 IRRITABLE BOWEL SYNDROME WITHOUT DIARRHEA: ICD-10-CM

## 2024-05-09 DIAGNOSIS — G43.719 INTRACTABLE CHRONIC MIGRAINE WITHOUT AURA AND WITHOUT STATUS MIGRAINOSUS: ICD-10-CM

## 2024-05-09 DIAGNOSIS — R39.15 URINARY URGENCY: ICD-10-CM

## 2024-05-09 DIAGNOSIS — I10 PRIMARY HYPERTENSION: Primary | ICD-10-CM

## 2024-05-09 PROBLEM — U07.1 COVID-19: Status: RESOLVED | Noted: 2024-02-15 | Resolved: 2024-05-09

## 2024-05-09 PROBLEM — R07.9 CHEST PAIN AT REST: Status: RESOLVED | Noted: 2018-09-17 | Resolved: 2024-05-09

## 2024-05-09 PROBLEM — R19.7 DIARRHEA OF PRESUMED INFECTIOUS ORIGIN: Status: RESOLVED | Noted: 2024-03-21 | Resolved: 2024-05-09

## 2024-05-09 PROBLEM — L23.7 POISON IVY DERMATITIS: Status: RESOLVED | Noted: 2024-03-21 | Resolved: 2024-05-09

## 2024-05-09 PROBLEM — Z01.810 PREOP CARDIOVASCULAR EXAM: Status: RESOLVED | Noted: 2024-03-15 | Resolved: 2024-05-09

## 2024-05-09 LAB
BACTERIA #/AREA URNS HPF: NORMAL /HPF
BILIRUB UR QL STRIP: NEGATIVE
CLARITY UR: CLEAR
COLOR UR: YELLOW
GLUCOSE UR QL STRIP: NEGATIVE
HGB UR QL STRIP: NEGATIVE
KETONES UR QL STRIP: NEGATIVE
LEUKOCYTE ESTERASE UR QL STRIP: ABNORMAL
MICROSCOPIC COMMENT: NORMAL
NITRITE UR QL STRIP: NEGATIVE
PH UR STRIP: 6 [PH] (ref 5–8)
PROT UR QL STRIP: NEGATIVE
SP GR UR STRIP: 1.02 (ref 1–1.03)
URN SPEC COLLECT METH UR: ABNORMAL
WBC #/AREA URNS HPF: 2 /HPF (ref 0–5)

## 2024-05-09 PROCEDURE — 3008F BODY MASS INDEX DOCD: CPT | Mod: CPTII,S$GLB,, | Performed by: STUDENT IN AN ORGANIZED HEALTH CARE EDUCATION/TRAINING PROGRAM

## 2024-05-09 PROCEDURE — 81000 URINALYSIS NONAUTO W/SCOPE: CPT | Mod: PO | Performed by: STUDENT IN AN ORGANIZED HEALTH CARE EDUCATION/TRAINING PROGRAM

## 2024-05-09 PROCEDURE — 3080F DIAST BP >= 90 MM HG: CPT | Mod: CPTII,S$GLB,, | Performed by: STUDENT IN AN ORGANIZED HEALTH CARE EDUCATION/TRAINING PROGRAM

## 2024-05-09 PROCEDURE — 99999 PR PBB SHADOW E&M-EST. PATIENT-LVL V: CPT | Mod: PBBFAC,,, | Performed by: STUDENT IN AN ORGANIZED HEALTH CARE EDUCATION/TRAINING PROGRAM

## 2024-05-09 PROCEDURE — 1159F MED LIST DOCD IN RCRD: CPT | Mod: CPTII,S$GLB,, | Performed by: STUDENT IN AN ORGANIZED HEALTH CARE EDUCATION/TRAINING PROGRAM

## 2024-05-09 PROCEDURE — 99214 OFFICE O/P EST MOD 30 MIN: CPT | Mod: S$GLB,,, | Performed by: STUDENT IN AN ORGANIZED HEALTH CARE EDUCATION/TRAINING PROGRAM

## 2024-05-09 PROCEDURE — 3044F HG A1C LEVEL LT 7.0%: CPT | Mod: CPTII,S$GLB,, | Performed by: STUDENT IN AN ORGANIZED HEALTH CARE EDUCATION/TRAINING PROGRAM

## 2024-05-09 PROCEDURE — 3077F SYST BP >= 140 MM HG: CPT | Mod: CPTII,S$GLB,, | Performed by: STUDENT IN AN ORGANIZED HEALTH CARE EDUCATION/TRAINING PROGRAM

## 2024-05-09 RX ORDER — TAMSULOSIN HYDROCHLORIDE 0.4 MG/1
0.4 CAPSULE ORAL DAILY
Qty: 30 CAPSULE | Refills: 11 | Status: SHIPPED | OUTPATIENT
Start: 2024-05-09 | End: 2025-05-09

## 2024-05-09 RX ORDER — CANDESARTAN CILEXETIL AND HYDROCHLOROTHIAZIDE 32; 12.5 MG/1; MG/1
1 TABLET ORAL DAILY
Qty: 90 TABLET | Refills: 3 | Status: SHIPPED | OUTPATIENT
Start: 2024-05-09 | End: 2025-05-09

## 2024-05-09 NOTE — PROGRESS NOTES
Name: Jovan Mcwilliams  MRN: [id number]  : 1965  PCP: James Peña MD    Subjective   Patient presents for follow up. Concerns as listed below.    Review of Systems   Cardiovascular:  Negative for palpitations and leg swelling.   Gastrointestinal:  Positive for diarrhea (fecal urgency about ten minutes after eating - solid/formed but feels loose and goes 5-6 times per day - no night time awakenings).   Genitourinary:  Positive for urgency.   Neurological:  Positive for headaches (migraine). Negative for dizziness.   Psychiatric/Behavioral:  Positive for sleep disturbance (nocturia).        Patient Active Problem List   Diagnosis    Familial hypercholesterolemia    GERD (gastroesophageal reflux disease)    Primary hypertension    Nicotine dependence    Class 2 severe obesity due to excess calories with serious comorbidity and body mass index (BMI) of 35.0 to 35.9 in adult    Elevated glucose    Positive colorectal cancer screening using Cologuard test    DELMY (obstructive sleep apnea)    Coronary artery disease involving native coronary artery of native heart without angina pectoris    Lumbar radiculopathy    Cyst of right kidney    Intractable episodic cluster headache    Irritable bowel syndrome without diarrhea    Intractable chronic migraine without aura and without status migrainosus    Urinary urgency       Health Maintenance Due   Topic Date Due    Pneumococcal Vaccines (Age 0-64) (1 of 2 - PCV) Never done    COVID-19 Vaccine (3 - - season) 2023    LDCT Lung Screen  2023    Lipid Panel  2024       Objective   Vitals:    24 1130   BP: (!) 148/92   Pulse:        Physical Exam  Constitutional:       General: He is not in acute distress.     Appearance: Normal appearance. He is well-developed.   HENT:      Head: Normocephalic and atraumatic.      Right Ear: External ear normal.      Left Ear: External ear normal.   Eyes:      Conjunctiva/sclera: Conjunctivae normal.    Pulmonary:      Effort: Pulmonary effort is normal. No respiratory distress.   Abdominal:      General: Abdomen is flat. There is no distension.   Musculoskeletal:         General: No swelling or deformity.      Right lower leg: No edema.      Left lower leg: No edema.   Skin:     General: Skin is warm and dry.      Coloration: Skin is not jaundiced.   Neurological:      Mental Status: He is alert and oriented to person, place, and time. Mental status is at baseline.   Psychiatric:         Attention and Perception: Attention and perception normal.         Mood and Affect: Mood normal.         Speech: Speech normal.         Behavior: Behavior normal. Behavior is cooperative.         Thought Content: Thought content normal.         Cognition and Memory: Cognition normal.         Judgment: Judgment normal.         Assessment & Plan   1. Primary hypertension  Assessment & Plan:  Not at goal. I am switching losartan to candesartan to help with migraine prophylaxis.     Orders:  -     candesartan-hydrochlorothiazid (ATACAND HCT) 32-12.5 mg per tablet; Take 1 tablet by mouth once daily.  Dispense: 90 tablet; Refill: 3    2. Intractable chronic migraine without aura and without status migrainosus  -     candesartan-hydrochlorothiazid (ATACAND HCT) 32-12.5 mg per tablet; Take 1 tablet by mouth once daily.  Dispense: 90 tablet; Refill: 3    3. Irritable bowel syndrome without diarrhea  Assessment & Plan:  Frequent but loose stools with urgency makes me consider IBS. Discussed FODMAP diet. Recommending daily psyllium or other soluble fiber supplement.      4. Urinary urgency  Assessment & Plan:  Possibly BPH - no increased frequency or polyuria, no dysuria. Will trial Flomax.    Orders:  -     PSA, SCREENING; Future; Expected date: 05/09/2024  -     Urinalysis; Future; Expected date: 05/09/2024  -     tamsulosin (FLOMAX) 0.4 mg Cap; Take 1 capsule (0.4 mg total) by mouth once daily.  Dispense: 30 capsule; Refill: 11    5.  Elevated hemoglobin A1c  -     Hemoglobin A1C; Future        Follow up in 3 months. I spent 36 minutes pre-charting, interviewing patient, performing exam, formulating and discussing plan, placing orders, and documenting.     James Peña MD  05/09/2024

## 2024-05-09 NOTE — ASSESSMENT & PLAN NOTE
Frequent but loose stools with urgency makes me consider IBS. Discussed FODMAP diet. Recommending daily psyllium or other soluble fiber supplement.

## 2024-05-10 ENCOUNTER — TELEPHONE (OUTPATIENT)
Dept: FAMILY MEDICINE | Facility: CLINIC | Age: 59
End: 2024-05-10
Payer: COMMERCIAL

## 2024-05-10 RX ORDER — NITROFURANTOIN 25; 75 MG/1; MG/1
100 CAPSULE ORAL 2 TIMES DAILY
Qty: 14 CAPSULE | Refills: 0 | Status: SHIPPED | OUTPATIENT
Start: 2024-05-10 | End: 2024-05-17

## 2024-05-10 NOTE — TELEPHONE ENCOUNTER
----- Message from Carleen Haynes sent at 5/10/2024 11:07 AM CDT -----  Type:  Needs Medical Advice    Who Called: [facility]   Best Call Back Number: [phone]  Additional Information:  Requesting call back wants to verify rx to replaced rx candesartan-hydrochlorothiazid (ATACAND HCT) 32-12.5 mg per tablet    please advise thank you

## 2024-05-13 NOTE — PROGRESS NOTES
[facility] pain management Established Patient        PCP:  Amauri Dickerson MD    CC:   Chief Complaint   Patient presents with    Neck Pain    Headache     Cluster migraines     Low-back Pain     Very little low back pain since RICHARD           Jovan Mcwilliams is a 58 y.o. year old male patient who has a past medical history of Basal cell carcinoma, COVID-19, Eye pain, and Poison ivy dermatitis. He presents in self referral for lower back pain.  He is status post left L3/4 transforaminal epidural steroid injection on 04/10/2024 with 80-90% relief.  The patient is new to me.  He is pleased with these results.  He continues to have some low back and left leg pain in an L3 pattern.  He does have some chronic numbness in the right lateral thigh.  Overall his symptoms are tolerable.  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  He also has some increased pain after other activity as well but overall manageable.  His main concern is cluster headaches and he is going to see neurology for this.  He denies weakness or incontinence.    Previous history:  He has history of two lumbar spine surgeries.  1995 diskectomy that left him with chronic right anterior thigh numbness.  2005 lumbar fusion after which he did really well with no complications or long term issues.  Current pain started 2-3 months ago.  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  He has pain in the left lower back to the left posterior thigh to the outer left knee.  The left leg has given out on him due to pain.  He has tried ibuprofen and tylenol.  He is taking motrin and tried flexeril in the past.     Denies bowel/ bladder incontinence.    Past and current medications:  Antineuropathics:  NSAIDs: motrin  Antidepressants:  Muscle relaxers: (past - flexeril)  Opioids: has tramadol at home, not  taking  Antiplatelets/Anticoagulants:  Others:  tylenol; tried medrol taper    Physical Therapy/ Chiropractic care:  PT - current trial with no improvement.     Pain Intervention History:  Status post left L3/4 transforaminal injection on 02/26/2024 with what sounds like 60% relief but starting to wear off somewhat.   He is status post left L3/4 transforaminal epidural steroid injection on 04/10/2024 with 80-90% relief.     Past Spine Surgical History:  1995 diskectomy that left him with chronic right anterior thigh numbness.    2005 lumbar fusion after which he did really well with no complications or long term issues; Dr. Dutch So        History:    Current Outpatient Medications:     albuterol (PROVENTIL/VENTOLIN HFA) 90 mcg/actuation inhaler, Inhale 2 puffs into the lungs every 4 (four) hours as needed for Wheezing or Shortness of Breath., Disp: 18 g, Rfl: 5    aspirin (ECOTRIN) 81 MG EC tablet, Take 1 tablet (81 mg total) by mouth once daily., Disp: 30 tablet, Rfl: 1    atorvastatin (LIPITOR) 40 MG tablet, Take 1 tablet (40 mg total) by mouth once daily. (Patient taking differently: Take 40 mg by mouth every evening.), Disp: 90 tablet, Rfl: 3    butalbital-acetaminophen-caffeine -40 mg (FIORICET, ESGIC) -40 mg per tablet, Take 2 tablets by mouth every 12 hours as needed for rescue if lidocaine fails, Disp: 14 tablet, Rfl: 1    carvediloL (COREG) 3.125 MG tablet, Take 1 tablet (3.125 mg total) by mouth 2 (two) times daily., Disp: 60 tablet, Rfl: 1    clotrimazole-betamethasone 1-0.05% (LOTRISONE) cream, Apply topically 2 (two) times daily., Disp: 15 g, Rfl: 1    galcanezumab-gnlm 300 mg/3 mL (100 mg/mL x 3) Syrg, Inject 3 mLs (300 mg total) into the skin every 28 days., Disp: 3 mL, Rfl: 11    ketoconazole (NIZORAL) 2 % cream, Apply to the feet and groin BID until clear then PRN for flares. (Patient not taking: Reported on 5/9/2024), Disp: 60 g, Rfl: 2    lasmiditan (REYVOW) tablet 100 mg, Take 1  tablet (100 mg) by mouth daily as needed (migraine)., Disp: 8 tablet, Rfl: 1    LIDOcaine (LIDODERM) 5 %, Place 1 patch onto the skin daily as needed (Pain). Remove & Discard patch within 12 hours or as directed by MD (Patient not taking: Reported on 5/9/2024), Disp: 15 patch, Rfl: 0    omeprazole (PRILOSEC) 40 MG capsule, TAKE ONE CAPSULE BY MOUTH ONCE A DAY (Patient taking differently: Take 40 mg by mouth every morning.), Disp: 90 capsule, Rfl: 3    traMADoL (ULTRAM) 50 mg tablet, Take 1 tablet (50 mg total) by mouth every 8 (eight) hours as needed for Pain., Disp: 20 tablet, Rfl: 0    candesartan-hydrochlorothiazid (ATACAND HCT) 32-12.5 mg per tablet, Take 1 tablet by mouth once daily., Disp: 90 tablet, Rfl: 3    divalproex (DEPAKOTE) 500 MG TbEC, Take 1 tablet (500 mg total) by mouth 2 (two) times daily., Disp: 60 tablet, Rfl: 11    nitrofurantoin, macrocrystal-monohydrate, (MACROBID) 100 MG capsule, Take 1 capsule (100 mg total) by mouth 2 (two) times daily. for 7 days, Disp: 14 capsule, Rfl: 0    predniSONE (DELTASONE) 10 MG tablet, 4 tab PO in AM x 2 days, 3 tab in AM x2 days, 2 tab in AM x 2 days, 1 tab in AM x 2 days then stop, Disp: 20 tablet, Rfl: 0    prochlorperazine (COMPAZINE) 10 MG tablet, Take 1 tablet (10 mg total) by mouth every 6 (six) hours as needed (migraine or nausea)., Disp: 60 tablet, Rfl: 11    rimegepant (NURTEC) 75 mg odt, Take 1 tablet (75 mg total) by mouth daily as needed for Migraine. Place ODT tablet on the tongue; alternatively the ODT tablet may be placed under the tongue, Disp: 16 tablet, Rfl: 11    tamsulosin (FLOMAX) 0.4 mg Cap, Take 1 capsule (0.4 mg total) by mouth once daily., Disp: 30 capsule, Rfl: 11    tiZANidine (ZANAFLEX) 4 MG tablet, Half or full tablet by mouth at night as needed for muscle spasm, Disp: 30 tablet, Rfl: 11    Past Medical History:   Diagnosis Date    Basal cell carcinoma 12/03/2020    right forearm- JAM     COVID-19 02/15/2024    Eye pain     left eye     Poison ivy dermatitis 2024       Past Surgical History:   Procedure Laterality Date    COLONOSCOPY N/A 2023    Procedure: COLONOSCOPY;  Surgeon: Leonardo Bright Jr., MD;  Location: [facility] ENDO;  Service: Endoscopy;  Laterality: N/A;    HERNIA REPAIR Bilateral 1974    KNEE SURGERY Left     LUMBAR FUSION      LUMBAR LAMINECTOMY      SHOULDER SURGERY Right     SKIN BIOPSY  2020    SPINE SURGERY   &     TRANSFORAMINAL EPIDURAL INJECTION OF STEROID Left 2024    Procedure: Injection,steroid,epidural,transforaminal approach       L3/4;  Surgeon: Cam Trivedi MD;  Location: [facility] OR;  Service: Pain Management;  Laterality: Left;    TRANSFORAMINAL EPIDURAL INJECTION OF STEROID Left 04/10/2024    Procedure: Injection,steroid,epidural,transforaminal approach   L3/4;  Surgeon: Cam Trivedi MD;  Location: [facility] OR;  Service: Pain Management;  Laterality: Left;    VASECTOMY  ?       Family History   Problem Relation Name Age of Onset    Hypertension Mother Stephanie     Hyperlipidemia Mother Stephanie     Diabetes Father Aristeo     Cancer Maternal Aunt Vera     Cancer Paternal Aunt R     Cancer Maternal Grandmother Sussy     Melanoma Maternal Grandmother Sussy     Cancer Maternal Grandfather Domonick     Colon cancer Paternal Grandfather Jose     Cancer Paternal Grandfather Jose        Social History     Socioeconomic History    Marital status:    Occupational History    Occupation:      Comment: Retired   Tobacco Use    Smoking status: Some Days     Current packs/day: 0.00     Average packs/day: 1 pack/day for 37.1 years (37.1 ttl pk-yrs)     Types: Cigarettes     Start date:      Last attempt to quit: 2024     Years since quittin.2    Smokeless tobacco: Never   Substance and Sexual Activity    Alcohol use: Yes     Alcohol/week: 10.0 standard drinks of alcohol     Types: 10 Cans of beer per week     Comment: Drinks on weekend - 10 beers all day on  Saturdays    Drug use: No    Sexual activity: Yes     Partners: Female     Birth control/protection: Partner-Vasectomy   Social History Narrative    Lives with wife and adopted daughter. One dog.      Social Determinants of Health     Financial Resource Strain: Low Risk  (3/6/2024)    Overall Financial Resource Strain (CARDIA)     Difficulty of Paying Living Expenses: Not hard at all   Food Insecurity: Unknown (3/6/2024)    Hunger Vital Sign     Worried About Running Out of Food in the Last Year: Never true     Ran Out of Food in the Last Year: Patient declined   Transportation Needs: Patient Declined (3/6/2024)    PRAPARE - Transportation     Lack of Transportation (Medical): Patient declined     Lack of Transportation (Non-Medical): Patient declined   Physical Activity: Inactive (3/6/2024)    Exercise Vital Sign     Days of Exercise per Week: 0 days     Minutes of Exercise per Session: 0 min   Stress: Stress Concern Present (3/6/2024)    [employer] - Occupational Stress Questionnaire     Feeling of Stress : To some extent   Housing Stability: Patient Declined (3/6/2024)    Housing Stability Vital Sign     Unable to Pay for Housing in the Last Year: Patient declined     Number of Places Lived in the Last Year: 1     Unstable Housing in the Last Year: Patient declined       Review of patient's allergies indicates:   Allergen Reactions    Doxycycline Anaphylaxis     Other reaction(s): nausea  Other reaction(s): Not Indicated    Azithromycin      Other reaction(s): Not Indicated    Erythromycin Other (See Comments)     Other reaction(s): Nausea  Pain with IV use       Labs:  Lab Results   Component Value Date    HGBA1C 5.6 05/09/2024       Lab Results   Component Value Date    WBC 6.72 02/15/2024    HGB 18.8 (H) 02/15/2024    HCT 52.4 02/15/2024    MCV 91 02/15/2024     02/15/2024           Review of Systems:  Low back pain.  Left leg pain.  Balance of review of systems is  negative.    Physical Exam:  Vitals:    04/30/24 0917   BP: (!) 142/93   Pulse: 63   Weight: 118 kg (260 lb 2.3 oz)   Height: 6' (1.829 m)   PainSc:   6   PainLoc: Back     Body mass index is 35.28 kg/m².    Pain disability index:      4/30/2024     9:18 AM 3/11/2024     9:28 AM 2/6/2024    10:20 AM   Last 3 PDI Scores   Pain Disability Index (PDI) 28 26 29       Gen: NAD  Psych: mood appropriate for given condition  HEENT: eyes anicteric   CV: RRR, 2+ radial pulse  Respiratory: non-labored, no signs of respiratory distress  Abd: non-distended  Skin: warm, dry and intact.  Gait: Able to heel walk, toe walk. No antalgic gait.     Lumbar spine:  Lumbar spine: ROM is mildly reduced with flexion extension and oblique extension with increased pain on flexion.  Kostas's test causes left low back pain.    Supine straight leg raise is mildly positive for left leg pain at 60°    Internal and external rotation of the hip causes no increased pain on either side.  Myofascial exam:  Tenderness palpation over the SI joint    Sensory:  Intact and symmetrical to light touch in C4-T1 dermatomes bilaterally. Intact and symmetrical to light touch in L1-S1 dermatomes bilaterally.; isolated right atnerior thigh numbness in a small circular area.     Motor:     Right Left   L2/3 Iliacus Hip flexion  5  5   L3/4 Qudratus Femoris Knee Extension  5  5   L4/5 Tib Anterior Ankle Dorsiflexion   5  5   L5/S1 Extensor Hallicus Longus Great toe extension  5  5   S1/S2 Gastroc/Soleus Plantar Flexion  5  5      Right Left                  Patellar DTR 2+ 2+   Achilles DTR 2+ 2+   Vincent Absent  Absent   Clonus Absent Absent   Babinski Absent Absent       Imaging:  Xray lumbar spinef rom 12-12-23:  postsurgical changes of posterior interbody fusion from L4 through S1.  There are bilateral L4 and S1 screws with the left-sided L5 screw.  There has been right-sided laminectomy at the levels of fusion.  Hardware is intact without fracture or  loosening.  The lumbar vertebral bodies maintain normal height and alignment.  There is moderate to marked disc space narrowing at the L4-5 and L5-S1 postoperative levels.  No instability is demonstrated with flexion or extension.     MRI lumbar spine 2-1-24 independently reviewed.  L3/4 facet hypertrophy with bilateral foraminal narrowing.  L4/5 post op changes and right facet cyst.  No right L5 screw.  L5/S1 post op changes, facet arthropathy, with right foraminal narrowing.  Right kidney cyst.    Assessment:   Jovan Mcwilliams is a 58 y.o. year old male patient who has a past medical history of Basal cell carcinoma, COVID-19, Eye pain, and Poison ivy dermatitis. He presents in self referral for lower back and left thigh pain.       1. Lumbar radiculopathy        2. DDD (degenerative disc disease), lumbar              Plan:  1. The patient did well following the left L3/4 transforaminal epidural steroid injection.  He can contact us to repeat this in the future if necessary.    2. He is going to follow-up with Neurology for his cluster headaches.    3. Follow-up as needed.

## 2024-05-22 ENCOUNTER — OFFICE VISIT (OUTPATIENT)
Dept: CARDIOLOGY | Facility: CLINIC | Age: 59
End: 2024-05-22
Attending: INTERNAL MEDICINE
Payer: COMMERCIAL

## 2024-05-22 VITALS
SYSTOLIC BLOOD PRESSURE: 149 MMHG | DIASTOLIC BLOOD PRESSURE: 104 MMHG | WEIGHT: 263.69 LBS | BODY MASS INDEX: 35.72 KG/M2 | HEART RATE: 67 BPM | HEIGHT: 72 IN

## 2024-05-22 DIAGNOSIS — E78.01 FAMILIAL HYPERCHOLESTEROLEMIA: ICD-10-CM

## 2024-05-22 DIAGNOSIS — I25.10 CORONARY ARTERY DISEASE INVOLVING NATIVE CORONARY ARTERY OF NATIVE HEART WITHOUT ANGINA PECTORIS: Primary | ICD-10-CM

## 2024-05-22 DIAGNOSIS — G47.33 OSA (OBSTRUCTIVE SLEEP APNEA): ICD-10-CM

## 2024-05-22 DIAGNOSIS — N28.1 CYST OF RIGHT KIDNEY: ICD-10-CM

## 2024-05-22 PROCEDURE — 3080F DIAST BP >= 90 MM HG: CPT | Mod: CPTII,S$GLB,, | Performed by: INTERNAL MEDICINE

## 2024-05-22 PROCEDURE — 1159F MED LIST DOCD IN RCRD: CPT | Mod: CPTII,S$GLB,, | Performed by: INTERNAL MEDICINE

## 2024-05-22 PROCEDURE — 99214 OFFICE O/P EST MOD 30 MIN: CPT | Mod: S$GLB,,, | Performed by: INTERNAL MEDICINE

## 2024-05-22 PROCEDURE — 99999 PR PBB SHADOW E&M-EST. PATIENT-LVL IV: CPT | Mod: PBBFAC,,, | Performed by: INTERNAL MEDICINE

## 2024-05-22 PROCEDURE — 3077F SYST BP >= 140 MM HG: CPT | Mod: CPTII,S$GLB,, | Performed by: INTERNAL MEDICINE

## 2024-05-22 PROCEDURE — 3008F BODY MASS INDEX DOCD: CPT | Mod: CPTII,S$GLB,, | Performed by: INTERNAL MEDICINE

## 2024-05-22 PROCEDURE — 3044F HG A1C LEVEL LT 7.0%: CPT | Mod: CPTII,S$GLB,, | Performed by: INTERNAL MEDICINE

## 2024-05-22 NOTE — PROGRESS NOTES
Subjective:    Patient ID:  Jovan Mcwilliams is a 58 y.o. male patient here for evaluation Follow-up      History of Present Illness:  hypertensive cardiovascular disease.  Follow up visit.  Equivocal nuclear technetium study last visit, follow-up PET scan normal.  No ischemia.  Remote history of left heart catheterization with nonobstructive disease by history.  Patient remains asymptomatic.      Blood pressure still elevated.  Medications change recently per primary care.   Recently reinstituted tobacco use, proximally 1 pack per day.  No diabetes mellitus             Review of patient's allergies indicates:   Allergen Reactions    Doxycycline Anaphylaxis     Other reaction(s): nausea  Other reaction(s): Not Indicated    Azithromycin      Other reaction(s): Not Indicated    Erythromycin Other (See Comments)     Other reaction(s): Nausea  Pain with IV use       Past Medical History:   Diagnosis Date    Basal cell carcinoma 12/03/2020    right forearm- JAM     COVID-19 02/15/2024    Eye pain     left eye    Poison ivy dermatitis 03/21/2024     Past Surgical History:   Procedure Laterality Date    COLONOSCOPY N/A 04/03/2023    Procedure: COLONOSCOPY;  Surgeon: Leonardo Bright Jr., MD;  Location: [facility] ENDO;  Service: Endoscopy;  Laterality: N/A;    HERNIA REPAIR Bilateral 1974    KNEE SURGERY Left     LUMBAR FUSION  2005    LUMBAR LAMINECTOMY  1995    SHOULDER SURGERY Right     SKIN BIOPSY  12/03/2020    SPINE SURGERY  1994 & 2004    TRANSFORAMINAL EPIDURAL INJECTION OF STEROID Left 02/26/2024    Procedure: Injection,steroid,epidural,transforaminal approach       L3/4;  Surgeon: Cam Trivedi MD;  Location: [facility] OR;  Service: Pain Management;  Laterality: Left;    TRANSFORAMINAL EPIDURAL INJECTION OF STEROID Left 04/10/2024    Procedure: Injection,steroid,epidural,transforaminal approach   L3/4;  Surgeon: Cam Trivedi MD;  Location: [facility] OR;  Service: Pain Management;  Laterality: Left;    VASECTOMY  2000?      Social History     Tobacco Use    Smoking status: Some Days     Current packs/day: 0.00     Average packs/day: 1 pack/day for 37.1 years (37.1 ttl pk-yrs)     Types: Cigarettes     Start date:      Last attempt to quit: 2024     Years since quittin.2    Smokeless tobacco: Never   Substance Use Topics    Alcohol use: Yes     Alcohol/week: 10.0 standard drinks of alcohol     Types: 10 Cans of beer per week     Comment: Drinks on weekend - 10 beers all day on     Drug use: No        Review of Systems:    As noted in HPI in addition      REVIEW OF SYSTEMS  Review of Systems   Constitutional: Negative for decreased appetite, diaphoresis, night sweats, weight gain and weight loss.   HENT:  Negative for nosebleeds and odynophagia.    Eyes:  Negative for double vision and photophobia.   Cardiovascular:  Negative for chest pain, claudication, cyanosis, dyspnea on exertion, irregular heartbeat, leg swelling, near-syncope, orthopnea, palpitations, paroxysmal nocturnal dyspnea and syncope.   Respiratory:  Negative for cough, hemoptysis, shortness of breath and wheezing.    Hematologic/Lymphatic: Negative for adenopathy.   Skin:  Negative for flushing, skin cancer and suspicious lesions.   Musculoskeletal:  Negative for gout, myalgias and neck pain.   Gastrointestinal:  Negative for abdominal pain, heartburn, hematemesis and hematochezia.   Genitourinary:  Negative for bladder incontinence, hesitancy and nocturia.   Neurological:  Negative for focal weakness, headaches, light-headedness and paresthesias.   Psychiatric/Behavioral:  Negative for memory loss and substance abuse.               Objective:        Vitals:    24 1008   BP: (!) 149/104   Pulse: 67       Lab Results   Component Value Date    WBC 6.72 02/15/2024    HGB 18.8 (H) 02/15/2024    HCT 52.4 02/15/2024     02/15/2024    CHOL 203 (H) 2023    TRIG 120 2023    HDL 38 (L) 2023    ALT 53 (H) 02/15/2024    AST 45  02/15/2024     02/16/2024    K 3.9 02/16/2024     02/16/2024    CREATININE 1.33 02/16/2024    BUN 27 (H) 02/16/2024    CO2 33 (H) 02/16/2024    TSH 0.660 07/20/2011    PSA 0.54 05/09/2024    INR 0.9 02/22/2008    HGBA1C 5.6 05/09/2024        ECHOCARDIOGRAM RESULTS  Results for orders placed during the hospital encounter of 02/15/24    Echo Saline Bubble? No    Interpretation Summary    Left Ventricle: There is normal systolic function with a visually estimated ejection fraction of 60 - 65%.    Right Ventricle: Normal right ventricular cavity size. Wall thickness is normal. Right ventricle wall motion  is normal. Systolic function is normal.    Pulmonary Artery: No pulmonary hypertension. The estimated pulmonary artery systolic pressure is 19 mmHg.    IVC/SVC: Intermediate venous pressure at 8 mmHg.    No results found for this or any previous visit.          CURRENT/PREVIOUS VISIT EKG  Results for orders placed or performed during the hospital encounter of 02/15/24   EKG 12-lead    Collection Time: 02/15/24  6:30 PM   Result Value Ref Range    QRS Duration 100 ms    [facility] QTC Calculation 452 ms    Narrative    Test Reason : R07.9,    Vent. Rate : 090 BPM     Atrial Rate : 090 BPM     P-R Int : 164 ms          QRS Dur : 100 ms      QT Int : 370 ms       P-R-T Axes : 071 -54 060 degrees     QTc Int : 452 ms    Normal sinus rhythm  Left axis deviation  Incomplete right bundle branch block  Cannot rule out Anterior infarct ,age undetermined  Abnormal ECG  When compared with ECG of 15-FEB-2024 16:42,  No significant change was found  Confirmed by Stalin Sharp MD ([id number]) on 2/16/2024 8:37:44 AM    Referred By: [email or web address]   SELF           Confirmed By:Stalin Sharp MD     No valid procedures specified.   Results for orders placed during the hospital encounter of 02/15/24    Nuclear Stress - Cardiology Interpreted    Interpretation Summary    Equivocal myocardial perfusion scan.    There is a moderate to  severe intensity, reversible perfusion abnormality that is consistent with ischemia in the basal to mid inferior wall(s).    There is a  moderate intensity fixed perfusion abnormality in the  wall of the left ventricle secondary to diaphragm attenuation.    The gated perfusion images showed an ejection fraction of 59% post stress.    There is normal wall motion post stress.    LV cavity size is normal at rest and normal at stress.    The ECG portion of the study is negative for ischemia.    The patient reported no chest pain during the stress test.    There were no arrhythmias during stress.    Equivocal nuclear study for ischemia.  Can not rule out diaphragmatic attenuation to account for inferior wall defect.  Clinical correlation warranted.  Consider cardiac PET or further workup if high index of suspicion for ischemia.    No valid procedures specified.    PHYSICAL EXAM  GENERAL: well built, well nourished, well-developed in no apparent distress alert and oriented.   HEENT: Normocephalic. Pupils normal and conjunctivae normal.  Mucous membranes normal, no cyanosis or icterus, trachea central,no pallor or icterus is noted..   NECK: No JVD. No bruit..   THYROID: Thyroid not enlarged. No nodules present..   CARDIAC:  Normal S1-S2.  No murmur rub click or gallop.  PMI nondisplaced.  CHEST ANATOMY: normal.   LUNGS: Clear to auscultation. No wheezing or rhonchi..   ABDOMEN: Soft no masses or organomegaly.  No abdomen pulsations or bruits.  Normal bowel sounds. No pulsations and no masses felt, No guarding or rebound.   URINARY: No roa catheter   EXTREMITIES: No cyanosis, clubbing or edema noted at this time., no calf tenderness bilaterally.   PERIPHERAL VASCULAR SYSTEM: Good palpable distal pulses.  2+ femoral, popliteal and pedal pulses.  No bruits    CENTRAL NERVOUS SYSTEM: No focal motor or sensory deficits noted.   SKIN: Skin without lesions, moist, well perfused.   MUSCLE STRENGTH & TONE: No noteable weakness,  atrophy or abnormal movement    I HAVE REVIEWED :    The vital signs, nurses notes, and all the pertinent radiology and labs.         Current Outpatient Medications   Medication Instructions    albuterol (PROVENTIL/VENTOLIN HFA) 90 mcg/actuation inhaler 2 puffs, Inhalation, Every 4 hours PRN    aspirin (ECOTRIN) 81 mg, Oral, Daily    atorvastatin (LIPITOR) 40 mg, Oral, Daily    butalbital-acetaminophen-caffeine -40 mg (FIORICET, ESGIC) -40 mg per tablet Take 2 tablets by mouth every 12 hours as needed for rescue if lidocaine fails    candesartan-hydrochlorothiazid (ATACAND HCT) 32-12.5 mg per tablet 1 tablet, Oral, Daily    carvediloL (COREG) 3.125 mg, Oral, 2 times daily    clotrimazole-betamethasone 1-0.05% (LOTRISONE) cream Topical (Top), 2 times daily    divalproex (DEPAKOTE) 500 mg, Oral, 2 times daily    galcanezumab-gnlm 300 mg, Subcutaneous, Every 28 days    ketoconazole (NIZORAL) 2 % cream Apply to the feet and groin BID until clear then PRN for flares.    lasmiditan (REYVOW) tablet 100 mg Take 1 tablet (100 mg) by mouth daily as needed (migraine).    LIDOcaine (LIDODERM) 5 % 1 patch, Transdermal, Daily PRN, Remove & Discard patch within 12 hours or as directed by MD    NURTEC 75 mg, Oral, Daily PRN, Place ODT tablet on the tongue; alternatively the ODT tablet may be placed under the tongue    omeprazole (PRILOSEC) 40 MG capsule TAKE ONE CAPSULE BY MOUTH ONCE A DAY    predniSONE (DELTASONE) 10 MG tablet 4 tab PO in AM x 2 days, 3 tab in AM x2 days, 2 tab in AM x 2 days, 1 tab in AM x 2 days then stop    prochlorperazine (COMPAZINE) 10 mg, Oral, Every 6 hours PRN    tamsulosin (FLOMAX) 0.4 mg, Oral, Daily    tiZANidine (ZANAFLEX) 4 MG tablet Half or full tablet by mouth at night as needed for muscle spasm    traMADoL (ULTRAM) 50 mg, Oral, Every 8 hours PRN          Assessment:    Hypertension   Dyslipidemia   Equivocal technetium study, follow-up PET with no ischemia.        Plan:     Blood  pressure remains elevated.  Further titration per primary care, okay cessation.  Weight loss, diet, exercise.    Consider increasing carvedilol 6.25 b.I.d. if blood pressure remains elevated.  Continue home monitoring of the blood pressure.      Patient remains in [facility] digital blood pressure program    No follow-ups on file.

## 2024-05-28 ENCOUNTER — PATIENT MESSAGE (OUTPATIENT)
Dept: CARDIOLOGY | Facility: CLINIC | Age: 59
End: 2024-05-28
Payer: COMMERCIAL

## 2024-05-28 DIAGNOSIS — I10 PRIMARY HYPERTENSION: Primary | ICD-10-CM

## 2024-05-29 RX ORDER — CARVEDILOL 3.12 MG/1
3.12 TABLET ORAL 2 TIMES DAILY
Qty: 180 TABLET | Refills: 3 | Status: CANCELLED | OUTPATIENT
Start: 2024-05-29

## 2024-05-30 ENCOUNTER — PATIENT MESSAGE (OUTPATIENT)
Dept: FAMILY MEDICINE | Facility: CLINIC | Age: 59
End: 2024-05-30
Payer: COMMERCIAL

## 2024-06-11 ENCOUNTER — OFFICE VISIT (OUTPATIENT)
Dept: SLEEP MEDICINE | Facility: CLINIC | Age: 59
End: 2024-06-11
Payer: COMMERCIAL

## 2024-06-11 VITALS
WEIGHT: 255 LBS | HEIGHT: 72 IN | BODY MASS INDEX: 34.54 KG/M2 | DIASTOLIC BLOOD PRESSURE: 79 MMHG | SYSTOLIC BLOOD PRESSURE: 129 MMHG

## 2024-06-11 DIAGNOSIS — G47.33 OSA (OBSTRUCTIVE SLEEP APNEA): Primary | ICD-10-CM

## 2024-06-11 DIAGNOSIS — G43.719 INTRACTABLE CHRONIC MIGRAINE WITHOUT AURA AND WITHOUT STATUS MIGRAINOSUS: ICD-10-CM

## 2024-06-11 DIAGNOSIS — R06.83 SNORING: ICD-10-CM

## 2024-06-11 PROCEDURE — 1160F RVW MEDS BY RX/DR IN RCRD: CPT | Mod: CPTII,95,, | Performed by: NURSE PRACTITIONER

## 2024-06-11 PROCEDURE — 3044F HG A1C LEVEL LT 7.0%: CPT | Mod: CPTII,95,, | Performed by: NURSE PRACTITIONER

## 2024-06-11 PROCEDURE — 99213 OFFICE O/P EST LOW 20 MIN: CPT | Mod: 95,,, | Performed by: NURSE PRACTITIONER

## 2024-06-11 PROCEDURE — 3078F DIAST BP <80 MM HG: CPT | Mod: CPTII,95,, | Performed by: NURSE PRACTITIONER

## 2024-06-11 PROCEDURE — 3074F SYST BP LT 130 MM HG: CPT | Mod: CPTII,95,, | Performed by: NURSE PRACTITIONER

## 2024-06-11 PROCEDURE — 3008F BODY MASS INDEX DOCD: CPT | Mod: CPTII,95,, | Performed by: NURSE PRACTITIONER

## 2024-06-11 PROCEDURE — 1159F MED LIST DOCD IN RCRD: CPT | Mod: CPTII,95,, | Performed by: NURSE PRACTITIONER

## 2024-06-11 NOTE — Clinical Note
Pt is out of his medication. He had an appt scheduled with Dr Zuluaga for 5/23 and has now rescheduled for 6/13. Please refill his metformin and atorvastatin. Please send rx to [facility]  [address] Thank you.  Elizabeth Parker,      Virtual- 5 weeks for initial download

## 2024-06-11 NOTE — PROGRESS NOTES
Subjective:      Patient ID: Jovan Mcwilliams is a 58 y.o. male.    Chief Complaint: Sleep Apnea  The patient location is: [place]  The chief complaint leading to consultation is: sleep apnea  Visit type: Virtual visit with synchronous audio and video  Total time spent with patient: 12 min   Each patient to whom he or she provides medical services by telemedicine is:  (1) informed of the relationship between the physician and patient and the respective role of any other health care provider with respect to management of the patient; and (2) notified that he or she may decline to receive medical services by telemedicine and may withdraw from such care at any time.    HPI    Presents for DELMY- recently dx with moderate DELMY.   He has not been wearing APAP due to anxiety and migraines. He is motivated to start.   He wakes up frequently throughout the night.  Patient does not wake up feeling refreshed in the morning.  Patient with daytime hypersomnolence. Comorbidities include BMI 35, HTN   Bedtime: 10 PM- asleep 10:30 PM  Wake time: 8 AM      Patient Active Problem List   Diagnosis    Familial hypercholesterolemia    GERD (gastroesophageal reflux disease)    Primary hypertension    Nicotine dependence    Class 2 severe obesity due to excess calories with serious comorbidity and body mass index (BMI) of 35.0 to 35.9 in adult    Elevated glucose    Positive colorectal cancer screening using Cologuard test    DELMY (obstructive sleep apnea)    Coronary artery disease involving native coronary artery of native heart without angina pectoris    Lumbar radiculopathy    Cyst of right kidney    Intractable episodic cluster headache    Irritable bowel syndrome without diarrhea    Intractable chronic migraine without aura and without status migrainosus    Urinary urgency     /79   Ht 6' (1.829 m)   Wt 115.7 kg (255 lb)   BMI 34.58 kg/m²   Body mass index is 34.58 kg/m².    Review of Systems   Constitutional: Negative.    HENT:  Negative.     Respiratory:  Positive for snoring and somnolence.    Cardiovascular: Negative.    Musculoskeletal: Negative.    Gastrointestinal: Negative.    Neurological: Negative.    Psychiatric/Behavioral:  Positive for sleep disturbance.      Objective:      Physical Exam  Constitutional:       Appearance: Normal appearance. He is well-developed.   HENT:      Head: Normocephalic and atraumatic.      Right Ear: External ear normal.      Left Ear: External ear normal.      Nose: Nose normal.   Eyes:      General: Lids are normal.      Conjunctiva/sclera: Conjunctivae normal.   Pulmonary:      Effort: Pulmonary effort is normal. No tachypnea, bradypnea, accessory muscle usage or respiratory distress.   Musculoskeletal:      Cervical back: Normal range of motion.   Skin:     Findings: No rash.   Neurological:      Mental Status: He is alert and oriented to person, place, and time.   Psychiatric:         Behavior: Behavior normal.         Thought Content: Thought content normal.         Judgment: Judgment normal.       Personal Diagnostic Review      6/11/2024     2:31 PM   EPWORTH SLEEPINESS SCALE   Sitting and reading 1   Watching TV 1   Sitting, inactive in a public place (e.g. a theatre or a meeting) 0   As a passenger in a car for an hour without a break 0   Lying down to rest in the afternoon when circumstances permit 3   Sitting and talking to someone 0   Sitting quietly after a lunch without alcohol 0   In a car, while stopped for a few minutes in traffic 0   Total score 5        No results found for this or any previous visit.    Results for orders placed during the hospital encounter of 02/20/17    X-Ray Chest PA And Lateral    Narrative  Time of Procedure: 02/20/17 13:06:31  Accession # [id number]    Comparison: Chest radiograph 07/28/2010    Number of views: 2    Findings:  Mediastinal structures are midline.  Cardiomediastinal silhouette appears within normal limits.    Lung volumes are equal and  symmetric, and there is no convincing evidence of pulmonary disease, pleural disease, or cardiac decompensation.    There is no free air beneath the diaphragm.    No significant osseous abnormality.  IMPRESSION:      No convincing evidence of intrathoracic disease identified.  ______________________________________    Electronically signed by resident: ARIES WILKERSON MD  Date:     02/20/17  Time:    13:40            As the supervising and teaching physician, I personally reviewed the images and resident's interpretation and I agree with the findings.            Electronically signed by: CAIO SANZ  Date:     02/20/17  Time:    16:42        Assessment:       1. DELMY (obstructive sleep apnea)    2. Snoring    3. Intractable chronic migraine without aura and without status migrainosus        Outpatient Encounter Medications as of 6/11/2024   Medication Sig Dispense Refill    albuterol (PROVENTIL/VENTOLIN HFA) 90 mcg/actuation inhaler Inhale 2 puffs into the lungs every 4 (four) hours as needed for Wheezing or Shortness of Breath. 18 g 5    aspirin (ECOTRIN) 81 MG EC tablet Take 1 tablet (81 mg total) by mouth once daily. 30 tablet 1    atorvastatin (LIPITOR) 40 MG tablet Take 1 tablet (40 mg total) by mouth once daily. (Patient taking differently: Take 40 mg by mouth every evening.) 90 tablet 3    butalbital-acetaminophen-caffeine -40 mg (FIORICET, ESGIC) -40 mg per tablet Take 2 tablets by mouth every 12 hours as needed for rescue if lidocaine fails 14 tablet 1    candesartan-hydrochlorothiazid (ATACAND HCT) 32-12.5 mg per tablet Take 1 tablet by mouth once daily. 90 tablet 3    carvediloL (COREG) 3.125 MG tablet Take 1 tablet (3.125 mg total) by mouth 2 (two) times daily with meals. 180 tablet 1    clotrimazole-betamethasone 1-0.05% (LOTRISONE) cream Apply topically 2 (two) times daily. 15 g 1    divalproex (DEPAKOTE) 500 MG TbEC Take 1 tablet (500 mg total) by mouth 2 (two) times daily. 60 tablet 11     galcanezumab-gnlm 300 mg/3 mL (100 mg/mL x 3) Syrg Inject 3 mLs (300 mg total) into the skin every 28 days. 3 mL 11    ketoconazole (NIZORAL) 2 % cream Apply to the feet and groin BID until clear then PRN for flares. 60 g 2    lasmiditan (REYVOW) tablet 100 mg Take 1 tablet (100 mg) by mouth daily as needed (migraine). 8 tablet 1    LIDOcaine (LIDODERM) 5 % Place 1 patch onto the skin daily as needed (Pain). Remove & Discard patch within 12 hours or as directed by MD 15 patch 0    omeprazole (PRILOSEC) 40 MG capsule TAKE ONE CAPSULE BY MOUTH ONCE A DAY (Patient taking differently: Take 40 mg by mouth every morning.) 90 capsule 3    prochlorperazine (COMPAZINE) 10 MG tablet Take 1 tablet (10 mg total) by mouth every 6 (six) hours as needed (migraine or nausea). 60 tablet 11    rimegepant (NURTEC) 75 mg odt Take 1 tablet (75 mg total) by mouth daily as needed for Migraine. Place ODT tablet on the tongue; alternatively the ODT tablet may be placed under the tongue 16 tablet 11    tamsulosin (FLOMAX) 0.4 mg Cap Take 1 capsule (0.4 mg total) by mouth once daily. 30 capsule 11    tiZANidine (ZANAFLEX) 4 MG tablet Half or full tablet by mouth at night as needed for muscle spasm 30 tablet 11    traMADoL (ULTRAM) 50 mg tablet Take 1 tablet (50 mg total) by mouth every 8 (eight) hours as needed for Pain. 20 tablet 0    predniSONE (DELTASONE) 10 MG tablet 4 tab PO in AM x 2 days, 3 tab in AM x2 days, 2 tab in AM x 2 days, 1 tab in AM x 2 days then stop 20 tablet 0    [DISCONTINUED] buPROPion (WELLBUTRIN XL) 150 MG TB24 tablet       [DISCONTINUED] carvediloL (COREG) 3.125 MG tablet Take 1 tablet (3.125 mg total) by mouth 2 (two) times daily. 60 tablet 1    [DISCONTINUED] valacyclovir (VALTREX) 1000 MG tablet        No facility-administered encounter medications on file as of 6/11/2024.     No orders of the defined types were placed in this encounter.    Plan:     Discussed habituation to CPAP mask.   Follow up in one month.        1. DELMY (obstructive sleep apnea)  Overview:  Home sleep study 2/2024 showed AHI ~17/hr.      2. Snoring    3. Intractable chronic migraine without aura and without status migrainosus                        Elizabeth LeJeune, JOSE ELIASP, ANP

## 2024-06-28 ENCOUNTER — PATIENT MESSAGE (OUTPATIENT)
Dept: ADMINISTRATIVE | Facility: HOSPITAL | Age: 59
End: 2024-06-28
Payer: COMMERCIAL

## 2024-06-28 NOTE — ASSESSMENT & PLAN NOTE
Appeared last week, a few days after working in the yard. Bilateral forearms. Itching controlled with over-the-counter topical medications. Continue such.   General Interventional Radiology

## 2024-07-17 ENCOUNTER — OFFICE VISIT (OUTPATIENT)
Dept: FAMILY MEDICINE | Facility: CLINIC | Age: 59
End: 2024-07-17
Payer: COMMERCIAL

## 2024-07-17 VITALS
BODY MASS INDEX: 34.74 KG/M2 | HEART RATE: 67 BPM | SYSTOLIC BLOOD PRESSURE: 126 MMHG | WEIGHT: 256.19 LBS | DIASTOLIC BLOOD PRESSURE: 82 MMHG | OXYGEN SATURATION: 96 %

## 2024-07-17 DIAGNOSIS — J02.9 ACUTE PHARYNGITIS, UNSPECIFIED ETIOLOGY: Primary | ICD-10-CM

## 2024-07-17 LAB
CTP QC/QA: YES
MOLECULAR STREP A: NEGATIVE

## 2024-07-17 PROCEDURE — 87651 STREP A DNA AMP PROBE: CPT | Mod: QW,S$GLB,,

## 2024-07-17 PROCEDURE — 3044F HG A1C LEVEL LT 7.0%: CPT | Mod: CPTII,S$GLB,,

## 2024-07-17 PROCEDURE — 3008F BODY MASS INDEX DOCD: CPT | Mod: CPTII,S$GLB,,

## 2024-07-17 PROCEDURE — 1159F MED LIST DOCD IN RCRD: CPT | Mod: CPTII,S$GLB,,

## 2024-07-17 PROCEDURE — 3074F SYST BP LT 130 MM HG: CPT | Mod: CPTII,S$GLB,,

## 2024-07-17 PROCEDURE — 99213 OFFICE O/P EST LOW 20 MIN: CPT | Mod: S$GLB,,,

## 2024-07-17 PROCEDURE — 3079F DIAST BP 80-89 MM HG: CPT | Mod: CPTII,S$GLB,,

## 2024-07-17 PROCEDURE — 99999 PR PBB SHADOW E&M-EST. PATIENT-LVL IV: CPT | Mod: PBBFAC,,,

## 2024-07-17 RX ORDER — AMOXICILLIN AND CLAVULANATE POTASSIUM 875; 125 MG/1; MG/1
1 TABLET, FILM COATED ORAL EVERY 12 HOURS
Qty: 14 TABLET | Refills: 0 | Status: SHIPPED | OUTPATIENT
Start: 2024-07-17 | End: 2024-07-24

## 2024-07-17 NOTE — PROGRESS NOTES
Name: Jovan Mcwilliams  MRN: [id number]  : 1965  PCP: James Peña MD    HPI    Patient follows with Dr. Peña, known to me. He presents for upper respiratory symptoms ongoing since last Thursday. He complains of right sided sore throat. He does complain of some right ear pain and PND that he noticed. He reports trying OTC sinus medication, Nyquil, and anti-histamine with little relief in symptoms. He has been using OTC advil with little relief in symptoms. He does mention a mild productive cough as well. He denies any fevers or body aches.       Review of Systems   Constitutional:  Negative for fatigue and fever.   HENT:  Positive for ear pain, postnasal drip and sore throat.    Respiratory:  Negative for shortness of breath.    Cardiovascular:  Negative for chest pain.   Gastrointestinal:  Negative for nausea and vomiting.       Patient Active Problem List   Diagnosis    Familial hypercholesterolemia    GERD (gastroesophageal reflux disease)    Primary hypertension    Nicotine dependence    Class 2 severe obesity due to excess calories with serious comorbidity and body mass index (BMI) of 35.0 to 35.9 in adult    Elevated glucose    Positive colorectal cancer screening using Cologuard test    DELMY (obstructive sleep apnea)    Coronary artery disease involving native coronary artery of native heart without angina pectoris    Lumbar radiculopathy    Cyst of right kidney    Intractable episodic cluster headache    Irritable bowel syndrome without diarrhea    Intractable chronic migraine without aura and without status migrainosus    Urinary urgency       Vitals:    24 1427   BP: 126/82   Pulse: 67       Physical Exam  Constitutional:       General: He is not in acute distress.     Appearance: He is well-developed.   HENT:      Head: Normocephalic and atraumatic.      Right Ear: External ear normal.      Left Ear: External ear normal.   Eyes:      Conjunctiva/sclera: Conjunctivae normal.      Pupils:  Pupils are equal, round, and reactive to light.   Neck:      Thyroid: No thyromegaly.   Cardiovascular:      Rate and Rhythm: Normal rate and regular rhythm.      Pulses: Normal pulses.      Heart sounds: Normal heart sounds, S1 normal and S2 normal.   Pulmonary:      Effort: Pulmonary effort is normal. No respiratory distress.      Breath sounds: Normal breath sounds.   Chest:      Chest wall: No tenderness.   Musculoskeletal:         General: No swelling or tenderness. Normal range of motion.      Cervical back: Normal range of motion and neck supple.   Skin:     General: Skin is warm and dry.      Coloration: Skin is not jaundiced or pale.   Neurological:      General: No focal deficit present.      Mental Status: He is alert and oriented to person, place, and time.      Cranial Nerves: No cranial nerve deficit.   Psychiatric:         Mood and Affect: Mood normal.         Behavior: Behavior normal.         1. Acute pharyngitis, unspecified etiology  -     POCT Strep A, Molecular  -     amoxicillin-clavulanate 875-125mg (AUGMENTIN) 875-125 mg per tablet; Take 1 tablet by mouth every 12 (twelve) hours. for 7 days  Dispense: 14 tablet; Refill: 0        Follow up as needed or if symptoms fail to improve       RODOLFO Cedeño  07/17/2024

## 2024-07-22 ENCOUNTER — PATIENT MESSAGE (OUTPATIENT)
Dept: FAMILY MEDICINE | Facility: CLINIC | Age: 59
End: 2024-07-22
Payer: COMMERCIAL

## 2024-08-21 ENCOUNTER — OFFICE VISIT (OUTPATIENT)
Dept: UROLOGY | Facility: CLINIC | Age: 59
End: 2024-08-21
Payer: COMMERCIAL

## 2024-08-21 VITALS — BODY MASS INDEX: 34.7 KG/M2 | WEIGHT: 256.19 LBS | HEIGHT: 72 IN

## 2024-08-21 DIAGNOSIS — N50.89 SWOLLEN TESTICLE: Primary | ICD-10-CM

## 2024-08-21 DIAGNOSIS — N53.14 RETROGRADE EJACULATION: ICD-10-CM

## 2024-08-21 LAB
BILIRUBIN, UA POC OHS: NEGATIVE
BLOOD, UA POC OHS: NEGATIVE
CLARITY, UA POC OHS: CLEAR
COLOR, UA POC OHS: YELLOW
GLUCOSE, UA POC OHS: NEGATIVE
KETONES, UA POC OHS: 15
LEUKOCYTES, UA POC OHS: NEGATIVE
NITRITE, UA POC OHS: NEGATIVE
PH, UA POC OHS: 6
PROTEIN, UA POC OHS: NEGATIVE
SPECIFIC GRAVITY, UA POC OHS: 1.02
UROBILINOGEN, UA POC OHS: 1

## 2024-08-21 PROCEDURE — 99999 PR PBB SHADOW E&M-EST. PATIENT-LVL IV: CPT | Mod: PBBFAC,,,

## 2024-08-21 NOTE — PROGRESS NOTES
[facility] Note  Staff: KELLY Bills    PCP: MD Mariana    Chief Complaint: Testicular Pain    Subjective:        HPI: Jovan Mcwilliams is a 58 y.o. male NEW PATIENT presents today for evaluation of testicular pain. He is accompanied by his wife. He states he has a history of epididymitis and he is concerned. He states he feels there is slight enlargement in the right testicle and tender to palpation. He denies dysuria, hematuria, fever, flank pain, abd pain, and difficulty urinating.    He also states he was recently started on flomax but is experiencing retrograde ejaculation. He would like to stop the medication. We discussed alternatives but he is not interested at this time. He denies constipation. He does have a family history of prostate cancer. His most recent PSA is from 5/9/2024 at 0.54.     Questions asked the pt during ov today:  Urgency: No, urge incontinence? No  NTF: 1-2x night  Dysuria: No  Gross Hematuria:No  Straining:No, Hesistancy:No, Intermittency:No}, Weak stream:No    Last PSA Screening:   Lab Results   Component Value Date    PSA 0.54 05/09/2024    PSA 0.64 03/30/2023    PSA 0.54 01/25/2021       History of Kidney Stones?:  No    Constipation issues?:  No    REVIEW OF SYSTEMS:  Review of Systems   Constitutional: Negative.  Negative for chills and fever.   Gastrointestinal: Negative.  Negative for abdominal pain, constipation, diarrhea, nausea and vomiting.   Genitourinary: Negative.  Negative for dysuria, flank pain, frequency, hematuria and urgency.   Musculoskeletal: Negative.  Negative for back pain.       PMHx:  Past Medical History:   Diagnosis Date    Basal cell carcinoma 12/03/2020    right forearm- JAM     COVID-19 02/15/2024    Eye pain     left eye    Poison ivy dermatitis 03/21/2024       PSHx:  Past Surgical History:   Procedure Laterality Date    COLONOSCOPY N/A 04/03/2023    Procedure: COLONOSCOPY;  Surgeon: Leonardo Bright Jr., MD;  Location:  [facility] ENDO;  Service: Endoscopy;  Laterality: N/A;    HERNIA REPAIR Bilateral 1974    KNEE SURGERY Left     LUMBAR FUSION  2005    LUMBAR LAMINECTOMY  1995    SHOULDER SURGERY Right     SKIN BIOPSY  12/03/2020    SPINE SURGERY  1994 & 2004    TRANSFORAMINAL EPIDURAL INJECTION OF STEROID Left 02/26/2024    Procedure: Injection,steroid,epidural,transforaminal approach       L3/4;  Surgeon: Cam Trivedi MD;  Location: [facility] OR;  Service: Pain Management;  Laterality: Left;    TRANSFORAMINAL EPIDURAL INJECTION OF STEROID Left 04/10/2024    Procedure: Injection,steroid,epidural,transforaminal approach   L3/4;  Surgeon: Cam Trivedi MD;  Location: [facility] OR;  Service: Pain Management;  Laterality: Left;    VASECTOMY  2000?       Fam Hx:   malignancies: Yes - father and brother prostate cancer    kidney stones: Yes - brother     Soc Hx:  , lives in [place]    Allergies:  Doxycycline, Azithromycin, and Erythromycin    Medications: reviewed     Objective:   There were no vitals filed for this visit.    Physical Exam  Constitutional:       Appearance: Normal appearance.   Pulmonary:      Effort: Pulmonary effort is normal.   Abdominal:      General: There is no distension.      Palpations: Abdomen is soft.      Tenderness: There is no abdominal tenderness. There is no right CVA tenderness or left CVA tenderness.   Musculoskeletal:         General: Normal range of motion.      Cervical back: Normal range of motion.   Skin:     General: Skin is warm.   Neurological:      Mental Status: He is alert and oriented to person, place, and time.   Psychiatric:         Mood and Affect: Mood normal.         Behavior: Behavior normal.          EXAM performed by me in office today:  normal exam  No scrotal rashes, cysts or lesions  Epididymis normal in size, no tenderness  Testes normal and size, equal size bilaterally, no masses  Urethral meatus normal without discharge    LABS REVIEW:  UA today:  Color:Clear,  Yellow  Spec. Grav.  1.025  PH  6.0  Negative for leukocytes, nitrates, protein, glucose, urobili, bili, and blood.  Positive ketones    Assessment:       1. Swollen testicle    2. Retrograde ejaculation          Plan:     US Scrotum and Testicles ordered and scheduled  Can stop flomax  Discussed saw palmetto supplementation    F/u as needed    [facility]: Active    KELLY Bills

## 2024-08-22 ENCOUNTER — HOSPITAL ENCOUNTER (OUTPATIENT)
Dept: RADIOLOGY | Facility: HOSPITAL | Age: 59
Discharge: HOME | End: 2024-08-22
Payer: COMMERCIAL

## 2024-08-22 DIAGNOSIS — N50.89 SWOLLEN TESTICLE: ICD-10-CM

## 2024-08-22 PROCEDURE — 76870 US EXAM SCROTUM: CPT | Mod: TC,PO

## 2024-08-22 PROCEDURE — 76870 US EXAM SCROTUM: CPT | Mod: 26,,, | Performed by: RADIOLOGY

## 2024-08-22 PROCEDURE — 93975 VASCULAR STUDY: CPT | Mod: TC,PO

## 2024-08-22 PROCEDURE — 93975 VASCULAR STUDY: CPT | Mod: 26,,, | Performed by: RADIOLOGY

## 2024-08-27 ENCOUNTER — PATIENT MESSAGE (OUTPATIENT)
Dept: FAMILY MEDICINE | Facility: CLINIC | Age: 59
End: 2024-08-27
Payer: COMMERCIAL

## 2024-09-03 ENCOUNTER — OFFICE VISIT (OUTPATIENT)
Dept: FAMILY MEDICINE | Facility: CLINIC | Age: 59
End: 2024-09-03
Payer: COMMERCIAL

## 2024-09-03 VITALS
SYSTOLIC BLOOD PRESSURE: 162 MMHG | OXYGEN SATURATION: 95 % | WEIGHT: 255.94 LBS | HEIGHT: 72 IN | BODY MASS INDEX: 34.67 KG/M2 | DIASTOLIC BLOOD PRESSURE: 96 MMHG | HEART RATE: 60 BPM

## 2024-09-03 DIAGNOSIS — R39.15 URINARY URGENCY: ICD-10-CM

## 2024-09-03 DIAGNOSIS — F17.210 CIGARETTE NICOTINE DEPENDENCE WITHOUT COMPLICATION: ICD-10-CM

## 2024-09-03 DIAGNOSIS — I10 PRIMARY HYPERTENSION: Primary | ICD-10-CM

## 2024-09-03 DIAGNOSIS — G43.719 INTRACTABLE CHRONIC MIGRAINE WITHOUT AURA AND WITHOUT STATUS MIGRAINOSUS: ICD-10-CM

## 2024-09-03 PROBLEM — R19.5 POSITIVE COLORECTAL CANCER SCREENING USING COLOGUARD TEST: Status: RESOLVED | Noted: 2023-04-03 | Resolved: 2024-09-03

## 2024-09-03 PROBLEM — R73.09 ELEVATED GLUCOSE: Status: RESOLVED | Noted: 2021-02-04 | Resolved: 2024-09-03

## 2024-09-03 PROCEDURE — 1159F MED LIST DOCD IN RCRD: CPT | Mod: CPTII,S$GLB,, | Performed by: STUDENT IN AN ORGANIZED HEALTH CARE EDUCATION/TRAINING PROGRAM

## 2024-09-03 PROCEDURE — 3008F BODY MASS INDEX DOCD: CPT | Mod: CPTII,S$GLB,, | Performed by: STUDENT IN AN ORGANIZED HEALTH CARE EDUCATION/TRAINING PROGRAM

## 2024-09-03 PROCEDURE — 3077F SYST BP >= 140 MM HG: CPT | Mod: CPTII,S$GLB,, | Performed by: STUDENT IN AN ORGANIZED HEALTH CARE EDUCATION/TRAINING PROGRAM

## 2024-09-03 PROCEDURE — 99999 PR PBB SHADOW E&M-EST. PATIENT-LVL V: CPT | Mod: PBBFAC,,, | Performed by: STUDENT IN AN ORGANIZED HEALTH CARE EDUCATION/TRAINING PROGRAM

## 2024-09-03 PROCEDURE — 99214 OFFICE O/P EST MOD 30 MIN: CPT | Mod: S$GLB,,, | Performed by: STUDENT IN AN ORGANIZED HEALTH CARE EDUCATION/TRAINING PROGRAM

## 2024-09-03 PROCEDURE — 3044F HG A1C LEVEL LT 7.0%: CPT | Mod: CPTII,S$GLB,, | Performed by: STUDENT IN AN ORGANIZED HEALTH CARE EDUCATION/TRAINING PROGRAM

## 2024-09-03 PROCEDURE — 3080F DIAST BP >= 90 MM HG: CPT | Mod: CPTII,S$GLB,, | Performed by: STUDENT IN AN ORGANIZED HEALTH CARE EDUCATION/TRAINING PROGRAM

## 2024-09-03 RX ORDER — CANDESARTAN CILEXETIL AND HYDROCHLOROTHIAZIDE 32; 25 MG/1; MG/1
1 TABLET ORAL DAILY
Qty: 30 EACH | Refills: 11 | Status: SHIPPED | OUTPATIENT
Start: 2024-09-03 | End: 2025-09-03

## 2024-09-03 NOTE — ASSESSMENT & PLAN NOTE
Currently working on reducing his cigarette use. One recent strategy is to leave cigarettes at home if he goes anywhere. Encouraged continue reduction in nicotine use.

## 2024-09-03 NOTE — PROGRESS NOTES
Name: Jovan Mcwilliams  MRN: [id number]  : 1965    Subjective   HPI  Patient presents for regular follow up.     Review of Systems   Cardiovascular:  Negative for palpitations and leg swelling.   Neurological:  Negative for dizziness and headaches (migraines typically happen seasonally - March through May).        Patient Active Problem List   Diagnosis    Familial hypercholesterolemia    GERD (gastroesophageal reflux disease)    Primary hypertension    Nicotine dependence    Class 2 severe obesity due to excess calories with serious comorbidity and body mass index (BMI) of 35.0 to 35.9 in adult    Elevated glucose    Positive colorectal cancer screening using Cologuard test    DELMY (obstructive sleep apnea)    Coronary artery disease involving native coronary artery of native heart without angina pectoris    Lumbar radiculopathy    Cyst of right kidney    Intractable episodic cluster headache    Irritable bowel syndrome without diarrhea    Intractable chronic migraine without aura and without status migrainosus    Urinary urgency       Current Outpatient Medications on File Prior to Visit   Medication Sig Dispense Refill    albuterol (PROVENTIL/VENTOLIN HFA) 90 mcg/actuation inhaler Inhale 2 puffs into the lungs every 4 (four) hours as needed for Wheezing or Shortness of Breath. 18 g 5    aspirin (ECOTRIN) 81 MG EC tablet Take 1 tablet (81 mg total) by mouth once daily. 30 tablet 1    atorvastatin (LIPITOR) 40 MG tablet Take 1 tablet (40 mg total) by mouth once daily. (Patient taking differently: Take 40 mg by mouth every evening.) 90 tablet 3    butalbital-acetaminophen-caffeine -40 mg (FIORICET, ESGIC) -40 mg per tablet Take 2 tablets by mouth every 12 hours as needed for rescue if lidocaine fails 14 tablet 1    candesartan-hydrochlorothiazid (ATACAND HCT) 32-12.5 mg per tablet Take 1 tablet by mouth once daily. 90 tablet 3    carvediloL (COREG) 6.25 MG tablet Take 1 tablet (6.25 mg total) by mouth 2  (two) times daily with meals. 180 tablet 1    clotrimazole-betamethasone 1-0.05% (LOTRISONE) cream Apply topically 2 (two) times daily. 15 g 1    divalproex (DEPAKOTE) 500 MG TbEC Take 1 tablet (500 mg total) by mouth 2 (two) times daily. 60 tablet 11    galcanezumab-gnlm 300 mg/3 mL (100 mg/mL x 3) Syrg Inject 3 mLs (300 mg total) into the skin every 28 days. 3 mL 11    ketoconazole (NIZORAL) 2 % cream Apply to the feet and groin BID until clear then PRN for flares. 60 g 2    lasmiditan (REYVOW) tablet 100 mg Take 1 tablet (100 mg) by mouth daily as needed (migraine). 8 tablet 1    LIDOcaine (LIDODERM) 5 % Place 1 patch onto the skin daily as needed (Pain). Remove & Discard patch within 12 hours or as directed by MD 15 patch 0    omeprazole (PRILOSEC) 40 MG capsule TAKE ONE CAPSULE BY MOUTH ONCE A DAY (Patient taking differently: Take 40 mg by mouth every morning.) 90 capsule 3    prochlorperazine (COMPAZINE) 10 MG tablet Take 1 tablet (10 mg total) by mouth every 6 (six) hours as needed (migraine or nausea). 60 tablet 11    rimegepant (NURTEC) 75 mg odt Take 1 tablet (75 mg total) by mouth daily as needed for Migraine. Place ODT tablet on the tongue; alternatively the ODT tablet may be placed under the tongue 16 tablet 11    tiZANidine (ZANAFLEX) 4 MG tablet Half or full tablet by mouth at night as needed for muscle spasm 30 tablet 11    traMADoL (ULTRAM) 50 mg tablet Take 1 tablet (50 mg total) by mouth every 8 (eight) hours as needed for Pain. 20 tablet 0    tamsulosin (FLOMAX) 0.4 mg Cap Take 1 capsule (0.4 mg total) by mouth once daily. (Patient not taking: Reported on 9/3/2024) 30 capsule 11    [DISCONTINUED] buPROPion (WELLBUTRIN XL) 150 MG TB24 tablet       [DISCONTINUED] valacyclovir (VALTREX) 1000 MG tablet        No current facility-administered medications on file prior to visit.       Past Medical History:   Diagnosis Date    Basal cell carcinoma 12/03/2020    right forearm- JAM     COVID-19 02/15/2024     Eye pain     left eye    Poison ivy dermatitis 2024       Past Surgical History:   Procedure Laterality Date    COLONOSCOPY N/A 2023    Procedure: COLONOSCOPY;  Surgeon: Leonardo Bright Jr., MD;  Location: [facility] ENDO;  Service: Endoscopy;  Laterality: N/A;    HERNIA REPAIR Bilateral 1974    KNEE SURGERY Left     LUMBAR FUSION      LUMBAR LAMINECTOMY      SHOULDER SURGERY Right     SKIN BIOPSY  2020    SPINE SURGERY   &     TRANSFORAMINAL EPIDURAL INJECTION OF STEROID Left 2024    Procedure: Injection,steroid,epidural,transforaminal approach       L3/4;  Surgeon: Cam Trivedi MD;  Location: [facility] OR;  Service: Pain Management;  Laterality: Left;    TRANSFORAMINAL EPIDURAL INJECTION OF STEROID Left 04/10/2024    Procedure: Injection,steroid,epidural,transforaminal approach   L3/4;  Surgeon: Cam Trivedi MD;  Location: [facility] OR;  Service: Pain Management;  Laterality: Left;    VASECTOMY  ?        Family History   Problem Relation Name Age of Onset    Hypertension Mother Stephanie     Hyperlipidemia Mother Stephanie     Diabetes Father Aristeo     Cancer Maternal Aunt Vera     Cancer Paternal Aunt R     Cancer Maternal Grandmother Sussy     Melanoma Maternal Grandmother Sussy     Cancer Maternal Grandfather Domonick     Colon cancer Paternal Grandfather Jose     Cancer Paternal Grandfather Jose        Social History     Socioeconomic History    Marital status:    Occupational History    Occupation:      Comment: Retired   Tobacco Use    Smoking status: Some Days     Current packs/day: 0.00     Average packs/day: 1 pack/day for 37.1 years (37.1 ttl pk-yrs)     Types: Cigarettes     Start date:      Last attempt to quit: 2024     Years since quittin.5    Smokeless tobacco: Never   Substance and Sexual Activity    Alcohol use: Yes     Alcohol/week: 10.0 standard drinks of alcohol     Types: 10 Cans of beer per week     Comment: Drinks on  weekend - 10 beers all day on Saturdays    Drug use: No    Sexual activity: Yes     Partners: Female     Birth control/protection: Partner-Vasectomy   Social History Narrative    Lives with wife and adopted daughter. One dog.      Social Determinants of Health     Financial Resource Strain: Low Risk  (3/6/2024)    Overall Financial Resource Strain (CARDIA)     Difficulty of Paying Living Expenses: Not hard at all   Food Insecurity: Unknown (3/6/2024)    Hunger Vital Sign     Worried About Running Out of Food in the Last Year: Never true     Ran Out of Food in the Last Year: Patient declined   Transportation Needs: Patient Declined (3/6/2024)    PRAPARE - Transportation     Lack of Transportation (Medical): Patient declined     Lack of Transportation (Non-Medical): Patient declined   Physical Activity: Inactive (3/6/2024)    Exercise Vital Sign     Days of Exercise per Week: 0 days     Minutes of Exercise per Session: 0 min   Stress: Stress Concern Present (3/6/2024)    [employer] [place] [employer] - Occupational Stress Questionnaire     Feeling of Stress : To some extent   Housing Stability: Patient Declined (3/6/2024)    Housing Stability Vital Sign     Unable to Pay for Housing in the Last Year: Patient declined     Number of Places Lived in the Last Year: 1     Unstable Housing in the Last Year: Patient declined       Review of patient's allergies indicates:   Allergen Reactions    Doxycycline Anaphylaxis     Other reaction(s): nausea  Other reaction(s): Not Indicated    Azithromycin      Other reaction(s): Not Indicated    Erythromycin Other (See Comments)     Other reaction(s): Nausea  Pain with IV use        Health Maintenance Due   Topic Date Due    Pneumococcal Vaccines (Age 0-64) (1 of 2 - PCV) Never done    LDCT Lung Screen  12/12/2023    Lipid Panel  03/30/2024    Influenza Vaccine (1) 09/01/2024    COVID-19 Vaccine (3 - 2023-24 season) 09/01/2024       Objective   Vitals:    09/03/24 1137    BP: (!) 154/102   Pulse: 60        Physical Exam  Constitutional:       General: He is not in acute distress.     Appearance: Normal appearance. He is well-developed.   HENT:      Head: Normocephalic and atraumatic.      Right Ear: External ear normal.      Left Ear: External ear normal.   Eyes:      Conjunctiva/sclera: Conjunctivae normal.   Pulmonary:      Effort: Pulmonary effort is normal. No respiratory distress.   Abdominal:      General: Abdomen is flat. There is no distension.   Musculoskeletal:         General: No swelling or deformity.      Right lower leg: No edema.      Left lower leg: No edema.   Skin:     General: Skin is warm and dry.      Coloration: Skin is not jaundiced.   Neurological:      Mental Status: He is alert and oriented to person, place, and time. Mental status is at baseline.   Psychiatric:         Attention and Perception: Attention and perception normal.         Mood and Affect: Mood normal.         Speech: Speech normal.         Behavior: Behavior normal. Behavior is cooperative.         Thought Content: Thought content normal.         Cognition and Memory: Cognition normal.         Judgment: Judgment normal.          Assessment & Plan   1. Primary hypertension  Assessment & Plan:  Worsening. Increase hydrochlorothiazide to 25mg. I'll check digital hypertension readings in 2 weeks.    Orders:  -     candesartan-hydrochlorothiazid 32-25 mg Tab; Take 1 tablet by mouth once daily.  Dispense: 30 each; Refill: 11    2. Urinary urgency  Assessment & Plan:  Patient stopped tamsulosin due to possible retrograde ejaculation, as determined by our urology department. So far, he's not had any issues since stopping the medication. Continue to monitor.a      3. Cigarette nicotine dependence without complication  Assessment & Plan:  Currently working on reducing his cigarette use. One recent strategy is to leave cigarettes at home if he goes anywhere. Encouraged continue reduction in nicotine  use.       4. Intractable chronic migraine without aura and without status migrainosus  -     candesartan-hydrochlorothiazid 32-25 mg Tab; Take 1 tablet by mouth once daily.  Dispense: 30 each; Refill: 11        Follow up in 4 months.     James Peña MD  09/03/2024

## 2024-09-03 NOTE — ASSESSMENT & PLAN NOTE
Patient stopped tamsulosin due to possible retrograde ejaculation, as determined by our urology department. So far, he's not had any issues since stopping the medication. Continue to monitor.a

## 2024-09-03 NOTE — ASSESSMENT & PLAN NOTE
Worsening. Increase hydrochlorothiazide to 25mg. I'll check digital hypertension readings in 2 weeks.

## 2024-09-19 ENCOUNTER — PATIENT OUTREACH (OUTPATIENT)
Dept: ADMINISTRATIVE | Facility: HOSPITAL | Age: 59
End: 2024-09-19
Payer: COMMERCIAL

## 2024-09-19 VITALS — DIASTOLIC BLOOD PRESSURE: 91 MMHG | SYSTOLIC BLOOD PRESSURE: 138 MMHG

## 2024-09-19 NOTE — PROGRESS NOTES
Population Health Chart Review & Patient Outreach Details      Additional [facility] Health Notes:               Updates Requested / Reviewed:      Updated Care Coordination Note         Health Maintenance Topics Overdue:      VBHM Score: 2     Uncontrolled BP  LDCT Lung Screen                       Health Maintenance Topic(s) Outreach Outcomes & Actions Taken:    Blood Pressure - Outreach Outcomes & Actions Taken  : Remote Blood Pressure Reading Captured

## 2024-10-20 ENCOUNTER — PATIENT MESSAGE (OUTPATIENT)
Dept: UROLOGY | Facility: CLINIC | Age: 59
End: 2024-10-20
Payer: COMMERCIAL

## 2024-10-21 DIAGNOSIS — N50.89 SWOLLEN TESTICLE: Primary | ICD-10-CM

## 2024-10-22 ENCOUNTER — PATIENT MESSAGE (OUTPATIENT)
Dept: UROLOGY | Facility: CLINIC | Age: 59
End: 2024-10-22
Payer: COMMERCIAL

## 2024-10-22 ENCOUNTER — HOSPITAL ENCOUNTER (OUTPATIENT)
Dept: RADIOLOGY | Facility: HOSPITAL | Age: 59
Discharge: HOME | End: 2024-10-22
Payer: COMMERCIAL

## 2024-10-22 DIAGNOSIS — N50.89 SWOLLEN TESTICLE: ICD-10-CM

## 2024-10-22 DIAGNOSIS — N45.1 EPIDIDYMITIS: Primary | ICD-10-CM

## 2024-10-22 PROCEDURE — 76870 US EXAM SCROTUM: CPT | Mod: TC,PO

## 2024-10-22 PROCEDURE — 76870 US EXAM SCROTUM: CPT | Mod: 26,,, | Performed by: STUDENT IN AN ORGANIZED HEALTH CARE EDUCATION/TRAINING PROGRAM

## 2024-10-22 PROCEDURE — 93975 VASCULAR STUDY: CPT | Mod: 26,,, | Performed by: STUDENT IN AN ORGANIZED HEALTH CARE EDUCATION/TRAINING PROGRAM

## 2024-10-22 PROCEDURE — 93975 VASCULAR STUDY: CPT | Mod: TC,PO

## 2024-10-22 RX ORDER — LEVOFLOXACIN 500 MG/1
500 TABLET, FILM COATED ORAL DAILY
Qty: 10 TABLET | Refills: 0 | Status: SHIPPED | OUTPATIENT
Start: 2024-10-22 | End: 2024-11-01

## 2024-10-22 RX ORDER — MELOXICAM 15 MG/1
15 TABLET ORAL DAILY
Qty: 14 TABLET | Refills: 0 | Status: SHIPPED | OUTPATIENT
Start: 2024-10-22 | End: 2024-11-05

## 2024-10-30 DIAGNOSIS — M25.511 RIGHT SHOULDER PAIN: Primary | ICD-10-CM

## 2024-10-31 ENCOUNTER — HOSPITAL ENCOUNTER (OUTPATIENT)
Dept: RADIOLOGY | Facility: HOSPITAL | Age: 59
Discharge: HOME | End: 2024-10-31
Attending: ORTHOPAEDIC SURGERY
Payer: COMMERCIAL

## 2024-10-31 ENCOUNTER — OFFICE VISIT (OUTPATIENT)
Dept: ORTHOPEDICS | Facility: CLINIC | Age: 59
End: 2024-10-31
Payer: COMMERCIAL

## 2024-10-31 DIAGNOSIS — M25.511 CHRONIC RIGHT SHOULDER PAIN: Primary | ICD-10-CM

## 2024-10-31 DIAGNOSIS — M25.511 RIGHT SHOULDER PAIN: ICD-10-CM

## 2024-10-31 DIAGNOSIS — M75.101 TEAR OF RIGHT ROTATOR CUFF, UNSPECIFIED TEAR EXTENT, UNSPECIFIED WHETHER TRAUMATIC: ICD-10-CM

## 2024-10-31 DIAGNOSIS — G89.29 CHRONIC RIGHT SHOULDER PAIN: Primary | ICD-10-CM

## 2024-10-31 PROCEDURE — 99999 PR PBB SHADOW E&M-EST. PATIENT-LVL III: CPT | Mod: PBBFAC,,, | Performed by: ORTHOPAEDIC SURGERY

## 2024-10-31 PROCEDURE — 73030 X-RAY EXAM OF SHOULDER: CPT | Mod: 26,RT,, | Performed by: RADIOLOGY

## 2024-10-31 PROCEDURE — 99204 OFFICE O/P NEW MOD 45 MIN: CPT | Mod: 25,S$GLB,, | Performed by: ORTHOPAEDIC SURGERY

## 2024-10-31 PROCEDURE — 3044F HG A1C LEVEL LT 7.0%: CPT | Mod: CPTII,S$GLB,, | Performed by: ORTHOPAEDIC SURGERY

## 2024-10-31 PROCEDURE — 20610 DRAIN/INJ JOINT/BURSA W/O US: CPT | Mod: RT,S$GLB,, | Performed by: ORTHOPAEDIC SURGERY

## 2024-10-31 PROCEDURE — 1159F MED LIST DOCD IN RCRD: CPT | Mod: CPTII,S$GLB,, | Performed by: ORTHOPAEDIC SURGERY

## 2024-10-31 PROCEDURE — 73030 X-RAY EXAM OF SHOULDER: CPT | Mod: TC,PO,RT

## 2024-10-31 RX ORDER — TRIAMCINOLONE ACETONIDE 40 MG/ML
80 INJECTION, SUSPENSION INTRA-ARTICULAR; INTRAMUSCULAR
Status: DISCONTINUED | OUTPATIENT
Start: 2024-10-31 | End: 2024-10-31 | Stop reason: HOSPADM

## 2024-10-31 RX ADMIN — TRIAMCINOLONE ACETONIDE 80 MG: 40 INJECTION, SUSPENSION INTRA-ARTICULAR; INTRAMUSCULAR at 01:10

## 2024-11-05 DIAGNOSIS — G44.011 INTRACTABLE EPISODIC CLUSTER HEADACHE: ICD-10-CM

## 2024-11-05 RX ORDER — GALCANEZUMAB 100 MG/ML
300 INJECTION, SOLUTION SUBCUTANEOUS
Qty: 3 ML | Refills: 11 | Status: SHIPPED | OUTPATIENT
Start: 2024-11-05

## 2025-01-03 ENCOUNTER — LAB VISIT (OUTPATIENT)
Dept: LAB | Facility: HOSPITAL | Age: 60
End: 2025-01-03
Attending: STUDENT IN AN ORGANIZED HEALTH CARE EDUCATION/TRAINING PROGRAM
Payer: COMMERCIAL

## 2025-01-03 ENCOUNTER — OFFICE VISIT (OUTPATIENT)
Dept: FAMILY MEDICINE | Facility: CLINIC | Age: 60
End: 2025-01-03
Payer: COMMERCIAL

## 2025-01-03 VITALS
OXYGEN SATURATION: 96 % | SYSTOLIC BLOOD PRESSURE: 126 MMHG | WEIGHT: 258.19 LBS | DIASTOLIC BLOOD PRESSURE: 84 MMHG | BODY MASS INDEX: 34.97 KG/M2 | HEIGHT: 72 IN | HEART RATE: 60 BPM

## 2025-01-03 DIAGNOSIS — E66.01 CLASS 2 SEVERE OBESITY DUE TO EXCESS CALORIES WITH SERIOUS COMORBIDITY AND BODY MASS INDEX (BMI) OF 35.0 TO 35.9 IN ADULT: ICD-10-CM

## 2025-01-03 DIAGNOSIS — I10 PRIMARY HYPERTENSION: ICD-10-CM

## 2025-01-03 DIAGNOSIS — E78.01 FAMILIAL HYPERCHOLESTEROLEMIA: ICD-10-CM

## 2025-01-03 DIAGNOSIS — G57.11 MERALGIA PARESTHETICA OF RIGHT SIDE: ICD-10-CM

## 2025-01-03 DIAGNOSIS — E66.812 CLASS 2 SEVERE OBESITY DUE TO EXCESS CALORIES WITH SERIOUS COMORBIDITY AND BODY MASS INDEX (BMI) OF 35.0 TO 35.9 IN ADULT: ICD-10-CM

## 2025-01-03 DIAGNOSIS — Z00.00 HEALTHCARE MAINTENANCE: Primary | ICD-10-CM

## 2025-01-03 DIAGNOSIS — G43.719 INTRACTABLE CHRONIC MIGRAINE WITHOUT AURA AND WITHOUT STATUS MIGRAINOSUS: ICD-10-CM

## 2025-01-03 DIAGNOSIS — F17.210 CIGARETTE NICOTINE DEPENDENCE WITHOUT COMPLICATION: ICD-10-CM

## 2025-01-03 DIAGNOSIS — K21.9 GASTROESOPHAGEAL REFLUX DISEASE, UNSPECIFIED WHETHER ESOPHAGITIS PRESENT: ICD-10-CM

## 2025-01-03 DIAGNOSIS — I25.10 CORONARY ARTERY DISEASE INVOLVING NATIVE CORONARY ARTERY OF NATIVE HEART WITHOUT ANGINA PECTORIS: ICD-10-CM

## 2025-01-03 LAB
ALBUMIN SERPL BCP-MCNC: 3.9 G/DL (ref 3.5–5.2)
ALP SERPL-CCNC: 41 U/L (ref 40–150)
ALT SERPL W/O P-5'-P-CCNC: 30 U/L (ref 10–44)
ANION GAP SERPL CALC-SCNC: 11 MMOL/L (ref 8–16)
AST SERPL-CCNC: 21 U/L (ref 10–40)
BILIRUB SERPL-MCNC: 0.4 MG/DL (ref 0.1–1)
BUN SERPL-MCNC: 19 MG/DL (ref 6–20)
CALCIUM SERPL-MCNC: 9.4 MG/DL (ref 8.7–10.5)
CHLORIDE SERPL-SCNC: 106 MMOL/L (ref 95–110)
CHOLEST SERPL-MCNC: 199 MG/DL (ref 120–199)
CHOLEST/HDLC SERPL: 4.7 {RATIO} (ref 2–5)
CO2 SERPL-SCNC: 28 MMOL/L (ref 23–29)
CREAT SERPL-MCNC: 1.2 MG/DL (ref 0.5–1.4)
ERYTHROCYTE [DISTWIDTH] IN BLOOD BY AUTOMATED COUNT: 13.2 % (ref 11.5–14.5)
EST. GFR  (NO RACE VARIABLE): >60 ML/MIN/1.73 M^2
ESTIMATED AVG GLUCOSE: 117 MG/DL (ref 68–131)
GLUCOSE SERPL-MCNC: 121 MG/DL (ref 70–110)
HBA1C MFR BLD: 5.7 % (ref 4–5.6)
HCT VFR BLD AUTO: 48.5 % (ref 40–54)
HDLC SERPL-MCNC: 42 MG/DL (ref 40–75)
HDLC SERPL: 21.1 % (ref 20–50)
HGB BLD-MCNC: 17.1 G/DL (ref 14–18)
LDLC SERPL CALC-MCNC: 115.8 MG/DL (ref 63–159)
MCH RBC QN AUTO: 33.3 PG (ref 27–31)
MCHC RBC AUTO-ENTMCNC: 35.3 G/DL (ref 32–36)
MCV RBC AUTO: 95 FL (ref 82–98)
NONHDLC SERPL-MCNC: 157 MG/DL
PLATELET # BLD AUTO: 175 K/UL (ref 150–450)
PMV BLD AUTO: 10.8 FL (ref 9.2–12.9)
POTASSIUM SERPL-SCNC: 4.9 MMOL/L (ref 3.5–5.1)
PROT SERPL-MCNC: 6.7 G/DL (ref 6–8.4)
RBC # BLD AUTO: 5.13 M/UL (ref 4.6–6.2)
SODIUM SERPL-SCNC: 145 MMOL/L (ref 136–145)
TRIGL SERPL-MCNC: 206 MG/DL (ref 30–150)
WBC # BLD AUTO: 7.41 K/UL (ref 3.9–12.7)

## 2025-01-03 PROCEDURE — 36415 COLL VENOUS BLD VENIPUNCTURE: CPT | Mod: PO | Performed by: STUDENT IN AN ORGANIZED HEALTH CARE EDUCATION/TRAINING PROGRAM

## 2025-01-03 PROCEDURE — 80053 COMPREHEN METABOLIC PANEL: CPT | Mod: PO | Performed by: STUDENT IN AN ORGANIZED HEALTH CARE EDUCATION/TRAINING PROGRAM

## 2025-01-03 PROCEDURE — 80061 LIPID PANEL: CPT | Performed by: STUDENT IN AN ORGANIZED HEALTH CARE EDUCATION/TRAINING PROGRAM

## 2025-01-03 PROCEDURE — 83036 HEMOGLOBIN GLYCOSYLATED A1C: CPT | Performed by: STUDENT IN AN ORGANIZED HEALTH CARE EDUCATION/TRAINING PROGRAM

## 2025-01-03 PROCEDURE — 99999 PR PBB SHADOW E&M-EST. PATIENT-LVL V: CPT | Mod: PBBFAC,,, | Performed by: STUDENT IN AN ORGANIZED HEALTH CARE EDUCATION/TRAINING PROGRAM

## 2025-01-03 PROCEDURE — 85027 COMPLETE CBC AUTOMATED: CPT | Mod: PO | Performed by: STUDENT IN AN ORGANIZED HEALTH CARE EDUCATION/TRAINING PROGRAM

## 2025-01-03 RX ORDER — GABAPENTIN 300 MG/1
300 CAPSULE ORAL DAILY PRN
Qty: 90 CAPSULE | Refills: 11 | Status: SHIPPED | OUTPATIENT
Start: 2025-01-03

## 2025-01-03 RX ORDER — OMEPRAZOLE 20 MG/1
20 CAPSULE, DELAYED RELEASE ORAL DAILY
Qty: 90 CAPSULE | Refills: 3 | Status: SHIPPED | OUTPATIENT
Start: 2025-01-03

## 2025-01-03 RX ORDER — ATORVASTATIN CALCIUM 40 MG/1
40 TABLET, FILM COATED ORAL DAILY
Qty: 90 TABLET | Refills: 3 | Status: SHIPPED | OUTPATIENT
Start: 2025-01-03

## 2025-01-03 RX ORDER — CANDESARTAN CILEXETIL AND HYDROCHLOROTHIAZIDE 32; 25 MG/1; MG/1
1 TABLET ORAL DAILY
Qty: 90 EACH | Refills: 3 | Status: SHIPPED | OUTPATIENT
Start: 2025-01-03 | End: 2026-01-03

## 2025-01-03 RX ORDER — CARVEDILOL 6.25 MG/1
6.25 TABLET ORAL 2 TIMES DAILY WITH MEALS
Qty: 180 TABLET | Refills: 3 | Status: SHIPPED | OUTPATIENT
Start: 2025-01-03 | End: 2026-01-03

## 2025-01-03 NOTE — PROGRESS NOTES
Name: Jovan Mcwilliams  MRN: [id number]  : 1965    Subjective   HPI  Patient presents for regular follow up.     Review of Systems   Cardiovascular:  Negative for palpitations and leg swelling.   Neurological:  Positive for numbness (anterior right thigh). Negative for dizziness.   Psychiatric/Behavioral:  Negative for dysphoric mood. The patient is not nervous/anxious.         Patient Active Problem List   Diagnosis    Familial hypercholesterolemia    GERD (gastroesophageal reflux disease)    Primary hypertension    Cigarette nicotine dependence without complication    Class 2 severe obesity due to excess calories with serious comorbidity and body mass index (BMI) of 35.0 to 35.9 in adult    DELMY (obstructive sleep apnea)    Coronary artery disease involving native coronary artery of native heart without angina pectoris    Lumbar radiculopathy    Cyst of right kidney    Intractable episodic cluster headache    Irritable bowel syndrome without diarrhea    Intractable chronic migraine without aura and without status migrainosus    Urinary urgency    Meralgia paresthetica of right side       Current Outpatient Medications on File Prior to Visit   Medication Sig Dispense Refill    albuterol (PROVENTIL/VENTOLIN HFA) 90 mcg/actuation inhaler Inhale 2 puffs into the lungs every 4 (four) hours as needed for Wheezing or Shortness of Breath. 18 g 5    aspirin (ECOTRIN) 81 MG EC tablet Take 1 tablet (81 mg total) by mouth once daily. 30 tablet 1    butalbital-acetaminophen-caffeine -40 mg (FIORICET, ESGIC) -40 mg per tablet Take 2 tablets by mouth every 12 hours as needed for rescue if lidocaine fails 14 tablet 1    clotrimazole-betamethasone 1-0.05% (LOTRISONE) cream Apply topically 2 (two) times daily. 15 g 1    divalproex (DEPAKOTE) 500 MG TbEC Take 1 tablet (500 mg total) by mouth 2 (two) times daily. 60 tablet 11    galcanezumab-gnlm 300 mg/3 mL (100 mg/mL x 3) Syrg Inject 3 mLs (300 mg total) into the skin  every 28 days. 3 mL 11    ketoconazole (NIZORAL) 2 % cream Apply to the feet and groin BID until clear then PRN for flares. 60 g 2    lasmiditan (REYVOW) tablet 100 mg Take 1 tablet (100 mg) by mouth daily as needed (migraine). 8 tablet 1    prochlorperazine (COMPAZINE) 10 MG tablet Take 1 tablet (10 mg total) by mouth every 6 (six) hours as needed (migraine or nausea). 60 tablet 11    rimegepant (NURTEC) 75 mg odt Take 1 tablet (75 mg total) by mouth daily as needed for Migraine. Place ODT tablet on the tongue; alternatively the ODT tablet may be placed under the tongue 16 tablet 11    tiZANidine (ZANAFLEX) 4 MG tablet Half or full tablet by mouth at night as needed for muscle spasm 30 tablet 11    traMADoL (ULTRAM) 50 mg tablet Take 1 tablet (50 mg total) by mouth every 8 (eight) hours as needed for Pain. 20 tablet 0    [DISCONTINUED] atorvastatin (LIPITOR) 40 MG tablet Take 1 tablet (40 mg total) by mouth once daily. 90 tablet 3    [DISCONTINUED] candesartan-hydrochlorothiazid 32-25 mg Tab Take 1 tablet by mouth once daily. 30 each 11    [DISCONTINUED] carvediloL (COREG) 6.25 MG tablet Take 1 tablet (6.25 mg total) by mouth 2 (two) times daily with meals. 180 tablet 1    [DISCONTINUED] omeprazole (PRILOSEC) 40 MG capsule TAKE ONE CAPSULE BY MOUTH ONCE A DAY 90 capsule 3    [DISCONTINUED] buPROPion (WELLBUTRIN XL) 150 MG TB24 tablet       [DISCONTINUED] LIDOcaine (LIDODERM) 5 % Place 1 patch onto the skin daily as needed (Pain). Remove & Discard patch within 12 hours or as directed by MD 15 patch 0    [DISCONTINUED] valacyclovir (VALTREX) 1000 MG tablet        No current facility-administered medications on file prior to visit.       Past Medical History:   Diagnosis Date    Basal cell carcinoma 12/03/2020    right forearm- JAM     COVID-19 02/15/2024    Eye pain     left eye    Poison ivy dermatitis 03/21/2024       Past Surgical History:   Procedure Laterality Date    COLONOSCOPY N/A 04/03/2023    Procedure:  COLONOSCOPY;  Surgeon: Leonardo Bright Jr., MD;  Location: [facility] ENDO;  Service: Endoscopy;  Laterality: N/A;    HERNIA REPAIR Bilateral 1974    KNEE SURGERY Left     LUMBAR FUSION      LUMBAR LAMINECTOMY      SHOULDER SURGERY Right     SKIN BIOPSY  2020    SPINE SURGERY   &     TRANSFORAMINAL EPIDURAL INJECTION OF STEROID Left 2024    Procedure: Injection,steroid,epidural,transforaminal approach       L3/4;  Surgeon: Cam Trivedi MD;  Location: [facility] OR;  Service: Pain Management;  Laterality: Left;    TRANSFORAMINAL EPIDURAL INJECTION OF STEROID Left 04/10/2024    Procedure: Injection,steroid,epidural,transforaminal approach   L3/4;  Surgeon: aCm Trivedi MD;  Location: [facility] OR;  Service: Pain Management;  Laterality: Left;    VASECTOMY  ?        Family History   Problem Relation Name Age of Onset    Hypertension Mother Stephanie     Hyperlipidemia Mother Stephanie     Diabetes Father Aristeo     Cancer Maternal Aunt Vera     Cancer Paternal Aunt R     Cancer Maternal Grandmother Sussy     Melanoma Maternal Grandmother Sussy     Cancer Maternal Grandfather Domonick     Colon cancer Paternal Grandfather Jose     Cancer Paternal Grandfather Jose        Social History     Socioeconomic History    Marital status:    Occupational History    Occupation:      Comment: Retired   Tobacco Use    Smoking status: Some Days     Current packs/day: 0.00     Average packs/day: 1 pack/day for 37.1 years (37.1 ttl pk-yrs)     Types: Cigarettes     Start date:      Last attempt to quit: 2024     Years since quittin.8    Smokeless tobacco: Never   Substance and Sexual Activity    Alcohol use: Yes     Alcohol/week: 10.0 standard drinks of alcohol     Types: 10 Cans of beer per week     Comment: Drinks on weekend - 10 beers all day on     Drug use: No    Sexual activity: Yes     Partners: Female     Birth control/protection: Partner-Vasectomy   Social History  Narrative    Lives with wife and adopted daughter. One dog.      Social Drivers of Health     Financial Resource Strain: Low Risk  (3/6/2024)    Overall Financial Resource Strain (CARDIA)     Difficulty of Paying Living Expenses: Not hard at all   Food Insecurity: Unknown (3/6/2024)    Hunger Vital Sign     Worried About Running Out of Food in the Last Year: Never true     Ran Out of Food in the Last Year: Patient declined   Transportation Needs: Patient Declined (3/6/2024)    PRAPARE - Transportation     Lack of Transportation (Medical): Patient declined     Lack of Transportation (Non-Medical): Patient declined   Physical Activity: Inactive (3/6/2024)    Exercise Vital Sign     Days of Exercise per Week: 0 days     Minutes of Exercise per Session: 0 min   Stress: Stress Concern Present (3/6/2024)    [employer] [place] [employer] - Occupational Stress Questionnaire     Feeling of Stress : To some extent   Housing Stability: Patient Declined (3/6/2024)    Housing Stability Vital Sign     Unable to Pay for Housing in the Last Year: Patient declined     Number of Places Lived in the Last Year: 1     Unstable Housing in the Last Year: Patient declined       Review of patient's allergies indicates:   Allergen Reactions    Doxycycline Anaphylaxis     Other reaction(s): nausea  Other reaction(s): Not Indicated    Azithromycin      Other reaction(s): Not Indicated    Erythromycin Other (See Comments)     Other reaction(s): Nausea  Pain with IV use        Health Maintenance Due   Topic Date Due    Pneumococcal Vaccines (Age 50+) (1 of 2 - PCV) Never done    LDCT Lung Screen  12/12/2023    Lipid Panel  03/30/2024    Influenza Vaccine (1) 09/01/2024    COVID-19 Vaccine (3 - 2024-25 season) 09/01/2024       Objective   Vitals:    01/03/25 0901   BP: 126/84   Pulse: 60        Physical Exam  Constitutional:       General: He is not in acute distress.     Appearance: Normal appearance. He is well-developed.   HENT:       Head: Normocephalic and atraumatic.      Right Ear: External ear normal.      Left Ear: External ear normal.   Eyes:      Conjunctiva/sclera: Conjunctivae normal.   Pulmonary:      Effort: Pulmonary effort is normal. No respiratory distress.   Abdominal:      General: Abdomen is flat. There is no distension.   Musculoskeletal:         General: No swelling or deformity.      Right lower leg: No edema.      Left lower leg: No edema.   Skin:     General: Skin is warm and dry.      Coloration: Skin is not jaundiced.   Neurological:      Mental Status: He is alert and oriented to person, place, and time. Mental status is at baseline.   Psychiatric:         Attention and Perception: Attention and perception normal.         Mood and Affect: Mood normal.         Speech: Speech normal.         Behavior: Behavior normal. Behavior is cooperative.         Thought Content: Thought content normal.         Cognition and Memory: Cognition normal.         Judgment: Judgment normal.          Assessment & Plan   1. Healthcare maintenance  -     influenza (Flulaval, Fluzone, Fluarix) 45 mcg/0.5 mL IM vaccine (> or = 6 mo) 0.5 mL    2. Primary hypertension  Assessment & Plan:  Controlled today. Diastolic readings have been a bit on the high side, but no changes needed today. Continue current regimen.    Orders:  -     CBC Without Differential; Future; Expected date: 01/03/2025  -     Comprehensive Metabolic Panel; Future; Expected date: 01/03/2025  -     Hemoglobin A1C; Future; Expected date: 01/03/2025  -     Lipid Panel; Future; Expected date: 01/03/2025  -     candesartan-hydrochlorothiazid 32-25 mg Tab; Take 1 tablet by mouth once daily.  Dispense: 90 each; Refill: 3  -     carvediloL (COREG) 6.25 MG tablet; Take 1 tablet (6.25 mg total) by mouth 2 (two) times daily with meals.  Dispense: 180 tablet; Refill: 3    3. Familial hypercholesterolemia  -     Lipid Panel; Future; Expected date: 01/03/2025  -     atorvastatin (LIPITOR) 40  MG tablet; Take 1 tablet (40 mg total) by mouth once daily.  Dispense: 90 tablet; Refill: 3    4. Class 2 severe obesity due to excess calories with serious comorbidity and body mass index (BMI) of 35.0 to 35.9 in adult  Assessment & Plan:  Weight is stable. Continue to monitor.      5. Meralgia paresthetica of right side  Assessment & Plan:  A new issue. Start as needed gabapentin.    Orders:  -     gabapentin (NEURONTIN) 300 MG capsule; Take 1 capsule (300 mg total) by mouth daily as needed (nerve pain).  Dispense: 90 capsule; Refill: 11    6. Cigarette nicotine dependence without complication  -     CT Chest Lung Screening Low Dose; Future; Expected date: 01/03/2025    7. Coronary artery disease involving native coronary artery of native heart without angina pectoris  -     atorvastatin (LIPITOR) 40 MG tablet; Take 1 tablet (40 mg total) by mouth once daily.  Dispense: 90 tablet; Refill: 3    8. Intractable chronic migraine without aura and without status migrainosus  -     candesartan-hydrochlorothiazid 32-25 mg Tab; Take 1 tablet by mouth once daily.  Dispense: 90 each; Refill: 3    9. Gastroesophageal reflux disease, unspecified whether esophagitis present  Assessment & Plan:  Patient asked about stopping omeprazole. Decrease dose to 20mg for a few weeks then stop if symptoms haven't worsened.    Orders:  -     omeprazole (PRILOSEC) 20 MG capsule; Take 1 capsule (20 mg total) by mouth once daily.  Dispense: 90 capsule; Refill: 3        Follow up in 6 months.     James Peña MD  01/03/2025

## 2025-01-03 NOTE — ASSESSMENT & PLAN NOTE
Patient asked about stopping omeprazole. Decrease dose to 20mg for a few weeks then stop if symptoms haven't worsened.

## 2025-01-03 NOTE — ASSESSMENT & PLAN NOTE
Controlled today. Diastolic readings have been a bit on the high side, but no changes needed today. Continue current regimen.

## 2025-01-06 ENCOUNTER — PATIENT MESSAGE (OUTPATIENT)
Dept: FAMILY MEDICINE | Facility: CLINIC | Age: 60
End: 2025-01-06
Payer: COMMERCIAL

## 2025-01-25 ENCOUNTER — HOSPITAL ENCOUNTER (OUTPATIENT)
Dept: RADIOLOGY | Facility: HOSPITAL | Age: 60
Discharge: HOME | End: 2025-01-25
Attending: STUDENT IN AN ORGANIZED HEALTH CARE EDUCATION/TRAINING PROGRAM
Payer: COMMERCIAL

## 2025-01-25 DIAGNOSIS — F17.210 CIGARETTE NICOTINE DEPENDENCE WITHOUT COMPLICATION: ICD-10-CM

## 2025-01-25 PROCEDURE — 71271 CT THORAX LUNG CANCER SCR C-: CPT | Mod: TC,PO

## 2025-01-25 PROCEDURE — 71271 CT THORAX LUNG CANCER SCR C-: CPT | Mod: 26,,, | Performed by: RADIOLOGY

## 2025-01-27 ENCOUNTER — OFFICE VISIT (OUTPATIENT)
Dept: PAIN MEDICINE | Facility: CLINIC | Age: 60
End: 2025-01-27
Payer: COMMERCIAL

## 2025-01-27 ENCOUNTER — PATIENT MESSAGE (OUTPATIENT)
Dept: PAIN MEDICINE | Facility: CLINIC | Age: 60
End: 2025-01-27

## 2025-01-27 VITALS
DIASTOLIC BLOOD PRESSURE: 83 MMHG | WEIGHT: 259.5 LBS | BODY MASS INDEX: 35.19 KG/M2 | HEART RATE: 54 BPM | SYSTOLIC BLOOD PRESSURE: 146 MMHG

## 2025-01-27 DIAGNOSIS — M54.16 LUMBAR RADICULOPATHY: Primary | ICD-10-CM

## 2025-01-27 DIAGNOSIS — Z98.1 HISTORY OF LUMBAR FUSION: ICD-10-CM

## 2025-01-27 DIAGNOSIS — M48.061 SPINAL STENOSIS OF LUMBAR REGION WITHOUT NEUROGENIC CLAUDICATION: ICD-10-CM

## 2025-01-27 PROCEDURE — 3077F SYST BP >= 140 MM HG: CPT | Mod: CPTII,S$GLB,, | Performed by: ANESTHESIOLOGY

## 2025-01-27 PROCEDURE — 1159F MED LIST DOCD IN RCRD: CPT | Mod: CPTII,S$GLB,, | Performed by: ANESTHESIOLOGY

## 2025-01-27 PROCEDURE — 3008F BODY MASS INDEX DOCD: CPT | Mod: CPTII,S$GLB,, | Performed by: ANESTHESIOLOGY

## 2025-01-27 PROCEDURE — 3044F HG A1C LEVEL LT 7.0%: CPT | Mod: CPTII,S$GLB,, | Performed by: ANESTHESIOLOGY

## 2025-01-27 PROCEDURE — 3079F DIAST BP 80-89 MM HG: CPT | Mod: CPTII,S$GLB,, | Performed by: ANESTHESIOLOGY

## 2025-01-27 PROCEDURE — 99999 PR PBB SHADOW E&M-EST. PATIENT-LVL III: CPT | Mod: PBBFAC,,, | Performed by: ANESTHESIOLOGY

## 2025-01-27 PROCEDURE — 99214 OFFICE O/P EST MOD 30 MIN: CPT | Mod: S$GLB,,, | Performed by: ANESTHESIOLOGY

## 2025-01-27 RX ORDER — GABAPENTIN 300 MG/1
600 CAPSULE ORAL NIGHTLY
Qty: 60 CAPSULE | Refills: 2 | Status: SHIPPED | OUTPATIENT
Start: 2025-01-27 | End: 2026-01-27

## 2025-01-27 NOTE — PROGRESS NOTES
[facility] pain management Established Patient        PCP:  Amauri Dickerson MD    CC:   Chief Complaint   Patient presents with    Leg Pain    Back Pain          Jovan Mcwilliams is a 59 y.o. year old male patient who has a past medical history of Basal cell carcinoma, COVID-19, Eye pain, and Poison ivy dermatitis. He presents in self referral for lower back pain.  The patient returns in follow-up today for pain in the legs.  When I had last seen him, we had done a left L3/4 transforaminal injection any add almost 90% relief but it did not seem to be long-lasting.  He still has numbness over the right lateral thigh.  He reports that his pain is burning, worse with standing too long or walking too long, sometimes sitting too long.  Is located in the right and left anterior thigh.  He had been taking gabapentin but did not seem to be doing much.  He denies any side effects with it.      Previous history:  He has history of two lumbar spine surgeries.  1995 diskectomy that left him with chronic right anterior thigh numbness.  2005 lumbar fusion after which he did really well with no complications or long term issues.  Current pain started 2-3 months ago.  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  He has pain in the left lower back to the left posterior thigh to the outer left knee.  The left leg has given out on him due to pain.  He has tried ibuprofen and tylenol.  He is taking motrin and tried flexeril in the past.     Denies bowel/ bladder incontinence.    Past and current medications:  Antineuropathics:  NSAIDs: motrin  Antidepressants:  Muscle relaxers: (past - flexeril)  Opioids: has tramadol at home, not taking  Antiplatelets/Anticoagulants:  Others:  tylenol; tried medrol taper    Physical Therapy/ Chiropractic care:  PT - current trial with no improvement.     Pain Intervention History:  Status post left L3/4 transforaminal injection  on 02/26/2024 with what sounds like 60% relief but starting to wear off somewhat.   He is status post left L3/4 transforaminal epidural steroid injection on 04/10/2024 with 80-90% relief.     Past Spine Surgical History:  1995 diskectomy that left him with chronic right anterior thigh numbness.    2005 lumbar fusion after which he did really well with no complications or long term issues; Dr. Dutch So        History:    Current Outpatient Medications:     albuterol (PROVENTIL/VENTOLIN HFA) 90 mcg/actuation inhaler, Inhale 2 puffs into the lungs every 4 (four) hours as needed for Wheezing or Shortness of Breath., Disp: 18 g, Rfl: 5    aspirin (ECOTRIN) 81 MG EC tablet, Take 1 tablet (81 mg total) by mouth once daily., Disp: 30 tablet, Rfl: 1    atorvastatin (LIPITOR) 40 MG tablet, Take 1 tablet (40 mg total) by mouth once daily., Disp: 90 tablet, Rfl: 3    butalbital-acetaminophen-caffeine -40 mg (FIORICET, ESGIC) -40 mg per tablet, Take 2 tablets by mouth every 12 hours as needed for rescue if lidocaine fails, Disp: 14 tablet, Rfl: 1    candesartan-hydrochlorothiazid 32-25 mg Tab, Take 1 tablet by mouth once daily., Disp: 90 each, Rfl: 3    carvediloL (COREG) 6.25 MG tablet, Take 1 tablet (6.25 mg total) by mouth 2 (two) times daily with meals., Disp: 180 tablet, Rfl: 3    clotrimazole-betamethasone 1-0.05% (LOTRISONE) cream, Apply topically 2 (two) times daily., Disp: 15 g, Rfl: 1    divalproex (DEPAKOTE) 500 MG TbEC, Take 1 tablet (500 mg total) by mouth 2 (two) times daily., Disp: 60 tablet, Rfl: 11    gabapentin (NEURONTIN) 300 MG capsule, Take 1 capsule (300 mg total) by mouth daily as needed (nerve pain)., Disp: 90 capsule, Rfl: 11    galcanezumab-gnlm 300 mg/3 mL (100 mg/mL x 3) Syrg, Inject 3 mLs (300 mg total) into the skin every 28 days., Disp: 3 mL, Rfl: 11    ketoconazole (NIZORAL) 2 % cream, Apply to the feet and groin BID until clear then PRN for flares., Disp: 60 g, Rfl: 2     lasmiditan (REYVOW) tablet 100 mg, Take 1 tablet (100 mg) by mouth daily as needed (migraine)., Disp: 8 tablet, Rfl: 1    omeprazole (PRILOSEC) 20 MG capsule, Take 1 capsule (20 mg total) by mouth once daily., Disp: 90 capsule, Rfl: 3    prochlorperazine (COMPAZINE) 10 MG tablet, Take 1 tablet (10 mg total) by mouth every 6 (six) hours as needed (migraine or nausea)., Disp: 60 tablet, Rfl: 11    rimegepant (NURTEC) 75 mg odt, Take 1 tablet (75 mg total) by mouth daily as needed for Migraine. Place ODT tablet on the tongue; alternatively the ODT tablet may be placed under the tongue, Disp: 16 tablet, Rfl: 11    tiZANidine (ZANAFLEX) 4 MG tablet, Half or full tablet by mouth at night as needed for muscle spasm, Disp: 30 tablet, Rfl: 11    traMADoL (ULTRAM) 50 mg tablet, Take 1 tablet (50 mg total) by mouth every 8 (eight) hours as needed for Pain., Disp: 20 tablet, Rfl: 0    Past Medical History:   Diagnosis Date    Basal cell carcinoma 12/03/2020    right forearm- JAM     COVID-19 02/15/2024    Eye pain     left eye    Poison ivy dermatitis 03/21/2024       Past Surgical History:   Procedure Laterality Date    COLONOSCOPY N/A 04/03/2023    Procedure: COLONOSCOPY;  Surgeon: Leonardo rBight Jr., MD;  [place] [facility];  Service: Endoscopy;  Laterality: N/A;    HERNIA REPAIR Bilateral 1974    KNEE SURGERY Left     LUMBAR FUSION  2005    LUMBAR LAMINECTOMY  1995    SHOULDER SURGERY Right     SKIN BIOPSY  12/03/2020    SPINE SURGERY  1994 & 2004    TRANSFORAMINAL EPIDURAL INJECTION OF STEROID Left 02/26/2024    Procedure: Injection,steroid,epidural,transforaminal approach       L3/4;  Surgeon: Cam Trivedi MD;  Location: [facility];  Service: Pain Management;  Laterality: Left;    TRANSFORAMINAL EPIDURAL INJECTION OF STEROID Left 04/10/2024    Procedure: Injection,steroid,epidural,transforaminal approach   L3/4;  Surgeon: Cam rTivedi MD;  Location: [facility];  Service: Pain Management;  Laterality: Left;     VASECTOMY  ?       Family History   Problem Relation Name Age of Onset    Hypertension Mother Stephanie     Hyperlipidemia Mother Stephanie     Diabetes Father Aristeo     Cancer Maternal Aunt Vera     Cancer Paternal Aunt R     Cancer Maternal Grandmother Sussy     Melanoma Maternal Grandmother Sussy     Cancer Maternal Grandfather Domonick     Colon cancer Paternal Grandfather Jose     Cancer Paternal Grandfather Jose        Social History     Socioeconomic History    Marital status:    Occupational History    Occupation:      Comment: Retired   Tobacco Use    Smoking status: Some Days     Current packs/day: 0.00     Average packs/day: 1 pack/day for 37.1 years (37.1 ttl pk-yrs)     Types: Cigarettes     Start date:      Last attempt to quit: 2024     Years since quittin.9    Smokeless tobacco: Never   Substance and Sexual Activity    Alcohol use: Yes     Alcohol/week: 10.0 standard drinks of alcohol     Types: 10 Cans of beer per week     Comment: Drinks on weekend - 10 beers all day on     Drug use: No    Sexual activity: Yes     Partners: Female     Birth control/protection: Partner-Vasectomy   Social History Narrative    Lives with wife and adopted daughter. One dog.      Social Drivers of Health     Financial Resource Strain: Low Risk  (3/6/2024)    Overall Financial Resource Strain (CARDIA)     Difficulty of Paying Living Expenses: Not hard at all   Food Insecurity: Unknown (3/6/2024)    Hunger Vital Sign     Worried About Running Out of Food in the Last Year: Never true     Ran Out of Food in the Last Year: Patient declined   Transportation Needs: Patient Declined (3/6/2024)    PRAPARE - Transportation     Lack of Transportation (Medical): Patient declined     Lack of Transportation (Non-Medical): Patient declined   Physical Activity: Inactive (3/6/2024)    Exercise Vital Sign     Days of Exercise per Week: 0 days     Minutes of Exercise per Session: 0 min   Stress: Stress  Concern Present (3/6/2024)    [employer] [place] [employer] - Occupational Stress Questionnaire     Feeling of Stress : To some extent   Housing Stability: Patient Declined (3/6/2024)    Housing Stability Vital Sign     Unable to Pay for Housing in the Last Year: Patient declined     Number of Places Lived in the Last Year: 1     Unstable Housing in the Last Year: Patient declined       Review of patient's allergies indicates:   Allergen Reactions    Doxycycline Anaphylaxis     Other reaction(s): nausea  Other reaction(s): Not Indicated    Azithromycin      Other reaction(s): Not Indicated    Erythromycin Other (See Comments)     Other reaction(s): Nausea  Pain with IV use       Labs:  Lab Results   Component Value Date    HGBA1C 5.7 (H) 01/03/2025       Lab Results   Component Value Date    WBC 7.41 01/03/2025    HGB 17.1 01/03/2025    HCT 48.5 01/03/2025    MCV 95 01/03/2025     01/03/2025           Review of Systems:  Low back pain.  Left leg pain.  Balance of review of systems is negative.    Physical Exam:  Vitals:    01/27/25 1120   BP: (!) 146/83   Pulse: (!) 54   Weight: 117.7 kg (259 lb 7.7 oz)   PainSc:   6   PainLoc: Back     Body mass index is 35.19 kg/m².    Pain disability index:      1/27/2025    11:19 AM 4/30/2024     9:18 AM 3/11/2024     9:28 AM   Last 3 PDI Scores   Pain Disability Index (PDI) 40 28 26       Gen: NAD  Psych: mood appropriate for given condition  HEENT: eyes anicteric   CV: RRR, 2+ radial pulse  Respiratory: non-labored, no signs of respiratory distress  Abd: non-distended  Skin: warm, dry and intact.  Gait: Able to heel walk, toe walk. No antalgic gait.     Lumbar spine:  Lumbar spine: ROM is mildly reduced with flexion extension and oblique extension with increased pain on flexion.  Kostas's test causes left low back pain.    Supine straight leg raise is mildly positive for left leg pain at 60°    Internal and external rotation of the hip causes no increased  pain on either side.  Myofascial exam:  Tenderness palpation over the SI joint    Sensory:   Intact and symmetrical to light touch in L1-S1 dermatomes bilaterally.; isolated right atnerior thigh numbness in a small circular area.     Motor:     Right Left   L2/3 Iliacus Hip flexion  5  5   L3/4 Qudratus Femoris Knee Extension  5  5   L4/5 Tib Anterior Ankle Dorsiflexion   5  5   L5/S1 Extensor Hallicus Longus Great toe extension  5  5   S1/S2 Gastroc/Soleus Plantar Flexion  5  5      Right Left                  Patellar DTR 2+ 2+   Achilles DTR 2+ 2+   Vincent Absent  Absent   Clonus Absent Absent   Babinski Absent Absent       Imaging:  Xray lumbar spinef rom 12-12-23:  postsurgical changes of posterior interbody fusion from L4 through S1.  There are bilateral L4 and S1 screws with the left-sided L5 screw.  There has been right-sided laminectomy at the levels of fusion.  Hardware is intact without fracture or loosening.  The lumbar vertebral bodies maintain normal height and alignment.  There is moderate to marked disc space narrowing at the L4-5 and L5-S1 postoperative levels.  No instability is demonstrated with flexion or extension.     MRI lumbar spine 2-1-24 independently reviewed.  L3/4 facet hypertrophy with bilateral foraminal narrowing.  L4/5 post op changes and right facet cyst.  No right L5 screw.  L5/S1 post op changes, facet arthropathy, with right foraminal narrowing.  Right kidney cyst.    Assessment:   Jovan Mcwilliams is a 59 y.o. year old male patient who has a past medical history of Basal cell carcinoma, COVID-19, Eye pain, and Poison ivy dermatitis. He presents in self referral for lower back and left thigh pain.       1. Lumbar radiculopathy        2. History of lumbar fusion        3. Spinal stenosis of lumbar region without neurogenic claudication                Plan:  We reviewed his symptoms, reviewed his lumbar spine MRI.  He does have some continued canal stenosis above his previous fusion at  L3/4 where he has epidural lipomatosis but also facet hypertrophy, disc bulging resulting in canal narrowing.  We discussed that his symptoms are likely coming from this L3/4 level, would try 1 more injection if he is not getting significant relief may have to consider something like spinal cord stimulation.  Fortunately since the pain does not seem to be severe, we are going to try medication management, he is currently taking gabapentin 300 mg and I am going to have him increase this to 600 mg.  He will let me know how he is doing over the next several weeks and if this is helpful.

## 2025-02-28 ENCOUNTER — PATIENT MESSAGE (OUTPATIENT)
Dept: PAIN MEDICINE | Facility: CLINIC | Age: 60
End: 2025-02-28
Payer: COMMERCIAL

## 2025-03-14 ENCOUNTER — OFFICE VISIT (OUTPATIENT)
Dept: PAIN MEDICINE | Facility: CLINIC | Age: 60
End: 2025-03-14
Payer: COMMERCIAL

## 2025-03-14 VITALS
HEIGHT: 72 IN | WEIGHT: 260.94 LBS | DIASTOLIC BLOOD PRESSURE: 85 MMHG | SYSTOLIC BLOOD PRESSURE: 133 MMHG | BODY MASS INDEX: 35.34 KG/M2 | HEART RATE: 63 BPM

## 2025-03-14 DIAGNOSIS — Z98.1 HISTORY OF LUMBAR FUSION: ICD-10-CM

## 2025-03-14 DIAGNOSIS — M54.16 LUMBAR RADICULOPATHY: Primary | ICD-10-CM

## 2025-03-14 DIAGNOSIS — M47.816 LUMBAR SPONDYLOSIS: ICD-10-CM

## 2025-03-14 PROCEDURE — 99999 PR PBB SHADOW E&M-EST. PATIENT-LVL IV: CPT | Mod: PBBFAC,,, | Performed by: PHYSICIAN ASSISTANT

## 2025-03-14 RX ORDER — TIZANIDINE 4 MG/1
4 TABLET ORAL NIGHTLY PRN
Qty: 40 TABLET | Refills: 0 | Status: SHIPPED | OUTPATIENT
Start: 2025-03-14

## 2025-03-14 RX ORDER — MELOXICAM 15 MG/1
15 TABLET ORAL DAILY
Qty: 30 TABLET | Refills: 0 | Status: SHIPPED | OUTPATIENT
Start: 2025-03-14

## 2025-03-14 NOTE — PROGRESS NOTES
[facility] pain management Established Patient        PCP:  Amauri Dickerson MD    CC:   Chief Complaint   Patient presents with    Pain    Low-back Pain    Mid-back Pain    Follow-up          Jovan Mcwilliams is a 59 y.o. year old male patient who has a past medical history of Basal cell carcinoma, COVID-19, Eye pain, and Poison ivy dermatitis. Mr. Aldana returns for follow up of back and leg pain.  He has increased gabapentin, but no change in symptoms.  He continues with pain in the lower back with burning pain in the bilateral anterior thighs associated with numbness.  Right sided pain is worse than left. He also shows me where he has hair loss/ decreased hair growth over the areas of numbness in the thighs.    HPI 1-27-25:  He presents in self referral for lower back pain.  The patient returns in follow-up today for pain in the legs.  When I had last seen him, we had done a left L3/4 transforaminal injection any add almost 90% relief but it did not seem to be long-lasting.  He still has numbness over the right lateral thigh.  He reports that his pain is burning, worse with standing too long or walking too long, sometimes sitting too long.  Is located in the right and left anterior thigh.  He had been taking gabapentin but did not seem to be doing much.  He denies any side effects with it.      Previous history:  He has history of two lumbar spine surgeries.  1995 diskectomy that left him with chronic right anterior thigh numbness.  2005 lumbar fusion after which he did really well with no complications or long term issues.  Current pain started 2-3 months ago.  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  He has pain in the left lower back to the left posterior thigh to the outer left knee.  The left leg has given out on him due to pain.  He has tried ibuprofen and tylenol.  He is taking motrin and tried flexeril in the past.     Denies  bowel/ bladder incontinence.    Past and current medications:  Antineuropathics: gabapentin  NSAIDs: motrin  Antidepressants:  Muscle relaxers: (past - flexeril)  Opioids: has tramadol at home, not taking  Antiplatelets/Anticoagulants:  Others:  tylenol; tried medrol taper    Physical Therapy/ Chiropractic care:  PT - current trial with no improvement.     Pain Intervention History:  Status post left L3/4 transforaminal injection on 02/26/2024 with what sounds like 60% relief but starting to wear off somewhat.   He is status post left L3/4 transforaminal epidural steroid injection on 04/10/2024 with 80-90% relief.     Past Spine Surgical History:  1995 diskectomy that left him with chronic right anterior thigh numbness.    2005 lumbar fusion after which he did really well with no complications or long term issues; Dr. Dutch So        History:    Current Outpatient Medications:     albuterol (PROVENTIL/VENTOLIN HFA) 90 mcg/actuation inhaler, Inhale 2 puffs into the lungs every 4 (four) hours as needed for Wheezing or Shortness of Breath., Disp: 18 g, Rfl: 5    aspirin (ECOTRIN) 81 MG EC tablet, Take 1 tablet (81 mg total) by mouth once daily., Disp: 30 tablet, Rfl: 1    atorvastatin (LIPITOR) 40 MG tablet, Take 1 tablet (40 mg total) by mouth once daily., Disp: 90 tablet, Rfl: 3    butalbital-acetaminophen-caffeine -40 mg (FIORICET, ESGIC) -40 mg per tablet, Take 2 tablets by mouth every 12 hours as needed for rescue if lidocaine fails, Disp: 14 tablet, Rfl: 1    candesartan-hydrochlorothiazid 32-25 mg Tab, Take 1 tablet by mouth once daily., Disp: 90 each, Rfl: 3    carvediloL (COREG) 6.25 MG tablet, Take 1 tablet (6.25 mg total) by mouth 2 (two) times daily with meals., Disp: 180 tablet, Rfl: 3    divalproex (DEPAKOTE) 500 MG TbEC, Take 1 tablet (500 mg total) by mouth 2 (two) times daily., Disp: 60 tablet, Rfl: 11    gabapentin (NEURONTIN) 300 MG capsule, Take 1 capsule (300 mg total) by mouth  daily as needed (nerve pain)., Disp: 90 capsule, Rfl: 11    gabapentin (NEURONTIN) 300 MG capsule, Take 2 capsules (600 mg total) by mouth every evening., Disp: 60 capsule, Rfl: 2    galcanezumab-gnlm 300 mg/3 mL (100 mg/mL x 3) Syrg, Inject 3 mLs (300 mg total) into the skin every 28 days., Disp: 3 mL, Rfl: 11    lasmiditan (REYVOW) tablet 100 mg, Take 1 tablet (100 mg) by mouth daily as needed (migraine)., Disp: 8 tablet, Rfl: 1    omeprazole (PRILOSEC) 20 MG capsule, Take 1 capsule (20 mg total) by mouth once daily., Disp: 90 capsule, Rfl: 3    prochlorperazine (COMPAZINE) 10 MG tablet, Take 1 tablet (10 mg total) by mouth every 6 (six) hours as needed (migraine or nausea)., Disp: 60 tablet, Rfl: 11    rimegepant (NURTEC) 75 mg odt, Take 1 tablet (75 mg total) by mouth daily as needed for Migraine. Place ODT tablet on the tongue; alternatively the ODT tablet may be placed under the tongue, Disp: 16 tablet, Rfl: 11    clotrimazole-betamethasone 1-0.05% (LOTRISONE) cream, Apply topically 2 (two) times daily., Disp: 15 g, Rfl: 1    ketoconazole (NIZORAL) 2 % cream, Apply to the feet and groin BID until clear then PRN for flares., Disp: 60 g, Rfl: 2    meloxicam (MOBIC) 15 MG tablet, Take 1 tablet (15 mg total) by mouth once daily. As needed for pain., Disp: 30 tablet, Rfl: 0    tiZANidine (ZANAFLEX) 4 MG tablet, Take 1 tablet (4 mg total) by mouth nightly as needed (muscle spasms/ pain)., Disp: 40 tablet, Rfl: 0    traMADoL (ULTRAM) 50 mg tablet, Take 1 tablet (50 mg total) by mouth every 8 (eight) hours as needed for Pain., Disp: 20 tablet, Rfl: 0    Past Medical History:   Diagnosis Date    Basal cell carcinoma 12/03/2020    right forearm- JAM     COVID-19 02/15/2024    Eye pain     left eye    Poison ivy dermatitis 03/21/2024       Past Surgical History:   Procedure Laterality Date    COLONOSCOPY N/A 04/03/2023    Procedure: COLONOSCOPY;  Surgeon: Leonardo Bright Jr., MD;  Location: [facility];  Service:  Endoscopy;  Laterality: N/A;    HERNIA REPAIR Bilateral 1974    KNEE SURGERY Left     LUMBAR FUSION  2005    LUMBAR LAMINECTOMY      SHOULDER SURGERY Right     SKIN BIOPSY  2020    SPINE SURGERY   &     TRANSFORAMINAL EPIDURAL INJECTION OF STEROID Left 2024    Procedure: Injection,steroid,epidural,transforaminal approach       L3/4;  Surgeon: Cam Trivedi MD;  Location: [facility] OR;  Service: Pain Management;  Laterality: Left;    TRANSFORAMINAL EPIDURAL INJECTION OF STEROID Left 04/10/2024    Procedure: Injection,steroid,epidural,transforaminal approach   L3/4;  Surgeon: Cam Trivedi MD;  Location: [facility] OR;  Service: Pain Management;  Laterality: Left;    VASECTOMY  ?       Family History   Problem Relation Name Age of Onset    Hypertension Mother Stephanie     Hyperlipidemia Mother Stephanie     Diabetes Father Aristeo     Cancer Maternal Aunt Vera     Cancer Paternal Aunt R     Cancer Maternal Grandmother Sussy     Melanoma Maternal Grandmother Sussy     Cancer Maternal Grandfather Domonick     Colon cancer Paternal Grandfather Jose     Cancer Paternal Grandfather Jose        Social History     Socioeconomic History    Marital status:    Occupational History    Occupation:      Comment: Retired   Tobacco Use    Smoking status: Some Days     Current packs/day: 0.00     Average packs/day: 1 pack/day for 37.1 years (37.1 ttl pk-yrs)     Types: Cigarettes     Start date:      Last attempt to quit: 2024     Years since quittin.0    Smokeless tobacco: Never   Substance and Sexual Activity    Alcohol use: Yes     Alcohol/week: 10.0 standard drinks of alcohol     Types: 10 Cans of beer per week     Comment: Drinks on weekend - 10 beers all day on     Drug use: No    Sexual activity: Yes     Partners: Female     Birth control/protection: Partner-Vasectomy   Social History Narrative    Lives with wife and adopted daughter. One dog.      Social Drivers of  Health     Financial Resource Strain: Low Risk  (3/6/2024)    Overall Financial Resource Strain (CARDIA)     Difficulty of Paying Living Expenses: Not hard at all   Food Insecurity: Unknown (3/6/2024)    Hunger Vital Sign     Worried About Running Out of Food in the Last Year: Never true     Ran Out of Food in the Last Year: Patient declined   Transportation Needs: Patient Declined (3/6/2024)    PRAPARE - Transportation     Lack of Transportation (Medical): Patient declined     Lack of Transportation (Non-Medical): Patient declined   Physical Activity: Inactive (3/6/2024)    Exercise Vital Sign     Days of Exercise per Week: 0 days     Minutes of Exercise per Session: 0 min   Stress: Stress Concern Present (3/6/2024)    [employer] - Occupational Stress Questionnaire     Feeling of Stress : To some extent   Housing Stability: Patient Declined (3/6/2024)    Housing Stability Vital Sign     Unable to Pay for Housing in the Last Year: Patient declined     Number of Places Lived in the Last Year: 1     Unstable Housing in the Last Year: Patient declined       Review of patient's allergies indicates:   Allergen Reactions    Doxycycline Anaphylaxis     Other reaction(s): nausea  Other reaction(s): Not Indicated    Azithromycin      Other reaction(s): Not Indicated    Erythromycin Other (See Comments)     Other reaction(s): Nausea  Pain with IV use       Labs:  Lab Results   Component Value Date    HGBA1C 5.7 (H) 01/03/2025       Lab Results   Component Value Date    WBC 7.41 01/03/2025    HGB 17.1 01/03/2025    HCT 48.5 01/03/2025    MCV 95 01/03/2025     01/03/2025           Review of Systems:  Low back pain.  Left leg pain.  Balance of review of systems is negative.    Physical Exam:  Vitals:    03/14/25 0929   BP: 133/85   Pulse: 63   Weight: 118.3 kg (260 lb 14.6 oz)   Height: 6' (1.829 m)   PainSc: 10-Worst pain ever   PainLoc: Back     Body mass index is 35.39 kg/m².    Pain  disability index:      3/14/2025     9:25 AM 1/27/2025    11:19 AM 4/30/2024     9:18 AM   Last 3 PDI Scores   Pain Disability Index (PDI) 46 40 28       Gen: NAD  Psych: mood appropriate for given condition  HEENT: eyes anicteric   CV: RRR, 2+ radial pulse  Respiratory: non-labored, no signs of respiratory distress  Abd: non-distended  Skin: warm, dry and intact.  Gait: Able to heel walk, toe walk. No antalgic gait.     Lumbar spine:  Lumbar spine: ROM is mildly reduced with flexion extension and oblique extension with increased pain on flexion.  Kostas's test causes left low back pain.    Supine straight leg raise is mildly positive for left leg pain at 60°    Internal and external rotation of the hip causes no increased pain on either side.  Myofascial exam:  Tenderness palpation over the SI joint    Sensory:   Intact and symmetrical to light touch in L1-S1 dermatomes bilaterally.; isolated right atnerior thigh numbness in a small circular area.     Motor:     Right Left   L2/3 Iliacus Hip flexion  5  5   L3/4 Qudratus Femoris Knee Extension  5  5   L4/5 Tib Anterior Ankle Dorsiflexion   5  5   L5/S1 Extensor Hallicus Longus Great toe extension  5  5   S1/S2 Gastroc/Soleus Plantar Flexion  5  5      Right Left                  Patellar DTR 2+ 2+   Achilles DTR 2+ 2+   Vincent Absent  Absent   Clonus Absent Absent   Babinski Absent Absent       Imaging:  Xray lumbar spinef rom 12-12-23:  postsurgical changes of posterior interbody fusion from L4 through S1.  There are bilateral L4 and S1 screws with the left-sided L5 screw.  There has been right-sided laminectomy at the levels of fusion.  Hardware is intact without fracture or loosening.  The lumbar vertebral bodies maintain normal height and alignment.  There is moderate to marked disc space narrowing at the L4-5 and L5-S1 postoperative levels.  No instability is demonstrated with flexion or extension.     MRI lumbar spine 2-1-24 independently reviewed.  L3/4  facet hypertrophy with bilateral foraminal narrowing.  L4/5 post op changes and right facet cyst.  No right L5 screw.  L5/S1 post op changes, facet arthropathy, with right foraminal narrowing.  Right kidney cyst.    Assessment:   Jovan Mcwilliams is a 59 y.o. year old male patient who has a past medical history of Basal cell carcinoma, COVID-19, Eye pain, and Poison ivy dermatitis. He presents in self referral for lower back and left thigh pain.       1. Lumbar radiculopathy  tiZANidine (ZANAFLEX) 4 MG tablet    meloxicam (MOBIC) 15 MG tablet      2. History of lumbar fusion  tiZANidine (ZANAFLEX) 4 MG tablet    meloxicam (MOBIC) 15 MG tablet      3. Lumbar spondylosis  tiZANidine (ZANAFLEX) 4 MG tablet    meloxicam (MOBIC) 15 MG tablet              Plan:  We reviewed his symptoms, reviewed his lumbar spine MRI.  He does have some continued canal stenosis above his previous fusion at L3/4 where he has epidural lipomatosis but also facet hypertrophy, disc bulging resulting in canal narrowing.  We discussed that his symptoms are likely coming from this L3/4 level.    - we could try another L3/4 injetions - bilateral L3/4 TF RICHARD - he will think about it and let us know if he wants to proceed.  Dr. Trivedi recommended trying antoerh RICHARD before considering something like spinal cord stimulation.  Call if he wants to schedule RICHARD.  - we discussed medication options  he is taking gabapentin.  - will try mobic in the morning and tizanidine at bedtime.  He prefers to avoid opiods at this time.  MARCO

## 2025-03-22 DIAGNOSIS — K21.9 GASTROESOPHAGEAL REFLUX DISEASE, UNSPECIFIED WHETHER ESOPHAGITIS PRESENT: ICD-10-CM

## 2025-03-22 NOTE — TELEPHONE ENCOUNTER
No care due was identified.  [facility] Embedded Care Due Messages. Reference number: [id number].   3/22/2025 5:28:48 PM CDT

## 2025-03-24 RX ORDER — OMEPRAZOLE 20 MG/1
20 CAPSULE, DELAYED RELEASE ORAL DAILY
Qty: 90 CAPSULE | Refills: 3 | Status: SHIPPED | OUTPATIENT
Start: 2025-03-24

## 2025-04-10 DIAGNOSIS — M47.816 LUMBAR SPONDYLOSIS: ICD-10-CM

## 2025-04-10 DIAGNOSIS — Z98.1 HISTORY OF LUMBAR FUSION: ICD-10-CM

## 2025-04-10 DIAGNOSIS — M54.16 LUMBAR RADICULOPATHY: ICD-10-CM

## 2025-04-10 RX ORDER — MELOXICAM 15 MG/1
15 TABLET ORAL
Qty: 30 TABLET | Refills: 0 | Status: SHIPPED | OUTPATIENT
Start: 2025-04-10

## 2025-04-22 ENCOUNTER — OFFICE VISIT (OUTPATIENT)
Dept: FAMILY MEDICINE | Facility: CLINIC | Age: 60
End: 2025-04-22
Payer: COMMERCIAL

## 2025-04-22 VITALS
DIASTOLIC BLOOD PRESSURE: 84 MMHG | SYSTOLIC BLOOD PRESSURE: 116 MMHG | OXYGEN SATURATION: 98 % | HEIGHT: 72 IN | WEIGHT: 261.38 LBS | BODY MASS INDEX: 35.4 KG/M2 | HEART RATE: 62 BPM

## 2025-04-22 DIAGNOSIS — R20.2 NUMBNESS AND TINGLING IN RIGHT HAND: ICD-10-CM

## 2025-04-22 DIAGNOSIS — R20.0 NUMBNESS AND TINGLING IN RIGHT HAND: ICD-10-CM

## 2025-04-22 DIAGNOSIS — G57.11 MERALGIA PARESTHETICA OF RIGHT SIDE: ICD-10-CM

## 2025-04-22 DIAGNOSIS — R53.82 CHRONIC FATIGUE: ICD-10-CM

## 2025-04-22 DIAGNOSIS — G47.33 OSA (OBSTRUCTIVE SLEEP APNEA): Primary | ICD-10-CM

## 2025-04-22 DIAGNOSIS — M54.16 LUMBAR RADICULOPATHY: ICD-10-CM

## 2025-04-22 PROCEDURE — 99999 PR PBB SHADOW E&M-EST. PATIENT-LVL IV: CPT | Mod: PBBFAC,,, | Performed by: STUDENT IN AN ORGANIZED HEALTH CARE EDUCATION/TRAINING PROGRAM

## 2025-04-22 RX ORDER — GABAPENTIN 300 MG/1
300 CAPSULE ORAL 3 TIMES DAILY
Qty: 90 CAPSULE | Refills: 2 | Status: SHIPPED | OUTPATIENT
Start: 2025-04-22 | End: 2026-04-22

## 2025-04-22 NOTE — ASSESSMENT & PLAN NOTE
Patient seems to have bilateral nerve entrapment now. Some concern about whether this is meralgia paresthetica vs lumbar radiculopathy. Increase gabapentin. If needed, we can consider an EMG.

## 2025-04-22 NOTE — ASSESSMENT & PLAN NOTE
Vascular insufficiency (most notable when riding motorcycle) vs carpal tunnel. I reviewed the differences. If the pattern of numbness tends to be more aligned with carpal tunnel, we'll send a referral to hand surgery.

## 2025-04-22 NOTE — PROGRESS NOTES
Name: Jovan Mcwilliams  MRN: [id number]  : 1965  PCP: James Peña MD    Subjective   Patient presents with several concerns.    He has bald patches on his bilateral lateral thighs associated with burning pain. Worse with prolonged sitting and prolonged standing. Not improved with increased gabapentin.     He asked about right hand numbness that waxes and wanes. See review of systems.     Finally, he asked about testosterone testing, primarily due to low energy levels, sleepiness, and brain fog. He denies any change in libido or erectile dysfunction.     Review of Systems   Constitutional:  Positive for fatigue (low energy, daytime somnolence). Negative for activity change and unexpected weight change.   HENT:  Positive for hearing loss. Negative for rhinorrhea and trouble swallowing.    Eyes:  Positive for discharge and visual disturbance.   Respiratory:  Positive for chest tightness and wheezing.    Cardiovascular:  Positive for chest pain. Negative for palpitations and leg swelling.   Gastrointestinal:  Positive for diarrhea. Negative for blood in stool, constipation and vomiting.   Endocrine: Negative for polydipsia and polyuria.   Genitourinary:  Negative for difficulty urinating, hematuria and urgency.   Musculoskeletal:  Negative for arthralgias, joint swelling and neck pain.   Neurological:  Positive for numbness (right hand numbness with prolonged motorcycle riding). Negative for dizziness, weakness and headaches.   Psychiatric/Behavioral:  Negative for confusion and dysphoric mood.        Problem List[1]    Health Maintenance Due   Topic Date Due    Pneumococcal Vaccines (Age 50+) (1 of 2 - PCV) Never done    COVID-19 Vaccine (3 - 2024-25 season) 2024       Objective   Vitals:    25 1120   BP: 116/84   Pulse: 62       Physical Exam  Constitutional:       General: He is not in acute distress.     Appearance: Normal appearance. He is well-developed.   HENT:      Head: Normocephalic and  atraumatic.      Right Ear: External ear normal.      Left Ear: External ear normal.   Eyes:      Conjunctiva/sclera: Conjunctivae normal.   Pulmonary:      Effort: Pulmonary effort is normal. No respiratory distress.   Abdominal:      General: Abdomen is flat. There is no distension.   Musculoskeletal:         General: No swelling or deformity.      Right lower leg: No edema.      Left lower leg: No edema.   Skin:     General: Skin is warm and dry.      Coloration: Skin is not jaundiced.   Neurological:      Mental Status: He is alert and oriented to person, place, and time. Mental status is at baseline.   Psychiatric:         Attention and Perception: Attention and perception normal.         Mood and Affect: Mood normal.         Speech: Speech normal.         Behavior: Behavior normal. Behavior is cooperative.         Thought Content: Thought content normal.         Cognition and Memory: Cognition normal.         Judgment: Judgment normal.         Assessment & Plan    1. DELMY (obstructive sleep apnea)  Overview:  Home sleep study 2/2024 showed AHI ~17/hr.    Assessment & Plan:  Patient has not been able to wear his CPAP mask due to intolerance and the mask coming off during the night. I think that this is the primary reason for his fatigue, brain fog, other symptoms that he asked about in regards to testosterone testing. I ask that he talk to sleep medicine for solutions. If none exist, consider referral to ENT for hypoglossal nerve stimulator. After discussing testosterone levels in detail, patient still elects to undergo testing.      2. Chronic fatigue  -     Testosterone; Future; Expected date: 04/22/2025  -     TSH; Future; Expected date: 04/22/2025    3. Meralgia paresthetica of right side  Assessment & Plan:  Patient seems to have bilateral nerve entrapment now. Some concern about whether this is meralgia paresthetica vs lumbar radiculopathy. Increase gabapentin. If needed, we can consider an  EMG.    Orders:  -     gabapentin (NEURONTIN) 300 MG capsule; Take 1 capsule (300 mg total) by mouth 3 (three) times daily.  Dispense: 90 capsule; Refill: 2    4. Lumbar radiculopathy  -     gabapentin (NEURONTIN) 300 MG capsule; Take 1 capsule (300 mg total) by mouth 3 (three) times daily.  Dispense: 90 capsule; Refill: 2    5. Numbness and tingling in right hand  Assessment & Plan:  Vascular insufficiency (most notable when riding motorcycle) vs carpal tunnel. I reviewed the differences. If the pattern of numbness tends to be more aligned with carpal tunnel, we'll send a referral to hand surgery.           Follow up as previously scheduled. I spent 37 minutes pre-charting, interviewing patient, performing exam, formulating and discussing plan, placing orders, and documenting.      James Peña MD  04/22/2025         [1]   Patient Active Problem List  Diagnosis    Familial hypercholesterolemia    GERD (gastroesophageal reflux disease)    Primary hypertension    Cigarette nicotine dependence without complication    Class 2 severe obesity due to excess calories with serious comorbidity and body mass index (BMI) of 35.0 to 35.9 in adult    DELMY (obstructive sleep apnea)    Coronary artery disease involving native coronary artery of native heart without angina pectoris    Lumbar radiculopathy    Cyst of right kidney    Intractable episodic cluster headache    Irritable bowel syndrome without diarrhea    Intractable chronic migraine without aura and without status migrainosus    Urinary urgency    Meralgia paresthetica of right side    Numbness and tingling in right hand

## 2025-04-22 NOTE — ASSESSMENT & PLAN NOTE
Patient has not been able to wear his CPAP mask due to intolerance and the mask coming off during the night. I think that this is the primary reason for his fatigue, brain fog, other symptoms that he asked about in regards to testosterone testing. I ask that he talk to sleep medicine for solutions. If none exist, consider referral to ENT for hypoglossal nerve stimulator. After discussing testosterone levels in detail, patient still elects to undergo testing.

## 2025-05-01 ENCOUNTER — LAB VISIT (OUTPATIENT)
Dept: LAB | Facility: HOSPITAL | Age: 60
End: 2025-05-01
Attending: STUDENT IN AN ORGANIZED HEALTH CARE EDUCATION/TRAINING PROGRAM
Payer: COMMERCIAL

## 2025-05-01 DIAGNOSIS — R53.82 CHRONIC FATIGUE: ICD-10-CM

## 2025-05-01 LAB
TESTOST SERPL-MCNC: 281 NG/DL (ref 304–1227)
TSH SERPL-ACNC: 1.86 UIU/ML (ref 0.4–4)

## 2025-05-01 PROCEDURE — 84403 ASSAY OF TOTAL TESTOSTERONE: CPT

## 2025-05-01 PROCEDURE — 84443 ASSAY THYROID STIM HORMONE: CPT

## 2025-05-01 PROCEDURE — 36415 COLL VENOUS BLD VENIPUNCTURE: CPT | Mod: PO

## 2025-05-02 ENCOUNTER — RESULTS FOLLOW-UP (OUTPATIENT)
Dept: FAMILY MEDICINE | Facility: CLINIC | Age: 60
End: 2025-05-02

## 2025-05-02 DIAGNOSIS — R79.89 LOW TESTOSTERONE IN MALE: Primary | ICD-10-CM

## 2025-05-13 ENCOUNTER — TELEPHONE (OUTPATIENT)
Dept: FAMILY MEDICINE | Facility: CLINIC | Age: 60
End: 2025-05-13
Payer: COMMERCIAL

## 2025-05-13 NOTE — TELEPHONE ENCOUNTER
Returned patient call, requested lab for Testo be drawn tomorrow morning. Patient is scheduled for 10:20 AM. Regi in lab stated that he will be worked in early to have drawn at appropriate time.

## 2025-05-13 NOTE — TELEPHONE ENCOUNTER
----- Message from Cindy sent at 5/12/2025 10:51 AM CDT -----  Name of Who is Calling:JEREMIAH CORTES [[id number]]  What is the request in detail:Pt requesting a call back today if possible.   Can the clinic reply by [facility]:Call  What Number to Call Back if not in [facility]:Telephone Information:Mangia          [phone]

## 2025-05-14 ENCOUNTER — LAB VISIT (OUTPATIENT)
Dept: LAB | Facility: HOSPITAL | Age: 60
End: 2025-05-14
Payer: COMMERCIAL

## 2025-05-14 DIAGNOSIS — M47.816 LUMBAR SPONDYLOSIS: ICD-10-CM

## 2025-05-14 DIAGNOSIS — R79.89 LOW TESTOSTERONE IN MALE: ICD-10-CM

## 2025-05-14 DIAGNOSIS — Z98.1 HISTORY OF LUMBAR FUSION: ICD-10-CM

## 2025-05-14 DIAGNOSIS — M54.16 LUMBAR RADICULOPATHY: ICD-10-CM

## 2025-05-14 PROCEDURE — 84270 ASSAY OF SEX HORMONE GLOBUL: CPT | Mod: PO

## 2025-05-14 PROCEDURE — 36415 COLL VENOUS BLD VENIPUNCTURE: CPT | Mod: PO

## 2025-05-14 RX ORDER — MELOXICAM 15 MG/1
15 TABLET ORAL
Qty: 30 TABLET | Refills: 0 | Status: SHIPPED | OUTPATIENT
Start: 2025-05-14

## 2025-05-19 LAB
W ALBUMIN: 3.8 G/DL
W SEX HORMONE BINDING GLOBULIN: 39 NMOL/L
W TESTOSTERONE, BIOAVAIL: 70.3 NG/DL
W TESTOSTERONE, FREE: 40.6 PG/ML
W TESTOSTERONE, TOTAL, MS: 348 NG/DL

## 2025-05-20 ENCOUNTER — RESULTS FOLLOW-UP (OUTPATIENT)
Dept: FAMILY MEDICINE | Facility: CLINIC | Age: 60
End: 2025-05-20

## 2025-05-20 DIAGNOSIS — R79.89 LOW TESTOSTERONE: Primary | ICD-10-CM

## 2025-05-21 ENCOUNTER — LAB VISIT (OUTPATIENT)
Dept: LAB | Facility: HOSPITAL | Age: 60
End: 2025-05-21
Attending: STUDENT IN AN ORGANIZED HEALTH CARE EDUCATION/TRAINING PROGRAM
Payer: COMMERCIAL

## 2025-05-21 DIAGNOSIS — R79.89 LOW TESTOSTERONE: ICD-10-CM

## 2025-05-21 DIAGNOSIS — G44.011 INTRACTABLE EPISODIC CLUSTER HEADACHE: ICD-10-CM

## 2025-05-21 LAB
CORTIS SERPL-MCNC: 11.8 UG/DL (ref 4.3–22.4)
FSH SERPL-ACNC: 6.51 MIU/ML (ref 0.95–11.95)
LH SERPL-ACNC: 2.1 MIU/ML (ref 0.6–12.1)
PROLACTIN SERPL IA-MCNC: 11.7 NG/ML (ref 3.5–19.4)
TSH SERPL-ACNC: 3.83 UIU/ML (ref 0.4–4)

## 2025-05-21 PROCEDURE — 36415 COLL VENOUS BLD VENIPUNCTURE: CPT | Mod: PO

## 2025-05-21 PROCEDURE — 82533 TOTAL CORTISOL: CPT

## 2025-05-21 PROCEDURE — 83001 ASSAY OF GONADOTROPIN (FSH): CPT

## 2025-05-21 PROCEDURE — 82024 ASSAY OF ACTH: CPT

## 2025-05-21 PROCEDURE — 84146 ASSAY OF PROLACTIN: CPT

## 2025-05-21 PROCEDURE — 84443 ASSAY THYROID STIM HORMONE: CPT

## 2025-05-21 PROCEDURE — 83002 ASSAY OF GONADOTROPIN (LH): CPT

## 2025-05-22 ENCOUNTER — RESULTS FOLLOW-UP (OUTPATIENT)
Dept: FAMILY MEDICINE | Facility: CLINIC | Age: 60
End: 2025-05-22

## 2025-05-22 RX ORDER — DIVALPROEX SODIUM 500 MG/1
500 TABLET, DELAYED RELEASE ORAL 2 TIMES DAILY
Qty: 60 TABLET | Refills: 11 | Status: SHIPPED | OUTPATIENT
Start: 2025-05-22

## 2025-05-22 NOTE — TELEPHONE ENCOUNTER
Spoke with pt to schedule virtual appt with Deyanira Jackman for 0930 on 5/26. Pt verbalized understanding.

## 2025-05-23 LAB — ACTH (OHS): 43 PG/ML

## 2025-05-26 ENCOUNTER — TELEPHONE (OUTPATIENT)
Dept: NEUROLOGY | Facility: CLINIC | Age: 60
End: 2025-05-26

## 2025-05-26 ENCOUNTER — OFFICE VISIT (OUTPATIENT)
Dept: NEUROLOGY | Facility: CLINIC | Age: 60
End: 2025-05-26
Payer: COMMERCIAL

## 2025-05-26 DIAGNOSIS — M79.18 CERVICAL MYOFASCIAL PAIN SYNDROME: ICD-10-CM

## 2025-05-26 DIAGNOSIS — G44.011 INTRACTABLE EPISODIC CLUSTER HEADACHE: Primary | ICD-10-CM

## 2025-05-26 PROCEDURE — 1159F MED LIST DOCD IN RCRD: CPT | Mod: CPTII,95,, | Performed by: NURSE PRACTITIONER

## 2025-05-26 PROCEDURE — 98005 SYNCH AUDIO-VIDEO EST LOW 20: CPT | Mod: 95,,, | Performed by: NURSE PRACTITIONER

## 2025-05-26 PROCEDURE — 1160F RVW MEDS BY RX/DR IN RCRD: CPT | Mod: CPTII,95,, | Performed by: NURSE PRACTITIONER

## 2025-05-26 PROCEDURE — 3044F HG A1C LEVEL LT 7.0%: CPT | Mod: CPTII,95,, | Performed by: NURSE PRACTITIONER

## 2025-05-26 RX ORDER — BUTALBITAL, ACETAMINOPHEN AND CAFFEINE 50; 325; 40 MG/1; MG/1; MG/1
TABLET ORAL
Qty: 14 TABLET | Refills: 1 | Status: SHIPPED | OUTPATIENT
Start: 2025-05-26

## 2025-05-26 NOTE — PROGRESS NOTES
Subjective:       Patient ID: Jovan Mcwilliams is a 59 y.o. male who presents for follow up of headache    Chief Complaint: Headache    INTERVAL HISTORY 5/26/25  The patient location is: home  The chief complaint leading to consultation is: follow up  Visit type: audiovisual  20 minutes of total time spent on the encounter, which includes face to face time and non-face to face time preparing to see the patient (eg, review of tests), Obtaining and/or reviewing separately obtained history, Documenting clinical information in the electronic or other health record, Independently interpreting results (not separately reported) and communicating results to the patient/family/caregiver, or Care coordination (not separately reported).   Each patient to whom he or she provides medical services by telemedicine is:  (1) informed of the relationship between the physician and patient and the respective role of any other health care provider with respect to management of the patient; and (2) notified that he or she may decline to receive medical services by telemedicine and may withdraw from such care at any time.    Notes:     Last visit was one year ago and at that time he was much better.    Today he reports he is doing well. He has not had any cluster headaches since last visit. He last took Emgality 300 mg in almost one year. He takes Emgality when a cycle starts and then for 1-2 months after headaches stop. He then keeps one dose on hand. He still takes Depakote. Otherwise information below is reviewed and verified with no changes made    INTERVAL HISTORY 5/7/24  Patient presents for follow up. Last office visit was over a year ago.    Today he reports he is the same. He takes Depakote daily. He did start an 8-day prednisone course with this current cycle and he has one day left. He takes Emgality monthly until headaches stop for 1-2 months. Current cluster cycle started late February-early March. He restarted Emgality one month  ago. He typically takes Emgality 3-4 months before headache breaks. Headaches are typically March-May. Current pain 0 with range 0-10. He has 6-7 headache days per week. He takes compazine, Fioricet, Ubrelvy, BC powder 6-7 days per week. Otherwise information below is reviewed and verified with no changes made    INTERVAL HISTORY 11/22/22  Patient presents for follow up. Lat visit was three months ago and at that time he was doing very well on Emgality for cluster migraine.     Today he reports he is much better. Emgality breaks the cycle. Pain is behind left eye and left side of head. Current pain 0 with range 0-10. Cluster cycles will last 2-4 months. Last cycle he took Emgality for 3 months. He takes Fioricet as needed. He has not tried Reyvow but has a prescription if needed. He also has a supply of Emgality on hand if needed. Otherwise information below is reviewed and verified with no changes made     INTERVAL HISTORY 8/17/22  Patient presents for follow up. Last visit was three months ago and at that time headaches were more frequent. Plan was to resume Emgality.    Today he reports he is much better. He did four months of Emgality 300 mg dosing which seemed to have broken the cluster cycle. Headaches are less intense and a little less frequent. headaches are mostly left sided, behind eye. Current pain 1 with range 1-10. He reports slight headaches occasionally at bedtime. He takes Fioricet and Toradol as needed. He will also take Excedrin migraine at bedtime rarely. Ubrelvy was not effective. He had one ER visit since last visit to help with pain. Otherwise information below is reviewed and verified with no changes made     INTERVAL HISTORY 5/17/22  Patient presents for follow up. Last visit was 10 months ago and at that time he was much better. He was in the beginning of a cluster cycle. Plan was to resume Emgality 300 mg monthly until cluster headaches broke. Added Nurtec for acute escalations.     Today  he reports he is about the same to little worse. Headaches are becoming more frequent. They occur over the left eye and left side. Current pain 0. He has 6-7 migraine days per week. He is taking OTC medication, Fioricet, compazine and Ublrelvy as needed. He is currently on verapamil and Depakote. Dyazide was recently added for his blood pressure. Lidocaine NS does not seem to help. Otherwise information below is reviewed and verified with no changes made unless noted.     INTERVAL HISTORY 7/14/21  Patient presents for follow up. Last visit was 7 months ago and at that time he was cluster headache free. He last took Emgality 300 mg was August or September 2020.    Today he reports he is much better. However, the frequency is coming back. Headaches occur behind left eye, left side of head, left side on top of head. Headaches have been daily for the past three days. Lasts minutes to hours. Current pain 2 with range 0-10. He takes compazine and Ubrelvy. He states sumatriptan intensifies headache. He did take Fioricet last night.      INTERVAL HISTORY 12/14/2020  Patient presents for virtual follow up.  The patient location is: [place], [place]   The chief complaint leading to consultation is: follow up  Visit type: audiovisual  Face to Face time with patient: 15  20 minutes of total time spent on the encounter, which includes face to face time and non-face to face time preparing to see the patient (eg, review of tests), Obtaining and/or reviewing separately obtained history, Documenting clinical information in the electronic or other health record, Independently interpreting results (not separately reported) and communicating results to the patient/family/caregiver, or Care coordination (not separately reported).   Each patient to whom he or she provides medical services by telemedicine is:  (1) informed of the relationship between the physician and patient and the respective role of any other health care provider with  "respect to management of the patient; and (2) notified that he or she may decline to receive medical services by telemedicine and may withdraw from such care at any time.    Notes:     Last visit was 11 months ago and at that time he was doing worse on Aimovg. We switched to Emgality.    Today he reports he is doing much better. He took Emgality for 6-8 months and did very well. Last injection was 3 months ago. He has not had any cluster migraines in about 5-6 months. He has not had to use Ubrelvy or Fioricet in months. He has not used compazine.     INTERVAL HISTORY 1/20/2020  Patient presents for follow up. He has been on Aimovig 70 mg since October 2018. He is also on Depakote and verapamil. He stopped depakote 6 months ago but resumed when cluster cycle about 5 weeks ago. Wife present today reports patient started taking Aimovig every other month about 6 months ago as well.  He has Imitrex IM, Zomig NS and Fioricet for escalations. He reports he has not used the Imitrex or Zomig in close to two years. When he last used the Imitrex, he reports the headache worsened for 5 minutes and then improved. He has HTN, HLD and history of chest pain.     Today he reports he is a little worse. Headaches are starting again after 18 months of little to no headaches. They are behind the left eye and left side of the head. current pain 3 with range 0-10. This cluster began about 5 weeks ago. Lidocaine NS used as first line but not effective. Fioricet second line and somewhat effective.     INTERVAL HISTORY  The patient is added to the clinic schedule as his headaches persist. He complains of a new symptom consisting of blurry vision with the left eye. He denies a specific description of a visual migraine aura in the form of fortification spectra, scotomata, hemianopsia, photopsia or transient loss of vision. He uses Imitrex injectable with good benefit but states that he is "drained" and unable to work the next day. He is tired " because of lack of sleep.   He underwent peripheral nerve blocks last week including GLORIA, auriculotemporal and supraorbital. He stated that initially he felt significant benefit but when the pain came back, it came back much worse, he almost went to the ED.   He is on Depakote 500 mg BID. Otherwise headache characteristics are unchanged.  HPI  The patient is a pleasant 52 y/o male with chief complaint of headaches. He has been getting them in the fall (Octobe-November) since 2012. Next cool season in 2013, they recurred. He underwent an MRI of the brain with not significant findings (my interpretation). This last fall he did not get the headaches, instead they came on 1/3/2017. The headache is strictly unilateral, originating in a discrete area in the left posterior region and projecting to the left orbital and periorbital region. He went to the ED where he was given a combination of steroids, benadryl and compazine as well as a supraorbital block with good response. He is followed by Dr. Velasquez who has recommended prevention with verapamil and Depakote and transitional treatment with steroids. Unfortunately, verapamil causes decreased libido. He was switched to Amlodipine. He has never tried oxygen. He treats acutely with Zomig NS, previously with SQ sumatriptan          Past Medical History   Diagnosis Date    Additional heart attack      10 years ago    Eye pain      left eye    GERD (gastroesophageal reflux disease)     Hyperlipemia      Past Surgical History   Procedure Laterality Date    Back surgery      Hernia repair       Family History   Problem Relation Age of Onset    Hypertension Mother     Hyperlipidemia Mother     Diabetes Father     Cancer Maternal Aunt     Cancer Paternal Aunt     Cancer Maternal Grandmother     Cancer Maternal Grandfather     Cancer Paternal Grandfather      Social History     Social History    Marital status: Single     Spouse name: N/A    Number of children: N/A    Years of  education: N/A     Occupational History    Not on file.     Social History Main Topics    Smoking status: Current Every Day Smoker     Packs/day: 1.00     Years: 15.00    Smokeless tobacco: Not on file      Comment: is about to quit    Alcohol use 0.6 oz/week     1 Cans of beer per week      Comment: per week    Drug use: No    Sexual activity: Not on file     Other Topics Concern    Not on file     Social History Narrative     Review of patient's allergies indicates:   Allergen Reactions    Doxycycline      Other reaction(s): nausea    Erythromycin      Other reaction(s): Nausea       Current Outpatient Prescriptions   Medication Sig    ascorbic acid (VITAMIN C) 1000 MG tablet     aspirin (ECOTRIN) 81 MG EC tablet Take 81 mg by mouth Daily. 1 Tablet, Delayed Release (E.C.) Oral Every day    ibuprofen (ADVIL,MOTRIN) 800 MG tablet 1 tqab po q 8 h prn pain    omeprazole (PRILOSEC) 40 MG capsule Take 1 capsule (40 mg total) by mouth once daily.    predniSONE (DELTASONE) 10 mg tablet pack Prednisone 10 mg tabs (40 mg for 2 days, 30 mg po for 2 days, 20 mg po for 2 days and 10 mg po for 2 days. Omeprazol 20 mg daily for gastro protection) Total #20 tabs    amlodipine (NORVASC) 5 MG tablet Take 1 tablet (5 mg total) by mouth once daily.    atorvastatin (LIPITOR) 40 MG tablet Take 1 tablet (40 mg total) by mouth once daily.    divalproex (DEPAKOTE) 500 MG TbEC Take 500 mg by mouth 2 (two) times daily.    hydrocodone-acetaminophen 7.5-325mg (NORCO) 7.5-325 mg per tablet Take 1 tablet by mouth once daily.    multivitamin (THERAGRAN) per tablet Take by mouth. 1 Tablet Oral Every day     No current facility-administered medications for this visit.            Objective:      There were no vitals filed for this visit.         Physical Exam    5/26/25  Constitutional:   He appears well-developed and well-nourished. He is well groomed     Neurological Exam:  General: well-developed, well-nourished, no distress  Mental status:  Awake and alert  Speech language: No dysarthria or aphasia on conversation  Cranial nerves: Face symmetric  Motor: Moves all extremities well  Coordination: No ataxia. No tremor.    Review of Data: I have reviewed images of an MRI from 2013 and see no abnormal findings        Assessment and Plan   Jovan was seen today for headache.    Diagnoses and all orders for this visit:    Intractable episodic cluster headache  Comments:  Continue Depakote daily. Resume Emgality if/when a cluster cycle starts again. Fioricet for rescue ok  Orders:  -     butalbital-acetaminophen-caffeine -40 mg (FIORICET, ESGIC) -40 mg per tablet; Take 2 tablets by mouth every 12 hours as needed for rescue if lidocaine fails    Cervical myofascial pain syndrome  Comments:  Continue muscle relaxer. Also followed by back and spine clinic          Cluster-Migraine syndrome. While there is a component of migraine, the vast majority of the headache phenotype is undoubtedly Cluster headache as evidenced by the headache characteristics, the circannual and circadian pattern and the response to medication and the tearing of the left eye.    Emgality has been effective at shortening cluster cycles. Has not needed in almost 1 year. He keeps one dose of 300 mg in fridge to take if cluster cycle starts again.   Failed aimovig.  Continue Depakote. Continue magnesium to counteract constipation    Avoid Imitrex 6 mg SQ injection as he has HTN, HLD, smoker, and has recurrent chest pain.  Fioricet for rescue. Ok to continue Reyvow with next cycle.   Continue compazine for nausea and mild headache relief.   Ubrelvy not very effective, continue Nurtec.         KELLY Hilario

## 2025-05-26 NOTE — TELEPHONE ENCOUNTER
Spoke [facility] at [facility] with an order clarification for Fioricet per VIANCA Potter' orders.

## 2025-05-27 DIAGNOSIS — M79.642 BILATERAL HAND PAIN: Primary | ICD-10-CM

## 2025-05-27 DIAGNOSIS — M79.641 BILATERAL HAND PAIN: Primary | ICD-10-CM

## 2025-05-28 ENCOUNTER — OFFICE VISIT (OUTPATIENT)
Dept: ORTHOPEDICS | Facility: CLINIC | Age: 60
End: 2025-05-28
Payer: COMMERCIAL

## 2025-05-28 ENCOUNTER — HOSPITAL ENCOUNTER (OUTPATIENT)
Dept: RADIOLOGY | Facility: HOSPITAL | Age: 60
Discharge: HOME | End: 2025-05-28
Attending: ORTHOPAEDIC SURGERY
Payer: COMMERCIAL

## 2025-05-28 VITALS — WEIGHT: 261.44 LBS | BODY MASS INDEX: 35.41 KG/M2 | HEIGHT: 72 IN

## 2025-05-28 DIAGNOSIS — R20.2 NUMBNESS AND TINGLING IN BOTH HANDS: ICD-10-CM

## 2025-05-28 DIAGNOSIS — M79.641 BILATERAL HAND PAIN: ICD-10-CM

## 2025-05-28 DIAGNOSIS — G56.01 CARPAL TUNNEL SYNDROME OF RIGHT WRIST: Primary | ICD-10-CM

## 2025-05-28 DIAGNOSIS — R20.0 NUMBNESS AND TINGLING IN BOTH HANDS: ICD-10-CM

## 2025-05-28 DIAGNOSIS — M79.642 BILATERAL HAND PAIN: ICD-10-CM

## 2025-05-28 PROCEDURE — 99999 PR PBB SHADOW E&M-EST. PATIENT-LVL III: CPT | Mod: PBBFAC,,, | Performed by: ORTHOPAEDIC SURGERY

## 2025-05-28 PROCEDURE — 3044F HG A1C LEVEL LT 7.0%: CPT | Mod: CPTII,S$GLB,, | Performed by: ORTHOPAEDIC SURGERY

## 2025-05-28 PROCEDURE — 1159F MED LIST DOCD IN RCRD: CPT | Mod: CPTII,S$GLB,, | Performed by: ORTHOPAEDIC SURGERY

## 2025-05-28 PROCEDURE — 73130 X-RAY EXAM OF HAND: CPT | Mod: 26,50,, | Performed by: RADIOLOGY

## 2025-05-28 PROCEDURE — 99215 OFFICE O/P EST HI 40 MIN: CPT | Mod: 57,S$GLB,, | Performed by: ORTHOPAEDIC SURGERY

## 2025-05-28 PROCEDURE — 3008F BODY MASS INDEX DOCD: CPT | Mod: CPTII,S$GLB,, | Performed by: ORTHOPAEDIC SURGERY

## 2025-05-28 PROCEDURE — 73130 X-RAY EXAM OF HAND: CPT | Mod: TC,50,PO

## 2025-05-28 RX ORDER — MUPIROCIN 20 MG/G
OINTMENT TOPICAL
OUTPATIENT
Start: 2025-05-28

## 2025-05-28 RX ORDER — CEFAZOLIN SODIUM 2 G/50ML
2 SOLUTION INTRAVENOUS
OUTPATIENT
Start: 2025-05-28

## 2025-05-28 NOTE — PROGRESS NOTES
59 years old right greater than left wrist pain and paresthesias into his hand and fingertips for the past couple years with the past 4-6 months being worse.  Reports nighttime paresthesias.  Difficulty picking things up and dropping objects.  Wants to shake his hands out.  Rates the pain is 2 on the pain scale    Exam shows positive Tinel's positive Phalen's, no thenar atrophy, negative 1st [facility] grind test, negative Tinel's at the elbow, full and painless cervical motion     X-rays are negative     Assessment:  Bilateral carpal tunnel syndrome right being more symptomatic than the left     Plan: We will schedule the patient for carpal tunnel release surgery, he is aware of the risks and limitations of surgery and still wants to proceed

## 2025-06-04 ENCOUNTER — ANESTHESIA EVENT (OUTPATIENT)
Dept: SURGERY | Facility: HOSPITAL | Age: 60
End: 2025-06-04
Payer: COMMERCIAL

## 2025-06-04 RX ORDER — ACETAMINOPHEN 500 MG
500 TABLET ORAL 2 TIMES DAILY
Qty: 60 TABLET | Refills: 0 | Status: SHIPPED | OUTPATIENT
Start: 2025-06-04

## 2025-06-05 ENCOUNTER — HOSPITAL ENCOUNTER (OUTPATIENT)
Facility: HOSPITAL | Age: 60
Discharge: HOME | End: 2025-06-05
Attending: ORTHOPAEDIC SURGERY | Admitting: ORTHOPAEDIC SURGERY
Payer: COMMERCIAL

## 2025-06-05 ENCOUNTER — ANESTHESIA (OUTPATIENT)
Dept: SURGERY | Facility: HOSPITAL | Age: 60
End: 2025-06-05
Payer: COMMERCIAL

## 2025-06-05 DIAGNOSIS — G56.01 CARPAL TUNNEL SYNDROME OF RIGHT WRIST: Primary | ICD-10-CM

## 2025-06-05 DIAGNOSIS — G56.01 CARPAL TUNNEL SYNDROME OF RIGHT WRIST: ICD-10-CM

## 2025-06-05 DIAGNOSIS — R20.0 NUMBNESS AND TINGLING IN BOTH HANDS: ICD-10-CM

## 2025-06-05 DIAGNOSIS — R20.2 NUMBNESS AND TINGLING IN BOTH HANDS: ICD-10-CM

## 2025-06-05 PROCEDURE — 71000033 HC RECOVERY, INTIAL HOUR: Mod: PO | Performed by: ORTHOPAEDIC SURGERY

## 2025-06-05 PROCEDURE — 37000008 HC ANESTHESIA 1ST 15 MINUTES: Mod: PO | Performed by: ORTHOPAEDIC SURGERY

## 2025-06-05 PROCEDURE — 71000015 HC POSTOP RECOV 1ST HR: Mod: PO | Performed by: ORTHOPAEDIC SURGERY

## 2025-06-05 PROCEDURE — 36000707: Mod: PO | Performed by: ORTHOPAEDIC SURGERY

## 2025-06-05 PROCEDURE — 63600175 PHARM REV CODE 636 W HCPCS: Mod: PO | Performed by: ORTHOPAEDIC SURGERY

## 2025-06-05 PROCEDURE — 36000706: Mod: PO | Performed by: ORTHOPAEDIC SURGERY

## 2025-06-05 PROCEDURE — 63600175 PHARM REV CODE 636 W HCPCS: Mod: PO | Performed by: NURSE ANESTHETIST, CERTIFIED REGISTERED

## 2025-06-05 PROCEDURE — 63600175 PHARM REV CODE 636 W HCPCS: Mod: PO | Performed by: ANESTHESIOLOGY

## 2025-06-05 PROCEDURE — 64721 CARPAL TUNNEL SURGERY: CPT | Mod: RT,,, | Performed by: ORTHOPAEDIC SURGERY

## 2025-06-05 PROCEDURE — 37000009 HC ANESTHESIA EA ADD 15 MINS: Mod: PO | Performed by: ORTHOPAEDIC SURGERY

## 2025-06-05 PROCEDURE — 25000003 PHARM REV CODE 250: Mod: PO | Performed by: ORTHOPAEDIC SURGERY

## 2025-06-05 RX ORDER — FENTANYL CITRATE 50 UG/ML
INJECTION, SOLUTION INTRAMUSCULAR; INTRAVENOUS
Status: DISCONTINUED | OUTPATIENT
Start: 2025-06-05 | End: 2025-06-05

## 2025-06-05 RX ORDER — PROPOFOL 10 MG/ML
VIAL (ML) INTRAVENOUS
Status: DISCONTINUED | OUTPATIENT
Start: 2025-06-05 | End: 2025-06-05

## 2025-06-05 RX ORDER — OXYCODONE HYDROCHLORIDE 5 MG/1
5 TABLET ORAL ONCE AS NEEDED
Status: DISCONTINUED | OUTPATIENT
Start: 2025-06-05 | End: 2025-06-05 | Stop reason: HOSPADM

## 2025-06-05 RX ORDER — BUPIVACAINE HYDROCHLORIDE 2.5 MG/ML
INJECTION, SOLUTION EPIDURAL; INFILTRATION; INTRACAUDAL; PERINEURAL
Status: DISCONTINUED | OUTPATIENT
Start: 2025-06-05 | End: 2025-06-05 | Stop reason: HOSPADM

## 2025-06-05 RX ORDER — MUPIROCIN 20 MG/G
OINTMENT TOPICAL
Status: DISCONTINUED | OUTPATIENT
Start: 2025-06-05 | End: 2025-06-05 | Stop reason: HOSPADM

## 2025-06-05 RX ORDER — ONDANSETRON HYDROCHLORIDE 2 MG/ML
INJECTION, SOLUTION INTRAVENOUS
Status: DISCONTINUED | OUTPATIENT
Start: 2025-06-05 | End: 2025-06-05

## 2025-06-05 RX ORDER — METOCLOPRAMIDE HYDROCHLORIDE 5 MG/ML
10 INJECTION INTRAMUSCULAR; INTRAVENOUS EVERY 10 MIN PRN
Status: DISCONTINUED | OUTPATIENT
Start: 2025-06-05 | End: 2025-06-05 | Stop reason: HOSPADM

## 2025-06-05 RX ORDER — CEFAZOLIN SODIUM 1 G/3ML
2 INJECTION, POWDER, FOR SOLUTION INTRAMUSCULAR; INTRAVENOUS
Status: COMPLETED | OUTPATIENT
Start: 2025-06-05 | End: 2025-06-05

## 2025-06-05 RX ORDER — SODIUM CHLORIDE, SODIUM LACTATE, POTASSIUM CHLORIDE, CALCIUM CHLORIDE 600; 310; 30; 20 MG/100ML; MG/100ML; MG/100ML; MG/100ML
INJECTION, SOLUTION INTRAVENOUS CONTINUOUS
Status: DISCONTINUED | OUTPATIENT
Start: 2025-06-05 | End: 2025-06-05 | Stop reason: HOSPADM

## 2025-06-05 RX ORDER — SODIUM CHLORIDE 0.9 % (FLUSH) 0.9 %
3 SYRINGE (ML) INJECTION
Status: DISCONTINUED | OUTPATIENT
Start: 2025-06-05 | End: 2025-06-05 | Stop reason: HOSPADM

## 2025-06-05 RX ORDER — PHENYLEPHRINE HYDROCHLORIDE 10 MG/ML
INJECTION INTRAVENOUS
Status: DISCONTINUED | OUTPATIENT
Start: 2025-06-05 | End: 2025-06-05

## 2025-06-05 RX ORDER — FENTANYL CITRATE 50 UG/ML
25 INJECTION, SOLUTION INTRAMUSCULAR; INTRAVENOUS EVERY 5 MIN PRN
Status: DISCONTINUED | OUTPATIENT
Start: 2025-06-05 | End: 2025-06-05 | Stop reason: HOSPADM

## 2025-06-05 RX ORDER — LIDOCAINE HYDROCHLORIDE 10 MG/ML
1 INJECTION, SOLUTION EPIDURAL; INFILTRATION; INTRACAUDAL; PERINEURAL ONCE
Status: DISCONTINUED | OUTPATIENT
Start: 2025-06-05 | End: 2025-06-05 | Stop reason: HOSPADM

## 2025-06-05 RX ORDER — LIDOCAINE HYDROCHLORIDE 20 MG/ML
INJECTION INTRAVENOUS
Status: DISCONTINUED | OUTPATIENT
Start: 2025-06-05 | End: 2025-06-05

## 2025-06-05 RX ORDER — ACETAMINOPHEN 10 MG/ML
INJECTION, SOLUTION INTRAVENOUS
Status: DISCONTINUED | OUTPATIENT
Start: 2025-06-05 | End: 2025-06-05

## 2025-06-05 RX ORDER — MIDAZOLAM HYDROCHLORIDE 1 MG/ML
INJECTION INTRAMUSCULAR; INTRAVENOUS
Status: DISCONTINUED | OUTPATIENT
Start: 2025-06-05 | End: 2025-06-05

## 2025-06-05 RX ADMIN — PHENYLEPHRINE HYDROCHLORIDE 100 MCG: 10 INJECTION INTRAVENOUS at 08:06

## 2025-06-05 RX ADMIN — ACETAMINOPHEN 1000 MG: 10 INJECTION INTRAVENOUS at 08:06

## 2025-06-05 RX ADMIN — MIDAZOLAM HYDROCHLORIDE 2 MG: 1 INJECTION, SOLUTION INTRAMUSCULAR; INTRAVENOUS at 08:06

## 2025-06-05 RX ADMIN — FENTANYL CITRATE 100 MCG: 50 INJECTION, SOLUTION INTRAMUSCULAR; INTRAVENOUS at 08:06

## 2025-06-05 RX ADMIN — LIDOCAINE HYDROCHLORIDE 100 MG: 20 INJECTION INTRAVENOUS at 08:06

## 2025-06-05 RX ADMIN — SODIUM CHLORIDE, POTASSIUM CHLORIDE, SODIUM LACTATE AND CALCIUM CHLORIDE: 600; 310; 30; 20 INJECTION, SOLUTION INTRAVENOUS at 08:06

## 2025-06-05 RX ADMIN — CEFAZOLIN 2 G: 330 INJECTION, POWDER, FOR SOLUTION INTRAMUSCULAR; INTRAVENOUS at 08:06

## 2025-06-05 RX ADMIN — PROPOFOL 30 MG: 10 INJECTION, EMULSION INTRAVENOUS at 08:06

## 2025-06-05 RX ADMIN — MUPIROCIN: 20 OINTMENT TOPICAL at 08:06

## 2025-06-05 RX ADMIN — PROPOFOL 170 MG: 10 INJECTION, EMULSION INTRAVENOUS at 08:06

## 2025-06-05 RX ADMIN — PHENYLEPHRINE HYDROCHLORIDE 200 MCG: 10 INJECTION INTRAVENOUS at 08:06

## 2025-06-05 RX ADMIN — ONDANSETRON 8 MG: 2 INJECTION, SOLUTION INTRAMUSCULAR; INTRAVENOUS at 08:06

## 2025-06-05 NOTE — OP NOTE
[facility] - Surgery  Operative Note - Carpal Tunnel        DATE OF PROCEDURE: 6/5/2025      PROCEDURE: right  carpal tunnel release.     PREOPERATIVE DIAGNOSIS: Numbness and tingling in both hands [R20.0, R20.2]  Carpal tunnel syndrome of right wrist [G56.01]    POSTOPERATIVE DIAGNOSIS: Numbness and tingling in both hands [R20.0, R20.2]  Carpal tunnel syndrome of right wrist [G56.01]    SURGEON: Patrick Dalton M.D.    Assistant: None     ANESTHESIA: General    ESTIMATED BLOOD LOSS: < 20cc    FLUIDS: Crystalloid.     DRAINS: None.     TOURNIQUET TIME: Approximately 9 minutes at 250 mmHg.    Specimens:   Specimen (24h ago, onward)      None                INDICATIONS FOR PROCEDURE: Jovan Mcwilliams is a 59 y.o. year-old male  with right Carpal Tunnel syndrome. The patient has had signs and symptoms of carpal tunnel syndrome for quite some time. It has been nonresponsive to nonoperative measures. It was felt that the patient would benefit from carpal tunnel release.   PROCEDURE IN DETAIL: After obtaining informed consent and starting the patient   on preoperative IV antibiotics, the patient was taken back to the operating   room. General anesthesia was administered by the anesthesia team. The right upper   extremity was prepped with DuraPrep and draped in a normal sterile fashion. An   Esmarch bandage was used to exsanguinate the upper extremity and the   pneumatic tourniquet was inflated to 250 mmHg. An approximately 3 cm   longitudinal incision was made in line with the radial border of the fourth   digit beginning at the distal wrist flexion crease and extending distally   approximately 3 cm. Full thickness skin flaps were raised. The palmar fascia   was identified and split the length of the skin incision. A hemostat retractor   was then placed deep to the transverse carpal ligament. This was then cut using   a Smackover blade scalpel from distal to proximal, the most proximal portion of   which was cut with Metzenbaum  scissors in a push fashion under direct   visualization. I was able to place my small finger into the distal aspect of   the forearm. I was satisfied with the release. The wound was then irrigated   with saline solution. The skin was closed with 4-0 nylon stitches in a   horizontal mattress fashion. The wound was then injected with approximately 3   mL of quarter percent Marcaine plain. A sterile dressing was applied.  The pneumatic tourniquet was then deflated.  This concluded the operative procedure.

## 2025-06-05 NOTE — DISCHARGE INSTRUCTIONS
AFTER HAND SURGERY    DO:  Keep affected extremity elevated above the level of your heart.  Check circulation frequently in fingers by pressing on the nail bed. Nail bed should turn white and then pink when released.  Exercise fingers to promote circulation.  Advance diet as tolerated.  Resume home medications.  Carpal Tunnel/Trigger Finger Release  Remove dressing in three days.    DO NOT:  Do not drive for 24 hours or while taking narcotic pain medication.  Do not take additional tylenol/acetaminophen while taking narcotic pain medication that contains tylenol/acetaminophen.     CALL PHYSICIAN FOR:  Obvious bleeding.  Excessive swelling.  Drainage (pus) from the wound.  Pain unrelieved by pain medication.    Contact your physician for emergencies.  [phone]

## 2025-06-05 NOTE — DISCHARGE SUMMARY
Discharge Note  Short Stay      SUMMARY     Admit Date: 6/5/2025    Attending Physician: Patrick Dalton MD     Discharge Physician: Patrick Dalton MD    Discharge Date: 6/5/2025 8:55 AM    Final Diagnosis: Numbness and tingling in both hands [R20.0, R20.2]  Carpal tunnel syndrome of right wrist [G56.01]    Hospital Course: Patient tolerated procedure well.     Disposition: Home or Self Care    Patient Instructions:   Current Discharge Medication List        CONTINUE these medications which have NOT CHANGED    Details   candesartan-hydrochlorothiazid 32-25 mg Tab Take 1 tablet by mouth once daily.  Qty: 90 each, Refills: 3    Associated Diagnoses: Primary hypertension; Intractable chronic migraine without aura and without status migrainosus      carvediloL (COREG) 6.25 MG tablet Take 1 tablet (6.25 mg total) by mouth 2 (two) times daily with meals.  Qty: 180 tablet, Refills: 3    Comments: .  Associated Diagnoses: Primary hypertension      acetaminophen (TYLENOL) 500 MG tablet Take 1 tablet (500 mg total) by mouth 2 (two) times a day.  Qty: 60 tablet, Refills: 0      albuterol (PROVENTIL/VENTOLIN HFA) 90 mcg/actuation inhaler Inhale 2 puffs into the lungs every 4 (four) hours as needed for Wheezing or Shortness of Breath.  Qty: 18 g, Refills: 5    Associated Diagnoses: Chronic obstructive pulmonary disease with acute exacerbation; Smoker; Acute bacterial bronchitis      aspirin (ECOTRIN) 81 MG EC tablet Take 1 tablet (81 mg total) by mouth once daily.  Qty: 30 tablet, Refills: 1      atorvastatin (LIPITOR) 40 MG tablet Take 1 tablet (40 mg total) by mouth once daily.  Qty: 90 tablet, Refills: 3    Associated Diagnoses: Familial hypercholesterolemia; Coronary artery disease involving native coronary artery of native heart without angina pectoris      butalbital-acetaminophen-caffeine -40 mg (FIORICET, ESGIC) -40 mg per tablet Take 2 tablets by mouth every 12 hours as needed for rescue if lidocaine  fails  Qty: 14 tablet, Refills: 1    Associated Diagnoses: Intractable episodic cluster headache      divalproex (DEPAKOTE) 500 MG TbEC Take 1 tablet (500 mg total) by mouth 2 (two) times daily.  Qty: 60 tablet, Refills: 11    Associated Diagnoses: Intractable episodic cluster headache      !! gabapentin (NEURONTIN) 300 MG capsule Take 1 capsule (300 mg total) by mouth daily as needed (nerve pain).  Qty: 90 capsule, Refills: 11    Associated Diagnoses: Meralgia paresthetica of right side      !! gabapentin (NEURONTIN) 300 MG capsule Take 1 capsule (300 mg total) by mouth 3 (three) times daily.  Qty: 90 capsule, Refills: 2    Associated Diagnoses: Meralgia paresthetica of right side; Lumbar radiculopathy      galcanezumab-gnlm 300 mg/3 mL (100 mg/mL x 3) Syrg Inject 3 mLs (300 mg total) into the skin every 28 days.  Qty: 3 mL, Refills: 11    Associated Diagnoses: Intractable episodic cluster headache      lasmiditan (REYVOW) tablet 100 mg Take 1 tablet (100 mg) by mouth daily as needed (migraine).  Qty: 8 tablet, Refills: 1    Associated Diagnoses: Intractable chronic migraine without aura and without status migrainosus      meloxicam (MOBIC) 15 MG tablet TAKE ONE TABLET BY MOUTH ONCE DAILY AS NEEDED FOR PAIN  Qty: 30 tablet, Refills: 0    Associated Diagnoses: Lumbar radiculopathy; History of lumbar fusion; Lumbar spondylosis      omeprazole (PRILOSEC) 20 MG capsule Take 1 capsule (20 mg total) by mouth once daily.  Qty: 90 capsule, Refills: 3    Comments: This prescription was filled on 2/12/2024. Any refills authorized will be placed on file.  Associated Diagnoses: Gastroesophageal reflux disease, unspecified whether esophagitis present      prochlorperazine (COMPAZINE) 10 MG tablet Take 1 tablet (10 mg total) by mouth every 6 (six) hours as needed (migraine or nausea).  Qty: 60 tablet, Refills: 11    Associated Diagnoses: Intractable chronic migraine without aura and without status migrainosus; Intractable  episodic cluster headache      rimegepant (NURTEC) 75 mg odt Take 1 tablet (75 mg total) by mouth daily as needed for Migraine. Place ODT tablet on the tongue; alternatively the ODT tablet may be placed under the tongue  Qty: 16 tablet, Refills: 11    Associated Diagnoses: Intractable chronic migraine without aura and without status migrainosus      tiZANidine (ZANAFLEX) 4 MG tablet Take 1 tablet (4 mg total) by mouth nightly as needed (muscle spasms/ pain).  Qty: 40 tablet, Refills: 0    Associated Diagnoses: Lumbar radiculopathy; History of lumbar fusion; Lumbar spondylosis       !! - Potential duplicate medications found. Please discuss with provider.          Discharge Procedure Orders (must include Diet, Follow-up, Activity):   Discharge Procedure Orders (must include Diet, Follow-up, Activity)   Leave dressing on - Keep it clean, dry, and intact until clinic visit     Activity as tolerated        Follow Up:  Follow up as scheduled.  Resume routine diet.  Activity as tolerated.    No driving day of procedure.

## 2025-06-05 NOTE — INTERVAL H&P NOTE
The patient has been examined and the H&P has been reviewed:    I concur with the findings and no changes have occurred since H&P was written.    Surgery risks, benefits and alternative options discussed and understood by patient/family.      Imaging studies ordered and reviewed by me    Further History  Aching pain  Worse with activity  Relieved with rest  No other associated symptoms  No other radiation    Further Exam  Alert and oriented  Pleasant  Contralateral limb has appropriate range of motion for age and condition  Contralateral limb has appropriate strength for age and condition  Contralateral limb has appropriate stability  for age and condition  No adenopathy  Pulses are appropriate for current condition  Skin is intact        Chief Complaint    No chief complaint on file.      HPI  Jovan Mcwilliams is a 59 y.o.  male who presents with       Past Medical History  Past Medical History:   Diagnosis Date    Basal cell carcinoma 12/03/2020    right forearm- JAM     COVID-19 02/15/2024    Eye pain     left eye    Poison ivy dermatitis 03/21/2024       Past Surgical History  Past Surgical History:   Procedure Laterality Date    COLONOSCOPY N/A 04/03/2023    Procedure: COLONOSCOPY;  Surgeon: Leonardo Bright Jr., MD;  Location: [facility];  Service: Endoscopy;  Laterality: N/A;    HERNIA REPAIR Bilateral 1974    KNEE SURGERY Left     LUMBAR FUSION  2005    LUMBAR LAMINECTOMY  1995    SHOULDER SURGERY Right     SKIN BIOPSY  12/03/2020    SPINE SURGERY  1994 & 2004    TRANSFORAMINAL EPIDURAL INJECTION OF STEROID Left 02/26/2024    Procedure: Injection,steroid,epidural,transforaminal approach       L3/4;  Surgeon: Cam Trivedi MD;  Location: [facility] OR;  Service: Pain Management;  Laterality: Left;    TRANSFORAMINAL EPIDURAL INJECTION OF STEROID Left 04/10/2024    Procedure: Injection,steroid,epidural,transforaminal approach   L3/4;  Surgeon: Cam Trivedi MD;  Location: [facility] OR;  Service: Pain Management;   Laterality: Left;    VASECTOMY  2000?       Medications  No current facility-administered medications for this encounter.       Allergies  Review of patient's allergies indicates:   Allergen Reactions    Doxycycline Anaphylaxis     Other reaction(s): nausea  Other reaction(s): Not Indicated    Azithromycin      Other reaction(s): Not Indicated    Erythromycin Other (See Comments)     Other reaction(s): Nausea  Pain with IV use       Family History  Family History   Problem Relation Name Age of Onset    Hypertension Mother Stephanie     Hyperlipidemia Mother Stephanie     Diabetes Father Aristeo     Cancer Maternal Aunt Vera     Cancer Paternal Aunt R     Cancer Maternal Grandmother Sussy     Melanoma Maternal Grandmother Sussy     Cancer Maternal Grandfather Gloria     Colon cancer Paternal Grandfather Jose     Cancer Paternal Grandfather Jose        Social History  Social History[1]            Review of Systems     Constitutional: Negative    HENT: Negative  Eyes: Negative  Respiratory: Negative  Cardiovascular: Negative  Musculoskeletal: HPI  Skin: Negative  Neurological: Negative  Hematological: Negative  Endocrine: Negative                 Physical Exam    There were no vitals filed for this visit.  Body mass index is 35.4 kg/m².  Physical Examination:     General appearance -  well appearing, and in no distress  Mental status - awake  Neck - supple  Chest -  symmetric air entry  Heart - normal rate   Abdomen - soft      Assessment   No diagnosis found.      Plan    There are no hospital problems to display for this patient.         [1]   Social History  Socioeconomic History    Marital status:    Occupational History    Occupation:      Comment: Retired   Tobacco Use    Smoking status: Some Days     Current packs/day: 0.00     Average packs/day: 1 pack/day for 37.1 years (37.1 ttl pk-yrs)     Types: Cigarettes     Start date:      Last attempt to quit: 2024     Years since quittin.3     Smokeless tobacco: Never   Substance and Sexual Activity    Alcohol use: Yes     Alcohol/week: 10.0 standard drinks of alcohol     Types: 10 Cans of beer per week     Comment: Drinks on weekend - 10 beers all day on Saturdays    Drug use: No    Sexual activity: Yes     Partners: Female     Birth control/protection: Partner-Vasectomy   Social History Narrative    Lives with wife and adopted daughter. One dog.      Social Drivers of Health     Financial Resource Strain: Low Risk  (4/17/2025)    Overall Financial Resource Strain (CARDIA)     Difficulty of Paying Living Expenses: Not hard at all   Food Insecurity: No Food Insecurity (4/17/2025)    Hunger Vital Sign     Worried About Running Out of Food in the Last Year: Never true     Ran Out of Food in the Last Year: Never true   Transportation Needs: No Transportation Needs (4/17/2025)    PRAPARE - Transportation     Lack of Transportation (Medical): No     Lack of Transportation (Non-Medical): No   Physical Activity: Insufficiently Active (4/17/2025)    Exercise Vital Sign     Days of Exercise per Week: 4 days     Minutes of Exercise per Session: 30 min   Stress: Stress Concern Present (4/17/2025)    [employer] - Occupational Stress Questionnaire     Feeling of Stress : Rather much   Housing Stability: Low Risk  (4/17/2025)    Housing Stability Vital Sign     Unable to Pay for Housing in the Last Year: No     Number of Times Moved in the Last Year: 0     Homeless in the Last Year: No

## 2025-06-06 ENCOUNTER — PATIENT MESSAGE (OUTPATIENT)
Dept: ORTHOPEDICS | Facility: CLINIC | Age: 60
End: 2025-06-06
Payer: COMMERCIAL

## 2025-06-06 VITALS
SYSTOLIC BLOOD PRESSURE: 109 MMHG | RESPIRATION RATE: 13 BRPM | TEMPERATURE: 97 F | OXYGEN SATURATION: 92 % | BODY MASS INDEX: 35.35 KG/M2 | HEIGHT: 72 IN | WEIGHT: 261 LBS | DIASTOLIC BLOOD PRESSURE: 57 MMHG | HEART RATE: 48 BPM

## 2025-06-06 RX ORDER — OXYCODONE AND ACETAMINOPHEN 5; 325 MG/1; MG/1
1 TABLET ORAL EVERY 4 HOURS PRN
Qty: 12 TABLET | Refills: 0 | Status: SHIPPED | OUTPATIENT
Start: 2025-06-06

## 2025-06-10 ENCOUNTER — PATIENT MESSAGE (OUTPATIENT)
Dept: ADMINISTRATIVE | Facility: OTHER | Age: 60
End: 2025-06-10
Payer: COMMERCIAL

## 2025-06-19 ENCOUNTER — OFFICE VISIT (OUTPATIENT)
Dept: ORTHOPEDICS | Facility: CLINIC | Age: 60
End: 2025-06-19
Payer: COMMERCIAL

## 2025-06-19 DIAGNOSIS — Z98.890 HISTORY OF CARPAL TUNNEL SURGERY OF RIGHT WRIST: Primary | ICD-10-CM

## 2025-06-19 PROCEDURE — 99999 PR PBB SHADOW E&M-EST. PATIENT-LVL III: CPT | Mod: PBBFAC,,, | Performed by: ORTHOPAEDIC SURGERY

## 2025-06-19 NOTE — PROGRESS NOTES
2 weeks post carpal tunnel release.  Doing well.  No signs of infection.  Sutures removed.  Gradually return to activity to tolerance.  Avoid aggravating activities.  We will give the patient a short wrist splint to use over the next few weeks as needed for comfort and protection.  Continue to keep the wound clean.  Follow up and or contact us if any problems or concerns.        We performed a custom orthotic/brace fitting, adjusting and training with the patient. The patient demonstrated understanding and proper care. This was performed for 15 minutes.

## 2025-07-03 ENCOUNTER — OFFICE VISIT (OUTPATIENT)
Dept: FAMILY MEDICINE | Facility: CLINIC | Age: 60
End: 2025-07-03
Payer: COMMERCIAL

## 2025-07-03 VITALS
WEIGHT: 257.69 LBS | BODY MASS INDEX: 34.9 KG/M2 | SYSTOLIC BLOOD PRESSURE: 122 MMHG | DIASTOLIC BLOOD PRESSURE: 72 MMHG | HEIGHT: 72 IN | HEART RATE: 61 BPM | OXYGEN SATURATION: 95 %

## 2025-07-03 DIAGNOSIS — G47.33 OSA (OBSTRUCTIVE SLEEP APNEA): Primary | ICD-10-CM

## 2025-07-03 DIAGNOSIS — R79.89 LOW TESTOSTERONE IN MALE: ICD-10-CM

## 2025-07-03 DIAGNOSIS — I10 PRIMARY HYPERTENSION: ICD-10-CM

## 2025-07-03 DIAGNOSIS — Z78.9 DIFFICULTY WITH CPAP USE: ICD-10-CM

## 2025-07-03 PROCEDURE — 3078F DIAST BP <80 MM HG: CPT | Mod: CPTII,S$GLB,, | Performed by: STUDENT IN AN ORGANIZED HEALTH CARE EDUCATION/TRAINING PROGRAM

## 2025-07-03 PROCEDURE — 3008F BODY MASS INDEX DOCD: CPT | Mod: CPTII,S$GLB,, | Performed by: STUDENT IN AN ORGANIZED HEALTH CARE EDUCATION/TRAINING PROGRAM

## 2025-07-03 PROCEDURE — 99999 PR PBB SHADOW E&M-EST. PATIENT-LVL IV: CPT | Mod: PBBFAC,,, | Performed by: STUDENT IN AN ORGANIZED HEALTH CARE EDUCATION/TRAINING PROGRAM

## 2025-07-03 PROCEDURE — 3044F HG A1C LEVEL LT 7.0%: CPT | Mod: CPTII,S$GLB,, | Performed by: STUDENT IN AN ORGANIZED HEALTH CARE EDUCATION/TRAINING PROGRAM

## 2025-07-03 PROCEDURE — 1159F MED LIST DOCD IN RCRD: CPT | Mod: CPTII,S$GLB,, | Performed by: STUDENT IN AN ORGANIZED HEALTH CARE EDUCATION/TRAINING PROGRAM

## 2025-07-03 PROCEDURE — 99214 OFFICE O/P EST MOD 30 MIN: CPT | Mod: S$GLB,,, | Performed by: STUDENT IN AN ORGANIZED HEALTH CARE EDUCATION/TRAINING PROGRAM

## 2025-07-03 PROCEDURE — 3074F SYST BP LT 130 MM HG: CPT | Mod: CPTII,S$GLB,, | Performed by: STUDENT IN AN ORGANIZED HEALTH CARE EDUCATION/TRAINING PROGRAM

## 2025-07-03 RX ORDER — ZOLPIDEM TARTRATE 5 MG/1
5 TABLET ORAL NIGHTLY
Qty: 30 TABLET | Refills: 5 | Status: SHIPPED | OUTPATIENT
Start: 2025-07-03 | End: 2026-01-01

## 2025-07-03 RX ORDER — CARVEDILOL 3.12 MG/1
3.12 TABLET ORAL 2 TIMES DAILY WITH MEALS
Qty: 180 TABLET | Refills: 3 | Status: SHIPPED | OUTPATIENT
Start: 2025-07-03 | End: 2026-07-03

## 2025-07-03 NOTE — PROGRESS NOTES
Name: Jovan Mcwilliams  MRN: [id number]  : 1965  PCP: James Peña MD    Subjective   Patient presents for follow up of fatigue.     In the interval, he had a carpal tunnel release in his right hand.     Review of Systems   Cardiovascular:  Negative for palpitations and leg swelling.   Neurological:  Negative for dizziness.       Problem List[1]    Health Maintenance Due   Topic Date Due    Pneumococcal Vaccines (Age 50+) (1 of 2 - PCV) Never done    COVID-19 Vaccine (3 - - season) 2024       Objective   Vitals:    25 0930   BP: 122/72   Pulse: 61       Physical Exam  Constitutional:       General: He is not in acute distress.     Appearance: Normal appearance. He is well-developed.   HENT:      Head: Normocephalic and atraumatic.      Right Ear: External ear normal.      Left Ear: External ear normal.   Eyes:      Conjunctiva/sclera: Conjunctivae normal.   Pulmonary:      Effort: Pulmonary effort is normal. No respiratory distress.   Abdominal:      General: Abdomen is flat. There is no distension.   Musculoskeletal:         General: No swelling or deformity.      Right lower leg: No edema.      Left lower leg: No edema.   Skin:     General: Skin is warm and dry.      Coloration: Skin is not jaundiced.   Neurological:      Mental Status: He is alert and oriented to person, place, and time. Mental status is at baseline.   Psychiatric:         Attention and Perception: Attention and perception normal.         Mood and Affect: Mood normal.         Speech: Speech normal.         Behavior: Behavior normal. Behavior is cooperative.         Thought Content: Thought content normal.         Cognition and Memory: Cognition normal.         Judgment: Judgment normal.         Assessment & Plan    1. DELMY (obstructive sleep apnea)  Overview:  Home sleep study 2024 showed AHI ~17/hr.    Assessment & Plan:  Patient has not seen sleep medicine in the interval. Still having issues tolerating CPAP. Will  start benzodiazepine receptor antagonist to help him tolerate the CPAP.      2. Difficulty with CPAP use  -     zolpidem (AMBIEN) 5 MG Tab; Take 1 tablet (5 mg total) by mouth every evening.  Dispense: 30 tablet; Refill: 5    3. Primary hypertension  Assessment & Plan:  Controlled today. We will decrease his carvedilol to see if that helps with his fatigue.    Orders:  -     carvediloL (COREG) 3.125 MG tablet; Take 1 tablet (3.125 mg total) by mouth 2 (two) times daily with meals.  Dispense: 180 tablet; Refill: 3    4. Low testosterone in male  Overview:  5/1/2025 - Testosterone 281 (7:12 AM)  5/14/2025 - Total T 348 but Free T low (40.6) and low bioavailable T (70.3)    Assessment & Plan:  Originally tested due to chronic fatigue. Only sexual symptom at this time is a smaller window to act on an erection. However, when he starts a sexual encounter, he has no problem keeping an erection. No change in libido. See plan for DELMY. If no improvement in fatigue with treatment of DELMY, we can consider testosterone supplement.           Follow up in 4 months.     James Peña MD  07/03/2025         [1]   Patient Active Problem List  Diagnosis    Familial hypercholesterolemia    GERD (gastroesophageal reflux disease)    Primary hypertension    Cigarette nicotine dependence without complication    Class 2 severe obesity due to excess calories with serious comorbidity and body mass index (BMI) of 35.0 to 35.9 in adult    DELMY (obstructive sleep apnea)    Coronary artery disease involving native coronary artery of native heart without angina pectoris    Lumbar radiculopathy    Cyst of right kidney    Intractable episodic cluster headache    Irritable bowel syndrome without diarrhea    Intractable chronic migraine without aura and without status migrainosus    Urinary urgency    Meralgia paresthetica of right side    Numbness and tingling in right hand    Low testosterone in male

## 2025-07-03 NOTE — ASSESSMENT & PLAN NOTE
Patient has not seen sleep medicine in the interval. Still having issues tolerating CPAP. Will start benzodiazepine receptor antagonist to help him tolerate the CPAP.

## 2025-07-03 NOTE — ASSESSMENT & PLAN NOTE
Originally tested due to chronic fatigue. Only sexual symptom at this time is a smaller window to act on an erection. However, when he starts a sexual encounter, he has no problem keeping an erection. No change in libido. See plan for DELMY. If no improvement in fatigue with treatment of DELMY, we can consider testosterone supplement.

## (undated) DEVICE — BANDAGE ESMARK LATEX FREE 4INX

## (undated) DEVICE — NDL SPINAL SPINOCAN 22GX3.5

## (undated) DEVICE — DURAPREP SURG SCRUB 26ML

## (undated) DEVICE — DRAPE THREE-QTR REINF 53X77IN

## (undated) DEVICE — GLOVE SURGICAL LATEX SZ 7

## (undated) DEVICE — TOWEL OR DISP STRL BLUE 4/PK

## (undated) DEVICE — Device

## (undated) DEVICE — UNDERGLOVES BIOGEL PI SIZE 8

## (undated) DEVICE — STRAP OR TABLE 5IN X 72IN

## (undated) DEVICE — MARKER SKIN RULER STERILE

## (undated) DEVICE — HANDLE SURG LIGHT NONRIGID

## (undated) DEVICE — SPONGE COTTON TRAY 4X4IN

## (undated) DEVICE — TRAY NERVE BLOCK

## (undated) DEVICE — APPLICATOR CHLORAPREP CLR 10.5

## (undated) DEVICE — SUT ETHILON 4-0 PS2 18 BLK

## (undated) DEVICE — REMOVER LOTION

## (undated) DEVICE — BLADE SCALP OPHTL BEVEL STR

## (undated) DEVICE — BANDAGE ELAS SOFTWRAP ST 4X5YD

## (undated) DEVICE — DRAPE HAND STERILE

## (undated) DEVICE — PAD CAST SPECIALIST STRL 4

## (undated) DEVICE — DRESSING XEROFORM NONADH 1X8IN

## (undated) DEVICE — NDL HYPO A BEVEL 22X1 1/2

## (undated) DEVICE — TOURNIQUET SB QC DP 18X4IN

## (undated) DEVICE — MARKER SKIN STND TIP BLUE BARR

## (undated) DEVICE — STOCKINET 4INX48

## (undated) DEVICE — KIT SAHARA DRAPE DRAW/LIFT